# Patient Record
Sex: FEMALE | Race: WHITE | NOT HISPANIC OR LATINO | Employment: OTHER | URBAN - METROPOLITAN AREA
[De-identification: names, ages, dates, MRNs, and addresses within clinical notes are randomized per-mention and may not be internally consistent; named-entity substitution may affect disease eponyms.]

---

## 2018-09-26 ENCOUNTER — TRANSCRIBE ORDERS (OUTPATIENT)
Dept: PHYSICAL THERAPY | Facility: CLINIC | Age: 73
End: 2018-09-26

## 2018-09-26 ENCOUNTER — EVALUATION (OUTPATIENT)
Dept: PHYSICAL THERAPY | Facility: CLINIC | Age: 73
End: 2018-09-26
Payer: MEDICARE

## 2018-09-26 DIAGNOSIS — R26.9 GAIT DISTURBANCE: Primary | ICD-10-CM

## 2018-09-26 DIAGNOSIS — M47.812 SPONDYLOSIS OF CERVICAL REGION WITHOUT MYELOPATHY OR RADICULOPATHY: ICD-10-CM

## 2018-09-26 PROCEDURE — 97161 PT EVAL LOW COMPLEX 20 MIN: CPT

## 2018-09-26 PROCEDURE — G8985 CARRY GOAL STATUS: HCPCS

## 2018-09-26 PROCEDURE — 97110 THERAPEUTIC EXERCISES: CPT

## 2018-09-26 PROCEDURE — G8984 CARRY CURRENT STATUS: HCPCS

## 2018-09-26 NOTE — LETTER
2018    Angel Connolly PA-C  [facility] [address]    Patient: Nettie Conti   YOB: 1945   Date of Visit: 2018     Encounter Diagnosis     ICD-10-CM    1  Gait disturbance R26 9    2  Spondylosis of cervical region without myelopathy or radiculopathy M47 812        Dear Dr Jeramy Murillo:    Please review the attached Plan of Care from [facility]'s recent visit  Please verify that you agree therapy should continue by signing the attached document and sending it back to our office  If you have any questions or concerns, please don't hesitate to call  Sincerely,    Jose Alfredo Blount, PT      Referring Provider:      I certify that I have read the below Plan of Care and certify the need for these services furnished under this plan of treatment while under my care  Angel Connolly PA-C  [address] [place] [address]  VIA Facsimile: [phone]          PT Evaluation     Today's date: 2018  Patient name: Nettie Conti  : 1945  MRN: [id number]  Referring provider: Flor Joshi PA-C  Dx:   Encounter Diagnosis     ICD-10-CM    1  Gait disturbance R26 9    2  Spondylosis of cervical region without myelopathy or radiculopathy M47 812                   Assessment  Impairments: abnormal gait, abnormal or restricted ROM, impaired balance, impaired physical strength, lacks appropriate home exercise program, pain with function and poor posture     Assessment details: Nettie Conti is a 72 y o  female presents with cervical spondylosis, no myelopathy, and gait/balance dysfunction  Suspect cervicogenic HA as source of pain, gait issues  Responded well to tx today, feeling improved to end tx with cervical retraction, manual traction bias   Nettie Conti has the above listed impairments and will benefit from skilled PT to improve deficits to return to prior level of function  Shawn Prieto was educated on eval findings and plan for management, cervical anatomy, need for erect posture, self SOR in H/L with towel roll  HEP initiated  Estefania Letters would benefit from skilled services to improve ROM, strength, flexibility, and function, and to decrease pain  Understanding of Dx/Px/POC: good   Prognosis: good    Goals  2 wks  - No pain > 2/10  - Increase strength 1/3 grade  - Increase cervical ROM at least 10 deg where applicable  - 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%    4-6 wks  - Pain 0/10  - Strength 5/5  - Cervical ROM WFL and painfree  - Independent with HEP for self management  - Functional Status Measure at least 58  - 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, driving  - Normal gait for community distances  - Single leg balance at least 10 sec B  Plan  Patient would benefit from: skilled physical therapy  Planned modality interventions: traction and thermotherapy: hydrocollator packs  Planned therapy interventions: joint mobilization, manual therapy, neuromuscular re-education, patient education, postural training, strengthening, stretching, therapeutic exercise, home exercise program, gait training, flexibility and balance  Frequency: 2x week  Duration in visits: 8  Duration in weeks: 4  Treatment plan discussed with: patient        Subjective Evaluation    History of Present Illness  Mechanism of injury: Pt reporting 2 yr h/o "when I turn my head left and right, it sounds like bubble wrap, it goes up the right side and into my ear  It's all coming off my neck "   Notes "my legs are weak and my knees feel like rubber balls "  Denies UE of LE symptoms  Reports paresthesias B upper traps  Reports "every once in a while, I almost stagger, you don't know when it's coming on "  Reports tinnitus B ears x 2 years  Pt feels that she staggers with gait "when the neck is tight, you feel loopy "  Reports dizziness with prolonged standing  Occasional STALLWORTH R suboccipital to R ear  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, "it's very physical", cleaning stalls, throwing hay, "I feel that physically I can't do that anymore  I sit in a chair to be safe  Because of this situation "  Notes decreased R cervical rotation, affects driving  Last did horse chores 2 weeks ago  Pain  Current pain ratin  At best pain ratin  At worst pain rating: 3  Location: R sided HA, tightness cervical spine  Progression: no change    Social Support  Steps to enter house: yes  5  Stairs in house: yes   14  Lives in: multiple-level home  Lives with: alone    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)  Hand dominance: right      Diagnostic Tests  MRI studies: abnormal (Impression see chart  No evidence central spinal cord compression  Multi-level foraminal stenosis)  Treatments  Previous treatment: chiropractic (Chrio with manip 2 5 yrs ago, with cavitation, "that's exactly where it is, it went away but then it came back"  )  Patient Goals  Patient goals for therapy: decreased pain, improved balance, increased motion, increased strength, independence with ADLs/IADLs and return to sport/leisure activities  Patient goal: "Get the ringing out of my ears  Get back to normal "        Objective     Gait: no AD, decreased heel strike and toe off B, steady  TU 70 sec, no AD  OH squat: increased trunk flexion at hips, fair balance  Single leg balance: R unable, L 3 sec  Posture: decreased cervical lordosis  Cervicothoracic kyphosis  Increased thoracic kyphosis and lumbar lordosis  Forward head and shoulders  OH reach: R 146 deg, L 142 deg  Mild IAD scapula B  Asymptomatic prior to testing, Cervical ROM: flexion 60 deg, no effect (NE)  Ext 49 deg, pt reporting crepitus cervical spine, R cervical tightness to R ear  SBR 35 deg, L UT stretch  SBL 29 deg, R cervical "stretchy pain"    R rotation 45 deg, upper L cervical pain  L rotation 72 deg, NE  Hooklying and short sitting cervical retraction with upper cervical tightness  Repeated retracted relieved this tightness  Shoulder A/PROM:  R: flexion 171 deg,  deg, ER 79 deg, IR 31 deg  L: flexion 150 deg,  deg, ER 81 deg, IR 57 deg  MMT: mid trap: R 4/5, L 4-/5  Lower trap: R 4-/5, L 3+/5  Joint mobilizations: B UPA C1, C5 tender  Otherwise, B UPA, central PA C1-C7, T1-T3 with no effect  T1-T3 hypomobile to PA glide  Special tests: alar and transverse ligament testing, vertebral artery testing (-)  Manual traction, SOR pain free  Flowsheet Rows      Most Recent Value   PT/OT G-Codes   Current Score  46   Projected Score  58   FOTO information reviewed  Yes   Assessment Type  Evaluation   G code set  Carrying, Moving & Handling Objects   Carrying, Moving and Handling Objects Current Status ()  CK   Carrying, Moving and Handling Objects Goal Status ()  CK          Precautions: TIA, anxiety, PSH L ankle ORIF    Daily Treatment Diary       Manuals 9/26/ 18            Man txn 30/10" 3'            SOR  1'            C-retract mob             Man str R UT, LS             L U/L flexion mob C1-C7             Exercise Diary              Scap retract 10"x 10            Cervical retract short sit, H/L x10 ea              Core row              UBE             Doorway pec str low             Wall slide shldr flexion B             T ball squat             Single leg balance             High knees gait with CG-S             DNF in H/L             Self R UT str 20" x5                                                                                                                                                           Modalities             [facility] txn 30/10"             MH PRN

## 2018-09-26 NOTE — PROGRESS NOTES
PT Evaluation     Today's date: 2018  Patient name: Sandra Arroyo  : 1945  MRN: [id number]  Referring provider: Skye Luong PA-C  Dx:   Encounter Diagnosis     ICD-10-CM    1  Gait disturbance R26 9    2  Spondylosis of cervical region without myelopathy or radiculopathy M47 812                   Assessment  Impairments: abnormal gait, abnormal or restricted ROM, impaired balance, impaired physical strength, lacks appropriate home exercise program, pain with function and poor posture     Assessment details: Sandra Arroyo is a 72 y o  female presents with cervical spondylosis, no myelopathy, and gait/balance dysfunction  Suspect cervicogenic HA as source of pain, gait issues  Responded well to tx today, feeling improved to end tx with cervical retraction, manual traction bias  Sandra Arroyo has the above listed impairments and will benefit from skilled PT to improve deficits to return to prior level of function  Sandra Arroyo was educated on eval findings and plan for management, cervical anatomy, need for erect posture, self SOR in H/L with towel roll  HEP initiated   would benefit from skilled services to improve ROM, strength, flexibility, and function, and to decrease pain  Understanding of Dx/Px/POC: good   Prognosis: good    Goals  2 wks  - No pain > 2/10  - Increase strength 1/3 grade  - Increase cervical ROM at least 10 deg where applicable  - 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%    4-6 wks  - Pain 0/10  - Strength 5/5  - Cervical ROM WFL and painfree  - Independent with HEP for self management  - Functional Status Measure at least 58  - 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, driving  - Normal gait for community distances  - Single leg balance at least 10 sec B        Plan  Patient would benefit from: skilled physical therapy  Planned modality interventions: traction and thermotherapy: hydrocollator packs  Planned therapy interventions: joint mobilization, manual therapy, neuromuscular re-education, patient education, postural training, strengthening, stretching, therapeutic exercise, home exercise program, gait training, flexibility and balance  Frequency: 2x week  Duration in visits: 8  Duration in weeks: 4  Treatment plan discussed with: patient        Subjective Evaluation    History of Present Illness  Mechanism of injury: Pt reporting 2 yr h/o "when I turn my head left and right, it sounds like bubble wrap, it goes up the right side and into my ear  It's all coming off my neck "   Notes "my legs are weak and my knees feel like rubber balls "  Denies UE of LE symptoms  Reports paresthesias B upper traps  Reports "every once in a while, I almost stagger, you don't know when it's coming on "  Reports tinnitus B ears x 2 years  Pt feels that she staggers with gait "when the neck is tight, you feel loopy "  Reports dizziness with prolonged standing  Occasional STALLWORTH R suboccipital to R ear  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, "it's very physical", cleaning stalls, throwing hay, "I feel that physically I can't do that anymore  I sit in a chair to be safe  Because of this situation "  Notes decreased R cervical rotation, affects driving  Last did horse chores 2 weeks ago  Pain  Current pain ratin  At best pain ratin  At worst pain rating: 3  Location: R sided HA, tightness cervical spine  Progression: no change    Social Support  Steps to enter house: yes  5  Stairs in house: yes   14  Lives in: multiple-level home  Lives with: alone    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)  Hand dominance: right      Diagnostic Tests  MRI studies: abnormal (Impression see chart  No evidence central spinal cord compression    Multi-level foraminal stenosis)  Treatments  Previous treatment: chiropractic (Chrio with manip 2 5 yrs ago, with cavitation, "that's exactly where it is, it went away but then it came back"  )  Patient Goals  Patient goals for therapy: decreased pain, improved balance, increased motion, increased strength, independence with ADLs/IADLs and return to sport/leisure activities  Patient goal: "Get the ringing out of my ears  Get back to normal "        Objective     Gait: no AD, decreased heel strike and toe off B, steady  TU 70 sec, no AD  OH squat: increased trunk flexion at hips, fair balance  Single leg balance: R unable, L 3 sec  Posture: decreased cervical lordosis  Cervicothoracic kyphosis  Increased thoracic kyphosis and lumbar lordosis  Forward head and shoulders  OH reach: R 146 deg, L 142 deg  Mild IAD scapula B  Asymptomatic prior to testing, Cervical ROM: flexion 60 deg, no effect (NE)  Ext 49 deg, pt reporting crepitus cervical spine, R cervical tightness to R ear  SBR 35 deg, L UT stretch  SBL 29 deg, R cervical "stretchy pain"  R rotation 45 deg, upper L cervical pain  L rotation 72 deg, NE  Hooklying and short sitting cervical retraction with upper cervical tightness  Repeated retracted relieved this tightness  Shoulder A/PROM:  R: flexion 171 deg,  deg, ER 79 deg, IR 31 deg  L: flexion 150 deg,  deg, ER 81 deg, IR 57 deg  MMT: mid trap: R 4/5, L 4-/5  Lower trap: R 4-/5, L 3+/5  Joint mobilizations: B UPA C1, C5 tender  Otherwise, B UPA, central PA C1-C7, T1-T3 with no effect  T1-T3 hypomobile to PA glide  Special tests: alar and transverse ligament testing, vertebral artery testing (-)  Manual traction, SOR pain free      Flowsheet Rows      Most Recent Value   PT/OT G-Codes   Current Score  46   Projected Score  58   FOTO information reviewed  Yes   Assessment Type  Evaluation   G code set  Carrying, Moving & Handling Objects   Carrying, Moving and Handling Objects Current Status ()  CK   Carrying, Moving and Handling Objects Goal Status ()  CK          Precautions: TIA, anxiety, PSH L ankle ORIF    Daily Treatment Diary       Manuals 9/26/ 18            Man txn 30/10" 3'            SOR  1'            C-retract mob             Man str R UT, LS             L U/L flexion mob C1-C7             Exercise Diary              Scap retract 10"x 10            Cervical retract short sit, H/L x10 ea              Core row              UBE             Doorway pec str low             Wall slide shldr flexion B             T ball squat             Single leg balance             High knees gait with CG-S             DNF in H/L             Self R UT str 20" x5                                                                                                                                                           Modalities             [facility] txn 30/10"              PRN

## 2018-09-28 ENCOUNTER — OFFICE VISIT (OUTPATIENT)
Dept: PHYSICAL THERAPY | Facility: CLINIC | Age: 73
End: 2018-09-28
Payer: MEDICARE

## 2018-09-28 DIAGNOSIS — R26.9 GAIT DISTURBANCE: Primary | ICD-10-CM

## 2018-09-28 DIAGNOSIS — M47.812 SPONDYLOSIS OF CERVICAL REGION WITHOUT MYELOPATHY OR RADICULOPATHY: ICD-10-CM

## 2018-09-28 PROCEDURE — 97110 THERAPEUTIC EXERCISES: CPT

## 2018-09-28 PROCEDURE — 97140 MANUAL THERAPY 1/> REGIONS: CPT

## 2018-09-28 NOTE — PROGRESS NOTES
Daily Note     Today's date: 2018  Patient name: Ben Esposito  : 1945  MRN: [id number]  Referring provider: Jorgito Cardoso PA-C  Dx:   Encounter Diagnosis     ICD-10-CM    1  Gait disturbance R26 9    2  Spondylosis of cervical region without myelopathy or radiculopathy M47 812                   Subjective: notes soreness interscapular, muscular from scapular retractions, no neck pain or HA  "Better  The ringing in my left ear went away but then it came back "      Objective: See treatment diary below    Precautions: TIA, anxiety, PSH L ankle ORIF    Daily Treatment Diary       Manuals            Man txn 30/10" 3' 5'           SOR  1' 3'           C-retract mob  G3           Man str R UT, LS  R UT 20" x5           L U/L flexion mob C1-C7  G3           Exercise Diary              Scap retract 10"x 10            Cervical retract short sit, H/L x10 ea  x10 ea           Core row              UBE             Doorway pec str low  20" x5           Wall slide shldr flexion B  10" x15 B           T ball squat             Single leg balance             High knees gait with CG-S             DNF in H/L             Self R UT str 20" x5                                                                                                                                                           Modalities             [facility] txn 30/10"             MH PRN  10'                         Assessment: Tolerated treatment well  Patient would benefit from continued PT      Plan: Continue per plan of care

## 2018-10-01 ENCOUNTER — APPOINTMENT (OUTPATIENT)
Dept: PHYSICAL THERAPY | Facility: CLINIC | Age: 73
End: 2018-10-01
Payer: MEDICARE

## 2018-10-04 ENCOUNTER — OFFICE VISIT (OUTPATIENT)
Dept: PHYSICAL THERAPY | Facility: CLINIC | Age: 73
End: 2018-10-04
Payer: MEDICARE

## 2018-10-04 DIAGNOSIS — M47.812 SPONDYLOSIS OF CERVICAL REGION WITHOUT MYELOPATHY OR RADICULOPATHY: ICD-10-CM

## 2018-10-04 DIAGNOSIS — R26.9 GAIT DISTURBANCE: Primary | ICD-10-CM

## 2018-10-04 PROCEDURE — 97012 MECHANICAL TRACTION THERAPY: CPT

## 2018-10-04 PROCEDURE — 97140 MANUAL THERAPY 1/> REGIONS: CPT

## 2018-10-04 PROCEDURE — 97110 THERAPEUTIC EXERCISES: CPT

## 2018-10-04 NOTE — PROGRESS NOTES
Daily Note     Today's date: 10/4/2018  Patient name: Fercho Luo  : 1945  MRN: [id number]  Referring provider: Winnie Fofana PA-C  Dx:   Encounter Diagnosis     ICD-10-CM    1  Gait disturbance R26 9    2  Spondylosis of cervical region without myelopathy or radiculopathy M47 812                   Subjective: neck pain 1/10, "just annoying "  Cx 10/1 visit due to illness  Notes can have tinnitus R, L, or B ears at times, intermittent; none at present  Objective: See treatment diary below    Precautions: TIA, anxiety, PSH L ankle ORIF    Daily Treatment Diary       Manuals 9/26/ 18 9/28 10/4          Man txn 30/10" 3' 5'           SOR  1' 3' 3'          C-retract mob  G3 G3          Man str R UT, LS  R UT 20" x5 x5          L U/L flexion mob C1-C7  G3 x5          Exercise Diary              Scap retract 10"x 10            Cervical retract short sit, H/L x10 ea  x10 ea x10  sit          Core row    blk 2x10          UBE   5'          Doorway pec str low  20" x5 x5          Wall slide shldr flexion B  10" x15 B x20          T ball squat   2x10          Single leg balance   nv          High knees gait with CG-S             DNF in H/L   nv          Self R UT str 20" x5  x5                                                                                                                                                         Modalities             [facility] txn 30/10"   13# 10'          MH PRN  10'                     Assessment: Tolerated treatment well  Patient would benefit from continued PT  Therex, HEP progressed  Mech traction initiated and tolerated well  Plan: Continue per plan of care

## 2018-10-08 ENCOUNTER — APPOINTMENT (OUTPATIENT)
Dept: PHYSICAL THERAPY | Facility: CLINIC | Age: 73
End: 2018-10-08
Payer: MEDICARE

## 2018-10-11 ENCOUNTER — APPOINTMENT (OUTPATIENT)
Dept: PHYSICAL THERAPY | Facility: CLINIC | Age: 73
End: 2018-10-11
Payer: MEDICARE

## 2018-10-15 ENCOUNTER — OFFICE VISIT (OUTPATIENT)
Dept: PHYSICAL THERAPY | Facility: CLINIC | Age: 73
End: 2018-10-15
Payer: MEDICARE

## 2018-10-15 DIAGNOSIS — R26.9 GAIT DISTURBANCE: Primary | ICD-10-CM

## 2018-10-15 DIAGNOSIS — M47.812 SPONDYLOSIS OF CERVICAL REGION WITHOUT MYELOPATHY OR RADICULOPATHY: ICD-10-CM

## 2018-10-15 PROCEDURE — 97140 MANUAL THERAPY 1/> REGIONS: CPT

## 2018-10-15 PROCEDURE — 97110 THERAPEUTIC EXERCISES: CPT

## 2018-10-15 NOTE — PROGRESS NOTES
Daily Note     Today's date: 10/15/2018  Patient name: Amy Concepcion  : 1945  MRN: [id number]  Referring provider: Korina Bah PA-C  Dx:   Encounter Diagnosis     ICD-10-CM    1  Gait disturbance R26 9    2  Spondylosis of cervical region without myelopathy or radiculopathy M47 812                   Subjective: Patient reports still having dizziness and ringing in ears  Reports feels a nodule on her right neck behind the ear that she has told multiple doctors about  Reports she believes that is where her symptoms are coming from  Patient reports missed treatments last week because of illness  Reports pain upon arrival a 4/10 right side occiput constant  Post treatment 2/10  Objective: See treatment diary below    Precautions: TIA, anxiety, PSH L ankle ORIF    Daily Treatment Diary       Manuals 9/26/ 18 9/28 10/4 10/15         Man txn 30/10" 3' 5'  5'         SOR  1' 3' 3' 3'         C-retract mob  G3 G3          Man str R UT, LS  R UT 20" x5 x5 x5         L U/L flexion mob C1-C7  G3 x5          Exercise Diary              Scap retract 10"x 10            Cervical retract short sit, H/L x10 ea  x10 ea x10  sit x10         Core row    blk 2x10 blk  3x10           UBE   5' 5'         Doorway pec str low  20" x5 x5 x5         Wall slide shldr flexion B  10" x15 B x20 x20         T ball squat   2x10 2x10         Single leg balance   nv -         High knees gait with CG-S             DNF in H/L   nv -         Self R UT str 20" x5  x5 x5                                                                                                                                                        Modalities             [facility] txn 30/10"   13# 10' 17#  10'         MH PRN  10'             Assessment: Tolerated treatment well  Patient subjectively had good reduction in discomfort  Patient appears to be responding from treatment and consistant atttendance to PT should increase relief and improve function  Recommended patient to seek MD for subjective report and she appeared to understand  Plan: Continue per plan of care

## 2018-10-18 ENCOUNTER — OFFICE VISIT (OUTPATIENT)
Dept: PHYSICAL THERAPY | Facility: CLINIC | Age: 73
End: 2018-10-18
Payer: MEDICARE

## 2018-10-18 DIAGNOSIS — R26.9 GAIT DISTURBANCE: Primary | ICD-10-CM

## 2018-10-18 DIAGNOSIS — M47.812 SPONDYLOSIS OF CERVICAL REGION WITHOUT MYELOPATHY OR RADICULOPATHY: ICD-10-CM

## 2018-10-18 PROCEDURE — G8985 CARRY GOAL STATUS: HCPCS

## 2018-10-18 PROCEDURE — G8984 CARRY CURRENT STATUS: HCPCS

## 2018-10-18 PROCEDURE — 97012 MECHANICAL TRACTION THERAPY: CPT

## 2018-10-18 NOTE — PROGRESS NOTES
Daily Note     Today's date: 10/18/2018  Patient name: Nurys Macedo  : 1945  MRN: [id number]  Referring provider: Maren French PA-C  Dx:   Encounter Diagnosis     ICD-10-CM    1  Gait disturbance R26 9    2  Spondylosis of cervical region without myelopathy or radiculopathy M47 812                   Subjective: "Pretty good," neck pain/HA 0/10   "I think I'm getting a cold "      Objective: See treatment diary below    Precautions: TIA, anxiety, PSH L ankle ORIF    Daily Treatment Diary       Manuals 9/26/ 18 9/28 10/4 10/15 10/18        Man txn 30/10" 3' 5'  5'         SOR  1' 3' 3' 3' 3'        C-retract mob  G3 G3          Man str R UT, LS  R UT 20" x5 x5 x5 x5        L U/L flexion mob C1-C7  G3 G3  G3        Exercise Diary              Scap retract 10"x 10            Cervical retract short sit, H/L x10 ea  x10 ea x10  sit x10 x10        Core row    blk 2x10 blk  3x10   3x10        UBE   5' 5' 7'        Doorway pec str low  20" x5 x5 x5 x5        Wall slide shldr flexion B  10" x15 B x20 x20 x25        T ball squat   2x10 2x10 3x10        Single leg balance   nv -         High knees gait with CG-S             DNF in H/L   nv -         Self R UT str 20" x5  x5 x5 x5                                                                                                                                                       Modalities             [facility] txn 30/10"   13# 10' 17#  10' 10' 17#         PRN  10'                   Assessment: Tolerated treatment well  Patient would benefit from continued PT   Therex, neuro re-ed progressed  Plan: Continue per plan of care

## 2018-10-22 ENCOUNTER — APPOINTMENT (OUTPATIENT)
Dept: PHYSICAL THERAPY | Facility: CLINIC | Age: 73
End: 2018-10-22
Payer: MEDICARE

## 2018-10-25 ENCOUNTER — APPOINTMENT (OUTPATIENT)
Dept: PHYSICAL THERAPY | Facility: CLINIC | Age: 73
End: 2018-10-25
Payer: MEDICARE

## 2018-11-20 ENCOUNTER — OFFICE VISIT (OUTPATIENT)
Dept: FAMILY MEDICINE CLINIC | Facility: CLINIC | Age: 73
End: 2018-11-20
Payer: MEDICARE

## 2018-11-20 VITALS
WEIGHT: 146 LBS | RESPIRATION RATE: 16 BRPM | SYSTOLIC BLOOD PRESSURE: 120 MMHG | HEIGHT: 66 IN | HEART RATE: 80 BPM | BODY MASS INDEX: 23.46 KG/M2 | DIASTOLIC BLOOD PRESSURE: 80 MMHG | OXYGEN SATURATION: 97 %

## 2018-11-20 DIAGNOSIS — G89.29 CHRONIC ABDOMINAL PAIN: ICD-10-CM

## 2018-11-20 DIAGNOSIS — C53.9 SQUAMOUS CELL CARCINOMA OF CERVIX (HCC): ICD-10-CM

## 2018-11-20 DIAGNOSIS — E78.5 HYPERLIPIDEMIA, UNSPECIFIED HYPERLIPIDEMIA TYPE: ICD-10-CM

## 2018-11-20 DIAGNOSIS — R10.9 CHRONIC ABDOMINAL PAIN: ICD-10-CM

## 2018-11-20 DIAGNOSIS — Z72.0 TOBACCO ABUSE: ICD-10-CM

## 2018-11-20 DIAGNOSIS — Z76.89 ENCOUNTER TO ESTABLISH CARE WITH NEW DOCTOR: Primary | ICD-10-CM

## 2018-11-20 DIAGNOSIS — R03.0 WHITE COAT SYNDROME WITH HIGH BLOOD PRESSURE BUT WITHOUT HYPERTENSION: ICD-10-CM

## 2018-11-20 PROBLEM — N18.30 CKD (CHRONIC KIDNEY DISEASE) STAGE 3, GFR 30-59 ML/MIN (HCC): Status: RESOLVED | Noted: 2017-06-17 | Resolved: 2018-11-20

## 2018-11-20 PROBLEM — R87.611 PAPANICOLAOU SMEAR OF CERVIX WITH ATYPICAL SQUAMOUS CELLS CANNOT EXCLUDE HIGH GRADE SQUAMOUS INTRAEPITHELIAL LESION (ASC-H): Status: RESOLVED | Noted: 2018-11-20 | Resolved: 2018-11-20

## 2018-11-20 PROBLEM — N18.30 CKD (CHRONIC KIDNEY DISEASE) STAGE 3, GFR 30-59 ML/MIN (HCC): Status: ACTIVE | Noted: 2017-06-17

## 2018-11-20 PROBLEM — R87.611 PAPANICOLAOU SMEAR OF CERVIX WITH ATYPICAL SQUAMOUS CELLS CANNOT EXCLUDE HIGH GRADE SQUAMOUS INTRAEPITHELIAL LESION (ASC-H): Status: ACTIVE | Noted: 2018-11-20

## 2018-11-20 PROBLEM — IMO0001 AORTIC SCLEROSIS: Status: ACTIVE | Noted: 2017-06-17

## 2018-11-20 PROBLEM — M48.02 CERVICAL STENOSIS OF SPINE: Status: ACTIVE | Noted: 2018-09-12

## 2018-11-20 PROCEDURE — 99204 OFFICE O/P NEW MOD 45 MIN: CPT | Performed by: INTERNAL MEDICINE

## 2018-11-20 RX ORDER — DICYCLOMINE HYDROCHLORIDE 10 MG/1
10 CAPSULE ORAL 2 TIMES DAILY PRN
Qty: 30 CAPSULE | Refills: 2 | Status: SHIPPED | OUTPATIENT
Start: 2018-11-20 | End: 2019-03-17

## 2018-11-20 RX ORDER — BUPROPION HYDROCHLORIDE 150 MG/1
150 TABLET, EXTENDED RELEASE ORAL 2 TIMES DAILY
Qty: 60 TABLET | Refills: 2 | Status: SHIPPED | OUTPATIENT
Start: 2018-11-20 | End: 2019-03-17

## 2018-12-11 ENCOUNTER — OFFICE VISIT (OUTPATIENT)
Dept: GYNECOLOGIC ONCOLOGY | Facility: CLINIC | Age: 73
End: 2018-12-11
Payer: MEDICARE

## 2018-12-11 ENCOUNTER — APPOINTMENT (OUTPATIENT)
Dept: LAB | Facility: MEDICAL CENTER | Age: 73
End: 2018-12-11
Payer: MEDICARE

## 2018-12-11 VITALS
HEIGHT: 66 IN | DIASTOLIC BLOOD PRESSURE: 92 MMHG | RESPIRATION RATE: 16 BRPM | BODY MASS INDEX: 23.78 KG/M2 | WEIGHT: 148 LBS | SYSTOLIC BLOOD PRESSURE: 170 MMHG | HEART RATE: 96 BPM

## 2018-12-11 DIAGNOSIS — C53.9 SQUAMOUS CELL CARCINOMA OF CERVIX (HCC): ICD-10-CM

## 2018-12-11 DIAGNOSIS — R10.2 PELVIC PAIN: ICD-10-CM

## 2018-12-11 DIAGNOSIS — Z85.41 HISTORY OF MALIGNANT NEOPLASM OF CERVIX UTERI: ICD-10-CM

## 2018-12-11 DIAGNOSIS — C53.9 SQUAMOUS CELL CARCINOMA OF CERVIX (HCC): Primary | ICD-10-CM

## 2018-12-11 LAB
BUN SERPL-MCNC: 11 MG/DL (ref 5–25)
CHOLEST SERPL-MCNC: 255 MG/DL (ref 50–200)
CREAT SERPL-MCNC: 1 MG/DL (ref 0.6–1.3)
GFR SERPL CREATININE-BSD FRML MDRD: 57 ML/MIN/1.73SQ M
HDLC SERPL-MCNC: 58 MG/DL (ref 40–60)
LDLC SERPL CALC-MCNC: 175 MG/DL (ref 0–100)
NONHDLC SERPL-MCNC: 197 MG/DL
TRIGL SERPL-MCNC: 111 MG/DL

## 2018-12-11 PROCEDURE — 84520 ASSAY OF UREA NITROGEN: CPT

## 2018-12-11 PROCEDURE — 36415 COLL VENOUS BLD VENIPUNCTURE: CPT

## 2018-12-11 PROCEDURE — 82565 ASSAY OF CREATININE: CPT

## 2018-12-11 PROCEDURE — 80061 LIPID PANEL: CPT | Performed by: INTERNAL MEDICINE

## 2018-12-11 PROCEDURE — 99213 OFFICE O/P EST LOW 20 MIN: CPT | Performed by: OBSTETRICS & GYNECOLOGY

## 2018-12-11 NOTE — PROGRESS NOTES
Assessment/Plan:    Problem List Items Addressed This Visit        Other    History of malignant neoplasm of cervix uteri - Primary     Patient has been lost to follow-up for several years  She now reports history of intermittent suprapubic pain  Will obtain CT scan of the abdomen and pelvis to further evaluate  She will contact me if she has any new symptoms  Otherwise, I will review results of CT scan and contact her back if any abnormalities are identified  In the absence of such, I plan to see her back in 6 months for routine surveillance  Pelvic pain     Given history of cancer, we will obtain CT scan of the abdomen and pelvis to further evaluate  Relevant Orders    CT abdomen pelvis w contrast    BUN    Creatinine, serum            CHIEF COMPLAINT    Remote history of cervical cancer, has been lost to follow-up  Now with suprapubic pain  Patient ID: Jose J Torres is a 72 y o  female  HPI  Patient with history of stage I B2 cervical cancer treated with curative intent chemo radiotherapy and August 2014  She was last seen in July of 2016 and since then has been lost to follow-up  Recently consulted with primary gynecologist and reported suprapubic pressure and pain  She was referred for evaluation and treatment  Denies vaginal bleeding, drainage or discharge  She has a known history of a large rectal polyp and states her last colonoscopy was last year  She was counseled again by me about importance of follow-up with colorectal specialists  The following portions of the patient's history were reviewed and updated as appropriate: allergies, current medications, past family history, past medical history, past social history, past surgical history and problem list     Review of Systems  As above  Otherwise 12 point review of systems is unremarkable    Current Outpatient Prescriptions   Medication Sig Dispense Refill    ALPRAZolam (XANAX) 0 5 mg tablet Take 0 5 mg by mouth daily at bedtime as needed for anxiety   buPROPion (ZYBAN) 150 MG 12 hr tablet Take 1 tablet (150 mg total) by mouth 2 (two) times a day 60 tablet 2    dicyclomine (BENTYL) 10 mg capsule Take 1 capsule (10 mg total) by mouth 2 (two) times a day as needed (abdominal pain) 30 capsule 2     No current facility-administered medications for this visit  Objective:    Blood pressure 170/92, pulse 96, resp  rate 16, height 5' 6" (1 676 m), weight 67 1 kg (148 lb)  Body mass index is 23 89 kg/m²  Body surface area is 1 76 meters squared  Physical Exam   Constitutional: She is oriented to person, place, and time  She appears well-developed and well-nourished  HENT:   Head: Normocephalic and atraumatic  Neck: Normal range of motion  Neck supple  No thyromegaly present  Cardiovascular: Normal rate, regular rhythm and normal heart sounds  No murmur heard  Pulmonary/Chest: Effort normal and breath sounds normal  No respiratory distress  She has no rales  Abdominal: Soft  She exhibits no distension and no mass  There is no rebound  Genitourinary:   Genitourinary Comments: The external female genitalia is normal  The bartholin's, uretheral and skenes glands are normal  The urethral meatus is normal (midline with no lesions)  Anus without fissure or lesion  Vagina is markedly atrophic and agglutinated proximally consistent with history of prior curative intent radiotherapy  The cervix is status post radiotherapy not identifiable, agglutinated with vaginal fornices  No tumor  No blood, no induration  Bimanual exam rectovaginal exam demonstrate no evidence of parametrial induration or palpable tumor  Uterus appears small  Bladder is without fullness, mass or tenderness  Musculoskeletal: Normal range of motion  She exhibits no edema  Lymphadenopathy:     She has no cervical adenopathy  Neurological: She is alert and oriented to person, place, and time     Skin: Skin is warm and dry  No rash noted  Psychiatric: She has a normal mood and affect  Her behavior is normal    Vitals reviewed      Stanley Covington MD, [address]  12/11/2018  1:34 PM

## 2018-12-11 NOTE — ASSESSMENT & PLAN NOTE
Patient has been lost to follow-up for several years  She now reports history of intermittent suprapubic pain  Will obtain CT scan of the abdomen and pelvis to further evaluate  She will contact me if she has any new symptoms  Otherwise, I will review results of CT scan and contact her back if any abnormalities are identified  In the absence of such, I plan to see her back in 6 months for routine surveillance

## 2018-12-11 NOTE — LETTER
December 11, 2018     Izabelgenia Setting, DO  [address]    Patient: Jozef Pinedo   YOB: 1945   Date of Visit: 12/11/2018       Dear Dr Renee Lozano: Thank you for referring Jozef Pinedo to me for evaluation  Below are my notes for this consultation  If you have questions, please do not hesitate to call me  I look forward to following your patient along with you  Sincerely,        Snow Colvin MD        CC: DO Snow Nicholson MD  12/11/2018  1:34 PM  Sign at close encounter  Assessment/Plan:    Problem List Items Addressed This Visit        Other    History of malignant neoplasm of cervix uteri - Primary     Patient has been lost to follow-up for several years  She now reports history of intermittent suprapubic pain  Will obtain CT scan of the abdomen and pelvis to further evaluate  She will contact me if she has any new symptoms  Otherwise, I will review results of CT scan and contact her back if any abnormalities are identified  In the absence of such, I plan to see her back in 6 months for routine surveillance  Pelvic pain     Given history of cancer, we will obtain CT scan of the abdomen and pelvis to further evaluate  Relevant Orders    CT abdomen pelvis w contrast    BUN    Creatinine, serum            CHIEF COMPLAINT    Remote history of cervical cancer, has been lost to follow-up  Now with suprapubic pain  Patient ID: Jozef Pinedo is a 72 y o  female  HPI  Patient with history of stage I B2 cervical cancer treated with curative intent chemo radiotherapy and August 2014  She was last seen in July of 2016 and since then has been lost to follow-up  Recently consulted with primary gynecologist and reported suprapubic pressure and pain  She was referred for evaluation and treatment  Denies vaginal bleeding, drainage or discharge    She has a known history of a large rectal polyp and states her last colonoscopy was last year  She was counseled again by me about importance of follow-up with colorectal specialists  The following portions of the patient's history were reviewed and updated as appropriate: allergies, current medications, past family history, past medical history, past social history, past surgical history and problem list     Review of Systems  As above  Otherwise 12 point review of systems is unremarkable  Current Outpatient Prescriptions   Medication Sig Dispense Refill    ALPRAZolam (XANAX) 0 5 mg tablet Take 0 5 mg by mouth daily at bedtime as needed for anxiety   buPROPion (ZYBAN) 150 MG 12 hr tablet Take 1 tablet (150 mg total) by mouth 2 (two) times a day 60 tablet 2    dicyclomine (BENTYL) 10 mg capsule Take 1 capsule (10 mg total) by mouth 2 (two) times a day as needed (abdominal pain) 30 capsule 2     No current facility-administered medications for this visit  Objective:    Blood pressure 170/92, pulse 96, resp  rate 16, height 5' 6" (1 676 m), weight 67 1 kg (148 lb)  Body mass index is 23 89 kg/m²  Body surface area is 1 76 meters squared  Physical Exam   Constitutional: She is oriented to person, place, and time  She appears well-developed and well-nourished  HENT:   Head: Normocephalic and atraumatic  Neck: Normal range of motion  Neck supple  No thyromegaly present  Cardiovascular: Normal rate, regular rhythm and normal heart sounds  No murmur heard  Pulmonary/Chest: Effort normal and breath sounds normal  No respiratory distress  She has no rales  Abdominal: Soft  She exhibits no distension and no mass  There is no rebound  Genitourinary:   Genitourinary Comments: The external female genitalia is normal  The bartholin's, uretheral and skenes glands are normal  The urethral meatus is normal (midline with no lesions)  Anus without fissure or lesion    Vagina is markedly atrophic and agglutinated proximally consistent with history of prior curative intent radiotherapy  The cervix is status post radiotherapy not identifiable, agglutinated with vaginal fornices  No tumor  No blood, no induration  Bimanual exam rectovaginal exam demonstrate no evidence of parametrial induration or palpable tumor  Uterus appears small  Bladder is without fullness, mass or tenderness  Musculoskeletal: Normal range of motion  She exhibits no edema  Lymphadenopathy:     She has no cervical adenopathy  Neurological: She is alert and oriented to person, place, and time  Skin: Skin is warm and dry  No rash noted  Psychiatric: She has a normal mood and affect  Her behavior is normal    Vitals reviewed      Kayla Pena MD, [address] [facility]  12/11/2018  1:34 PM

## 2018-12-19 ENCOUNTER — HOSPITAL ENCOUNTER (OUTPATIENT)
Dept: CT IMAGING | Facility: HOSPITAL | Age: 73
Discharge: HOME | End: 2018-12-19
Attending: OBSTETRICS & GYNECOLOGY
Payer: MEDICARE

## 2018-12-19 DIAGNOSIS — C53.9 SQUAMOUS CELL CARCINOMA OF CERVIX (HCC): ICD-10-CM

## 2018-12-19 DIAGNOSIS — R10.2 PELVIC PAIN: ICD-10-CM

## 2018-12-19 PROCEDURE — 74177 CT ABD & PELVIS W/CONTRAST: CPT

## 2018-12-19 RX ADMIN — IOHEXOL 100 ML: 350 INJECTION, SOLUTION INTRAVENOUS at 08:24

## 2019-01-03 DIAGNOSIS — R10.84 GENERALIZED ABDOMINAL PAIN: Primary | ICD-10-CM

## 2019-01-28 ENCOUNTER — OFFICE VISIT (OUTPATIENT)
Dept: URGENT CARE | Facility: CLINIC | Age: 74
End: 2019-01-28
Payer: MEDICARE

## 2019-01-28 VITALS
WEIGHT: 143 LBS | SYSTOLIC BLOOD PRESSURE: 156 MMHG | OXYGEN SATURATION: 96 % | RESPIRATION RATE: 18 BRPM | HEIGHT: 67 IN | DIASTOLIC BLOOD PRESSURE: 96 MMHG | HEART RATE: 76 BPM | BODY MASS INDEX: 22.44 KG/M2 | TEMPERATURE: 97.4 F

## 2019-01-28 DIAGNOSIS — J01.90 ACUTE SINUSITIS, RECURRENCE NOT SPECIFIED, UNSPECIFIED LOCATION: Primary | ICD-10-CM

## 2019-01-28 PROCEDURE — 99213 OFFICE O/P EST LOW 20 MIN: CPT | Performed by: PHYSICIAN ASSISTANT

## 2019-01-28 PROCEDURE — G0463 HOSPITAL OUTPT CLINIC VISIT: HCPCS | Performed by: PHYSICIAN ASSISTANT

## 2019-01-28 RX ORDER — AMOXICILLIN 500 MG/1
500 CAPSULE ORAL EVERY 8 HOURS SCHEDULED
Qty: 21 CAPSULE | Refills: 0 | Status: SHIPPED | OUTPATIENT
Start: 2019-01-28 | End: 2019-02-04

## 2019-01-28 NOTE — PROGRESS NOTES
[facility]        NAME: Miguel Dutton is a 72 y o  female  : 1945    MRN: [id number]  DATE: 2019  TIME: 12:10 PM    Assessment and Plan   Acute sinusitis, recurrence not specified, unspecified location [J01 90]  1  Acute sinusitis, recurrence not specified, unspecified location  amoxicillin (AMOXIL) 500 mg capsule     Patient Instructions     Take antibiotic as prescribed  Follow up with PCP in 3-5 days  Proceed to  ER if symptoms worsen  Chief Complaint     Chief Complaint   Patient presents with    Cold Like Symptoms     started 3 weeks ago  believes she has a sinus infection         History of Present Illness       Sinusitis   This is a new problem  Episode onset: 3 weeks ago  The problem has been gradually worsening since onset  The pain is moderate  Associated symptoms include congestion, coughing, sinus pressure, a sore throat and swollen glands  Pertinent negatives include no chills, diaphoresis, ear pain, headaches, hoarse voice, neck pain, shortness of breath or sneezing  Past treatments include oral decongestants and acetaminophen  Review of Systems   Review of Systems   Constitutional: Negative for activity change, appetite change, chills, diaphoresis, fatigue, fever and unexpected weight change  HENT: Positive for congestion, sinus pressure and sore throat  Negative for ear pain, hoarse voice and sneezing  Respiratory: Positive for cough and chest tightness  Negative for apnea, choking, shortness of breath, wheezing and stridor  Cardiovascular: Negative for chest pain, palpitations and leg swelling  Musculoskeletal: Negative for neck pain  Neurological: Negative for headaches  Current Medications       Current Outpatient Prescriptions:     ALPRAZolam (XANAX) 0 5 mg tablet, Take 0 5 mg by mouth daily at bedtime as needed for anxiety  , Disp: , Rfl:     amoxicillin (AMOXIL) 500 mg capsule, Take 1 capsule (500 mg total) by mouth every 8 (eight) hours for 7 days, Disp: 21 capsule, Rfl: 0    buPROPion (ZYBAN) 150 MG 12 hr tablet, Take 1 tablet (150 mg total) by mouth 2 (two) times a day (Patient not taking: Reported on 1/28/2019 ), Disp: 60 tablet, Rfl: 2    dicyclomine (BENTYL) 10 mg capsule, Take 1 capsule (10 mg total) by mouth 2 (two) times a day as needed (abdominal pain) (Patient not taking: Reported on 1/28/2019 ), Disp: 30 capsule, Rfl: 2    Current Allergies     Allergies as of 01/28/2019 - Reviewed 01/28/2019   Allergen Reaction Noted    Lisinopril  01/30/2017            The following portions of the patient's history were reviewed and updated as appropriate: allergies, current medications, past family history, past medical history, past social history, past surgical history and problem list      Past Medical History:   Diagnosis Date    Atypical squamous cells cannot exclude high grade squamous intraepithelial lesion on cytologic smear of cervix (ASC-H)     Last Assessed 5/13/2014    Ovarian cyst        Past Surgical History:   Procedure Laterality Date    ANKLE FRACTURE SURGERY      Last Assessed 07/26/2016    ANKLE SURGERY      CERVICAL BIOPSY  W/ LOOP ELECTRODE EXCISION      DIAGNOSTIC LAPAROSCOPY      DILATION AND CURETTAGE OF UTERUS      ESOPHAGOGASTRODUODENOSCOPY N/A 9/12/2016    Procedure: ESOPHAGOGASTRODUODENOSCOPY (EGD); Surgeon: Christine Russell MD;  Location: [facility]; Service:     ME COLONOSCOPY FLX DX W/COLLJ SPEC WHEN PFRMD N/A 9/12/2016    Procedure: Iain Corey;  Surgeon: Christine Russell MD;  Location: [facility]; Service: Colorectal    SALPINGOOPHORECTOMY Left     TONSILLECTOMY         Family History   Problem Relation Age of Onset    No Known Problems Mother     No Known Problems Family          Medications have been verified          Objective   /96   Pulse 76   Temp (!) 97 4 °F (36 3 °C) (Tympanic)   Resp 18   Ht 5' 6 5" (1 689 m)   Wt 64 9 kg (143 lb)   SpO2 96%   BMI 22 74 kg/m²        Physical Exam     Physical Exam   Constitutional: She appears well-developed  HENT:   Head: Normocephalic  Right Ear: External ear normal    Left Ear: External ear normal    Nose: Mucosal edema and rhinorrhea present  Right sinus exhibits maxillary sinus tenderness  Left sinus exhibits maxillary sinus tenderness  Mouth/Throat: Mucous membranes are normal  Posterior oropharyngeal erythema present  No oropharyngeal exudate or posterior oropharyngeal edema  Cardiovascular: Normal rate, regular rhythm, normal heart sounds and intact distal pulses  Exam reveals no gallop and no friction rub  No murmur heard  Pulmonary/Chest: Effort normal and breath sounds normal  No respiratory distress  She has no wheezes  She has no rales  Abdominal: Soft  Bowel sounds are normal  She exhibits no distension  There is no tenderness  There is no rebound and no guarding  Lymphadenopathy:     She has cervical adenopathy  Right cervical: Superficial cervical adenopathy present  Left cervical: Superficial cervical adenopathy present

## 2019-02-22 ENCOUNTER — OFFICE VISIT (OUTPATIENT)
Dept: URGENT CARE | Facility: CLINIC | Age: 74
End: 2019-02-22
Payer: MEDICARE

## 2019-02-22 VITALS
DIASTOLIC BLOOD PRESSURE: 110 MMHG | HEART RATE: 72 BPM | HEIGHT: 67 IN | WEIGHT: 148 LBS | OXYGEN SATURATION: 99 % | SYSTOLIC BLOOD PRESSURE: 182 MMHG | BODY MASS INDEX: 23.23 KG/M2 | TEMPERATURE: 97.2 F | RESPIRATION RATE: 20 BRPM

## 2019-02-22 DIAGNOSIS — J06.9 VIRAL UPPER RESPIRATORY TRACT INFECTION: Primary | ICD-10-CM

## 2019-02-22 PROCEDURE — G0463 HOSPITAL OUTPT CLINIC VISIT: HCPCS | Performed by: EMERGENCY MEDICINE

## 2019-02-22 PROCEDURE — 99213 OFFICE O/P EST LOW 20 MIN: CPT | Performed by: EMERGENCY MEDICINE

## 2019-02-22 RX ORDER — PREDNISONE 10 MG/1
TABLET ORAL
Qty: 24 TABLET | Refills: 0 | Status: SHIPPED | OUTPATIENT
Start: 2019-02-22 | End: 2019-03-17

## 2019-02-22 RX ORDER — ALBUTEROL SULFATE 90 UG/1
2 AEROSOL, METERED RESPIRATORY (INHALATION) EVERY 6 HOURS PRN
Qty: 8.5 G | Refills: 0 | Status: SHIPPED | OUTPATIENT
Start: 2019-02-22 | End: 2019-03-17

## 2019-02-22 NOTE — PATIENT INSTRUCTIONS
You have been diagnosed with a Viral Upper Respiratory infection and your symptoms should resolve over the next 7 to 10 days with the treatments recommended today   If they do not, it is possible that you have developed a bacterial infection and you should return  If you were to take an antibiotic while you are still in the viral stage, you will not get better any faster, but could kill off the good germs in your body as well as make the germs in you resistant to the antibiotic  Take an expectorant - guaifenesin should be the only ingredient - during the day, and the cough suppressant (ex  Robitussin DM or Tessalon) if needed at night only  Take Zinc 12 5 to 15 mg every 2 - 3 hrs while awake for the next few days   You may take Cold Ciro (13 3 mg of Zinc) or split a 25 mg Zinc tablet or lozenge in two or a 50 mg into four to get the proper dose   The total daily dose of Zinc should exceed 75 mg per day   You may also take a decongestant like Sudafed, unless you have hypertension or cardiac disease  Hold any NSAID's like Ibuprofen (Advil), Naprosyn (Aleve), etc while on steroids like Medrol or Prednisone  If you are diabetic, you should also adhere strictly to your diet and monitor your blood sugar closely while on the steroids as discussed  Upper Respiratory Infection   AMBULATORY CARE:   An upper respiratory infection  is also called a common cold  It can affect your nose, throat, ears, and sinuses  Common signs and symptoms include the following:  Cold symptoms are usually worst for the first 3 to 5 days  You may have any of the following:  · Runny or stuffy nose    · Sneezing and coughing    · Sore throat or hoarseness    · Red, watery, and sore eyes    · Fatigue     · Chills and fever    · Headache, body aches, or sore muscles  Seek care immediately if:   · You have chest pain or trouble breathing  Contact your healthcare provider if:   · You have a fever over 102ºF (39°C)      · Your sore throat gets worse or you see white or yellow spots in your throat  · Your symptoms get worse after 3 to 5 days or your cold is not better in 14 days  · You have a rash anywhere on your skin  · You have large, tender lumps in your neck  · You have thick, green or yellow drainage from your nose  · You cough up thick yellow, green, or bloody mucus  · You have vomiting for more than 24 hours and cannot keep fluids down  · You have a bad earache  · You have questions or concerns about your condition or care  Treatment for a cold: There is no cure for the common cold  Colds are caused by viruses and do not get better with antibiotics  Most people get better in 7 to 14 days  You may continue to cough for 2 to 3 weeks  The following may help decrease your symptoms:  · Decongestants  help reduce nasal congestion and help you breathe more easily  If you take decongestant pills, they may make you feel restless or not able to sleep  Do not use decongestant sprays for more than a few days  · Cough suppressants  help reduce coughing  Ask your healthcare provider which type of cough medicine is best for you  · NSAIDs , such as ibuprofen, help decrease swelling, pain, and fever  NSAIDs can cause stomach bleeding or kidney problems in certain people  If you take blood thinner medicine, always ask your healthcare provider if NSAIDs are safe for you  Always read the medicine label and follow directions  · Acetaminophen  decreases pain and fever  It is available without a doctor's order  Ask how much to take and how often to take it  Follow directions  Read the labels of all other medicines you are using to see if they also contain acetaminophen, or ask your doctor or pharmacist  Acetaminophen can cause liver damage if not taken correctly  Do not use more than 4 grams (4,000 milligrams) total of acetaminophen in one day  Manage your cold:   · Rest as much as possible  Slowly start to do more each day  · Drink more liquids as directed  Liquids will help thin and loosen mucus so you can cough it up  Liquids will also help prevent dehydration  Liquids that help prevent dehydration include water, fruit juice, and broth  Do not drink liquids that contain caffeine  Caffeine can increase your risk for dehydration  Ask your healthcare provider how much liquid to drink each day  · Soothe a sore throat  Gargle with warm salt water  This helps your sore throat feel better  Make salt water by dissolving ¼ teaspoon salt in 1 cup warm water  You may also suck on hard candy or throat lozenges  You may use a sore throat spray  · Use a humidifier or vaporizer  Use a cool mist humidifier or a vaporizer to increase air moisture in your home  This may make it easier for you to breathe and help decrease your cough  · Use saline nasal drops as directed  These help relieve congestion  · Apply petroleum-based jelly around the outside of your nostrils  This can decrease irritation from blowing your nose  · Do not smoke  Nicotine and other chemicals in cigarettes and cigars can make your symptoms worse  They can also cause infections such as bronchitis or pneumonia  Ask your healthcare provider for information if you currently smoke and need help to quit  E-cigarettes or smokeless tobacco still contain nicotine  Talk to your healthcare provider before you use these products  Prevent spreading your cold to others:   · Try to stay away from other people during the first 2 to 3 days of your cold when it is more easily spread  · Do not share food or drinks  · Do not share hand towels with household members  · Wash your hands often, especially after you blow your nose  Turn away from other people and cover your mouth and nose with a tissue when you sneeze or cough  Follow up with your healthcare provider as directed:  Write down your questions so you remember to ask them during your visits     © 2017 [address] [facility] Information is for End User's use only and may not be sold, redistributed or otherwise used for commercial purposes  All illustrations and images included in CareNotes® are the copyrighted property of A D [employer] M [employer]  or [employer]  The above information is an  only  It is not intended as medical advice for individual conditions or treatments  Talk to your doctor, nurse or pharmacist before following any medical regimen to see if it is safe and effective for you

## 2019-02-22 NOTE — PROGRESS NOTES
[facility]        NAME: Rickie Ricketts is a 72 y o  female  : 1945    MRN: [id number]  DATE: 2019  TIME: 1:06 PM    Assessment and Plan   Viral upper respiratory tract infection [J06 9]  1  Viral upper respiratory tract infection  predniSONE 10 mg tablet    albuterol (PROAIR HFA) 90 mcg/act inhaler         Patient Instructions     Patient Instructions     You have been diagnosed with a Viral Upper Respiratory infection and your symptoms should resolve over the next 7 to 10 days with the treatments recommended today   If they do not, it is possible that you have developed a bacterial infection and you should return  If you were to take an antibiotic while you are still in the viral stage, you will not get better any faster, but could kill off the good germs in your body as well as make the germs in you resistant to the antibiotic  Take an expectorant - guaifenesin should be the only ingredient - during the day, and the cough suppressant (ex  Robitussin DM or Tessalon) if needed at night only  Take Zinc 12 5 to 15 mg every 2 - 3 hrs while awake for the next few days   You may take Cold Ciro (13 3 mg of Zinc) or split a 25 mg Zinc tablet or lozenge in two or a 50 mg into four to get the proper dose   The total daily dose of Zinc should exceed 75 mg per day   You may also take a decongestant like Sudafed, unless you have hypertension or cardiac disease  Hold any NSAID's like Ibuprofen (Advil), Naprosyn (Aleve), etc while on steroids like Medrol or Prednisone  If you are diabetic, you should also adhere strictly to your diet and monitor your blood sugar closely while on the steroids as discussed  Upper Respiratory Infection   AMBULATORY CARE:   An upper respiratory infection  is also called a common cold  It can affect your nose, throat, ears, and sinuses  Common signs and symptoms include the following:  Cold symptoms are usually worst for the first 3 to 5 days   You may have any of the following:  · Runny or stuffy nose    · Sneezing and coughing    · Sore throat or hoarseness    · Red, watery, and sore eyes    · Fatigue     · Chills and fever    · Headache, body aches, or sore muscles  Seek care immediately if:   · You have chest pain or trouble breathing  Contact your healthcare provider if:   · You have a fever over 102ºF (39°C)  · Your sore throat gets worse or you see white or yellow spots in your throat  · Your symptoms get worse after 3 to 5 days or your cold is not better in 14 days  · You have a rash anywhere on your skin  · You have large, tender lumps in your neck  · You have thick, green or yellow drainage from your nose  · You cough up thick yellow, green, or bloody mucus  · You have vomiting for more than 24 hours and cannot keep fluids down  · You have a bad earache  · You have questions or concerns about your condition or care  Treatment for a cold: There is no cure for the common cold  Colds are caused by viruses and do not get better with antibiotics  Most people get better in 7 to 14 days  You may continue to cough for 2 to 3 weeks  The following may help decrease your symptoms:  · Decongestants  help reduce nasal congestion and help you breathe more easily  If you take decongestant pills, they may make you feel restless or not able to sleep  Do not use decongestant sprays for more than a few days  · Cough suppressants  help reduce coughing  Ask your healthcare provider which type of cough medicine is best for you  · NSAIDs , such as ibuprofen, help decrease swelling, pain, and fever  NSAIDs can cause stomach bleeding or kidney problems in certain people  If you take blood thinner medicine, always ask your healthcare provider if NSAIDs are safe for you  Always read the medicine label and follow directions  · Acetaminophen  decreases pain and fever  It is available without a doctor's order   Ask how much to take and how often to take it  Follow directions  Read the labels of all other medicines you are using to see if they also contain acetaminophen, or ask your doctor or pharmacist  Acetaminophen can cause liver damage if not taken correctly  Do not use more than 4 grams (4,000 milligrams) total of acetaminophen in one day  Manage your cold:   · Rest as much as possible  Slowly start to do more each day  · Drink more liquids as directed  Liquids will help thin and loosen mucus so you can cough it up  Liquids will also help prevent dehydration  Liquids that help prevent dehydration include water, fruit juice, and broth  Do not drink liquids that contain caffeine  Caffeine can increase your risk for dehydration  Ask your healthcare provider how much liquid to drink each day  · Soothe a sore throat  Gargle with warm salt water  This helps your sore throat feel better  Make salt water by dissolving ¼ teaspoon salt in 1 cup warm water  You may also suck on hard candy or throat lozenges  You may use a sore throat spray  · Use a humidifier or vaporizer  Use a cool mist humidifier or a vaporizer to increase air moisture in your home  This may make it easier for you to breathe and help decrease your cough  · Use saline nasal drops as directed  These help relieve congestion  · Apply petroleum-based jelly around the outside of your nostrils  This can decrease irritation from blowing your nose  · Do not smoke  Nicotine and other chemicals in cigarettes and cigars can make your symptoms worse  They can also cause infections such as bronchitis or pneumonia  Ask your healthcare provider for information if you currently smoke and need help to quit  E-cigarettes or smokeless tobacco still contain nicotine  Talk to your healthcare provider before you use these products    Prevent spreading your cold to others:   · Try to stay away from other people during the first 2 to 3 days of your cold when it is more easily spread  · Do not share food or drinks  · Do not share hand towels with household members  · Wash your hands often, especially after you blow your nose  Turn away from other people and cover your mouth and nose with a tissue when you sneeze or cough  Follow up with your healthcare provider as directed:  Write down your questions so you remember to ask them during your visits  © 2017 [employer] Information is for End User's use only and may not be sold, redistributed or otherwise used for commercial purposes  All illustrations and images included in CareNotes® are the copyrighted property of [employer]  or [employer]  The above information is an  only  It is not intended as medical advice for individual conditions or treatments  Talk to your doctor, nurse or pharmacist before following any medical regimen to see if it is safe and effective for you  Follow up with PCP in 3-5 days  Proceed to  ER if symptoms worsen  Chief Complaint     Chief Complaint   Patient presents with    Cough     Cough and cold like symptoms for the past 4-5 weeks  Symptoms worsening         History of Present Illness       Patient complains of cough and congestion for the past 4-5 weeks  She recently completed a course of amoxicillin  She denies fever or chills  She claims similar symptoms in the past have responded to steroids and albuterol inhaler  Review of Systems   Review of Systems   Constitutional: Negative for chills and fever  HENT: Positive for congestion, rhinorrhea, sinus pressure and sore throat  Negative for trouble swallowing and voice change  Respiratory: Positive for cough  Negative for chest tightness, shortness of breath and wheezing  Cardiovascular: Negative for chest pain           Current Medications       Current Outpatient Medications:     ALPRAZolam (XANAX) 0 5 mg tablet, Take 0 5 mg by mouth daily at bedtime as needed for anxiety  , Disp: , Rfl:     albuterol (PROAIR HFA) 90 mcg/act inhaler, Inhale 2 puffs every 6 (six) hours as needed for shortness of breath, Disp: 8 5 g, Rfl: 0    buPROPion (ZYBAN) 150 MG 12 hr tablet, Take 1 tablet (150 mg total) by mouth 2 (two) times a day (Patient not taking: Reported on 1/28/2019 ), Disp: 60 tablet, Rfl: 2    dicyclomine (BENTYL) 10 mg capsule, Take 1 capsule (10 mg total) by mouth 2 (two) times a day as needed (abdominal pain) (Patient not taking: Reported on 1/28/2019 ), Disp: 30 capsule, Rfl: 2    predniSONE 10 mg tablet, Take once daily all days pills on this schedule 5- 5- 4- 4- 3- 2- 1, Disp: 24 tablet, Rfl: 0    Current Allergies     Allergies as of 02/22/2019 - Reviewed 02/22/2019   Allergen Reaction Noted    Lisinopril  01/30/2017            The following portions of the patient's history were reviewed and updated as appropriate: allergies, current medications, past family history, past medical history, past social history, past surgical history and problem list      Past Medical History:   Diagnosis Date    Atypical squamous cells cannot exclude high grade squamous intraepithelial lesion on cytologic smear of cervix (ASC-H)     Last Assessed 5/13/2014    Ovarian cyst        Past Surgical History:   Procedure Laterality Date    ANKLE FRACTURE SURGERY      Last Assessed 07/26/2016    ANKLE SURGERY      CERVICAL BIOPSY  W/ LOOP ELECTRODE EXCISION      DIAGNOSTIC LAPAROSCOPY      DILATION AND CURETTAGE OF UTERUS      ESOPHAGOGASTRODUODENOSCOPY N/A 9/12/2016    Procedure: ESOPHAGOGASTRODUODENOSCOPY (EGD); Surgeon: Mara Dixon MD;  [facility]; Service:     NE COLONOSCOPY FLX DX W/COLLJ SPEC WHEN PFRMD N/A 9/12/2016    Procedure: Reuben Cloud;  Surgeon: Mara Dixon MD;  [facility]: [facility];   Service: Colorectal    SALPINGOOPHORECTOMY Left     TONSILLECTOMY         Family History   Problem Relation Age of Onset    No Known Problems Mother    [facility] No Known Problems Family          Medications have been verified  Objective   BP (!) 182/110   Pulse 72   Temp (!) 97 2 °F (36 2 °C) (Tympanic)   Resp 20   Ht 5' 6 5" (1 689 m)   Wt 67 1 kg (148 lb)   SpO2 99%   BMI 23 53 kg/m²        Physical Exam     Physical Exam   Constitutional: She is oriented to person, place, and time  She appears well-developed and well-nourished  No distress  HENT:   Head: Normocephalic and atraumatic  Right Ear: Tympanic membrane and external ear normal    Left Ear: Tympanic membrane and external ear normal    Nose: Mucosal edema present  Mouth/Throat: Posterior oropharyngeal erythema present  No oropharyngeal exudate or tonsillar abscesses  Neck: Neck supple  Cardiovascular: Normal rate and regular rhythm  Pulmonary/Chest: Effort normal    Abdominal: Soft  Bowel sounds are normal    Neurological: She is alert and oriented to person, place, and time  Skin: Skin is warm and dry  Nursing note and vitals reviewed

## 2019-03-01 ENCOUNTER — OFFICE VISIT (OUTPATIENT)
Dept: URGENT CARE | Facility: CLINIC | Age: 74
End: 2019-03-01
Payer: MEDICARE

## 2019-03-01 VITALS
OXYGEN SATURATION: 97 % | HEIGHT: 66 IN | SYSTOLIC BLOOD PRESSURE: 200 MMHG | DIASTOLIC BLOOD PRESSURE: 116 MMHG | BODY MASS INDEX: 23.78 KG/M2 | HEART RATE: 97 BPM | TEMPERATURE: 97.4 F | WEIGHT: 148 LBS | RESPIRATION RATE: 18 BRPM

## 2019-03-01 DIAGNOSIS — I10 ELEVATED BLOOD PRESSURE READING WITH DIAGNOSIS OF HYPERTENSION: ICD-10-CM

## 2019-03-01 DIAGNOSIS — R51.9 ACUTE NONINTRACTABLE HEADACHE, UNSPECIFIED HEADACHE TYPE: ICD-10-CM

## 2019-03-01 DIAGNOSIS — R07.89 OTHER CHEST PAIN: Primary | ICD-10-CM

## 2019-03-01 PROCEDURE — 99213 OFFICE O/P EST LOW 20 MIN: CPT | Performed by: EMERGENCY MEDICINE

## 2019-03-01 PROCEDURE — G0463 HOSPITAL OUTPT CLINIC VISIT: HCPCS | Performed by: EMERGENCY MEDICINE

## 2019-03-01 PROCEDURE — 93005 ELECTROCARDIOGRAM TRACING: CPT | Performed by: EMERGENCY MEDICINE

## 2019-03-01 RX ORDER — ASPIRIN 81 MG/1
324 TABLET, CHEWABLE ORAL ONCE
Status: COMPLETED | OUTPATIENT
Start: 2019-03-01 | End: 2019-03-01

## 2019-03-01 RX ADMIN — ASPIRIN 324 MG: 81 TABLET, CHEWABLE ORAL at 13:21

## 2019-03-01 NOTE — PROGRESS NOTES
[address] [facility]        NAME: Ana M Villatoro is a 72 y o  female  : 1945    MRN: [id number]  DATE: 2019  TIME: 1:22 PM    Assessment and Plan   Other chest pain [R07 89]  1  Other chest pain  Ambulatory Referral to Emergency Medicine    Transfer to other facility    aspirin chewable tablet 324 mg   2  Acute nonintractable headache, unspecified headache type     3  Elevated blood pressure reading with diagnosis of hypertension       I offered to send patient to the ER by ambulance but she refuses, wants her daughter to drive her there  EKG showed normal sinus rhythm at 66/min, nonspecific ST and T-wave abnormalities, normal R-wave progression  Patient Instructions     Patient Instructions   Chest Pain   AMBULATORY CARE:   Chest pain  can be caused by a range of conditions, from not serious to life-threatening  It may be caused by a heart attack or a blood clot in your lungs  Sometimes chest pain or pressure is caused by poor blood flow to your heart (angina)  Infection, inflammation, or a fracture in the bones or cartilage in your chest can cause pain or discomfort  Chest pain can also be a symptom of a digestive problem, such as acid reflux or a stomach ulcer  An anxiety attack or a strong emotion such as anger can also cause chest pain  It is important to follow up with your healthcare provider to find the cause of your chest pain    Common symptoms you may have with chest pain:   · Fever or sweating     · Nausea or vomiting     · Shortness of breath     · Discomfort or pressure that spreads from your chest to your back, jaw, or arm     · A racing or slow heartbeat     · Feeling weak, tired, or faint  Call 911 if:   · You have any of the following signs of a heart attack:      ¨ Squeezing, pressure, or pain in your chest that lasts longer than 5 minutes or returns    ¨ Discomfort or pain in your back, neck, jaw, stomach, or arm     ¨ Trouble breathing    ¨ Nausea or vomiting    ¨ Lightheadedness or a sudden cold sweat, especially with chest pain or trouble breathing    Seek care immediately if:   · You have chest discomfort that gets worse, even with medicine  · You cough or vomit blood  · Your bowel movements are black or bloody  · You cannot stop vomiting, or it hurts to swallow  Contact your healthcare provider if:   · You have questions or concerns about your condition or care  Treatment for chest pain  may include medicine to treat your symptoms while your healthcare provider finds the cause of your chest pain  · Medicines  may be given to treat the cause of your chest pain  Examples include pain medicine, anxiety medicine, or medicines to increase blood flow to your heart  · Do not take certain medicines without asking your healthcare provider first   These include NSAIDs, herbal or vitamin supplements, or hormones (estrogen or progestin)  Follow up with your healthcare provider within 72 hours, or as directed: You may need to return for more tests to find the cause of your chest pain  You may be referred to a specialist, such as a cardiologist or gastroenterologist  Write down your questions so you remember to ask them during your visits  Healthy living tips: The following are general healthy guidelines  If your chest pain is caused by a heart problem, your healthcare provider will give you specific guidelines to follow  · Do not smoke  Nicotine and other chemicals in cigarettes and cigars can cause lung and heart damage  Ask your healthcare provider for information if you currently smoke and need help to quit  E-cigarettes or smokeless tobacco still contain nicotine  Talk to your healthcare provider before you use these products  · Eat a variety of healthy, low-fat foods  Healthy foods include fruits, vegetables, whole-grain breads, low-fat dairy products, beans, lean meats, and fish   Ask for more information about a heart healthy diet     · Ask about activity  Your healthcare provider will tell you which activities to limit or avoid  Ask when you can drive, return to work, and have sex  Ask about the best exercise plan for you  · Maintain a healthy weight  Ask your healthcare provider how much you should weigh  Ask him or her to help you create a weight loss plan if you are overweight  · Get the flu and pneumonia vaccines  All adults should get the influenza (flu) vaccine  Get it every year as soon as it becomes available  The pneumococcal vaccine is given to adults aged 72 years or older  The vaccine is given every 5 years to prevent pneumococcal disease, such as pneumonia  © 2017 [employer] Information is for End User's use only and may not be sold, redistributed or otherwise used for commercial purposes  All illustrations and images included in CareNotes® are the copyrighted property of [employer]  or [employer]  The above information is an  only  It is not intended as medical advice for individual conditions or treatments  Talk to your doctor, nurse or pharmacist before following any medical regimen to see if it is safe and effective for you  Follow up with PCP in 3-5 days  Proceed to  ER if symptoms worsen  Chief Complaint     Chief Complaint   Patient presents with    Headache     c/o ear pain and headache  pt states its the same thing that she has had since the last visit  History of Present Illness       Patient complains of recurrent dull substernal chest pain for the past several days  She denies palpitations or shortness of breath  She denies diaphoresis  She has history of hypertension but has not been taking any of her blood pressure medicines for about a year because of syncopal episodes while on each of 3 different blood pressure meds    She also complains of cough and congestion for the past week despite being on prednisone taper at present  Review of Systems   Review of Systems   Constitutional: Negative for activity change, chills and fever  HENT: Positive for congestion, rhinorrhea, sinus pressure and sore throat  Negative for trouble swallowing and voice change  Respiratory: Positive for cough  Negative for chest tightness, shortness of breath and wheezing  Cardiovascular: Negative for chest pain, palpitations and leg swelling  Gastrointestinal: Negative for abdominal pain  Musculoskeletal: Negative for arthralgias, back pain, myalgias, neck pain and neck stiffness  Skin: Negative for color change and wound  Neurological: Negative for dizziness, syncope, weakness, light-headedness and headaches  Psychiatric/Behavioral: Negative for confusion  Current Medications       Current Outpatient Medications:     ALPRAZolam (XANAX) 0 5 mg tablet, Take 0 5 mg by mouth daily at bedtime as needed for anxiety  , Disp: , Rfl:     albuterol (PROAIR HFA) 90 mcg/act inhaler, Inhale 2 puffs every 6 (six) hours as needed for shortness of breath (Patient not taking: Reported on 3/1/2019), Disp: 8 5 g, Rfl: 0    buPROPion (ZYBAN) 150 MG 12 hr tablet, Take 1 tablet (150 mg total) by mouth 2 (two) times a day (Patient not taking: Reported on 1/28/2019 ), Disp: 60 tablet, Rfl: 2    dicyclomine (BENTYL) 10 mg capsule, Take 1 capsule (10 mg total) by mouth 2 (two) times a day as needed (abdominal pain) (Patient not taking: Reported on 1/28/2019 ), Disp: 30 capsule, Rfl: 2    predniSONE 10 mg tablet, Take once daily all days pills on this schedule 5- 5- 4- 4- 3- 2- 1 (Patient not taking: Reported on 3/1/2019), Disp: 24 tablet, Rfl: 0  No current facility-administered medications for this visit       Current Allergies     Allergies as of 03/01/2019 - Reviewed 03/01/2019   Allergen Reaction Noted    Lisinopril  01/30/2017            The following portions of the patient's history were reviewed and updated as appropriate: allergies, current medications, past family history, past medical history, past social history, past surgical history and problem list      Past Medical History:   Diagnosis Date    Atypical squamous cells cannot exclude high grade squamous intraepithelial lesion on cytologic smear of cervix (ASC-H)     Last Assessed 5/13/2014    Ovarian cyst        Past Surgical History:   Procedure Laterality Date    ANKLE FRACTURE SURGERY      Last Assessed 07/26/2016    ANKLE SURGERY      CERVICAL BIOPSY  W/ LOOP ELECTRODE EXCISION      DIAGNOSTIC LAPAROSCOPY      DILATION AND CURETTAGE OF UTERUS      ESOPHAGOGASTRODUODENOSCOPY N/A 9/12/2016    Procedure: ESOPHAGOGASTRODUODENOSCOPY (EGD); Surgeon: Arnulfo Payton MD;  [facility]; Service:     MI COLONOSCOPY FLX DX W/COLLJ SPEC WHEN PFRMD N/A 9/12/2016    Procedure: Tahir Battiest;  Surgeon: Arnulfo Payton MD;  [facility]; Service: Colorectal    SALPINGOOPHORECTOMY Left     TONSILLECTOMY         Family History   Problem Relation Age of Onset    No Known Problems Mother     No Known Problems Family          Medications have been verified  Objective   BP (!) 198/121   Pulse 97   Temp (!) 97 4 °F (36 3 °C) (Tympanic)   Resp 18   Ht 5' 6" (1 676 m)   Wt 67 1 kg (148 lb)   SpO2 97%   BMI 23 89 kg/m²        Physical Exam     Physical Exam   Constitutional: She is oriented to person, place, and time  She appears well-developed and well-nourished  No distress  HENT:   Head: Normocephalic and atraumatic  Right Ear: Tympanic membrane and external ear normal    Left Ear: Tympanic membrane and external ear normal    Nose: Mucosal edema present  Mouth/Throat: Posterior oropharyngeal erythema present  No oropharyngeal exudate or tonsillar abscesses  Eyes: Pupils are equal, round, and reactive to light  Conjunctivae and EOM are normal    Neck: Normal range of motion  Neck supple     No carotid bruits   Cardiovascular: Normal rate, regular rhythm, normal heart sounds and intact distal pulses  Exam reveals no gallop and no friction rub  No murmur heard  Pulmonary/Chest: Effort normal and breath sounds normal    Abdominal: Soft  Bowel sounds are normal    Musculoskeletal: She exhibits no tenderness  Neurological: She is alert and oriented to person, place, and time  Skin: Skin is warm and dry  No rash noted  Nursing note and vitals reviewed

## 2019-03-01 NOTE — PATIENT INSTRUCTIONS
Chest Pain   AMBULATORY CARE:   Chest pain  can be caused by a range of conditions, from not serious to life-threatening  It may be caused by a heart attack or a blood clot in your lungs  Sometimes chest pain or pressure is caused by poor blood flow to your heart (angina)  Infection, inflammation, or a fracture in the bones or cartilage in your chest can cause pain or discomfort  Chest pain can also be a symptom of a digestive problem, such as acid reflux or a stomach ulcer  An anxiety attack or a strong emotion such as anger can also cause chest pain  It is important to follow up with your healthcare provider to find the cause of your chest pain  Common symptoms you may have with chest pain:   · Fever or sweating     · Nausea or vomiting     · Shortness of breath     · Discomfort or pressure that spreads from your chest to your back, jaw, or arm     · A racing or slow heartbeat     · Feeling weak, tired, or faint  Call 911 if:   · You have any of the following signs of a heart attack:      ¨ Squeezing, pressure, or pain in your chest that lasts longer than 5 minutes or returns    ¨ Discomfort or pain in your back, neck, jaw, stomach, or arm     ¨ Trouble breathing    ¨ Nausea or vomiting    ¨ Lightheadedness or a sudden cold sweat, especially with chest pain or trouble breathing    Seek care immediately if:   · You have chest discomfort that gets worse, even with medicine  · You cough or vomit blood  · Your bowel movements are black or bloody  · You cannot stop vomiting, or it hurts to swallow  Contact your healthcare provider if:   · You have questions or concerns about your condition or care  Treatment for chest pain  may include medicine to treat your symptoms while your healthcare provider finds the cause of your chest pain  · Medicines  may be given to treat the cause of your chest pain  Examples include pain medicine, anxiety medicine, or medicines to increase blood flow to your heart  · Do not take certain medicines without asking your healthcare provider first   These include NSAIDs, herbal or vitamin supplements, or hormones (estrogen or progestin)  Follow up with your healthcare provider within 72 hours, or as directed: You may need to return for more tests to find the cause of your chest pain  You may be referred to a specialist, such as a cardiologist or gastroenterologist  Write down your questions so you remember to ask them during your visits  Healthy living tips: The following are general healthy guidelines  If your chest pain is caused by a heart problem, your healthcare provider will give you specific guidelines to follow  · Do not smoke  Nicotine and other chemicals in cigarettes and cigars can cause lung and heart damage  Ask your healthcare provider for information if you currently smoke and need help to quit  E-cigarettes or smokeless tobacco still contain nicotine  Talk to your healthcare provider before you use these products  · Eat a variety of healthy, low-fat foods  Healthy foods include fruits, vegetables, whole-grain breads, low-fat dairy products, beans, lean meats, and fish  Ask for more information about a heart healthy diet  · Ask about activity  Your healthcare provider will tell you which activities to limit or avoid  Ask when you can drive, return to work, and have sex  Ask about the best exercise plan for you  · Maintain a healthy weight  Ask your healthcare provider how much you should weigh  Ask him or her to help you create a weight loss plan if you are overweight  · Get the flu and pneumonia vaccines  All adults should get the influenza (flu) vaccine  Get it every year as soon as it becomes available  The pneumococcal vaccine is given to adults aged 72 years or older  The vaccine is given every 5 years to prevent pneumococcal disease, such as pneumonia    © 2017 [employer] Information is for End User's use only and may not be sold, redistributed or otherwise used for commercial purposes  All illustrations and images included in CareNotes® are the copyrighted property of A D A M [employer] Inc  or [employer]  The above information is an  only  It is not intended as medical advice for individual conditions or treatments  Talk to your doctor, nurse or pharmacist before following any medical regimen to see if it is safe and effective for you

## 2019-03-04 LAB
ATRIAL RATE: 66 BPM
P AXIS: 73 DEGREES
PR INTERVAL: 150 MS
QRS AXIS: 2 DEGREES
QRSD INTERVAL: 92 MS
QT INTERVAL: 418 MS
QTC INTERVAL: 438 MS
T WAVE AXIS: 63 DEGREES
VENTRICULAR RATE: 66 BPM

## 2019-03-04 PROCEDURE — 93010 ELECTROCARDIOGRAM REPORT: CPT | Performed by: INTERNAL MEDICINE

## 2019-03-17 ENCOUNTER — APPOINTMENT (EMERGENCY)
Dept: CT IMAGING | Facility: HOSPITAL | Age: 74
End: 2019-03-17
Payer: MEDICARE

## 2019-03-17 ENCOUNTER — HOSPITAL ENCOUNTER (EMERGENCY)
Facility: HOSPITAL | Age: 74
Discharge: HOME | End: 2019-03-17
Attending: EMERGENCY MEDICINE | Admitting: EMERGENCY MEDICINE
Payer: MEDICARE

## 2019-03-17 VITALS
TEMPERATURE: 97.9 F | OXYGEN SATURATION: 96 % | WEIGHT: 152.12 LBS | RESPIRATION RATE: 18 BRPM | SYSTOLIC BLOOD PRESSURE: 148 MMHG | DIASTOLIC BLOOD PRESSURE: 92 MMHG | HEART RATE: 65 BPM | BODY MASS INDEX: 24.55 KG/M2

## 2019-03-17 DIAGNOSIS — G89.29 CHRONIC HEADACHE: ICD-10-CM

## 2019-03-17 DIAGNOSIS — I10 UNCONTROLLED HYPERTENSION: Primary | ICD-10-CM

## 2019-03-17 DIAGNOSIS — R51.9 CHRONIC HEADACHE: ICD-10-CM

## 2019-03-17 DIAGNOSIS — F41.9 ANXIETY: ICD-10-CM

## 2019-03-17 LAB
ANION GAP SERPL CALCULATED.3IONS-SCNC: 10 MMOL/L (ref 4–13)
BASOPHILS # BLD AUTO: 0.03 THOUSANDS/ΜL (ref 0–0.1)
BASOPHILS NFR BLD AUTO: 0 % (ref 0–1)
BILIRUB UR QL STRIP: NEGATIVE
BUN SERPL-MCNC: 17 MG/DL (ref 5–25)
CALCIUM SERPL-MCNC: 9.4 MG/DL (ref 8.3–10.1)
CHLORIDE SERPL-SCNC: 106 MMOL/L (ref 100–108)
CLARITY UR: CLEAR
CO2 SERPL-SCNC: 28 MMOL/L (ref 21–32)
COLOR UR: YELLOW
COLOR, POC: YELLOW
CREAT SERPL-MCNC: 1.02 MG/DL (ref 0.6–1.3)
EOSINOPHIL # BLD AUTO: 0.07 THOUSAND/ΜL (ref 0–0.61)
EOSINOPHIL NFR BLD AUTO: 1 % (ref 0–6)
ERYTHROCYTE [DISTWIDTH] IN BLOOD BY AUTOMATED COUNT: 12.7 % (ref 11.6–15.1)
GFR SERPL CREATININE-BSD FRML MDRD: 55 ML/MIN/1.73SQ M
GLUCOSE SERPL-MCNC: 103 MG/DL (ref 65–140)
GLUCOSE UR STRIP-MCNC: NEGATIVE MG/DL
HCT VFR BLD AUTO: 49.8 % (ref 34.8–46.1)
HGB BLD-MCNC: 16.3 G/DL (ref 11.5–15.4)
HGB UR QL STRIP.AUTO: NEGATIVE
IMM GRANULOCYTES # BLD AUTO: 0.03 THOUSAND/UL (ref 0–0.2)
IMM GRANULOCYTES NFR BLD AUTO: 0 % (ref 0–2)
KETONES UR STRIP-MCNC: NEGATIVE MG/DL
LEUKOCYTE ESTERASE UR QL STRIP: NEGATIVE
LYMPHOCYTES # BLD AUTO: 0.64 THOUSANDS/ΜL (ref 0.6–4.47)
LYMPHOCYTES NFR BLD AUTO: 10 % (ref 14–44)
MCH RBC QN AUTO: 30.8 PG (ref 26.8–34.3)
MCHC RBC AUTO-ENTMCNC: 32.7 G/DL (ref 31.4–37.4)
MCV RBC AUTO: 94 FL (ref 82–98)
MONOCYTES # BLD AUTO: 0.33 THOUSAND/ΜL (ref 0.17–1.22)
MONOCYTES NFR BLD AUTO: 5 % (ref 4–12)
NEUTROPHILS # BLD AUTO: 5.65 THOUSANDS/ΜL (ref 1.85–7.62)
NEUTS SEG NFR BLD AUTO: 84 % (ref 43–75)
NITRITE UR QL STRIP: NEGATIVE
NRBC BLD AUTO-RTO: 0 /100 WBCS
PH UR STRIP.AUTO: 5.5 [PH] (ref 4.5–8)
PLATELET # BLD AUTO: 293 THOUSANDS/UL (ref 149–390)
PMV BLD AUTO: 9.4 FL (ref 8.9–12.7)
POTASSIUM SERPL-SCNC: 3.8 MMOL/L (ref 3.5–5.3)
PROT UR STRIP-MCNC: NEGATIVE MG/DL
RBC # BLD AUTO: 5.3 MILLION/UL (ref 3.81–5.12)
SODIUM SERPL-SCNC: 144 MMOL/L (ref 136–145)
SP GR UR STRIP.AUTO: 1.01 (ref 1–1.03)
UROBILINOGEN UR QL STRIP.AUTO: 0.2 E.U./DL
WBC # BLD AUTO: 6.75 THOUSAND/UL (ref 4.31–10.16)

## 2019-03-17 PROCEDURE — 36415 COLL VENOUS BLD VENIPUNCTURE: CPT | Performed by: EMERGENCY MEDICINE

## 2019-03-17 PROCEDURE — 81003 URINALYSIS AUTO W/O SCOPE: CPT

## 2019-03-17 PROCEDURE — 99284 EMERGENCY DEPT VISIT MOD MDM: CPT

## 2019-03-17 PROCEDURE — 70498 CT ANGIOGRAPHY NECK: CPT

## 2019-03-17 PROCEDURE — 93005 ELECTROCARDIOGRAM TRACING: CPT

## 2019-03-17 PROCEDURE — 70496 CT ANGIOGRAPHY HEAD: CPT

## 2019-03-17 PROCEDURE — 85025 COMPLETE CBC W/AUTO DIFF WBC: CPT | Performed by: EMERGENCY MEDICINE

## 2019-03-17 PROCEDURE — 80048 BASIC METABOLIC PNL TOTAL CA: CPT | Performed by: EMERGENCY MEDICINE

## 2019-03-17 RX ADMIN — IODIXANOL 85 ML: 320 INJECTION, SOLUTION INTRAVASCULAR at 12:50

## 2019-03-17 NOTE — ED PROVIDER NOTES
History  Chief Complaint   Patient presents with    High Blood Pressure     pt c/o dizzeness anbd hypertension for the past x3 days; pt states she has had a headache and vision issues  pt denies any n/v/d  pt states she has had taken her BP medications this morning  This 82-year-old female presents for evaluation of 3 years of waxing waning symptoms of dizziness, intermittent high blood pressure, headaches, palpitations  The patient admits to history of high anxiety as well as hypertension  She states that she has been on antihypertensives the past however she has had syncopal episodes  She believes that her elevated blood pressures related to her anxiety which is worse when she has to go somewhere  She states that sometimes she has taken Xanax as needed with some improvement  None       Past Medical History:   Diagnosis Date    Atypical squamous cells cannot exclude high grade squamous intraepithelial lesion on cytologic smear of cervix (ASC-H)     Last Assessed 5/13/2014    Ovarian cyst        Past Surgical History:   Procedure Laterality Date    ANKLE FRACTURE SURGERY      Last Assessed 07/26/2016    ANKLE SURGERY      CERVICAL BIOPSY  W/ LOOP ELECTRODE EXCISION      DIAGNOSTIC LAPAROSCOPY      DILATION AND CURETTAGE OF UTERUS      ESOPHAGOGASTRODUODENOSCOPY N/A 9/12/2016    Procedure: ESOPHAGOGASTRODUODENOSCOPY (EGD); Surgeon: Wanda Woo MD;  Location: [facility]; Service:     OK COLONOSCOPY FLX DX W/COLLJ SPEC WHEN PFRMD N/A 9/12/2016    Procedure: Travis Velasco;  Surgeon: Wanda Woo MD;  Location: [facility]; Service: Colorectal    SALPINGOOPHORECTOMY Left     TONSILLECTOMY         Family History   Problem Relation Age of Onset    No Known Problems Mother     No Known Problems Family      I have reviewed and agree with the history as documented      Social History     Tobacco Use    Smoking status: Current Some Day Smoker     Packs/day: 0 50    Smokeless tobacco: Never Used    Tobacco comment: Tobacco use   Substance Use Topics    Alcohol use: No    Drug use: Never        Review of Systems   Constitutional: Negative for chills, fatigue and fever  HENT: Negative for sore throat  Eyes: Negative for visual disturbance  Respiratory: Negative for shortness of breath  Cardiovascular: Positive for palpitations  Negative for chest pain  Gastrointestinal: Negative for abdominal pain, diarrhea, nausea and vomiting  Genitourinary: Negative for difficulty urinating, dysuria and pelvic pain  Musculoskeletal: Negative for back pain  Skin: Negative for rash  Neurological: Positive for light-headedness and headaches  Negative for syncope and weakness  All other systems reviewed and are negative  Physical Exam  Physical Exam   Constitutional: She is oriented to person, place, and time  She appears well-developed and well-nourished  No distress  HENT:   Head: Normocephalic and atraumatic  Right Ear: External ear normal    Left Ear: External ear normal    Eyes: Conjunctivae and EOM are normal  No scleral icterus  Fundoscopic exam:       The right eye shows papilledema  The left eye shows papilledema  Neck: Normal range of motion  Cardiovascular: Normal rate, regular rhythm and normal heart sounds  Pulmonary/Chest: Effort normal and breath sounds normal  No respiratory distress  Abdominal: Soft  Bowel sounds are normal  There is no tenderness  There is no rebound and no guarding  Musculoskeletal: Normal range of motion  She exhibits no edema  Neurological: She is alert and oriented to person, place, and time  Skin: Skin is warm and dry  No rash noted  Psychiatric: She has a normal mood and affect  Nursing note and vitals reviewed        Vital Signs  ED Triage Vitals [03/17/19 1050]   Temperature Pulse Respirations Blood Pressure SpO2   97 9 °F (36 6 °C) 60 18 (!) 195/100 96 %      Temp Source Heart Rate Source Patient Position - Orthostatic VS BP Location FiO2 (%)   Oral Monitor Lying Left arm --      Pain Score       No Pain           Vitals:    03/17/19 1050 03/17/19 1300 03/17/19 1422   BP: (!) 195/100 160/100 148/92   Pulse: 60 66 65   Patient Position - Orthostatic VS: Lying Lying Lying       qSOFA     Row Name 03/17/19 1422 03/17/19 1300 03/17/19 1102 03/17/19 1050       Altered mental status GCS < 15      0       Respiratory Rate > / =22  0  0    0     Systolic BP < / =635  0  0    0     Q Sofa Score  0  0  0  0           Visual Acuity  Visual Acuity      Most Recent Value   L Pupil Size (mm)  3   R Pupil Size (mm)  3          ED Medications  Medications   iodixanol (VISIPAQUE) 320 MG/ML injection 85 mL (85 mL Intravenous Given 3/17/19 1250)       Diagnostic Studies  Results Reviewed     Procedure Component Value Units Date/Time    Basic metabolic panel [[id number]] Collected:  03/17/19 1151    Lab Status:  Final result Specimen:  Blood from Arm, Right Updated:  03/17/19 1207     Sodium 144 mmol/L      Potassium 3 8 mmol/L      Chloride 106 mmol/L      CO2 28 mmol/L      ANION GAP 10 mmol/L      BUN 17 mg/dL      Creatinine 1 02 mg/dL      Glucose 103 mg/dL      Calcium 9 4 mg/dL      eGFR 55 ml/min/1 73sq m     Narrative:       National Kidney Disease Education Program recommendations are as follows:  GFR calculation is accurate only with a steady state creatinine  Chronic Kidney disease less than 60 ml/min/1 73 sq  meters  Kidney failure less than 15 ml/min/1 73 sq  meters      POCT urinalysis dipstick [[id number]]  (Normal) Resulted:  03/17/19 1157    Lab Status:  Final result Specimen:  Urine Updated:  03/17/19 1157     Color, UA yellow    ED Urine Macroscopic [[id number]] Collected:  03/17/19 1158    Lab Status:  Final result Specimen:  Urine Updated:  03/17/19 1156     Color, UA Yellow     Clarity, UA Clear     pH, UA 5 5     Leukocytes, UA Negative     Nitrite, UA Negative     Protein, UA Negative mg/dl      Glucose, UA Negative mg/dl      Ketones, UA Negative mg/dl      Urobilinogen, UA 0 2 E U /dl      Bilirubin, UA Negative     Blood, UA Negative     Specific Gravity, UA 1 010    Narrative:       CLINITEK RESULT    CBC and differential [[id number]]  (Abnormal) Collected:  03/17/19 1151    Lab Status:  Final result Specimen:  Blood from Arm, Right Updated:  03/17/19 1156     WBC 6 75 Thousand/uL      RBC 5 30 Million/uL      Hemoglobin 16 3 g/dL      Hematocrit 49 8 %      MCV 94 fL      MCH 30 8 pg      MCHC 32 7 g/dL      RDW 12 7 %      MPV 9 4 fL      Platelets 883 Thousands/uL      nRBC 0 /100 WBCs      Neutrophils Relative 84 %      Immat GRANS % 0 %      Lymphocytes Relative 10 %      Monocytes Relative 5 %      Eosinophils Relative 1 %      Basophils Relative 0 %      Neutrophils Absolute 5 65 Thousands/µL      Immature Grans Absolute 0 03 Thousand/uL      Lymphocytes Absolute 0 64 Thousands/µL      Monocytes Absolute 0 33 Thousand/µL      Eosinophils Absolute 0 07 Thousand/µL      Basophils Absolute 0 03 Thousands/µL                  CTA head and neck with and without contrast   Final Result by Jaime Santiago MD (03/17 5465)      1  No acute intracranial abnormality   2  Mild right carotid artery disease without stenosis  Otherwise no significant CTA abnormality  Workstation performed: [id number]                    Procedures  ECG 12 Lead Documentation  Date/Time: 3/17/2019 11:48 AM  Performed by: Herve Moscoso DO  Authorized by:  Herve Moscoso DO     Indications / Diagnosis:  Hypertension  ECG reviewed by me, the ED Provider: yes    Patient location:  ED  Previous ECG:     Previous ECG:  Compared to current    Similarity:  No change  Interpretation:     Interpretation: normal    Rate:     ECG rate:  57    ECG rate assessment: bradycardic    Rhythm:     Rhythm: sinus bradycardia    Ectopy:     Ectopy: none    QRS:     QRS axis:  Normal    QRS intervals:  Normal  Conduction:     Conduction: normal ST segments:     ST segments:  Normal  T waves:     T waves: normal             Phone Contacts  ED Phone Contact    ED Course  ED Course as of Mar 17 1752   Sun Mar 17, 2019   7014 Patient improved upon re-eval  Discussed labs and CTA  Noted SBP now 160  Discussed plan for DC and need for outpatient follow up for BP and anxiety control  MDM  Number of Diagnoses or Management Options  Anxiety:   Chronic headache:   Uncontrolled hypertension:   Diagnosis management comments: Plan is to obtain laboratories and a CTA to rule out bleed, electrolyte disturbance, renal failure other etiologies for the patient's worsening headache  I suspect there is a strong anxiety component however secondary etiology is will be ruled out  The diagnostics reviewed and discussed with patient  I discussed the need for the patient to follow up with primary care provider for outpatient management of her hypertension as well as her anxiety as I believe both of these are contributing to her elevated blood pressure and chronic headaches  The patient (and any family present) verbalized understanding of the discharge instructions and warnings that would necessitate return to the Emergency Department  All questions were answered prior to discharge         Amount and/or Complexity of Data Reviewed  Clinical lab tests: ordered and reviewed  Tests in the radiology section of CPT®: ordered and reviewed  Tests in the medicine section of CPT®: ordered and reviewed        Disposition  Final diagnoses:   Uncontrolled hypertension   Anxiety   Chronic headache     Time reflects when diagnosis was documented in both MDM as applicable and the Disposition within this note     Time User Action Codes Description Comment    3/17/2019  1:55 PM Alice Roe Add [I10] Uncontrolled hypertension     3/17/2019  1:55 PM Alice Sow Add [F41 9] Anxiety     3/17/2019  1:55 PM Geovanny DOW Add [R51] Chronic headache ED Disposition     ED Disposition Condition Date/Time Comment    Discharge Stable Sun Mar 17, 2019  1:55 PM [address] discharge to home/self care  Follow-up Information     Follow up With Specialties Details Why Contact Info Additional [address], DO Internal Medicine Schedule an appointment as soon as possible for a visit  For further evaluation of high blood pressure and anxiety [address] [facility] Schedule an appointment as soon as possible for a visit  For further evaluation of anxiety [address] [facility] [id number]  [address] [place] [address], [phone]          There are no discharge medications for this patient  No discharge procedures on file      ED Provider  Electronically Signed by           Oneyda Bose DO  03/17/19 0904

## 2019-03-18 LAB
ATRIAL RATE: 57 BPM
P AXIS: 70 DEGREES
PR INTERVAL: 148 MS
QRS AXIS: 42 DEGREES
QRSD INTERVAL: 82 MS
QT INTERVAL: 420 MS
QTC INTERVAL: 408 MS
T WAVE AXIS: 67 DEGREES
VENTRICULAR RATE: 57 BPM

## 2019-03-18 PROCEDURE — 93010 ELECTROCARDIOGRAM REPORT: CPT | Performed by: INTERNAL MEDICINE

## 2019-04-02 ENCOUNTER — OFFICE VISIT (OUTPATIENT)
Dept: FAMILY MEDICINE CLINIC | Facility: CLINIC | Age: 74
End: 2019-04-02
Payer: MEDICARE

## 2019-04-02 VITALS
WEIGHT: 146.6 LBS | HEIGHT: 66 IN | OXYGEN SATURATION: 98 % | TEMPERATURE: 98.1 F | DIASTOLIC BLOOD PRESSURE: 92 MMHG | BODY MASS INDEX: 23.56 KG/M2 | SYSTOLIC BLOOD PRESSURE: 150 MMHG | HEART RATE: 88 BPM | RESPIRATION RATE: 20 BRPM

## 2019-04-02 DIAGNOSIS — Z85.41 HISTORY OF MALIGNANT NEOPLASM OF CERVIX UTERI: ICD-10-CM

## 2019-04-02 DIAGNOSIS — M77.9 BONE SPUR: ICD-10-CM

## 2019-04-02 DIAGNOSIS — R03.0 WHITE COAT SYNDROME WITH HIGH BLOOD PRESSURE BUT WITHOUT HYPERTENSION: Primary | ICD-10-CM

## 2019-04-02 DIAGNOSIS — Z72.0 TOBACCO ABUSE: ICD-10-CM

## 2019-04-02 DIAGNOSIS — Z00.00 MEDICARE ANNUAL WELLNESS VISIT, SUBSEQUENT: ICD-10-CM

## 2019-04-02 PROBLEM — R10.9 CHRONIC ABDOMINAL PAIN: Status: RESOLVED | Noted: 2018-11-20 | Resolved: 2019-04-02

## 2019-04-02 PROBLEM — G89.29 CHRONIC ABDOMINAL PAIN: Status: RESOLVED | Noted: 2018-11-20 | Resolved: 2019-04-02

## 2019-04-02 PROCEDURE — 99213 OFFICE O/P EST LOW 20 MIN: CPT | Performed by: INTERNAL MEDICINE

## 2019-04-02 PROCEDURE — G0439 PPPS, SUBSEQ VISIT: HCPCS | Performed by: INTERNAL MEDICINE

## 2019-04-02 RX ORDER — HYDROCHLOROTHIAZIDE 12.5 MG/1
12.5 TABLET ORAL DAILY
COMMUNITY
End: 2019-08-27 | Stop reason: ALTCHOICE

## 2019-05-16 ENCOUNTER — TELEPHONE (OUTPATIENT)
Dept: FAMILY MEDICINE CLINIC | Facility: CLINIC | Age: 74
End: 2019-05-16

## 2019-05-16 DIAGNOSIS — R53.83 FATIGUE, UNSPECIFIED TYPE: Primary | ICD-10-CM

## 2019-05-16 DIAGNOSIS — M25.50 ARTHRALGIA, UNSPECIFIED JOINT: ICD-10-CM

## 2019-05-18 LAB — HBA1C MFR BLD HPLC: 5.6 %

## 2019-05-21 DIAGNOSIS — D58.2 ELEVATED HEMOGLOBIN (HCC): Primary | ICD-10-CM

## 2019-05-21 DIAGNOSIS — D75.1 ERYTHROCYTOSIS: ICD-10-CM

## 2019-05-22 ENCOUNTER — TELEPHONE (OUTPATIENT)
Dept: FAMILY MEDICINE CLINIC | Facility: CLINIC | Age: 74
End: 2019-05-22

## 2019-05-28 ENCOUNTER — HOSPITAL ENCOUNTER (EMERGENCY)
Facility: HOSPITAL | Age: 74
Discharge: HOME | End: 2019-05-28
Attending: EMERGENCY MEDICINE | Admitting: EMERGENCY MEDICINE
Payer: MEDICARE

## 2019-05-28 ENCOUNTER — APPOINTMENT (EMERGENCY)
Dept: CT IMAGING | Facility: HOSPITAL | Age: 74
End: 2019-05-28
Payer: MEDICARE

## 2019-05-28 VITALS
WEIGHT: 148.37 LBS | OXYGEN SATURATION: 96 % | HEART RATE: 60 BPM | TEMPERATURE: 98.5 F | SYSTOLIC BLOOD PRESSURE: 157 MMHG | DIASTOLIC BLOOD PRESSURE: 90 MMHG | BODY MASS INDEX: 23.95 KG/M2 | RESPIRATION RATE: 18 BRPM

## 2019-05-28 DIAGNOSIS — R10.2 PELVIC PAIN: ICD-10-CM

## 2019-05-28 DIAGNOSIS — J44.9 COPD (CHRONIC OBSTRUCTIVE PULMONARY DISEASE) (HCC): ICD-10-CM

## 2019-05-28 DIAGNOSIS — M79.10 MYALGIA: Primary | ICD-10-CM

## 2019-05-28 LAB
ALBUMIN SERPL BCP-MCNC: 3.6 G/DL (ref 3.5–5)
ALP SERPL-CCNC: 90 U/L (ref 46–116)
ALT SERPL W P-5'-P-CCNC: 21 U/L (ref 12–78)
ANION GAP SERPL CALCULATED.3IONS-SCNC: 8 MMOL/L (ref 4–13)
AST SERPL W P-5'-P-CCNC: 16 U/L (ref 5–45)
BASOPHILS # BLD AUTO: 0.03 THOUSANDS/ΜL (ref 0–0.1)
BASOPHILS NFR BLD AUTO: 1 % (ref 0–1)
BILIRUB SERPL-MCNC: 0.46 MG/DL (ref 0.2–1)
BILIRUB UR QL STRIP: NEGATIVE
BUN SERPL-MCNC: 13 MG/DL (ref 5–25)
CALCIUM SERPL-MCNC: 9.6 MG/DL (ref 8.3–10.1)
CHLORIDE SERPL-SCNC: 105 MMOL/L (ref 100–108)
CK SERPL-CCNC: 49 U/L (ref 26–192)
CLARITY UR: CLEAR
CO2 SERPL-SCNC: 26 MMOL/L (ref 21–32)
COLOR UR: YELLOW
CREAT SERPL-MCNC: 1.01 MG/DL (ref 0.6–1.3)
EOSINOPHIL # BLD AUTO: 0.06 THOUSAND/ΜL (ref 0–0.61)
EOSINOPHIL NFR BLD AUTO: 1 % (ref 0–6)
ERYTHROCYTE [DISTWIDTH] IN BLOOD BY AUTOMATED COUNT: 13.1 % (ref 11.6–15.1)
ERYTHROCYTE [SEDIMENTATION RATE] IN BLOOD: 1 MM/HOUR (ref 0–20)
GFR SERPL CREATININE-BSD FRML MDRD: 56 ML/MIN/1.73SQ M
GLUCOSE SERPL-MCNC: 102 MG/DL (ref 65–140)
GLUCOSE UR STRIP-MCNC: NEGATIVE MG/DL
HCT VFR BLD AUTO: 49.4 % (ref 34.8–46.1)
HGB BLD-MCNC: 16.4 G/DL (ref 11.5–15.4)
HGB UR QL STRIP.AUTO: NEGATIVE
IMM GRANULOCYTES # BLD AUTO: 0.02 THOUSAND/UL (ref 0–0.2)
IMM GRANULOCYTES NFR BLD AUTO: 0 % (ref 0–2)
KETONES UR STRIP-MCNC: NEGATIVE MG/DL
LEUKOCYTE ESTERASE UR QL STRIP: NEGATIVE
LIPASE SERPL-CCNC: 87 U/L (ref 73–393)
LYMPHOCYTES # BLD AUTO: 0.82 THOUSANDS/ΜL (ref 0.6–4.47)
LYMPHOCYTES NFR BLD AUTO: 14 % (ref 14–44)
MAGNESIUM SERPL-MCNC: 1.7 MG/DL (ref 1.6–2.6)
MCH RBC QN AUTO: 31.3 PG (ref 26.8–34.3)
MCHC RBC AUTO-ENTMCNC: 33.2 G/DL (ref 31.4–37.4)
MCV RBC AUTO: 94 FL (ref 82–98)
MONOCYTES # BLD AUTO: 0.33 THOUSAND/ΜL (ref 0.17–1.22)
MONOCYTES NFR BLD AUTO: 6 % (ref 4–12)
NEUTROPHILS # BLD AUTO: 4.75 THOUSANDS/ΜL (ref 1.85–7.62)
NEUTS SEG NFR BLD AUTO: 78 % (ref 43–75)
NITRITE UR QL STRIP: NEGATIVE
NRBC BLD AUTO-RTO: 0 /100 WBCS
PH UR STRIP.AUTO: 6 [PH]
PHOSPHATE SERPL-MCNC: 3.6 MG/DL (ref 2.3–4.1)
PLATELET # BLD AUTO: 217 THOUSANDS/UL (ref 149–390)
PMV BLD AUTO: 9.7 FL (ref 8.9–12.7)
POTASSIUM SERPL-SCNC: 3.5 MMOL/L (ref 3.5–5.3)
PROT SERPL-MCNC: 7.2 G/DL (ref 6.4–8.2)
PROT UR STRIP-MCNC: NEGATIVE MG/DL
RBC # BLD AUTO: 5.24 MILLION/UL (ref 3.81–5.12)
SODIUM SERPL-SCNC: 139 MMOL/L (ref 136–145)
SP GR UR STRIP.AUTO: <=1.005 (ref 1–1.03)
TROPONIN I SERPL-MCNC: <0.02 NG/ML
TSH SERPL DL<=0.05 MIU/L-ACNC: 0.87 UIU/ML (ref 0.36–3.74)
UROBILINOGEN UR QL STRIP.AUTO: 0.2 E.U./DL
WBC # BLD AUTO: 6.01 THOUSAND/UL (ref 4.31–10.16)

## 2019-05-28 PROCEDURE — 83735 ASSAY OF MAGNESIUM: CPT | Performed by: EMERGENCY MEDICINE

## 2019-05-28 PROCEDURE — 81003 URINALYSIS AUTO W/O SCOPE: CPT | Performed by: EMERGENCY MEDICINE

## 2019-05-28 PROCEDURE — 85652 RBC SED RATE AUTOMATED: CPT | Performed by: EMERGENCY MEDICINE

## 2019-05-28 PROCEDURE — 85025 COMPLETE CBC W/AUTO DIFF WBC: CPT | Performed by: EMERGENCY MEDICINE

## 2019-05-28 PROCEDURE — 80053 COMPREHEN METABOLIC PANEL: CPT | Performed by: EMERGENCY MEDICINE

## 2019-05-28 PROCEDURE — 84443 ASSAY THYROID STIM HORMONE: CPT | Performed by: EMERGENCY MEDICINE

## 2019-05-28 PROCEDURE — 83690 ASSAY OF LIPASE: CPT | Performed by: EMERGENCY MEDICINE

## 2019-05-28 PROCEDURE — 71260 CT THORAX DX C+: CPT

## 2019-05-28 PROCEDURE — 99284 EMERGENCY DEPT VISIT MOD MDM: CPT | Performed by: EMERGENCY MEDICINE

## 2019-05-28 PROCEDURE — 99285 EMERGENCY DEPT VISIT HI MDM: CPT

## 2019-05-28 PROCEDURE — 93005 ELECTROCARDIOGRAM TRACING: CPT

## 2019-05-28 PROCEDURE — 36415 COLL VENOUS BLD VENIPUNCTURE: CPT | Performed by: EMERGENCY MEDICINE

## 2019-05-28 PROCEDURE — 82550 ASSAY OF CK (CPK): CPT | Performed by: EMERGENCY MEDICINE

## 2019-05-28 PROCEDURE — 84484 ASSAY OF TROPONIN QUANT: CPT | Performed by: EMERGENCY MEDICINE

## 2019-05-28 PROCEDURE — 74177 CT ABD & PELVIS W/CONTRAST: CPT

## 2019-05-28 PROCEDURE — 84100 ASSAY OF PHOSPHORUS: CPT | Performed by: EMERGENCY MEDICINE

## 2019-05-28 RX ADMIN — IOHEXOL 100 ML: 350 INJECTION, SOLUTION INTRAVENOUS at 12:12

## 2019-05-29 ENCOUNTER — TELEPHONE (OUTPATIENT)
Dept: FAMILY MEDICINE CLINIC | Facility: CLINIC | Age: 74
End: 2019-05-29

## 2019-05-29 DIAGNOSIS — R53.1 GENERALIZED WEAKNESS: Primary | ICD-10-CM

## 2019-06-03 LAB
ATRIAL RATE: 63 BPM
P AXIS: 90 DEGREES
PR INTERVAL: 164 MS
QRS AXIS: 60 DEGREES
QRSD INTERVAL: 92 MS
QT INTERVAL: 440 MS
QTC INTERVAL: 450 MS
T WAVE AXIS: 77 DEGREES
VENTRICULAR RATE: 63 BPM

## 2019-06-03 PROCEDURE — 93010 ELECTROCARDIOGRAM REPORT: CPT | Performed by: INTERNAL MEDICINE

## 2019-08-27 ENCOUNTER — OFFICE VISIT (OUTPATIENT)
Dept: GYNECOLOGIC ONCOLOGY | Facility: CLINIC | Age: 74
End: 2019-08-27
Payer: MEDICARE

## 2019-08-27 VITALS
WEIGHT: 145 LBS | SYSTOLIC BLOOD PRESSURE: 144 MMHG | DIASTOLIC BLOOD PRESSURE: 80 MMHG | BODY MASS INDEX: 23.3 KG/M2 | HEART RATE: 68 BPM | RESPIRATION RATE: 16 BRPM | HEIGHT: 66 IN

## 2019-08-27 DIAGNOSIS — Z85.41 ENCOUNTER FOR FOLLOW-UP SURVEILLANCE OF CERVICAL CANCER: Primary | ICD-10-CM

## 2019-08-27 DIAGNOSIS — Z08 ENCOUNTER FOR FOLLOW-UP SURVEILLANCE OF CERVICAL CANCER: Primary | ICD-10-CM

## 2019-08-27 DIAGNOSIS — R10.2 PELVIC PAIN: ICD-10-CM

## 2019-08-27 PROCEDURE — 99213 OFFICE O/P EST LOW 20 MIN: CPT | Performed by: OBSTETRICS & GYNECOLOGY

## 2019-08-27 NOTE — PROGRESS NOTES
Assessment/Plan:    Problem List Items Addressed This Visit        Other    Encounter for follow-up surveillance of cervical cancer - Primary     Patient completed curative intent chemo radiotherapy for stage I B2 cervical cancer in August of 2014  She has remained with no evidence of disease  She has chronic fatigue and recurrent episodes of abdominal pain  This has been extensively worked up by means of cross-sectional imaging  I have personally reviewed CT scans from December 2018 and May of 2019, there is no evidence of abnormalities to suggest cancer recurrence or any other acute findings to explain pelvic pain  Patient reassured  At this time, risk of cervical cancer recurrence is negligible  We plan to see her back on a yearly basis for routine surveillance  I encouraged her to contact me if she develops any new symptoms  Pelvic pain     Chronic  Not related to history of cervical cancer  No organic pathology identified via recent cross-sectional imaging  Patient reassured  I have encouraged her to discuss with primary care providers possible GI evaluation if/as indicated  CHIEF COMPLAINT:     Surveillance for history of cervical cancer, chronic pelvic pain  Patient ID: Dolores Vallejo is a 69 y o  female  HPI  Patient presents for follow-up for history of cervical cancer  She has stage I B2 cervical cancer treated with curative intent chemo radiotherapy in August of 2014  She has remained with no evidence of disease  Multiple instances of acute on chronic pelvic pain requiring assessment in the emergency department, etc   CT scans have been negative  Denies vaginal bleeding, drainage or discharge  Normal bowel bladder function  Denies nausea vomiting or other symptoms    The following portions of the patient's history were reviewed and updated as appropriate: allergies, current medications, past family history, past medical history, past social history, past surgical history and problem list     Review of Systems  As above  Anxious  Fatigue  Twelve point review of systems otherwise unremarkable  No current outpatient medications on file  No current facility-administered medications for this visit  Objective:    Blood pressure 144/80, pulse 68, resp  rate 16, height 5' 6" (1 676 m), weight 65 8 kg (145 lb), not currently breastfeeding  Body mass index is 23 4 kg/m²  Body surface area is 1 74 meters squared  Physical Exam   Constitutional: She is oriented to person, place, and time  She appears well-developed and well-nourished  HENT:   Head: Normocephalic and atraumatic  Neck: Normal range of motion  Neck supple  No thyromegaly present  Pulmonary/Chest: Effort normal    Abdominal: Soft  She exhibits no distension and no mass  There is no rebound  Genitourinary:   Genitourinary Comments: The external female genitalia is normal  The bartholin's, uretheral and skenes glands are normal  The urethral meatus is normal (midline with no lesions)  Anus without fissure or lesion  Speculum exam reveals agglutination of proximal vagina, atrophy but otherwise grossly normal vagina  No masses, lesions, discharge or bleeding  No significant cystocele or rectocele noted  Bimanual exam notes a surgical absent cervix, uterus and adnexal structures  No masses or fullness  Bladder is without fullness, mass or tenderness  Musculoskeletal: Normal range of motion  She exhibits no edema  Lymphadenopathy:     She has no cervical adenopathy  Neurological: She is alert and oriented to person, place, and time  Skin: Skin is warm and dry  No rash noted  Psychiatric: She has a normal mood and affect  Her behavior is normal    Vitals reviewed  Study Result     CT CHEST, ABDOMEN AND PELVIS WITH IV CONTRAST     INDICATION:   79-year-old woman presenting with abdominal pain and weakness    History of cervical cancer      COMPARISON:  Abdomen pelvis CT 12/19/2018  No prior chest CTs available      TECHNIQUE: CT examination of the chest, abdomen and pelvis was performed  Axial, sagittal, and coronal 2D reformatted images were created from the source data and submitted for interpretation      Radiation dose length product (DLP) for this visit:  535 mGy-cm   This examination, like all CT scans performed in the [facility], was performed utilizing techniques to minimize radiation dose exposure, including the use of iterative   reconstruction and automated exposure control      IV Contrast:  100 mL of iohexol (OMNIPAQUE)  Enteric Contrast: Enteric contrast was not administered      FINDINGS:     CHEST     LUNGS:  Moderate centrilobular emphysema  No focal consolidation or suspicious pulmonary nodules     There is no tracheal or endobronchial lesion      PLEURA:  Unremarkable      HEART/GREAT VESSELS:  Unremarkable for patient's age      MEDIASTINUM AND MURALI:  No adenopathy  Small hiatal hernia is unchanged      CHEST WALL AND LOWER NECK:   Unremarkable      ABDOMEN     LIVER/BILIARY TREE:  Unremarkable      GALLBLADDER:  No calcified gallstones  No pericholecystic inflammatory change      SPLEEN:  Unremarkable      PANCREAS:  Unremarkable      ADRENAL GLANDS:  Unremarkable      KIDNEYS/URETERS:  Right kidney upper pole exophytic cyst is unchanged  Additional subcentimeter renal hypodensities are too small to characterize, but statistically benign representing additional cysts  No hydronephrosis      STOMACH AND BOWEL:  There is colonic diverticulosis without evidence of acute diverticulitis      APPENDIX:  No findings to suggest appendicitis      ABDOMINOPELVIC CAVITY:  No ascites or free intraperitoneal air  No lymphadenopathy      VESSELS:  Atherosclerotic changes are present  No evidence of aneurysm  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      PELVIS     REPRODUCTIVE ORGANS:  Status post hysterectomy    Soft tissue tract extending from the vaginal cuff to the rectum is unchanged        URINARY BLADDER:  Unremarkable      ABDOMINAL WALL/INGUINAL REGIONS:  Unremarkable      OSSEOUS STRUCTURES:  No acute fracture or destructive osseous lesion      IMPRESSION:  1  No acute inflammatory process in abdomen or pelvis  2   No evidence of metastatic disease within chest, abdomen or pelvis  3   Moderate centrilobular emphysema  4   Unchanged soft tissue tract between the vaginal cuff and rectum, possibly related to prior surgery or radiation for cervical cancer      Workstation performed: [id number]        I have personally reviewed images from CT scan above from May 2019       MD Lefty, [address] [place] [address]  8/27/2019  11:37 AM

## 2019-08-27 NOTE — ASSESSMENT & PLAN NOTE
Chronic  Not related to history of cervical cancer  No organic pathology identified via recent cross-sectional imaging  Patient reassured  I have encouraged her to discuss with primary care providers possible GI evaluation if/as indicated

## 2019-08-27 NOTE — ASSESSMENT & PLAN NOTE
Patient completed curative intent chemo radiotherapy for stage I B2 cervical cancer in August of 2014  She has remained with no evidence of disease  She has chronic fatigue and recurrent episodes of abdominal pain  This has been extensively worked up by means of cross-sectional imaging  I have personally reviewed CT scans from December 2018 and May of 2019, there is no evidence of abnormalities to suggest cancer recurrence or any other acute findings to explain pelvic pain  Patient reassured  At this time, risk of cervical cancer recurrence is negligible  We plan to see her back on a yearly basis for routine surveillance  I encouraged her to contact me if she develops any new symptoms

## 2019-09-26 PROBLEM — R19.7 DIARRHEA: Status: ACTIVE | Noted: 2019-09-26

## 2019-10-07 PROCEDURE — 88305 TISSUE EXAM BY PATHOLOGIST: CPT | Performed by: PATHOLOGY

## 2019-10-08 ENCOUNTER — LAB REQUISITION (OUTPATIENT)
Dept: LAB | Facility: HOSPITAL | Age: 74
End: 2019-10-08
Payer: MEDICARE

## 2019-10-08 DIAGNOSIS — D12.3 BENIGN NEOPLASM OF TRANSVERSE COLON: ICD-10-CM

## 2019-10-08 DIAGNOSIS — R19.7 DIARRHEA, UNSPECIFIED: ICD-10-CM

## 2019-10-29 ENCOUNTER — OFFICE VISIT (OUTPATIENT)
Dept: FAMILY MEDICINE CLINIC | Facility: CLINIC | Age: 74
End: 2019-10-29
Payer: MEDICARE

## 2019-10-29 VITALS
DIASTOLIC BLOOD PRESSURE: 84 MMHG | OXYGEN SATURATION: 98 % | HEART RATE: 78 BPM | WEIGHT: 143.6 LBS | SYSTOLIC BLOOD PRESSURE: 142 MMHG | BODY MASS INDEX: 23.08 KG/M2 | TEMPERATURE: 98.1 F | HEIGHT: 66 IN | RESPIRATION RATE: 18 BRPM

## 2019-10-29 DIAGNOSIS — G89.29 CHRONIC ABDOMINAL PAIN: Primary | ICD-10-CM

## 2019-10-29 DIAGNOSIS — R03.0 WHITE COAT SYNDROME WITH HIGH BLOOD PRESSURE BUT WITHOUT HYPERTENSION: ICD-10-CM

## 2019-10-29 DIAGNOSIS — R10.9 CHRONIC ABDOMINAL PAIN: Primary | ICD-10-CM

## 2019-10-29 DIAGNOSIS — K58.0 IRRITABLE BOWEL SYNDROME WITH DIARRHEA: ICD-10-CM

## 2019-10-29 DIAGNOSIS — F41.1 GENERALIZED ANXIETY DISORDER: ICD-10-CM

## 2019-10-29 PROCEDURE — 99214 OFFICE O/P EST MOD 30 MIN: CPT | Performed by: INTERNAL MEDICINE

## 2019-10-29 RX ORDER — HYDROCHLOROTHIAZIDE 12.5 MG/1
12.5 CAPSULE, GELATIN COATED ORAL DAILY
COMMUNITY
End: 2019-10-29 | Stop reason: SDUPTHER

## 2019-10-29 RX ORDER — HYDROCHLOROTHIAZIDE 12.5 MG/1
12.5 CAPSULE, GELATIN COATED ORAL DAILY
Qty: 30 CAPSULE | Refills: 2 | Status: SHIPPED | OUTPATIENT
Start: 2019-10-29 | End: 2020-03-16 | Stop reason: SDUPTHER

## 2019-10-29 RX ORDER — DICYCLOMINE HYDROCHLORIDE 10 MG/1
10 CAPSULE ORAL DAILY
COMMUNITY
End: 2019-10-29 | Stop reason: SDUPTHER

## 2019-10-29 RX ORDER — ESCITALOPRAM OXALATE 5 MG/1
5 TABLET ORAL DAILY
Qty: 30 TABLET | Refills: 1 | Status: SHIPPED | OUTPATIENT
Start: 2019-10-29 | End: 2020-05-19 | Stop reason: ALTCHOICE

## 2019-10-29 RX ORDER — ATORVASTATIN CALCIUM 20 MG/1
20 TABLET, FILM COATED ORAL DAILY
COMMUNITY
Start: 2018-08-28 | End: 2019-10-29

## 2019-10-29 RX ORDER — DICYCLOMINE HYDROCHLORIDE 10 MG/1
10 CAPSULE ORAL DAILY
Qty: 30 CAPSULE | Refills: 2 | Status: SHIPPED | OUTPATIENT
Start: 2019-10-29 | End: 2020-05-19 | Stop reason: ALTCHOICE

## 2019-10-29 NOTE — PROGRESS NOTES
Assessment/Plan:   Diagnoses and all orders for this visit:    Chronic abdominal pain  -     dicyclomine (BENTYL) 10 mg capsule; Take 1 capsule (10 mg total) by mouth daily    Irritable bowel syndrome with diarrhea  -     dicyclomine (BENTYL) 10 mg capsule; Take 1 capsule (10 mg total) by mouth daily    We discussed her symptoms and I gave her a worksheet with the low FODMAP diet to follow  Generalized anxiety disorder  -     escitalopram (LEXAPRO) 5 mg tablet; Take 1 tablet (5 mg total) by mouth daily  Had been on Prozac in the past  We will start this and have her return with close follow up  White coat syndrome with high blood pressure but without hypertension  -     hydrochlorothiazide (MICROZIDE) 12 5 mg capsule; Take 1 capsule (12 5 mg total) by mouth daily  Home readings are well controlled  Other orders  -     Discontinue: atorvastatin (LIPITOR) 20 mg tablet; Take 20 mg by mouth daily  -     Discontinue: hydrochlorothiazide (MICROZIDE) 12 5 mg capsule; Take 12 5 mg by mouth daily  -     Discontinue: dicyclomine (BENTYL) 10 mg capsule; Take 10 mg by mouth daily        Subjective:      Patient ID: Doug Ramos is a 69 y o  female  Guerry Massing is here today with continuing complaints of not feeling well  Reports chronic abdominal pain, dizziness, diarrhea in the AM, and anxiety  She feels this all started after treatment of her cancer  She was seen by colorectal and Cscope was found to be normal  Reports symptoms are concentrated in the AM  Reports 4-5 bowel movements  She reports she will have sharp abdominal pain in the lower abdomen and will take Bentyl  This will help but does not take this again  The following portions of the patient's history were reviewed and updated as appropriate: allergies, current medications, past family history, past medical history, past social history, past surgical history and problem list     Review of Systems   Constitutional: Positive for fatigue  Negative for chills, fever and unexpected weight change  Respiratory: Negative for cough, chest tightness and shortness of breath  Cardiovascular: Negative for chest pain  Gastrointestinal: Positive for abdominal pain and diarrhea  Negative for nausea and vomiting  Genitourinary: Negative for difficulty urinating  Neurological: Positive for dizziness and light-headedness  Negative for numbness and headaches  Psychiatric/Behavioral: Negative for dysphoric mood and sleep disturbance  The patient is nervous/anxious  Objective:      /84   Pulse 78   Temp 98 1 °F (36 7 °C)   Resp 18   Ht 5' 6" (1 676 m)   Wt 65 1 kg (143 lb 9 6 oz)   SpO2 98%   BMI 23 18 kg/m²          Physical Exam   Constitutional: She is oriented to person, place, and time  She appears well-developed and well-nourished  No distress  HENT:   Head: Normocephalic and atraumatic  Eyes: Conjunctivae and EOM are normal  Right eye exhibits no discharge  Left eye exhibits no discharge  No scleral icterus  Neck: Normal range of motion  Cardiovascular: Normal rate, regular rhythm and normal heart sounds  No murmur heard  Pulmonary/Chest: Effort normal and breath sounds normal  No respiratory distress  She has no wheezes  Abdominal: Soft  Bowel sounds are normal  There is no tenderness  Lymphadenopathy:     She has no cervical adenopathy  Neurological: She is alert and oriented to person, place, and time  Skin: Skin is warm and dry  She is not diaphoretic  No erythema  Psychiatric: She has a normal mood and affect  Her speech is normal and behavior is normal  Judgment and thought content normal    Vitals reviewed

## 2019-12-10 ENCOUNTER — OFFICE VISIT (OUTPATIENT)
Dept: FAMILY MEDICINE CLINIC | Facility: CLINIC | Age: 74
End: 2019-12-10
Payer: MEDICARE

## 2019-12-10 VITALS
DIASTOLIC BLOOD PRESSURE: 86 MMHG | TEMPERATURE: 97.7 F | WEIGHT: 144.6 LBS | RESPIRATION RATE: 20 BRPM | OXYGEN SATURATION: 97 % | HEART RATE: 80 BPM | SYSTOLIC BLOOD PRESSURE: 140 MMHG | HEIGHT: 66 IN | BODY MASS INDEX: 23.24 KG/M2

## 2019-12-10 DIAGNOSIS — R10.9 CHRONIC ABDOMINAL PAIN: Primary | ICD-10-CM

## 2019-12-10 DIAGNOSIS — G89.29 CHRONIC ABDOMINAL PAIN: Primary | ICD-10-CM

## 2019-12-10 DIAGNOSIS — R03.0 WHITE COAT SYNDROME WITH HIGH BLOOD PRESSURE BUT WITHOUT HYPERTENSION: ICD-10-CM

## 2019-12-10 DIAGNOSIS — F41.1 GENERALIZED ANXIETY DISORDER: ICD-10-CM

## 2019-12-10 PROCEDURE — 99214 OFFICE O/P EST MOD 30 MIN: CPT | Performed by: INTERNAL MEDICINE

## 2019-12-10 RX ORDER — HYOSCYAMINE SULFATE 0.125 MG
0.12 TABLET ORAL EVERY 6 HOURS PRN
Qty: 30 TABLET | Refills: 0 | Status: SHIPPED | OUTPATIENT
Start: 2019-12-10 | End: 2020-05-19 | Stop reason: ALTCHOICE

## 2019-12-10 NOTE — PROGRESS NOTES
Assessment/Plan:     Diagnoses and all orders for this visit:    Chronic abdominal pain  -     hyoscyamine (ANASPAZ,LEVSIN) 0 125 MG tablet; Take 1 tablet (0 125 mg total) by mouth every 6 (six) hours as needed for cramping  We discussed other options  Advised to stick to a LOW FODMAP diet  I am going to have her first try taking the Bentyl at bedtime to see if it helps morning symptoms  She can do a trial of hyoscyamine as well  We also discussed this may be secondary to anxiety which is not controlled and we could also increase the Lexapro if none of the above helps  Generalized anxiety disorder  Will remain on Lexapro  White coat syndrome with high blood pressure but without hypertension    Stable  Home readings are controlled  Subjective:      Patient ID: Marlene Alcantara is a 69 y o  female  Tatyana Verma is here today for a follow up  Reports generally doing better in terms of her anxiety  She has been taking the Lexapro as prescribed without issues  She still reports chronic abdominal pain in the morning with diarrhea until about 11 am  She does take the Bentyl then but notes only minimal relief  She has had evaluation with GI including Cscope testing which was fine  The following portions of the patient's history were reviewed and updated as appropriate: allergies, current medications, past family history, past medical history, past social history, past surgical history and problem list     Review of Systems   Constitutional: Negative for chills, fever and unexpected weight change  Respiratory: Negative for cough, chest tightness and shortness of breath  Cardiovascular: Negative for chest pain  Gastrointestinal: Positive for abdominal pain and diarrhea  Negative for nausea and vomiting  Musculoskeletal: Negative for myalgias  Neurological: Negative for dizziness, numbness and headaches  Psychiatric/Behavioral: Negative for dysphoric mood and sleep disturbance   The patient is nervous/anxious  Objective:      /86   Pulse 80   Temp 97 7 °F (36 5 °C)   Resp 20   Ht 5' 6" (1 676 m)   Wt 65 6 kg (144 lb 9 6 oz)   SpO2 97%   BMI 23 34 kg/m²          Physical Exam   Constitutional: She is oriented to person, place, and time  She appears well-developed and well-nourished  No distress  HENT:   Head: Normocephalic and atraumatic  Eyes: Conjunctivae and EOM are normal  Right eye exhibits no discharge  Left eye exhibits no discharge  No scleral icterus  Neck: Normal range of motion  Cardiovascular: Normal rate, regular rhythm and normal heart sounds  No murmur heard  Pulmonary/Chest: Effort normal and breath sounds normal  No respiratory distress  She has no wheezes  Abdominal: Soft  Bowel sounds are normal  There is no tenderness  There is no guarding  Musculoskeletal: Normal range of motion  She exhibits no edema  Lymphadenopathy:     She has no cervical adenopathy  Neurological: She is alert and oriented to person, place, and time  Skin: Skin is warm and dry  She is not diaphoretic  Psychiatric: She has a normal mood and affect  Her speech is normal and behavior is normal  Judgment and thought content normal    Vitals reviewed

## 2020-01-09 ENCOUNTER — OFFICE VISIT (OUTPATIENT)
Dept: FAMILY MEDICINE CLINIC | Facility: CLINIC | Age: 75
End: 2020-01-09
Payer: MEDICARE

## 2020-01-09 VITALS
HEIGHT: 66 IN | TEMPERATURE: 97.7 F | DIASTOLIC BLOOD PRESSURE: 78 MMHG | OXYGEN SATURATION: 97 % | WEIGHT: 143.2 LBS | BODY MASS INDEX: 23.01 KG/M2 | HEART RATE: 82 BPM | SYSTOLIC BLOOD PRESSURE: 129 MMHG

## 2020-01-09 DIAGNOSIS — R10.9 CHRONIC ABDOMINAL PAIN: Primary | ICD-10-CM

## 2020-01-09 DIAGNOSIS — K59.1 FUNCTIONAL DIARRHEA: ICD-10-CM

## 2020-01-09 DIAGNOSIS — F41.1 GENERALIZED ANXIETY DISORDER: ICD-10-CM

## 2020-01-09 DIAGNOSIS — Z72.0 TOBACCO ABUSE: ICD-10-CM

## 2020-01-09 DIAGNOSIS — G89.29 CHRONIC ABDOMINAL PAIN: Primary | ICD-10-CM

## 2020-01-09 DIAGNOSIS — Z13.820 SCREENING FOR OSTEOPOROSIS: ICD-10-CM

## 2020-01-09 DIAGNOSIS — Z11.59 NEED FOR HEPATITIS C SCREENING TEST: ICD-10-CM

## 2020-01-09 PROCEDURE — 99204 OFFICE O/P NEW MOD 45 MIN: CPT | Performed by: FAMILY MEDICINE

## 2020-01-09 NOTE — PROGRESS NOTES
Subjective:    HPI  Kelli Calderon is a 69 y o  female here today for:  Chief Complaint   Patient presents with    Establish Care    GI Problem     ongoing issue for years    Loss of Consciousness     ---Above per clinical staff & reviewed  ---  HPI:    72-year-old female here to establish  Last Medicare physical was in April of 2019  Patient is here for ongoing GI problems  Patient has had multiple testing scans labs and office visits due to these issues  Patient states that she develops abdominal pain and that cut her in half, she then has diarrhea sweating cold sweats and at times passes out  Patient has been seen in the ER for this issue  Patient's previous PCP had discussed the FODMAP diet with her  Patient states that she has been following this and does state that symptoms seem to have decreased  States that she had an episode this past Saturday but prior to that the last episode was in May of 2019  Patient has had labs which has been negative  Patient has had CT scans of the abdomen which have been normal   Patient has had colonoscopy which has been unremarkable  Patient has an appointment with GI on the 30th of this month  It was recommended that she keep this appointment  Patient is here with her granddaughter  Granddaughter states that she does have these episodes she has witnessed these episodes she does get sweaty pass out and seems very sick at the time  The patient states that these episodes just stop  There is nothing that makes them, on or that improves them  Patient is a smoker smoking at least half a pack a day since age 17  Patient is not interested in medication at this time  Did discuss the importance of quitting smoking  Had long discussion with patient after normal exam regarding diet  Also recommended that patient cut down on diet green tea which she has been drinking a lot of  Recommended she drink water    Recommended she continue to follow the FODMAP diet as it seems to have improved her symptoms  Patient is active taking care of horses  Patient is to for hepatitis C screening and a DEXA scan  Orders were given for these tests  Patient is to follow up in April for her Medicare physical   All questions were answered  The following portions of the patient's history were reviewed and updated as appropriate: allergies, current medications, past family history, past medical history, past social history, past surgical history and problem list     Past Medical History:   Diagnosis Date    Anxiety     Atypical squamous cells cannot exclude high grade squamous intraepithelial lesion on cytologic smear of cervix (ASC-H)     Last Assessed 5/13/2014    Depression     Ovarian cyst        Past Surgical History:   Procedure Laterality Date    ANKLE FRACTURE SURGERY      Last Assessed 07/26/2016    ANKLE SURGERY      CERVICAL BIOPSY  W/ LOOP ELECTRODE EXCISION      DIAGNOSTIC LAPAROSCOPY      DILATION AND CURETTAGE OF UTERUS      ESOPHAGOGASTRODUODENOSCOPY N/A 9/12/2016    Procedure: ESOPHAGOGASTRODUODENOSCOPY (EGD); Surgeon: Blossom Colbert MD;  Location: [facility]; Service:     MA COLONOSCOPY FLX DX W/COLLJ SPEC WHEN PFRMD N/A 9/12/2016    Procedure: [place];  Surgeon: Blossom Colbert MD;  Location: [facility]; Service: Colorectal    SALPINGOOPHORECTOMY Left     TONSILLECTOMY         Social History     Socioeconomic History    Marital status:       Spouse name: None    Number of children: None    Years of education: 13    Highest education level: None   Occupational History    None   Social Needs    Financial resource strain: None    Food insecurity:     Worry: Never true     Inability: None    Transportation needs:     Medical: No     Non-medical: None   Tobacco Use    Smoking status: Current Some Day Smoker     Packs/day: 0 50     Years: 60 00     Pack years: 30 00     Types: Cigarettes    Smokeless tobacco: Never Used   Substance and Sexual Activity    Alcohol use: No    Drug use: Never    Sexual activity: Not Currently   Lifestyle    Physical activity:     Days per week: None     Minutes per session: None    Stress: None   Relationships    Social connections:     Talks on phone: None     Gets together: None     Attends [employer] witness service: None     Active member of club or organization: None     Attends meetings of clubs or organizations: None     Relationship status: None    Intimate partner violence:     Fear of current or ex partner: None     Emotionally abused: None     Physically abused: None     Forced sexual activity: None   Other Topics Concern    None   Social History Narrative    None       Current Outpatient Medications   Medication Sig Dispense Refill    dicyclomine (BENTYL) 10 mg capsule Take 1 capsule (10 mg total) by mouth daily 30 capsule 2    escitalopram (LEXAPRO) 5 mg tablet Take 1 tablet (5 mg total) by mouth daily 30 tablet 1    hydrochlorothiazide (MICROZIDE) 12 5 mg capsule Take 1 capsule (12 5 mg total) by mouth daily 30 capsule 2    hyoscyamine (ANASPAZ,LEVSIN) 0 125 MG tablet Take 1 tablet (0 125 mg total) by mouth every 6 (six) hours as needed for cramping (Patient not taking: Reported on 1/9/2020) 30 tablet 0     No current facility-administered medications for this visit  Review of Systems   Constitutional: Negative  HENT: Negative  Respiratory: Positive for cough  Cardiovascular: Negative  Gastrointestinal: Positive for abdominal pain, diarrhea and nausea  Negative for vomiting  Genitourinary: Negative  Musculoskeletal: Negative  Skin: Negative  Neurological: Positive for weakness and light-headedness  Psychiatric/Behavioral: The patient is nervous/anxious           Objective:    /78 (BP Location: Left arm, Patient Position: Sitting, Cuff Size: Standard)   Pulse 82   Temp 97 7 °F (36 5 °C) (Tympanic)   Ht 5' 6" (1 676 m)   Wt 65 kg (143 lb 3 2 oz)   SpO2 97% BMI 23 11 kg/m²   Wt Readings from Last 3 Encounters:   01/09/20 65 kg (143 lb 3 2 oz)   12/10/19 65 6 kg (144 lb 9 6 oz)   10/29/19 65 1 kg (143 lb 9 6 oz)     BP Readings from Last 3 Encounters:   01/09/20 129/78   12/10/19 140/86   10/29/19 142/84       Lab Results   Component Value Date    WBC 6 01 05/28/2019    HGB 16 4 (H) 05/28/2019    HCT 49 4 (H) 05/28/2019     05/28/2019    TRIG 111 12/11/2018    HDL 58 12/11/2018    ALT 21 05/28/2019    AST 16 05/28/2019     12/10/2015    K 3 5 05/28/2019     05/28/2019    CREATININE 1 01 05/28/2019    BUN 13 05/28/2019    CO2 26 05/28/2019    INR 0 99 04/25/2014    HGBA1C 5 6 05/18/2019       Physical Exam   Constitutional: She is oriented to person, place, and time  She appears well-developed and well-nourished  HENT:   Head: Normocephalic and atraumatic  Right Ear: External ear normal    Left Ear: External ear normal    Mouth/Throat: Oropharynx is clear and moist    Eyes: Pupils are equal, round, and reactive to light  Conjunctivae and EOM are normal    Neck: Normal range of motion  Neck supple  No thyromegaly present  No carotid bruits   Cardiovascular: Normal rate, regular rhythm and normal heart sounds  No murmur heard  Pulmonary/Chest: Effort normal and breath sounds normal  No respiratory distress  Abdominal: Soft  Bowel sounds are normal  She exhibits no distension  There is no tenderness  Musculoskeletal: Normal range of motion  Neurological: She is alert and oriented to person, place, and time  No cranial nerve deficit  Skin: Skin is warm  Capillary refill takes less than 2 seconds  Psychiatric: She has a normal mood and affect  Nursing note and vitals reviewed  Tobacco Cessation Counseling: Tobacco cessation counseling was provided  The patient is sincerely urged to quit consumption of tobacco  She is not ready to quit tobacco  Medication options and side effects of medication discussed   Patient agreed to medication  Tobacco use screening or tobacco cessation counseling was not performed due to other medical reasons  Pt refused medication at this time         Assessment/Plan:   Julián Turner was seen today for establish care, gi problem and loss of consciousness  Diagnoses and all orders for this visit:    Chronic abdominal pain    Generalized anxiety disorder    Functional diarrhea    Screening for osteoporosis  -     DXA bone density spine hip and pelvis; Future    Need for hepatitis C screening test  -     Hepatitis C antibody; Future    Tobacco abuse      Return in about 4 months (around 5/9/2020) for Medicare physical   Patient Instructions   Please keep food diary and symptoms diary to see if certain foods cause your symptoms  Please keep your appointment with GI  Please quit smoking  Low Carb Recommendations:  Please follow a low carbohydrate, high protein diet  [employer] is a good bhavna/computer program to keep food logs  Your meals should be less than 30 grams of carbohydrates  Your snacks should be less than 15 grams of carbohydrates  You should drink at least 64 ounces of water daily  You should walk at least 30 minutes daily  Medicare physical is due in April  I am giving you orders for Hepatitis C screening test and  DEXA scan that you can schedule  It was a pleasure meeting you today  Thank you for enrolling in [address] [place] [address]  Please follow the instructions below to securely access your online medical record  InMage Systemshart allows you to send messages to your doctor, view your test results, renew your prescriptions, schedule appointments, and more  [id number] uses Single Sign on (SSO) Technology for you to log in and access our [employer], including [employer]  No more remembering multiple user names and passwords!   We are going to guide you through, step by step, to help you set up your [employer] account which will provide access to your [employer] account  How Do I Sign Up? 1  In your Internet browser, go to [email or web address]      2  Click on the [facility] patient account and then click Dont have an                 Account? Create one now      3  Enter your demographic information and chose a user name (email address) and password  Think of one that is secure and easy to remember  Enter a Referral code if you have one (this is not your MyChart code ) Accept the Terms and Conditions and the Privacy Policy  4  Select your security questions that you will use to reset your password should you forget it  Click Submit  5  Enter your [employer] Activation Code exactly as it appears below  You will not need to use this code after you have completed the sign-up process  If you do not sign up before the expiration date, you must request a new code  [employer] Activation Code: [email or web address]  Expires: 1/23/2020 12:29 PM    6  Confirm your email address  An email confirmation was sent to you  Please open that email and click Confirm your Email   You should then be redirected to our [address] [place] [address] Single sign on page, where you will log on with the user name and password you created! Proceed to the Inside [employer] Icon to view your personal health information  Additional Information  If you have questions, you can e-mail patient  [email or web address] or call [phone] to talk to our customer support staff  Remember, [employer] is NOT to be used for urgent needs  For medical emergencies, dial 911

## 2020-01-09 NOTE — PATIENT INSTRUCTIONS
Please keep food diary and symptoms diary to see if certain foods cause your symptoms  Please keep your appointment with GI  Please quit smoking  Low Carb Recommendations:  Please follow a low carbohydrate, high protein diet  [employer] is a good bhavna/computer program to keep food logs  Your meals should be less than 30 grams of carbohydrates  Your snacks should be less than 15 grams of carbohydrates  You should drink at least 64 ounces of water daily  You should walk at least 30 minutes daily  Medicare physical is due in April  I am giving you orders for Hepatitis C screening test and  DEXA scan that you can schedule  It was a pleasure meeting you today  Thank you for enrolling in [address] [place], [place] [address]  Please follow the instructions below to securely access your online medical record  TimeCast allows you to send messages to your doctor, view your test results, renew your prescriptions, schedule appointments, and more  [id number] [facility] uses Single Sign on (SSO) Technology for you to log in and access our [employer]  [employer], including TimeCast  No more remembering multiple user names and passwords! We are going to guide you through, step by step, to help you set up your Janet Wong account which will provide access to your [employer] Solutionst account  How Do I Sign Up? 1  In your Internet browser, go to [email or web address]      2  Click on the [facility] patient account and then click Dont have an                 Account? Create one now      3  Enter your demographic information and chose a user name (email address) and password  Think of one that is secure and easy to remember  Enter a Referral code if you have one (this is not your InSync Softwarehart code ) Accept the Terms and Conditions and the Privacy Policy  4  Select your security questions that you will use to reset your password should you forget it  Click Submit     5  Enter your InSync Softwarehart Activation Code exactly as it appears below  You will not need to use this code after you have completed the sign-up process  If you do not sign up before the expiration date, you must request a new code  [employer] Activation Code: [email or web address]  Expires: 1/23/2020 12:29 PM    6  Confirm your email address  An email confirmation was sent to you  Please open that email and click Confirm your Email   You should then be redirected to our Daniella Edwards Single sign on page, where you will log on with the user name and password you created! Proceed to the [employer] Icon to view your personal health information  Additional Information  If you have questions, you can e-mail patient  [email or web address] or call [phone] to talk to our customer support staff  Remember, [employer] is NOT to be used for urgent needs  For medical emergencies, dial 911

## 2020-01-14 ENCOUNTER — OFFICE VISIT (OUTPATIENT)
Dept: GASTROENTEROLOGY | Facility: MEDICAL CENTER | Age: 75
End: 2020-01-14
Payer: MEDICARE

## 2020-01-14 VITALS
BODY MASS INDEX: 23.14 KG/M2 | DIASTOLIC BLOOD PRESSURE: 74 MMHG | HEART RATE: 84 BPM | SYSTOLIC BLOOD PRESSURE: 110 MMHG | HEIGHT: 66 IN | WEIGHT: 144 LBS | TEMPERATURE: 98 F

## 2020-01-14 DIAGNOSIS — K59.1 FUNCTIONAL DIARRHEA: ICD-10-CM

## 2020-01-14 DIAGNOSIS — F41.1 GENERALIZED ANXIETY DISORDER: ICD-10-CM

## 2020-01-14 DIAGNOSIS — K63.5 POLYP OF COLON, UNSPECIFIED PART OF COLON, UNSPECIFIED TYPE: Primary | ICD-10-CM

## 2020-01-14 DIAGNOSIS — G89.29 CHRONIC ABDOMINAL PAIN: ICD-10-CM

## 2020-01-14 DIAGNOSIS — R15.9 FULL INCONTINENCE OF FECES: ICD-10-CM

## 2020-01-14 DIAGNOSIS — R10.9 CHRONIC ABDOMINAL PAIN: ICD-10-CM

## 2020-01-14 DIAGNOSIS — K58.0 IRRITABLE BOWEL SYNDROME WITH DIARRHEA: ICD-10-CM

## 2020-01-14 PROCEDURE — 99204 OFFICE O/P NEW MOD 45 MIN: CPT | Performed by: INTERNAL MEDICINE

## 2020-01-14 RX ORDER — NORTRIPTYLINE HYDROCHLORIDE 25 MG/1
25 CAPSULE ORAL
Qty: 30 CAPSULE | Refills: 2 | Status: SHIPPED | OUTPATIENT
Start: 2020-01-14 | End: 2020-03-16 | Stop reason: SDUPTHER

## 2020-01-14 RX ORDER — CHOLESTYRAMINE 4 G/9G
1 POWDER, FOR SUSPENSION ORAL 2 TIMES DAILY WITH MEALS
Qty: 60 PACKET | Refills: 0 | Status: SHIPPED | OUTPATIENT
Start: 2020-01-14 | End: 2020-05-19 | Stop reason: ALTCHOICE

## 2020-01-14 NOTE — LETTER
January 17, 2020     Referral [address] [place] [id number]    Patient: Kali Colon   YOB: 1945   Date of Visit: 1/14/2020       Dear Dr Osborn Alpers:    Thank you for referring Kali Colon to me for evaluation  Below are my notes for this consultation  If you have questions, please do not hesitate to call me  I look forward to following your patient along with you  Sincerely,        Vlad Dotson MD        CC: MD Vlad Marquez MD  1/17/2020  6:05 PM  Sign at close encounter    Outpatient Consultation  Rachel LIND  Ph : [phone]  Fax : [phone]  Mobile : [phone]  Email : [email or web address]  Also available on [employer]      Philipana maria Kosandijoellentorsten 69 y o  female MRN: [id number]    PCP: Telma Jovel MD  Referring: Referral [address] [place], [address]    Kali Colon was seen in consultation  My recommendations are included  Please do not hesitate to contact me with any questions you may have  ASSESSMENT AND PLAN:      1  Abdominal pain which is intermittent and quite significant  It appears that she may have pain either due to adhesions or she could also have intermittent intussusception  I would recommend getting a CT enterography  2  Regarding her diarrhea I think she could have IBS with diarrhea  I would recommend using Xifaxan and probiotics  Also recommend using cholestyramine and have instructed her to space medications at least 2 hours before and after  Lactose-free diet  FODMAP diet  3  She also feels that her anxiety Dr  Her diarrhea  Recommend evaluation by psychiatry/psychology  Prescribed her amitriptyline 25 mg to see if that would help  Referred [facility] to [facility] for Psychiatry    4  She also has fecal incontinence for which I recommended for her to follow up with Dr Chastity Sutherland regarding possible InterStim placement  Diagnoses and all orders for this visit:    Polyp of colon, unspecified part of colon, unspecified type    Chronic abdominal pain  -     CT small bowel enterography; Future    Functional diarrhea  -     CT small bowel enterography; Future  -     cholestyramine (QUESTRAN) 4 g packet; Take 1 packet (4 g total) by mouth 2 (two) times a day with meals    Full incontinence of feces  -     cholestyramine (QUESTRAN) 4 g packet; Take 1 packet (4 g total) by mouth 2 (two) times a day with meals    Generalized anxiety disorder    Irritable bowel syndrome with diarrhea  -     rifaximin (XIFAXAN) 550 mg tablet; Take 1 tablet (550 mg total) by mouth every 8 (eight) hours for 14 days  -     nortriptyline (PAMELOR) 25 mg capsule; Take 1 capsule (25 mg total) by mouth daily at bedtime  -     ECG 12 lead; Future        ______________________________________________________________________    HPI:  Pain for 6 years in the periumbilical area  It started after she had her left ovary removed  The pain is severe and feels like she is being ripped in half and has cramping  She has sweating during the episode followed by a cold sensation  The pain occurs once or twice a month  She has diarrhea with that as well  She has tried a FODMAP diet and did not improve  She tried Bentyl and still had the episode  She has had diarrhea and has had accidents as well  She has seen Dr Sandra Mendez and Sujey Bill  EGD in 2016 : The esophagus appeared to be normal  There was no   evidence of Robertson's esophagus or esophagitis in the esophagus  Stomach: There was a 4 cm hiatus hernia visible in the stomach  Otherwise, the   stomach appeared to be normal  There was no evidence of ulcers or   erythematous mucosae in the stomach  Duodenum: The duodenum appeared to   be normal  There was no evidence of ulcers in the duodenum   Four random   cold forceps biopsies was taken from the 2nd portion of the duodenum   Colonoscopy 10/19 : polyps and radiation changes to the rectum  Random biopsies were negative  She has a h/o cervical cancer and had radiation for 12 weeks  PRIOR ENDOSCOPIC EVALUATION :     Endoscopy : yes    Colonoscopy : yes      REVIEW OF SYSTEMS:    CONSTITUTIONAL: Denies any fever, chills, rigors, and weight loss  HEENT: No earache or tinnitus  Denies hearing loss or visual disturbances  CARDIOVASCULAR: No chest pain or palpitations  RESPIRATORY: Denies any cough, hemoptysis, shortness of breath or dyspnea on exertion  GASTROINTESTINAL: As noted in the History of Present Illness  GENITOURINARY: No problems with urination  Denies any hematuria or dysuria  NEUROLOGIC: No dizziness or vertigo, denies headaches  MUSCULOSKELETAL: Denies any muscle or joint pain  SKIN: Denies skin rashes or itching  ENDOCRINE: Denies excessive thirst  Denies intolerance to heat or cold  PSYCHOSOCIAL: Denies depression or anxiety  Denies any recent memory loss  Historical Information   Past Medical History:   Diagnosis Date    Anxiety     Atypical squamous cells cannot exclude high grade squamous intraepithelial lesion on cytologic smear of cervix (ASC-H)     Last Assessed 5/13/2014    Depression     Ovarian cyst      Past Surgical History:   Procedure Laterality Date    ANKLE FRACTURE SURGERY      Last Assessed 07/26/2016    ANKLE SURGERY      CERVICAL BIOPSY  W/ LOOP ELECTRODE EXCISION      COLONOSCOPY  10/2019    DIAGNOSTIC LAPAROSCOPY      DILATION AND CURETTAGE OF UTERUS      ESOPHAGOGASTRODUODENOSCOPY N/A 9/12/2016    Procedure: ESOPHAGOGASTRODUODENOSCOPY (EGD); Surgeon: Zenon Romero MD;  Location: [facility]; Service:     WI COLONOSCOPY FLX DX W/COLLJ SPEC WHEN PFRMD N/A 9/12/2016    Procedure: Jarrett Scott;  Surgeon: Zenon Romero MD;  Location: [facility];   Service: Colorectal    SALPINGOOPHORECTOMY Left     TONSILLECTOMY      UPPER GASTROINTESTINAL ENDOSCOPY  2016     Social History   Social History     Substance and Sexual Activity   Alcohol Use No     Social History     Substance and Sexual Activity   Drug Use Never     Social History     Tobacco Use   Smoking Status Current Some Day Smoker    Packs/day: 0 50    Years: 60 00    Pack years: 30 00    Types: Cigarettes   Smokeless Tobacco Never Used     Family History   Problem Relation Age of Onset    Coronary artery disease Mother     No Known Problems Family        Meds/Allergies       Current Outpatient Medications:     dicyclomine (BENTYL) 10 mg capsule    escitalopram (LEXAPRO) 5 mg tablet    hydrochlorothiazide (MICROZIDE) 12 5 mg capsule    hyoscyamine (ANASPAZ,LEVSIN) 0 125 MG tablet    cholestyramine (QUESTRAN) 4 g packet    doxycycline (ADOXA) 100 MG tablet    nortriptyline (PAMELOR) 25 mg capsule    rifaximin (XIFAXAN) 550 mg tablet    Allergies   Allergen Reactions    Lisinopril      Other reaction(s): Other (See Comments)  Nose bleeds, passing out           Objective     Blood pressure 110/74, pulse 84, temperature 98 °F (36 7 °C), height 5' 6" (1 676 m), weight 65 3 kg (144 lb), not currently breastfeeding  Body mass index is 23 24 kg/m²  PHYSICAL EXAM:      Physical Exam   Constitutional: She is oriented to person, place, and time  Vital signs are normal  She appears well-developed and well-nourished  HENT:   Head: Normocephalic and atraumatic  Eyes: Pupils are equal, round, and reactive to light  Conjunctivae are normal  No scleral icterus  Neck: Normal range of motion  Cardiovascular: Normal rate, regular rhythm and normal heart sounds  Pulmonary/Chest: Effort normal and breath sounds normal  No respiratory distress  Abdominal: Soft  Normal appearance and bowel sounds are normal  She exhibits no distension, no ascites and no mass  There is no hepatosplenomegaly  There is tenderness in the periumbilical area  No hernia  Musculoskeletal: Normal range of motion  Lymphadenopathy:     She has no cervical adenopathy  Neurological: She is alert and oriented to person, place, and time  Skin: Skin is warm  Psychiatric: She has a normal mood and affect  Her behavior is normal  Thought content normal            Lab Results:     Lab Results   Component Value Date    WBC 6 01 05/28/2019    HGB 16 4 (H) 05/28/2019    HCT 49 4 (H) 05/28/2019    MCV 94 05/28/2019     05/28/2019       Lab Results   Component Value Date     12/10/2015    K 3 5 05/28/2019     05/28/2019    CO2 26 05/28/2019    ANIONGAP 18 12/10/2015    BUN 13 05/28/2019    CREATININE 1 01 05/28/2019    GLUCOSE 91 12/10/2015    CALCIUM 9 6 05/28/2019    AST 16 05/28/2019    ALT 21 05/28/2019    ALKPHOS 90 05/28/2019    PROT 8 0 12/10/2015    BILITOT 0 65 12/10/2015    EGFR 56 05/28/2019       Lab Results   Component Value Date    INR 0 99 04/25/2014    PROTIME 12 6 04/25/2014         Radiology Results:   No results found

## 2020-01-14 NOTE — LETTER
January 17, 2020     Referral [address] [place] [id number]    Patient: Melinda Araiza   YOB: 1945   Date of Visit: 1/14/2020       Dear Dr Gela Mcmahon:    Thank you for referring Melinda Araiza to me for evaluation  Below are my notes for this consultation  If you have questions, please do not hesitate to call me  I look forward to following your patient along with you  Sincerely,        Inga Briscoe MD        CC: MD Inga Gudino MD  1/17/2020  6:04 PM  Incomplete    Outpatient Consultation  [facility]ec[facility]  Ph : [phone]  Fax : [phone]  Mobile : [phone]  Email : [email or web address]  Also available on Tiger Text      Ana Camara 69 y o  female MRN: [id number]    PCP: Sesar Connolly MD  Referring: Referral [address] [place] [address], [address]    Melinda Araiza was seen in consultation  My recommendations are included  Please do not hesitate to contact me with any questions you may have  ASSESSMENT AND PLAN:      1  Abdominal pain which is intermittent and quite significant  It appears that she may have pain either due to adhesions or she could also have intermittent intussusception  I would recommend getting a CT enterography  2  Regarding her diarrhea I think she could have IBS with diarrhea  I would recommend using Xifaxan and probiotics  Also recommend using cholestyramine and have instructed her to space medications at least 2 hours before and after  Lactose-free diet  FODMAP diet  3  She also feels that her anxiety Dr  Her diarrhea  Recommend evaluation by psychiatry/psychology  Prescribed her amitriptyline 25 mg to see if that would help  Referred her to [facility] for Psychiatry    4  She also has fecal incontinence for which I recommended for her to follow up with Dr Alexandria Kelly regarding possible InterStim placement  Diagnoses and all orders for this visit:    Polyp of colon, unspecified part of colon, unspecified type    Chronic abdominal pain  -     CT small bowel enterography; Future    Functional diarrhea  -     CT small bowel enterography; Future  -     cholestyramine (QUESTRAN) 4 g packet; Take 1 packet (4 g total) by mouth 2 (two) times a day with meals    Full incontinence of feces  -     cholestyramine (QUESTRAN) 4 g packet; Take 1 packet (4 g total) by mouth 2 (two) times a day with meals    Generalized anxiety disorder    Irritable bowel syndrome with diarrhea  -     rifaximin (XIFAXAN) 550 mg tablet; Take 1 tablet (550 mg total) by mouth every 8 (eight) hours for 14 days  -     nortriptyline (PAMELOR) 25 mg capsule; Take 1 capsule (25 mg total) by mouth daily at bedtime  -     ECG 12 lead; Future        ______________________________________________________________________    HPI:  Pain for 6 years in the periumbilical area  It started after she had her left ovary removed  The pain is severe and feels like she is being ripped in half and has cramping  She has sweating during the episode followed by a cold sensation  The pain occurs once or twice a month  She has diarrhea with that as well  She has tried a FODMAP diet and did not improve  She tried Bentyl and still had the episode  She has had diarrhea and has had accidents as well  She has seen Dr Dennie Pang and Jihan Vásquez  EGD in 2016 : The esophagus appeared to be normal  There was no   evidence of Robertson's esophagus or esophagitis in the esophagus  Stomach: There was a 4 cm hiatus hernia visible in the stomach  Otherwise, the   stomach appeared to be normal  There was no evidence of ulcers or   erythematous mucosae in the stomach  Duodenum: The duodenum appeared to   be normal  There was no evidence of ulcers in the duodenum   Four random   cold forceps biopsies was taken from the 2nd portion of the duodenum   Colonoscopy 10/19 : polyps and radiation changes to the rectum  Random biopsies were negative  She has a h/o cervical cancer and had radiation for 12 weeks  PRIOR ENDOSCOPIC EVALUATION :     Endoscopy : yes    Colonoscopy : yes      REVIEW OF SYSTEMS:    CONSTITUTIONAL: Denies any fever, chills, rigors, and weight loss  HEENT: No earache or tinnitus  Denies hearing loss or visual disturbances  CARDIOVASCULAR: No chest pain or palpitations  RESPIRATORY: Denies any cough, hemoptysis, shortness of breath or dyspnea on exertion  GASTROINTESTINAL: As noted in the History of Present Illness  GENITOURINARY: No problems with urination  Denies any hematuria or dysuria  NEUROLOGIC: No dizziness or vertigo, denies headaches  MUSCULOSKELETAL: Denies any muscle or joint pain  SKIN: Denies skin rashes or itching  ENDOCRINE: Denies excessive thirst  Denies intolerance to heat or cold  PSYCHOSOCIAL: Denies depression or anxiety  Denies any recent memory loss  Historical Information   Past Medical History:   Diagnosis Date    Anxiety     Atypical squamous cells cannot exclude high grade squamous intraepithelial lesion on cytologic smear of cervix (ASC-H)     Last Assessed 5/13/2014    Depression     Ovarian cyst      Past Surgical History:   Procedure Laterality Date    ANKLE FRACTURE SURGERY      Last Assessed 07/26/2016    ANKLE SURGERY      CERVICAL BIOPSY  W/ LOOP ELECTRODE EXCISION      COLONOSCOPY  10/2019    DIAGNOSTIC LAPAROSCOPY      DILATION AND CURETTAGE OF UTERUS      ESOPHAGOGASTRODUODENOSCOPY N/A 9/12/2016    Procedure: ESOPHAGOGASTRODUODENOSCOPY (EGD); Surgeon: Kaleb Riley MD;  [facility]; Service:     OK COLONOSCOPY FLX DX W/COLLJ SPEC WHEN PFRMD N/A 9/12/2016    Procedure: Gabriela Guzmán;  Surgeon: Kaleb Riley MD;  [facility];   Service: Colorectal    SALPINGOOPHORECTOMY Left     TONSILLECTOMY  UPPER GASTROINTESTINAL ENDOSCOPY  2016     Social History   Social History     Substance and Sexual Activity   Alcohol Use No     Social History     Substance and Sexual Activity   Drug Use Never     Social History     Tobacco Use   Smoking Status Current Some Day Smoker    Packs/day: 0 50    Years: 60 00    Pack years: 30 00    Types: Cigarettes   Smokeless Tobacco Never Used     Family History   Problem Relation Age of Onset    Coronary artery disease Mother     No Known Problems Family        Meds/Allergies       Current Outpatient Medications:     dicyclomine (BENTYL) 10 mg capsule    escitalopram (LEXAPRO) 5 mg tablet    hydrochlorothiazide (MICROZIDE) 12 5 mg capsule    hyoscyamine (ANASPAZ,LEVSIN) 0 125 MG tablet    cholestyramine (QUESTRAN) 4 g packet    doxycycline (ADOXA) 100 MG tablet    nortriptyline (PAMELOR) 25 mg capsule    rifaximin (XIFAXAN) 550 mg tablet    Allergies   Allergen Reactions    Lisinopril      Other reaction(s): Other (See Comments)  Nose bleeds, passing out           Objective     Blood pressure 110/74, pulse 84, temperature 98 °F (36 7 °C), height 5' 6" (1 676 m), weight 65 3 kg (144 lb), not currently breastfeeding  Body mass index is 23 24 kg/m²  PHYSICAL EXAM:      Physical Exam   Constitutional: She is oriented to person, place, and time  Vital signs are normal  She appears well-developed and well-nourished  HENT:   Head: Normocephalic and atraumatic  Eyes: Pupils are equal, round, and reactive to light  Conjunctivae are normal  No scleral icterus  Neck: Normal range of motion  Cardiovascular: Normal rate, regular rhythm and normal heart sounds  Pulmonary/Chest: Effort normal and breath sounds normal  No respiratory distress  Abdominal: Soft  Normal appearance and bowel sounds are normal  She exhibits no distension, no ascites and no mass  There is no hepatosplenomegaly  There is tenderness in the periumbilical area  No hernia  Musculoskeletal: Normal range of motion  Lymphadenopathy:     She has no cervical adenopathy  Neurological: She is alert and oriented to person, place, and time  Skin: Skin is warm  Psychiatric: She has a normal mood and affect  Her behavior is normal  Thought content normal            Lab Results:     Lab Results   Component Value Date    WBC 6 01 05/28/2019    HGB 16 4 (H) 05/28/2019    HCT 49 4 (H) 05/28/2019    MCV 94 05/28/2019     05/28/2019       Lab Results   Component Value Date     12/10/2015    K 3 5 05/28/2019     05/28/2019    CO2 26 05/28/2019    ANIONGAP 18 12/10/2015    BUN 13 05/28/2019    CREATININE 1 01 05/28/2019    GLUCOSE 91 12/10/2015    CALCIUM 9 6 05/28/2019    AST 16 05/28/2019    ALT 21 05/28/2019    ALKPHOS 90 05/28/2019    PROT 8 0 12/10/2015    BILITOT 0 65 12/10/2015    EGFR 56 05/28/2019       Lab Results   Component Value Date    INR 0 99 04/25/2014    PROTIME 12 6 04/25/2014         Radiology Results:   No results found  Talita Kang MD  1/14/2020  1:39 PM  Incomplete    Outpatient Consultation  Tu L[facility]  Ph : [phone]  Fax : [phone]  Mobile : [phone]  Email : [email or web address]  Also available on Tiger Text      Telma Phlegm Pellingtonfrantz 69 y o  female MRN: [id number]    PCP: Ina Hameed MD  Referring: Referral [address] [place] [address] [address] was seen in consultation  My recommendations are included  Please do not hesitate to contact me with any questions you may have  ASSESSMENT AND PLAN:      No problem-specific Assessment & Plan notes found for this encounter        Diagnoses and all orders for this visit:    Polyp of colon, unspecified part of colon, unspecified type    Chronic abdominal pain        ______________________________________________________________________    HPI:  Pain for 6 years in the periumbilical area  It started after she had her left ovary removed  The pain is severe and feels like she is being ripped in half and has cramping  She has sweating during the episode followed by cold  The pain occurs once or twice a month  She has diarrhea with that as well  She has tried a FODMAP diet and did not improve  She tried Bentyl and still had the episode  She has had diarrhea and has had accidents as well  She has seen Dr Silva Erickson and Brianna Hurley  EGD in 2016 : The esophagus appeared to be normal  There was no   evidence of Robertson's esophagus or esophagitis in the esophagus  Stomach: There was a 4 cm hiatus hernia visible in the stomach  Otherwise, the   stomach appeared to be normal  There was no evidence of ulcers or   erythematous mucosae in the stomach  Duodenum: The duodenum appeared to   be normal  There was no evidence of ulcers in the duodenum  Four random   cold forceps biopsies was taken from the 2nd portion of the duodenum   Colonoscopy 10/19 : polyps and radiation changes to the rectum  She has a h/o cervical cancer in 2014 and had radiation for 12 weeks  PRIOR ENDOSCOPIC EVALUATION :     Endoscopy : {YES/NO:20200}    Colonoscopy : {YES/NO:20200}      REVIEW OF SYSTEMS:    CONSTITUTIONAL: Denies any fever, chills, rigors, and weight loss  HEENT: No earache or tinnitus  Denies hearing loss or visual disturbances  CARDIOVASCULAR: No chest pain or palpitations  RESPIRATORY: Denies any cough, hemoptysis, shortness of breath or dyspnea on exertion  GASTROINTESTINAL: As noted in the History of Present Illness  GENITOURINARY: No problems with urination  Denies any hematuria or dysuria  NEUROLOGIC: No dizziness or vertigo, denies headaches  MUSCULOSKELETAL: Denies any muscle or joint pain  SKIN: Denies skin rashes or itching     ENDOCRINE: Denies excessive thirst  Denies intolerance to heat or cold  PSYCHOSOCIAL: Denies depression or anxiety  Denies any recent memory loss  Historical Information   Past Medical History:   Diagnosis Date    Anxiety     Atypical squamous cells cannot exclude high grade squamous intraepithelial lesion on cytologic smear of cervix (ASC-H)     Last Assessed 5/13/2014    Depression     Ovarian cyst      Past Surgical History:   Procedure Laterality Date    ANKLE FRACTURE SURGERY      Last Assessed 07/26/2016    ANKLE SURGERY      CERVICAL BIOPSY  W/ LOOP ELECTRODE EXCISION      COLONOSCOPY  10/2019    DIAGNOSTIC LAPAROSCOPY      DILATION AND CURETTAGE OF UTERUS      ESOPHAGOGASTRODUODENOSCOPY N/A 9/12/2016    Procedure: ESOPHAGOGASTRODUODENOSCOPY (EGD); Surgeon: Maribel Connolly MD;  [facility]; Service:     [place] COLONOSCOPY FLX DX W/COLLJ SPEC WHEN PFRMD N/A 9/12/2016    Procedure: Davene Beams;  Surgeon: Maribel Connolly MD;  [facility]; Service: Colorectal    SALPINGOOPHORECTOMY Left     TONSILLECTOMY      UPPER GASTROINTESTINAL ENDOSCOPY  2016     Social History   Social History     Substance and Sexual Activity   Alcohol Use No     Social History     Substance and Sexual Activity   Drug Use Never     Social History     Tobacco Use   Smoking Status Current Some Day Smoker    Packs/day: 0 50    Years: 60 00    Pack years: 30 00    Types: Cigarettes   Smokeless Tobacco Never Used     Family History   Problem Relation Age of Onset    Coronary artery disease Mother     No Known Problems Family        Meds/Allergies       Current Outpatient Medications:     dicyclomine (BENTYL) 10 mg capsule    escitalopram (LEXAPRO) 5 mg tablet    hydrochlorothiazide (MICROZIDE) 12 5 mg capsule    hyoscyamine (ANASPAZ,LEVSIN) 0 125 MG tablet    Allergies   Allergen Reactions    Lisinopril      Other reaction(s):  Other (See Comments)  Nose bleeds, passing out           Objective Blood pressure 110/74, pulse 84, temperature 98 °F (36 7 °C), height 5' 6" (1 676 m), weight 65 3 kg (144 lb), not currently breastfeeding  Body mass index is 23 24 kg/m²  PHYSICAL EXAM:      Physical Exam        Lab Results:     Lab Results   Component Value Date    WBC 6 01 05/28/2019    HGB 16 4 (H) 05/28/2019    HCT 49 4 (H) 05/28/2019    MCV 94 05/28/2019     05/28/2019       Lab Results   Component Value Date     12/10/2015    K 3 5 05/28/2019     05/28/2019    CO2 26 05/28/2019    ANIONGAP 18 12/10/2015    BUN 13 05/28/2019    CREATININE 1 01 05/28/2019    GLUCOSE 91 12/10/2015    CALCIUM 9 6 05/28/2019    AST 16 05/28/2019    ALT 21 05/28/2019    ALKPHOS 90 05/28/2019    PROT 8 0 12/10/2015    BILITOT 0 65 12/10/2015    EGFR 56 05/28/2019       Lab Results   Component Value Date    INR 0 99 04/25/2014    PROTIME 12 6 04/25/2014         Radiology Results:   No results found  Remberto Samuels MD  1/14/2020 12:39 PM  Incomplete    Outpatient Consultation  Josh LIND  Ph : [phone]  Fax : [phone]  Mobile : [phone]  Email : [email or web address]  Also available on Tiger Text      Aubree Precise Pellingtonfrantz 69 y o  female MRN: [id number]    PCP: Luna Blel MD  Referring: Referral [address] [place], [address] was seen in consultation  My recommendations are included  Please do not hesitate to contact me with any questions you may have  ASSESSMENT AND PLAN:      No problem-specific Assessment & Plan notes found for this encounter  Diagnoses and all orders for this visit:    Polyp of colon, unspecified part of colon, unspecified type    Chronic abdominal pain        ______________________________________________________________________    HPI:  Pain for 6 years in the periumbilical area   It started after she had her left ovary removed  The pain is severe and feels like she is being ripped in half and has cramping  She has sweating during the episode followed by cold  The pain occurs once or twice a month  She has diarrhea with that as well  She has tried a FODMAP diet and did not improve  She tried Bentyl and still had the episode  She has had diarrhea and has had accidents as well  She has seen Dr Rach Oshea and Asif Leigh  EGD in 2016 : The esophagus appeared to be normal  There was no   evidence of Robertson's esophagus or esophagitis in the esophagus  Stomach: There was a 4 cm hiatus hernia visible in the stomach  Otherwise, the   stomach appeared to be normal  There was no evidence of ulcers or   erythematous mucosae in the stomach  Duodenum: The duodenum appeared to   be normal  There was no evidence of ulcers in the duodenum  Four random   cold forceps biopsies was taken from the 2nd portion of the duodenum   Colonoscopy 10/19 : polyps and radiation changes to the rectum  She has a h/o cervical cancer and had radiation for 12 weeks  PRIOR ENDOSCOPIC EVALUATION :     Endoscopy : {YES/NO:20200}    Colonoscopy : {YES/NO:20200}      REVIEW OF SYSTEMS:    CONSTITUTIONAL: Denies any fever, chills, rigors, and weight loss  HEENT: No earache or tinnitus  Denies hearing loss or visual disturbances  CARDIOVASCULAR: No chest pain or palpitations  RESPIRATORY: Denies any cough, hemoptysis, shortness of breath or dyspnea on exertion  GASTROINTESTINAL: As noted in the History of Present Illness  GENITOURINARY: No problems with urination  Denies any hematuria or dysuria  NEUROLOGIC: No dizziness or vertigo, denies headaches  MUSCULOSKELETAL: Denies any muscle or joint pain  SKIN: Denies skin rashes or itching  ENDOCRINE: Denies excessive thirst  Denies intolerance to heat or cold  PSYCHOSOCIAL: Denies depression or anxiety  Denies any recent memory loss         Historical Information   Past Medical History:   Diagnosis Date    Anxiety     Atypical squamous cells cannot exclude high grade squamous intraepithelial lesion on cytologic smear of cervix (ASC-H)     Last Assessed 5/13/2014    Depression     Ovarian cyst      Past Surgical History:   Procedure Laterality Date    ANKLE FRACTURE SURGERY      Last Assessed 07/26/2016    ANKLE SURGERY      CERVICAL BIOPSY  W/ LOOP ELECTRODE EXCISION      COLONOSCOPY  10/2019    DIAGNOSTIC LAPAROSCOPY      DILATION AND CURETTAGE OF UTERUS      ESOPHAGOGASTRODUODENOSCOPY N/A 9/12/2016    Procedure: ESOPHAGOGASTRODUODENOSCOPY (EGD); Surgeon: Miller Reyes MD;  [facility]; Service:     DE COLONOSCOPY FLX DX W/COLLJ SPEC WHEN PFRMD N/A 9/12/2016    Procedure: Tacy Patch;  Surgeon: Miller Reyes MD;  [facility]; Service: Colorectal    SALPINGOOPHORECTOMY Left     TONSILLECTOMY      UPPER GASTROINTESTINAL ENDOSCOPY  2016     Social History   Social History     Substance and Sexual Activity   Alcohol Use No     Social History     Substance and Sexual Activity   Drug Use Never     Social History     Tobacco Use   Smoking Status Current Some Day Smoker    Packs/day: 0 50    Years: 60 00    Pack years: 30 00    Types: Cigarettes   Smokeless Tobacco Never Used     Family History   Problem Relation Age of Onset    Coronary artery disease Mother     No Known Problems Family        Meds/Allergies       Current Outpatient Medications:     dicyclomine (BENTYL) 10 mg capsule    escitalopram (LEXAPRO) 5 mg tablet    hydrochlorothiazide (MICROZIDE) 12 5 mg capsule    hyoscyamine (ANASPAZ,LEVSIN) 0 125 MG tablet    Allergies   Allergen Reactions    Lisinopril      Other reaction(s): Other (See Comments)  Nose bleeds, passing out           Objective     Blood pressure 110/74, pulse 84, temperature 98 °F (36 7 °C), height 5' 6" (1 676 m), weight 65 3 kg (144 lb), not currently breastfeeding   Body mass index is 23 24 kg/m²  PHYSICAL EXAM:      Physical Exam        Lab Results:     Lab Results   Component Value Date    WBC 6 01 05/28/2019    HGB 16 4 (H) 05/28/2019    HCT 49 4 (H) 05/28/2019    MCV 94 05/28/2019     05/28/2019       Lab Results   Component Value Date     12/10/2015    K 3 5 05/28/2019     05/28/2019    CO2 26 05/28/2019    ANIONGAP 18 12/10/2015    BUN 13 05/28/2019    CREATININE 1 01 05/28/2019    GLUCOSE 91 12/10/2015    CALCIUM 9 6 05/28/2019    AST 16 05/28/2019    ALT 21 05/28/2019    ALKPHOS 90 05/28/2019    PROT 8 0 12/10/2015    BILITOT 0 65 12/10/2015    EGFR 56 05/28/2019       Lab Results   Component Value Date    INR 0 99 04/25/2014    PROTIME 12 6 04/25/2014         Radiology Results:   No results found

## 2020-01-14 NOTE — PATIENT INSTRUCTIONS
1  Rifaximin - please make sure it is approved and the copy is low  2  Lactose free diet  3  FODMAP diet  4  Cholestyramine (space it out between medications by 2 hours)  5  CT enterography  6  Psychiatry - [facility]  7  Amitriptyline 25 mg daily  8  Follow up with Dr Laura Krause regarding InterStim

## 2020-01-15 ENCOUNTER — TELEPHONE (OUTPATIENT)
Dept: GASTROENTEROLOGY | Facility: CLINIC | Age: 75
End: 2020-01-15

## 2020-01-15 NOTE — TELEPHONE ENCOUNTER
Patients GI provider:  Dr Jeff August    Number to return call: [phone]    Reason for call: Pt calling stated pharmacy is not filling xifaxan due to cost  Patient would like altenative sent to  [facility]    Scheduled procedure/appointment date if applicable: Apt/procedure 3/16/20

## 2020-01-16 DIAGNOSIS — K58.0 IRRITABLE BOWEL SYNDROME WITH DIARRHEA: Primary | ICD-10-CM

## 2020-01-16 RX ORDER — DOXYCYCLINE 100 MG/1
100 TABLET ORAL 2 TIMES DAILY
Qty: 20 TABLET | Refills: 0 | Status: SHIPPED | OUTPATIENT
Start: 2020-01-16 | End: 2020-01-26

## 2020-01-16 NOTE — TELEPHONE ENCOUNTER
Please let her know I can send doxycycline instead, which is an antibiotic Dr Felicia Monroy uses as an alternative   Thank you

## 2020-01-24 ENCOUNTER — HOSPITAL ENCOUNTER (OUTPATIENT)
Dept: CT IMAGING | Facility: HOSPITAL | Age: 75
Discharge: HOME | End: 2020-01-24
Attending: INTERNAL MEDICINE
Payer: MEDICARE

## 2020-01-24 DIAGNOSIS — R10.9 CHRONIC ABDOMINAL PAIN: ICD-10-CM

## 2020-01-24 DIAGNOSIS — K59.1 FUNCTIONAL DIARRHEA: ICD-10-CM

## 2020-01-24 DIAGNOSIS — G89.29 CHRONIC ABDOMINAL PAIN: ICD-10-CM

## 2020-01-24 PROCEDURE — 74177 CT ABD & PELVIS W/CONTRAST: CPT

## 2020-01-24 RX ADMIN — IOHEXOL 100 ML: 350 INJECTION, SOLUTION INTRAVENOUS at 19:58

## 2020-01-28 ENCOUNTER — HOSPITAL ENCOUNTER (OUTPATIENT)
Dept: NON INVASIVE DIAGNOSTICS | Facility: HOSPITAL | Age: 75
Discharge: HOME | End: 2020-01-28
Attending: INTERNAL MEDICINE
Payer: MEDICARE

## 2020-01-28 DIAGNOSIS — K58.0 IRRITABLE BOWEL SYNDROME WITH DIARRHEA: ICD-10-CM

## 2020-01-28 LAB
ATRIAL RATE: 65 BPM
P AXIS: 66 DEGREES
PR INTERVAL: 160 MS
QRS AXIS: 11 DEGREES
QRSD INTERVAL: 84 MS
QT INTERVAL: 414 MS
QTC INTERVAL: 430 MS
T WAVE AXIS: 53 DEGREES
VENTRICULAR RATE: 65 BPM

## 2020-01-28 PROCEDURE — 93005 ELECTROCARDIOGRAM TRACING: CPT

## 2020-01-28 PROCEDURE — 93010 ELECTROCARDIOGRAM REPORT: CPT | Performed by: INTERNAL MEDICINE

## 2020-03-16 ENCOUNTER — OFFICE VISIT (OUTPATIENT)
Dept: GASTROENTEROLOGY | Facility: MEDICAL CENTER | Age: 75
End: 2020-03-16

## 2020-03-16 VITALS
HEIGHT: 66 IN | SYSTOLIC BLOOD PRESSURE: 116 MMHG | DIASTOLIC BLOOD PRESSURE: 72 MMHG | WEIGHT: 158 LBS | HEART RATE: 71 BPM | BODY MASS INDEX: 25.39 KG/M2 | TEMPERATURE: 97.2 F

## 2020-03-16 DIAGNOSIS — R03.0 WHITE COAT SYNDROME WITH HIGH BLOOD PRESSURE BUT WITHOUT HYPERTENSION: ICD-10-CM

## 2020-03-16 DIAGNOSIS — R10.9 ABDOMINAL PAIN, UNSPECIFIED ABDOMINAL LOCATION: Primary | ICD-10-CM

## 2020-03-16 DIAGNOSIS — K58.0 IRRITABLE BOWEL SYNDROME WITH DIARRHEA: ICD-10-CM

## 2020-03-16 PROCEDURE — 1160F RVW MEDS BY RX/DR IN RCRD: CPT | Performed by: PHYSICIAN ASSISTANT

## 2020-03-16 PROCEDURE — 4004F PT TOBACCO SCREEN RCVD TLK: CPT | Performed by: PHYSICIAN ASSISTANT

## 2020-03-16 PROCEDURE — 3074F SYST BP LT 130 MM HG: CPT | Performed by: PHYSICIAN ASSISTANT

## 2020-03-16 PROCEDURE — 99214 OFFICE O/P EST MOD 30 MIN: CPT | Performed by: PHYSICIAN ASSISTANT

## 2020-03-16 PROCEDURE — 3008F BODY MASS INDEX DOCD: CPT | Performed by: PHYSICIAN ASSISTANT

## 2020-03-16 PROCEDURE — 3078F DIAST BP <80 MM HG: CPT | Performed by: PHYSICIAN ASSISTANT

## 2020-03-16 RX ORDER — NORTRIPTYLINE HYDROCHLORIDE 25 MG/1
25 CAPSULE ORAL
Qty: 30 CAPSULE | Refills: 2 | Status: SHIPPED | OUTPATIENT
Start: 2020-03-16 | End: 2020-05-29

## 2020-03-16 NOTE — PROGRESS NOTES
[facility] - Outpatient Follow-up Note  Radha Camara 69 y o  female MRN: [id number]  Encounter: [id number]          ASSESSMENT AND PLAN:      1  Irritable bowel syndrome with diarrhea:  2  Abdominal pain:   She was experiencing quite significant periumbilical abdominal pain as well as multiple episodes of diarrhea on a daily basis  She underwent a CT enterography of the small bowel, which was negative  She tried medications including Bentyl which did not work, [place] which caused worsening diarrhea  She also took Xifaxan as started nortriptyline, these 2 medications seem to have worked well for her  She takes 25 mg of nortriptyline and a daily basis and states that her abdominal pain has resolved  Her stools may be loose or more frequent depending on what she eats that day  Her incontinence has resolved and she does not think that she needs to see colorectal surgery further for InterStim placement  - nortriptyline (PAMELOR) 25 mg capsule; Take 1 capsule (25 mg total) by mouth daily at bedtime  Dispense: 30 capsule; Refill: 2  -continue low FODMAP diet  -started daily probiotic  -follow-up in 3 months    ______________________________________________________________________    SUBJECTIVE:  Forrest Rodriguez is a 45-year-old female with a past medical history of anxiety, history of cervical cancer status post radiation he was here for follow-up of abdominal pain and diarrhea  She was experiencing intermittent but significant periumbilical abdominal pain associated with multiple episodes of diarrhea on a daily basis  She had previously tried Bentyl but her symptoms persisted  She has seen Dr Carmina Rosado and Dr Damien Dent in the past    Was recently given Xifaxan, nortriptyline 25 mg daily as well as Questran and bentyl  She states that the Bentyl did not help her symptoms, the question made her diarrhea worse    She did have significant improvement with Xifaxan and nortriptyline 25 mg daily  She states that she is now having 1-2 formed bowel movements on a daily basis and her abdominal pain has resolved  She had an EGD in 2016 that revealed 4 cm hiatal hernia  Colonoscopy in October of 2019 with polyps and evidence of radiation changes to the rectum  Biopsies were negative  REVIEW OF SYSTEMS IS OTHERWISE NEGATIVE  Historical Information   Past Medical History:   Diagnosis Date    Anxiety     Atypical squamous cells cannot exclude high grade squamous intraepithelial lesion on cytologic smear of cervix (ASC-H)     Last Assessed 5/13/2014    Depression     Ovarian cyst      Past Surgical History:   Procedure Laterality Date    ANKLE FRACTURE SURGERY      Last Assessed 07/26/2016    ANKLE SURGERY      CERVICAL BIOPSY  W/ LOOP ELECTRODE EXCISION      COLONOSCOPY  10/2019    DIAGNOSTIC LAPAROSCOPY      DILATION AND CURETTAGE OF UTERUS      ESOPHAGOGASTRODUODENOSCOPY N/A 9/12/2016    Procedure: ESOPHAGOGASTRODUODENOSCOPY (EGD); Surgeon: Arnoldo Fuentes MD;  [facility]: [facility]; Service:     SC COLONOSCOPY FLX DX W/COLLJ SPEC WHEN PFRMD N/A 9/12/2016    Procedure: Anahi Martínez;  Surgeon: Arnoldo Fuentes MD;  Location: [facility];   Service: Colorectal    SALPINGOOPHORECTOMY Left     TONSILLECTOMY      UPPER GASTROINTESTINAL ENDOSCOPY  2016     Social History   Social History     Substance and Sexual Activity   Alcohol Use No     Social History     Substance and Sexual Activity   Drug Use Never     Social History     Tobacco Use   Smoking Status Current Some Day Smoker    Packs/day: 0 50    Years: 60 00    Pack years: 30 00    Types: Cigarettes   Smokeless Tobacco Never Used     Family History   Problem Relation Age of Onset    Coronary artery disease Mother     No Known Problems Family        Meds/Allergies       Current Outpatient Medications:     cholestyramine (QUESTRAN) 4 g packet    dicyclomine (BENTYL) 10 mg capsule   escitalopram (LEXAPRO) 5 mg tablet    hydrochlorothiazide (MICROZIDE) 12 5 mg capsule    hyoscyamine (ANASPAZ,LEVSIN) 0 125 MG tablet    nortriptyline (PAMELOR) 25 mg capsule    Allergies   Allergen Reactions    Lisinopril      Other reaction(s): Other (See Comments)  Nose bleeds, passing out           Objective     Blood pressure 116/72, pulse 71, temperature (!) 97 2 °F (36 2 °C), temperature source Tympanic, height 5' 6" (1 676 m), weight 71 7 kg (158 lb), not currently breastfeeding  Body mass index is 25 5 kg/m²  PHYSICAL EXAM:      General Appearance:   Alert, cooperative, no distress   HEENT:   Normocephalic, atraumatic, anicteric  Right eye exhibits no discharge  Left eye exhibits no discharge  No scleral icterus    Neck:  Supple, symmetrical, trachea midline, no stridor    Lungs:   Clear to auscultation bilaterally; no rales, rhonchi or wheezing; respirations unlabored    Heart[de-identified]   Regular rate and rhythm; no murmur, rub, or gallop  Abdomen:   Soft, non-tender, non-distended; normal bowel sounds; no masses, no organomegaly    Genitalia:   Deferred    Rectal:   Deferred    Extremities:  No cyanosis, clubbing or edema        Skin:  No jaundice, rashes, or lesions          Lab Results:   No visits with results within 1 Day(s) from this visit  Latest known visit with results is:   Hospital Outpatient Visit on 01/28/2020   Component Date Value    Ventricular Rate 01/28/2020 65     Atrial Rate 01/28/2020 65     NH Interval 01/28/2020 160     QRSD Interval 01/28/2020 84     QT Interval 01/28/2020 414     QTC Interval 01/28/2020 430     P Axis 01/28/2020 66     QRS Axis 01/28/2020 11     T Wave [place] 01/28/2020 53          Radiology Results:   No results found

## 2020-03-17 RX ORDER — HYDROCHLOROTHIAZIDE 12.5 MG/1
12.5 CAPSULE, GELATIN COATED ORAL DAILY
Qty: 90 CAPSULE | Refills: 1 | Status: SHIPPED | OUTPATIENT
Start: 2020-03-17 | End: 2020-11-02 | Stop reason: SDUPTHER

## 2020-04-27 ENCOUNTER — TELEPHONE (OUTPATIENT)
Dept: GASTROENTEROLOGY | Facility: AMBULARY SURGERY CENTER | Age: 75
End: 2020-04-27

## 2020-04-27 DIAGNOSIS — K58.0 IRRITABLE BOWEL SYNDROME WITH DIARRHEA: ICD-10-CM

## 2020-04-27 RX ORDER — NORTRIPTYLINE HYDROCHLORIDE 25 MG/1
25 CAPSULE ORAL
Qty: 30 CAPSULE | Refills: 2 | Status: CANCELLED | OUTPATIENT
Start: 2020-04-27 | End: 2020-05-27

## 2020-05-19 ENCOUNTER — OFFICE VISIT (OUTPATIENT)
Dept: FAMILY MEDICINE CLINIC | Facility: CLINIC | Age: 75
End: 2020-05-19
Payer: MEDICARE

## 2020-05-19 VITALS
SYSTOLIC BLOOD PRESSURE: 131 MMHG | HEART RATE: 90 BPM | BODY MASS INDEX: 24.28 KG/M2 | OXYGEN SATURATION: 97 % | DIASTOLIC BLOOD PRESSURE: 73 MMHG | HEIGHT: 66 IN | WEIGHT: 151.1 LBS | TEMPERATURE: 98 F

## 2020-05-19 DIAGNOSIS — J44.9 CHRONIC OBSTRUCTIVE PULMONARY DISEASE, UNSPECIFIED COPD TYPE (HCC): ICD-10-CM

## 2020-05-19 DIAGNOSIS — F32.A DEPRESSION, UNSPECIFIED DEPRESSION TYPE: ICD-10-CM

## 2020-05-19 DIAGNOSIS — Z72.0 TOBACCO ABUSE: ICD-10-CM

## 2020-05-19 DIAGNOSIS — E78.5 HYPERLIPIDEMIA, UNSPECIFIED HYPERLIPIDEMIA TYPE: ICD-10-CM

## 2020-05-19 DIAGNOSIS — Z00.00 MEDICARE ANNUAL WELLNESS VISIT, SUBSEQUENT: Primary | ICD-10-CM

## 2020-05-19 DIAGNOSIS — K58.0 IRRITABLE BOWEL SYNDROME WITH DIARRHEA: ICD-10-CM

## 2020-05-19 DIAGNOSIS — F41.1 GENERALIZED ANXIETY DISORDER: ICD-10-CM

## 2020-05-19 DIAGNOSIS — Z13.6 SCREENING FOR CARDIOVASCULAR CONDITION: ICD-10-CM

## 2020-05-19 DIAGNOSIS — D58.2 ELEVATED HEMOGLOBIN (HCC): ICD-10-CM

## 2020-05-19 PROCEDURE — 3075F SYST BP GE 130 - 139MM HG: CPT | Performed by: FAMILY MEDICINE

## 2020-05-19 PROCEDURE — 3008F BODY MASS INDEX DOCD: CPT | Performed by: FAMILY MEDICINE

## 2020-05-19 PROCEDURE — 4004F PT TOBACCO SCREEN RCVD TLK: CPT | Performed by: FAMILY MEDICINE

## 2020-05-19 PROCEDURE — 1160F RVW MEDS BY RX/DR IN RCRD: CPT | Performed by: FAMILY MEDICINE

## 2020-05-19 PROCEDURE — G0439 PPPS, SUBSEQ VISIT: HCPCS | Performed by: FAMILY MEDICINE

## 2020-05-19 PROCEDURE — 3078F DIAST BP <80 MM HG: CPT | Performed by: FAMILY MEDICINE

## 2020-05-19 PROCEDURE — 99213 OFFICE O/P EST LOW 20 MIN: CPT | Performed by: FAMILY MEDICINE

## 2020-05-19 PROCEDURE — 1170F FXNL STATUS ASSESSED: CPT | Performed by: FAMILY MEDICINE

## 2020-05-19 PROCEDURE — 1125F AMNT PAIN NOTED PAIN PRSNT: CPT | Performed by: FAMILY MEDICINE

## 2020-05-19 RX ORDER — ALPRAZOLAM 0.5 MG/1
TABLET ORAL
Qty: 30 TABLET | Refills: 0 | Status: SHIPPED | OUTPATIENT
Start: 2020-05-19 | End: 2021-01-19 | Stop reason: SDUPTHER

## 2020-05-19 RX ORDER — BUPROPION HYDROCHLORIDE 150 MG/1
150 TABLET ORAL EVERY MORNING
Qty: 30 TABLET | Refills: 1 | Status: SHIPPED | OUTPATIENT
Start: 2020-05-19 | End: 2021-10-21 | Stop reason: ALTCHOICE

## 2020-05-19 RX ORDER — ESCITALOPRAM OXALATE 5 MG/1
5 TABLET ORAL DAILY
Qty: 30 TABLET | Refills: 1 | Status: CANCELLED | OUTPATIENT
Start: 2020-05-19

## 2020-05-29 ENCOUNTER — TELEMEDICINE (OUTPATIENT)
Dept: GASTROENTEROLOGY | Facility: MEDICAL CENTER | Age: 75
End: 2020-05-29
Payer: MEDICARE

## 2020-05-29 ENCOUNTER — TELEPHONE (OUTPATIENT)
Dept: GASTROENTEROLOGY | Facility: MEDICAL CENTER | Age: 75
End: 2020-05-29

## 2020-05-29 DIAGNOSIS — K63.5 POLYP OF COLON, UNSPECIFIED PART OF COLON, UNSPECIFIED TYPE: ICD-10-CM

## 2020-05-29 DIAGNOSIS — G89.29 CHRONIC ABDOMINAL PAIN: ICD-10-CM

## 2020-05-29 DIAGNOSIS — K58.0 IRRITABLE BOWEL SYNDROME WITH DIARRHEA: Primary | ICD-10-CM

## 2020-05-29 DIAGNOSIS — R10.9 CHRONIC ABDOMINAL PAIN: ICD-10-CM

## 2020-05-29 PROBLEM — R15.9 FULL INCONTINENCE OF FECES: Status: RESOLVED | Noted: 2020-01-14 | Resolved: 2020-05-29

## 2020-05-29 PROCEDURE — 99441 PR PHYS/QHP TELEPHONE EVALUATION 5-10 MIN: CPT | Performed by: INTERNAL MEDICINE

## 2020-05-29 RX ORDER — NORTRIPTYLINE HYDROCHLORIDE 25 MG/1
50 CAPSULE ORAL
Qty: 30 CAPSULE | Refills: 2 | Status: SHIPPED | OUTPATIENT
Start: 2020-05-29 | End: 2020-09-17 | Stop reason: SDUPTHER

## 2020-07-14 ENCOUNTER — OFFICE VISIT (OUTPATIENT)
Dept: FAMILY MEDICINE CLINIC | Facility: CLINIC | Age: 75
End: 2020-07-14
Payer: MEDICARE

## 2020-07-14 VITALS
DIASTOLIC BLOOD PRESSURE: 90 MMHG | HEART RATE: 78 BPM | TEMPERATURE: 96 F | BODY MASS INDEX: 24.53 KG/M2 | OXYGEN SATURATION: 94 % | SYSTOLIC BLOOD PRESSURE: 142 MMHG | WEIGHT: 152.6 LBS | HEIGHT: 66 IN

## 2020-07-14 DIAGNOSIS — E78.5 HYPERLIPIDEMIA, UNSPECIFIED HYPERLIPIDEMIA TYPE: ICD-10-CM

## 2020-07-14 DIAGNOSIS — F41.1 GENERALIZED ANXIETY DISORDER: Primary | ICD-10-CM

## 2020-07-14 DIAGNOSIS — Z72.0 TOBACCO ABUSE: ICD-10-CM

## 2020-07-14 PROCEDURE — 1160F RVW MEDS BY RX/DR IN RCRD: CPT | Performed by: FAMILY MEDICINE

## 2020-07-14 PROCEDURE — 3080F DIAST BP >= 90 MM HG: CPT | Performed by: FAMILY MEDICINE

## 2020-07-14 PROCEDURE — 99214 OFFICE O/P EST MOD 30 MIN: CPT | Performed by: FAMILY MEDICINE

## 2020-07-14 PROCEDURE — 3077F SYST BP >= 140 MM HG: CPT | Performed by: FAMILY MEDICINE

## 2020-07-14 PROCEDURE — 4004F PT TOBACCO SCREEN RCVD TLK: CPT | Performed by: FAMILY MEDICINE

## 2020-07-14 PROCEDURE — 3008F BODY MASS INDEX DOCD: CPT | Performed by: FAMILY MEDICINE

## 2020-07-14 NOTE — PATIENT INSTRUCTIONS
Your labs look good- continue to watch greasy, fatty, high cholesterol foods  Make sure to be drinking plenty of water and walk! Start Wellbutrin/Buproprion as directed  Quit smoking after starting this med, it could help with cravings  Any questions or concerns please call office  Follow up in 3 months

## 2020-07-14 NOTE — PROGRESS NOTES
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  Patient has also been in a lot of pain due to back issues  Patient did have an injection yesterday and states that the pain is improved  She is hoping to become more active as this pain resolves  Patient is smoking approximately 8 cigarettes a day  I did discuss with patient regarding the Wellbutrin decreasing cravings of smoking  Recent labs reviewed with patient cholesterol levels have slightly improved  Kidney function is stable or improved over the past 2-3 years  GFR has been around the range of 55  Discussed making sure to drink drink plenty of water with patient and also avoiding NSAIDs      The following portions of the patient's history were reviewed and updated as appropriate: allergies, current medications, past family history, past medical history, past social history, past surgical history and problem list     Past Medical History:   Diagnosis Date    Anxiety     Atypical squamous cells cannot exclude high grade squamous intraepithelial lesion on cytologic smear of cervix (ASC-H)     Last Assessed 5/13/2014    Depression     Ovarian cyst        Past Surgical History:   Procedure Laterality Date    ANKLE FRACTURE SURGERY      Last Assessed 07/26/2016    ANKLE SURGERY      CERVICAL BIOPSY  W/ LOOP ELECTRODE EXCISION      COLONOSCOPY  10/2019    DIAGNOSTIC LAPAROSCOPY      DILATION AND CURETTAGE OF UTERUS      ESOPHAGOGASTRODUODENOSCOPY N/A 9/12/2016    Procedure: ESOPHAGOGASTRODUODENOSCOPY (EGD); Surgeon: Felipe Hurtado MD;  [facility]; Service:     UT COLONOSCOPY FLX DX W/COLLJ SPEC WHEN PFRMD N/A 9/12/2016    Procedure: Amara Pap;  Surgeon: Felipe Hurtado MD;  [facility]; Service: Colorectal    SALPINGOOPHORECTOMY Left     TONSILLECTOMY      UPPER GASTROINTESTINAL ENDOSCOPY  2016       Social History     Socioeconomic History    Marital status:       Spouse name: None    Number of children: None    Years of education: 13    Highest education level: None   Occupational History    None   Social Needs    Financial resource strain: None    Food insecurity:     Worry: Never true     Inability: None    Transportation needs:     Medical: No     Non-medical: None   Tobacco Use    Smoking status: Current Some Day Smoker     Packs/day: 0 50     Years: 60 00     Pack years: 30 00     Types: Cigarettes    Smokeless tobacco: Never Used   Substance and Sexual Activity    Alcohol use: No    Drug use: Never    Sexual activity: Not Currently   Lifestyle    Physical activity:     Days per week: None     Minutes per session: None    Stress: None   Relationships    Social connections:     Talks on phone: None     Gets together: None     Attends Shinto service: None     Active member of club or organization: None     Attends meetings of clubs or organizations: None     Relationship status: None    Intimate partner violence:     Fear of current or ex partner: None     Emotionally abused: None     Physically abused: None     Forced sexual activity: None   Other Topics Concern    None   Social History Narrative    None       Current Outpatient Medications   Medication Sig Dispense Refill    ALPRAZolam (XANAX) 0 5 mg tablet Take 1/2-1 tablet daily as needed for anxiety 30 tablet 0    ASPIRIN 81 PO Take 81 mg by mouth daily      buPROPion (WELLBUTRIN XL) 150 mg 24 hr tablet Take 1 tablet (150 mg total) by mouth every morning 30 tablet 1    hydrochlorothiazide (MICROZIDE) 12 5 mg capsule Take 1 capsule (12 5 mg total) by mouth daily 90 capsule 1    nortriptyline (PAMELOR) 25 mg capsule Take 2 capsules (50 mg total) by mouth daily at bedtime 30 capsule 2     No current facility-administered medications for this visit  Review of Systems   Constitutional: Negative  HENT: Negative  Eyes: Negative  Respiratory: Negative  Cardiovascular: Negative  Gastrointestinal: Negative  Genitourinary: Negative  Musculoskeletal: Positive for back pain  Skin: Negative  Neurological: Negative  Psychiatric/Behavioral: The patient is nervous/anxious  Objective:    /90 (BP Location: Left arm, Patient Position: Sitting, Cuff Size: Adult)   Pulse 78   Temp (!) 96 °F (35 6 °C) (Tympanic)   Ht 5' 6" (1 676 m)   Wt 69 2 kg (152 lb 9 6 oz)   SpO2 94%   BMI 24 63 kg/m²   Wt Readings from Last 3 Encounters:   07/14/20 69 2 kg (152 lb 9 6 oz)   05/19/20 68 5 kg (151 lb 1 6 oz)   03/16/20 71 7 kg (158 lb)     BP Readings from Last 3 Encounters:   07/14/20 142/90   05/19/20 131/73   03/16/20 116/72       Lab Results   Component Value Date    WBC 6 01 05/28/2019    HGB 16 4 (H) 05/28/2019    HCT 49 4 (H) 05/28/2019     05/28/2019    TRIG 111 12/11/2018    HDL 58 12/11/2018    ALT 21 05/28/2019    AST 16 05/28/2019     12/10/2015    K 3 5 05/28/2019     05/28/2019    CREATININE 1 01 05/28/2019    BUN 13 05/28/2019    CO2 26 05/28/2019    INR 0 99 04/25/2014    HGBA1C 5 6 05/18/2019       Physical Exam   Constitutional: She is oriented to person, place, and time  She appears well-developed and well-nourished  HENT:   Head: Normocephalic and atraumatic     Eyes: Pupils are equal, round, and reactive to light  EOM are normal    Neck: Normal range of motion  Neck supple  Cardiovascular: Normal rate and regular rhythm  No murmur heard  Pulmonary/Chest: Effort normal and breath sounds normal    Neurological: She is alert and oriented to person, place, and time  No cranial nerve deficit  Skin: Skin is warm  Capillary refill takes less than 2 seconds  Psychiatric: She has a normal mood and affect  Nursing note and vitals reviewed  Assessment/Plan:   Daniel Pelayo was seen today for follow-up, depression and anxiety  Diagnoses and all orders for this visit:    Generalized anxiety disorder    Tobacco abuse    Hyperlipidemia, unspecified hyperlipidemia type      Return in about 3 months (around 10/14/2020) for Recheck  Patient Instructions   Your labs look good- continue to watch greasy, fatty, high cholesterol foods  Make sure to be drinking plenty of water and walk! Start Wellbutrin/Buproprion as directed  Quit smoking after starting this med, it could help with cravings  Any questions or concerns please call office  Follow up in 3 months

## 2020-07-31 ENCOUNTER — TELEPHONE (OUTPATIENT)
Dept: FAMILY MEDICINE CLINIC | Facility: CLINIC | Age: 75
End: 2020-07-31

## 2020-07-31 NOTE — TELEPHONE ENCOUNTER
wendy called requesting a referral for orthopedics for her Left leg, she states that 3-4 years ago broke her ankle and she is having pain and she thinks its related to this injury please advise  I asked the patient if she was seen for this with you and she said she has not seen you for it at all   I did advise the patient that you would not be back until tuesday

## 2020-08-06 ENCOUNTER — OFFICE VISIT (OUTPATIENT)
Dept: FAMILY MEDICINE CLINIC | Facility: CLINIC | Age: 75
End: 2020-08-06
Payer: MEDICARE

## 2020-08-06 VITALS
TEMPERATURE: 97.6 F | HEART RATE: 95 BPM | RESPIRATION RATE: 17 BRPM | HEIGHT: 66 IN | SYSTOLIC BLOOD PRESSURE: 144 MMHG | WEIGHT: 150.3 LBS | OXYGEN SATURATION: 98 % | DIASTOLIC BLOOD PRESSURE: 90 MMHG | BODY MASS INDEX: 24.15 KG/M2

## 2020-08-06 DIAGNOSIS — Z98.890 HISTORY OF OPEN REDUCTION AND INTERNAL FIXATION (ORIF) PROCEDURE: ICD-10-CM

## 2020-08-06 DIAGNOSIS — G89.29 CHRONIC PAIN OF LEFT ANKLE: Primary | ICD-10-CM

## 2020-08-06 DIAGNOSIS — M54.16 LUMBAR RADICULOPATHY: ICD-10-CM

## 2020-08-06 DIAGNOSIS — Z72.0 TOBACCO ABUSE: ICD-10-CM

## 2020-08-06 DIAGNOSIS — M25.572 CHRONIC PAIN OF LEFT ANKLE: Primary | ICD-10-CM

## 2020-08-06 PROCEDURE — 3008F BODY MASS INDEX DOCD: CPT | Performed by: FAMILY MEDICINE

## 2020-08-06 PROCEDURE — 1160F RVW MEDS BY RX/DR IN RCRD: CPT | Performed by: FAMILY MEDICINE

## 2020-08-06 PROCEDURE — 3077F SYST BP >= 140 MM HG: CPT | Performed by: FAMILY MEDICINE

## 2020-08-06 PROCEDURE — 3080F DIAST BP >= 90 MM HG: CPT | Performed by: FAMILY MEDICINE

## 2020-08-06 PROCEDURE — 99213 OFFICE O/P EST LOW 20 MIN: CPT | Performed by: FAMILY MEDICINE

## 2020-08-06 PROCEDURE — 4004F PT TOBACCO SCREEN RCVD TLK: CPT | Performed by: FAMILY MEDICINE

## 2020-08-06 NOTE — PATIENT INSTRUCTIONS
I am giving you a referral for orthopedic surgery  Use ice and over the counter meds for pain control  Please do stretching exercises of your calf muscle and thigh muscles- try and do them twice daily  Follow up as needed after your ortho visit

## 2020-08-06 NOTE — PROGRESS NOTES
Subjective:    HPI  Loretta Ventura is a 70 y o  female here today for:  Chief Complaint   Patient presents with    Left leg     needs referral          ---Above per clinical staff & reviewed  ---  HPI:  77-year-old female here for left ankle and lower leg pain  Approximately 4 years ago, patient had a fall and had significantly displaced comminuted fractures of her left tibia and fibula  Patient had open reduction internal fixation with plate and screws  Patient states that approximately 2 and half years ago she started having ankle pain  She has been going to the chiropractor for this pain  Patient does a lot of work outside with walking  More recently she has been having pain that seems to originate in her ankle and radiate upward  She states she feels something is lose  Patient has gone back to orthopedics for this in June of this year  An x-ray was done which showed that plates and screws were in place  The orthopedist felt that her pain was possibly due to a lumbar radiculopathy  Patient proceeded to go see pain management and did have a L4-L5 injection  She states the pain that she is having in her ankle and lower leg has not improved at all  She states that the pain has been coming with less than less walking  Patient states the ankle was not swell  She only uses over-the-counter medication when she is having pain  She states that the pain comes on when she walks approximately 30-50 feet  She states that she does not have pain when she is not putting pressure or weight on it  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 because she has trouble getting on the horse due to her left leg pain      The following portions of the patient's history were reviewed and updated as appropriate: allergies, current medications, past family history, past medical history, past social history, past surgical history and problem list     Past Medical History:   Diagnosis Date    Anxiety     Atypical squamous cells cannot exclude high grade squamous intraepithelial lesion on cytologic smear of cervix (ASC-H)     Last Assessed 5/13/2014    Depression     Ovarian cyst        Past Surgical History:   Procedure Laterality Date    ANKLE FRACTURE SURGERY      Last Assessed 07/26/2016    ANKLE SURGERY      CERVICAL BIOPSY  W/ LOOP ELECTRODE EXCISION      COLONOSCOPY  10/2019    DIAGNOSTIC LAPAROSCOPY      DILATION AND CURETTAGE OF UTERUS      ESOPHAGOGASTRODUODENOSCOPY N/A 9/12/2016    Procedure: ESOPHAGOGASTRODUODENOSCOPY (EGD); Surgeon: Scott Gardner MD;  [facility]; Service:     DC COLONOSCOPY FLX DX W/COLLJ SPEC WHEN PFRMD N/A 9/12/2016    Procedure: Iain Glaze;  Surgeon: Scott Gardner MD;  [facility]; Service: Colorectal    SALPINGOOPHORECTOMY Left     TONSILLECTOMY      UPPER GASTROINTESTINAL ENDOSCOPY  2016       Social History     Socioeconomic History    Marital status:       Spouse name: None    Number of children: None    Years of education: 13    Highest education level: None   Occupational History    None   Social Needs    Financial resource strain: None    Food insecurity     Worry: Never true     Inability: None    Transportation needs     Medical: No     Non-medical: None   Tobacco Use    Smoking status: Current Some Day Smoker     Packs/day: 0 50     Years: 60 00     Pack years: 30 00     Types: Cigarettes    Smokeless tobacco: Never Used   Substance and Sexual Activity    Alcohol use: No    Drug use: Never    Sexual activity: Not Currently   Lifestyle    Physical activity     Days per week: None     Minutes per session: None    Stress: None   Relationships    Social connections     Talks on phone: None     Gets together: None     Attends Roman Catholic service: None     Active member of club or organization: None     Attends meetings of clubs or organizations: None     Relationship status: None    Intimate partner violence     Fear of current or ex partner: None     Emotionally abused: None     Physically abused: None     Forced sexual activity: None   Other Topics Concern    None   Social History Narrative    None       Current Outpatient Medications   Medication Sig Dispense Refill    ALPRAZolam (XANAX) 0 5 mg tablet Take 1/2-1 tablet daily as needed for anxiety 30 tablet 0    ASPIRIN 81 PO Take 81 mg by mouth daily      buPROPion (WELLBUTRIN XL) 150 mg 24 hr tablet Take 1 tablet (150 mg total) by mouth every morning 30 tablet 1    hydrochlorothiazide (MICROZIDE) 12 5 mg capsule Take 1 capsule (12 5 mg total) by mouth daily 90 capsule 1    nortriptyline (PAMELOR) 25 mg capsule Take 2 capsules (50 mg total) by mouth daily at bedtime 30 capsule 2     No current facility-administered medications for this visit  Review of Systems   Constitutional: Negative  HENT: Negative  Eyes: Negative  Respiratory: Negative  Cardiovascular: Negative  Gastrointestinal: Negative  Genitourinary: Negative  Musculoskeletal: Positive for myalgias  Left ankle and calf pain   Skin: Negative  Neurological: Negative  Psychiatric/Behavioral: Negative           Objective:    /90 (BP Location: Left arm, Patient Position: Sitting, Cuff Size: Adult)   Pulse 95   Temp 97 6 °F (36 4 °C) (Tympanic)   Resp 17   Ht 5' 6" (1 676 m)   Wt 68 2 kg (150 lb 4 8 oz)   SpO2 98%   BMI 24 26 kg/m²   Wt Readings from Last 3 Encounters:   08/06/20 68 2 kg (150 lb 4 8 oz)   07/14/20 69 2 kg (152 lb 9 6 oz)   05/19/20 68 5 kg (151 lb 1 6 oz)     BP Readings from Last 3 Encounters:   08/06/20 144/90   07/14/20 142/90   05/19/20 131/73       Lab Results   Component Value Date    WBC 6 01 05/28/2019    HGB 16 4 (H) 05/28/2019    HCT 49 4 (H) 05/28/2019     05/28/2019    TRIG 111 12/11/2018    HDL 58 12/11/2018    ALT 21 05/28/2019    AST 16 05/28/2019     12/10/2015    K 3 5 05/28/2019     05/28/2019    CREATININE 1 01 05/28/2019    BUN 13 05/28/2019    CO2 26 05/28/2019    INR 0 99 04/25/2014    HGBA1C 5 6 05/18/2019       Physical Exam   Constitutional: She is oriented to person, place, and time  She appears well-developed  HENT:   Head: Normocephalic and atraumatic  Eyes: Pupils are equal, round, and reactive to light  Neck: Normal range of motion  Neck supple  Cardiovascular: Normal rate and regular rhythm  No murmur heard  Pulmonary/Chest: Effort normal and breath sounds normal    Musculoskeletal:      Comments: Left ankle without erythema or edema  Scars noted from her ORIF  Mobility is slightly decreased  She does have good distal pulses bilaterally  She does have tenderness when palpating around her medial malleolus and the incision in that area  No deformity noted of her ankle  Neurological: She is alert and oriented to person, place, and time  No cranial nerve deficit  Skin: Skin is warm  Capillary refill takes less than 2 seconds  Nursing note and vitals reviewed  Assessment/Plan:   Tony Proctor was seen today for left leg  Diagnoses and all orders for this visit:    Chronic pain of left ankle  -     Ambulatory referral to Orthopedic Surgery; Future    History of open reduction and internal fixation (ORIF) procedure  -     Ambulatory referral to Orthopedic Surgery; Future    Tobacco abuse    Lumbar radiculopathy      Return if symptoms worsen or fail to improve  Patient Instructions   I am giving you a referral for orthopedic surgery  Use ice and over the counter meds for pain control  Please do stretching exercises of your calf muscle and thigh muscles- try and do them twice daily  Follow up as needed after your ortho visit

## 2020-08-17 ENCOUNTER — TRANSCRIBE ORDERS (OUTPATIENT)
Dept: PHYSICAL THERAPY | Facility: CLINIC | Age: 75
End: 2020-08-17

## 2020-08-17 ENCOUNTER — EVALUATION (OUTPATIENT)
Dept: PHYSICAL THERAPY | Facility: CLINIC | Age: 75
End: 2020-08-17
Payer: MEDICARE

## 2020-08-17 DIAGNOSIS — M12.572 TRAUMATIC ARTHROPATHY OF ANKLE, LEFT: Primary | ICD-10-CM

## 2020-08-17 PROCEDURE — 97140 MANUAL THERAPY 1/> REGIONS: CPT

## 2020-08-17 PROCEDURE — 97161 PT EVAL LOW COMPLEX 20 MIN: CPT

## 2020-08-17 NOTE — LETTER
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        Dear Dr Seema Crockett: Thank you for your recent referral of Jozef Pinedo  Please review the attached evaluation summary from Ángela's recent visit  Please verify that you agree with the plan of care by signing the attached order  If you have any questions or concerns, please do not hesitate to call  I sincerely appreciate the opportunity to share in the care of one of your patients and hope to have another opportunity to work with you in the near future  Sincerely,    Hanna Givens, PT      Referring Provider:      I certify that I have read the below Plan of Care and certify the need for these services furnished under this plan of treatment while under my care  Jinny Osler, [address]  [facility]: [phone]          PT Evaluation     Today's date: 2020  Patient name: Jozef Pinedo  : 1945  MRN: [id number]  Referring provider: Jenifer Zhou DPM  Dx:   Encounter Diagnosis     ICD-10-CM    1  Traumatic arthropathy of ankle, 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        Start Time: 1617          Assessment  Assessment details: Jozef Pinedo is a 70 y o  female presents with PSH L ankle ORIF  Presents with functional leg length discrepancy, postural asymmetry  LLD correctable with self MET today  Pt pain free to end tx, including ambulation, "I feel good "  Jozef Pinedo has the above listed impairments and will benefit from skilled PT to improve deficits to return to prior level of function  Jozef Pinedo was educated on eval findings and plan for management  [facility] would benefit from skilled services to improve ROM, strength, flexibility, and function, and to decrease pain  Impairments: abnormal or restricted ROM, activity intolerance, impaired balance, impaired physical strength, lacks appropriate home exercise program, pain with function and poor posture   Understanding of Dx/Px/POC: good   Prognosis: good    Goals  Impairment Goals  - Pt I with initial HEP in 1-2 visits  - No functional LLD in 1-2 visits   - Increase strength to 5/5 in all affected areas in 4-6 weeks    Functional Goals  - Patient will be independent with comprehensive HEP in 4-6 weeks  - Ambulation is improved to prior level of function in 4-6 weeks, pain free community ambulation  - Stair climbing is improved to prior level of function in 4-6 weeks, reciprocal pattern with rail   - LLE dynamic strength and balance at least 90% of RLE via single leg step test, in 4-6 wks  - Pt able to mount horse leading with LLE in 4-6 wks  Plan  Patient would benefit from: skilled physical therapy  Planned modality interventions: cryotherapy and thermotherapy: hydrocollator packs  Planned therapy interventions: abdominal trunk stabilization, joint mobilization, manual therapy, patient education, postural training, home exercise program, flexibility, stretching, strengthening, therapeutic activities, therapeutic exercise and neuromuscular re-education  Frequency: 2x week  Duration in visits: 8  Duration in weeks: 4  Treatment plan discussed with: patient        Subjective Evaluation    History of Present Illness  Mechanism of injury: Pt noting 1 year ago pt began to develop "all kinds of reactions, falling apart" with L calf cramping with walking 140 ft, which would spread to L anterior thigh  "The leg is weak, I don't understand why  I don't know if it's a pinched nerve or what    Sometimes the bottom of my (left) foot feels like it's sleeping "  [facility] L ankle ORIF 2016   "I think being in that cast threw off my walking "  Notes "if I continue walking when it's like that I'll feel it into my left hip and my low back "  Recent x-rays taken of L ankle ORIF hardware, "Dr Jair Elliott said everything looks good " Pt received 2 injections to lumbar spine 6 wks ago, "my back felt better but it didn't do anything for my leg "  Also received injection to L ankle a few days ago, "that helped a little bit "  Chief complaint L calf, thigh cramping, L lateral hip and central lumbosacral pain beginning after walking 140 ft  No PSH lumbar spine  Functional limitations include pain with ambulation, unable to mount horse with LLE due to weakness  Also pain with steps  Pain  Current pain ratin  At best pain ratin  At worst pain rating: 3  Location: L calf, anterior thigh, lateral hip, central LBP  Quality: cramping, burning, sharp and radiating  Relieving factors: rest  Aggravating factors: stair climbing and walking    Social Support  Steps to enter house: yes  2  Stairs in house: yes   14  Lives in: multiple-level home  Lives with: alone    Employment status: not working (Retired)    Diagnostic Tests  X-ray: abnormal (ORIF L ankle with hardware intact)  MRI studies: abnormal (Lumbar MRI)  Treatments  Previous treatment: injection treatment  Current treatment: chiropractic and injection treatment  Current treatment comments: Chiro most recent visit last week, no improvement  Patient Goals  Patient goals for therapy: decreased pain, independence with ADLs/IADLs, increased motion, increased strength and return to sport/leisure activities  Patient goal: Horse back riding, horse training        Objective    Gait: no AD, non-antalgic steady pattern  Posture/observation: decreased lumbar lordosis and thoracic kyphosis  L iliac crest inferior vs R   L ankle non-edematous, non-erythmatous  Foot/ankle alignment unremarkable  OH squat: increased L genu valgum, depth 50% of normal, fair balance  Lumbar ROM: flexion 80 deg, no effect (NE)    RFIS NE   Ext 15 deg, central LBP reproduced  MARK with decreased LBP, better  SBR 15 deg, NE   SBL 20 deg, LBP  R rotation 73 deg, NE   L rotation 69 deg, NE  Ankle A/PROM: B WFL grossly assessed, no effect on LLE pain  MMT: B anterior tibialis, peroneals, posterior tibialis 5/5  B glute medius 5/5  Glute max: R 4-/5, L 3/5  Transverse abdominis cx Fair via palpation  Special tests: L stand march test (+), R (-)  LLE short in supine, long in long sit  B SLR, Gaenslen's, Thigh thrust, ISAURA, FADIR (-)  Flexibility: B gastroc, hamstrings, hip flexors moderately tight  L SI joint area pain with L hip flexor testing  Palpation: B calves non-TTP deep midline  B SI joint non-TTP  Neuro: DTR with B L3, S1 2/4  Lumbosacral myotomes 5/5 B to include iliospoas, quadriceps, anterior tibialis, extensor hallucis, peroneals, and hamstrings  Sensation with B lower legs/feet intact to light touch             Precautions: COPD, PSH L ankle ORIF 2016      Manuals 8/17 /20            R lumbar rotation mob G3            R inom ant rot mob G3            R inom ant rot SCS 90"                         Neuro Re-Ed             Self MET for L inom post rot 5"x3            TA cx 10"x10                                                                             Ther Ex             Bridge 10"x10            Wall gastroc str             H/L HS str             Kneel HF str             T ball squat             LSU L              Nu Step                                                                                                        Modalities

## 2020-08-17 NOTE — PROGRESS NOTES
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        Start Time: 1617          Assessment  Assessment details: Sebas Alfonso is a 70 y o  female presents with PSH L ankle ORIF  Presents with functional leg length discrepancy, postural asymmetry  LLD correctable with self MET today  Pt pain free to end tx, including ambulation, "I feel good "  Sebas Alfonso has the above listed impairments and will benefit from skilled PT to improve deficits to return to prior level of function  Sebas Alfonso was educated on eval findings and plan for management  HEP initiated  Dane Johnson would benefit from skilled services to improve ROM, strength, flexibility, and function, and to decrease pain  Impairments: abnormal or restricted ROM, activity intolerance, impaired balance, impaired physical strength, lacks appropriate home exercise program, pain with function and poor posture   Understanding of Dx/Px/POC: good   Prognosis: good    Goals  Impairment Goals  - Pt I with initial HEP in 1-2 visits  - No functional LLD in 1-2 visits   - Increase strength to 5/5 in all affected areas in 4-6 weeks    Functional Goals  - Patient will be independent with comprehensive HEP in 4-6 weeks  - Ambulation is improved to prior level of function in 4-6 weeks, pain free community ambulation  - Stair climbing is improved to prior level of function in 4-6 weeks, reciprocal pattern with rail   - LLE dynamic strength and balance at least 90% of RLE via single leg step test, in 4-6 wks  - Pt able to mount horse leading with LLE in 4-6 wks        Plan  Patient would benefit from: skilled physical therapy  Planned modality interventions: cryotherapy and thermotherapy: hydrocollator packs  Planned therapy interventions: abdominal trunk stabilization, joint mobilization, manual therapy, patient education, postural training, home exercise program, flexibility, stretching, strengthening, therapeutic activities, therapeutic exercise and neuromuscular re-education  Frequency: 2x week  Duration in visits: 8  Duration in weeks: 4  Treatment plan discussed with: patient        Subjective Evaluation    History of Present Illness  Mechanism of injury: Pt noting 1 year ago pt began to develop "all kinds of reactions, falling apart" with L calf cramping with walking 140 ft, which would spread to L anterior thigh  "The leg is weak, I don't understand why  I don't know if it's a pinched nerve or what  Sometimes the bottom of my (left) foot feels like it's sleeping "  PSH L ankle ORIF 2016   "I think being in that cast threw off my walking "  Notes "if I continue walking when it's like that I'll feel it into my left hip and my low back "  Recent x-rays taken of L ankle ORIF hardware, "Dr Torres Distance said everything looks good " Pt received 2 injections to lumbar spine 6 wks ago, "my back felt better but it didn't do anything for my leg "  Also received injection to L ankle a few days ago, "that helped a little bit "  Chief complaint L calf, thigh cramping, L lateral hip and central lumbosacral pain beginning after walking 140 ft  No PSH lumbar spine  Functional limitations include pain with ambulation, unable to mount horse with LLE due to weakness  Also pain with steps    Pain  Current pain ratin  At best pain ratin  At worst pain rating: 3  Location: L calf, anterior thigh, lateral hip, central LBP  Quality: cramping, burning, sharp and radiating  Relieving factors: rest  Aggravating factors: stair climbing and walking    Social Support  Steps to enter house: yes  2  Stairs in house: yes   14  Lives in: multiple-level home  Lives with: alone    Employment status: not working (Retired)    Diagnostic Tests  X-ray: abnormal (ORIF L ankle with hardware intact)  MRI studies: abnormal (Lumbar MRI)  Treatments  Previous treatment: injection treatment  Current treatment: chiropractic and injection treatment  Current treatment comments: Chiro most recent visit last week, no improvement  Patient Goals  Patient goals for therapy: decreased pain, independence with ADLs/IADLs, increased motion, increased strength and return to sport/leisure activities  Patient goal: Horse back riding, horse training        Objective    Gait: no AD, non-antalgic steady pattern  Posture/observation: decreased lumbar lordosis and thoracic kyphosis  L iliac crest inferior vs R   L ankle non-edematous, non-erythmatous  Foot/ankle alignment unremarkable  OH squat: increased L genu valgum, depth 50% of normal, fair balance  Lumbar ROM: flexion 80 deg, no effect (NE)  RFIS NE  Ext 15 deg, central LBP reproduced  MARK with decreased LBP, better  SBR 15 deg, NE   SBL 20 deg, LBP  R rotation 73 deg, NE   L rotation 69 deg, NE  Ankle A/PROM: B WFL grossly assessed, no effect on LLE pain  MMT: B anterior tibialis, peroneals, posterior tibialis 5/5  B glute medius 5/5  Glute max: R 4-/5, L 3/5  Transverse abdominis cx Fair via palpation  Special tests: L stand march test (+), R (-)  LLE short in supine, long in long sit  B SLR, Gaenslen's, Thigh thrust, ISAURA, FADIR (-)  Flexibility: B gastroc, hamstrings, hip flexors moderately tight  L SI joint area pain with L hip flexor testing  Palpation: B calves non-TTP deep midline  B SI joint non-TTP  Neuro: DTR with B L3, S1 2/4  Lumbosacral myotomes 5/5 B to include iliospoas, quadriceps, anterior tibialis, extensor hallucis, peroneals, and hamstrings  Sensation with B lower legs/feet intact to light touch             Precautions: COPD, PSH L ankle ORIF 2016      Manuals 8/17 /20            R lumbar rotation mob G3            R inom ant rot mob G3            R inom ant rot SCS 90" Neuro Re-Ed             Self MET for L inom post rot 5"x3            TA cx 10"x10                                                                             Ther Ex             Bridge 10"x10            Wall gastroc str             H/L HS str             Kneel HF str             T ball squat             LSU L              Nu Step                                                                                                        Modalities

## 2020-08-19 ENCOUNTER — OFFICE VISIT (OUTPATIENT)
Dept: PHYSICAL THERAPY | Facility: CLINIC | Age: 75
End: 2020-08-19
Payer: MEDICARE

## 2020-08-19 DIAGNOSIS — M12.572 TRAUMATIC ARTHROPATHY OF ANKLE, LEFT: Primary | ICD-10-CM

## 2020-08-19 PROCEDURE — 97110 THERAPEUTIC EXERCISES: CPT

## 2020-08-19 PROCEDURE — 97140 MANUAL THERAPY 1/> REGIONS: CPT

## 2020-08-19 PROCEDURE — 97112 NEUROMUSCULAR REEDUCATION: CPT

## 2020-08-19 NOTE — PROGRESS NOTES
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                   Subjective: "I hurt, I feel muscles I haven't felt in a while " Pt c/o's of LB and L LE pain that spans down to her posterior calf, "it burns and feels like a knot  It only hurts when I go to use it " Pt unable to specify number on verbal pain scale  Objective: See treatment diary below  B ASIS level, no functional LLD  Assessment: Tolerated treatment well  Patient would benefit from continued PT For improved strength, flexibility and overall function  Pt most likely experiencing DOMS from initial eval  L LE fatigue noted throughout session  Issued updated HEP  Plan: Continue per plan of care        Precautions: COPD, PSH L ankle ORIF 2016      Manuals            R lumbar rotation mob G3 G3 HJ           L inom ant rot mob G3 G3 HJ           L inom ant rot SCS 90" 80'' HJ                        Neuro Re-Ed             Self MET for L inom post rot 5"x3 -           TA cx 10"x10 x15                                                                             Ther Ex             Bridge 10"x10 x15            Wall gastroc str  20''x5           H/L HS str  20''x5           Kneel HF str  20''x5           T ball squat  nv           LSU L              Nu Step  L1 5'                                                                                                      Modalities

## 2020-08-24 ENCOUNTER — OFFICE VISIT (OUTPATIENT)
Dept: PHYSICAL THERAPY | Facility: CLINIC | Age: 75
End: 2020-08-24
Payer: MEDICARE

## 2020-08-24 DIAGNOSIS — M12.572 TRAUMATIC ARTHROPATHY OF ANKLE, LEFT: Primary | ICD-10-CM

## 2020-08-24 NOTE — PROGRESS NOTES
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                   Subjective: "Not so good  I'm stressed  I've been on the phone fighting with [place] this morning over this horse I'm buying  I have anxiety and that sets it off "  Pt reports feeling dizzy, overheated  "My leg is feeling better (LLE), I just have to be careful how I'm walking, that my leg isn't rolled out "      Objective: See treatment diary below  No treatment rendered today, /90  Urged pt to f/u with PCP  Assessment: no treatment rendered today, pt to f/u with PCP due to elevated BP  Treatment cancelled today  Plan: Continue per plan of care  This pending f/u with PCP       Precautions: COPD, PSH L ankle ORIF 2016      Manuals           R lumbar rotation mob G3 G3 HJ / 90          L inom ant rot mob G3 G3 HJ           L inom ant rot SCS 90" 90'' HJ                        Neuro Re-Ed             Self MET for L inom post rot 5"x3 -           TA cx 10"x10 x15                                                                             Ther Ex             Bridge 10"x10 x15            Wall gastroc str  20''x5           H/L HS str  20''x5           Kneel HF str  20''x5           T ball squat  nv           LSU L              Nu Step  L1 5'                                                                                                      Modalities

## 2020-08-26 ENCOUNTER — OFFICE VISIT (OUTPATIENT)
Dept: PHYSICAL THERAPY | Facility: CLINIC | Age: 75
End: 2020-08-26
Payer: MEDICARE

## 2020-08-26 DIAGNOSIS — M12.572 TRAUMATIC ARTHROPATHY OF ANKLE, LEFT: Primary | ICD-10-CM

## 2020-08-26 PROCEDURE — 97110 THERAPEUTIC EXERCISES: CPT

## 2020-08-26 PROCEDURE — 97140 MANUAL THERAPY 1/> REGIONS: CPT

## 2020-08-26 NOTE — PROGRESS NOTES
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                   Subjective: pt did not f/u with PCP after last visit with high BP, "she isn't in until Thursday  I feel fine today "  Pt denies any dizziness prior to tx  Pt with no LLE pain today, "my leg is exhausted  I ran across the field after two horses that got loose "  "I took my blood pressure this morning and it was 115/70  I don't take anything for blood pressure  I had two cardiologists tell me don't take anything for blood pressure "      Objective: See treatment diary below  /90  Urged pt again to f/u with PCP re: BP   Prior to treatment, no LLD and level ASIS  Prior to treatment, no LLD and level ASIS  Assessment: Tolerated treatment well  Patient would benefit from continued PT      Plan: Continue per plan of care        Precautions: COPD, PSH L ankle ORIF 2016      Manuals          R lumbar rotation mob G3 G3 HJ / 90 G3         L inom ant rot mob G3 G3 HJ  G3         L inom ant rot SCS 90" 80'' HJ  90"                      Neuro Re-Ed             Self MET for L inom post rot 5"x3 -  -         TA cx 10"x10 x15                                                                             Ther Ex             Bridge 10"x10 x15   x30         Wall gastroc str  20''x5  x5         H/L HS str  20''x5  x5         Kneel HF str  20''x5  x5         T ball squat  2x10  3x10         LSU L              Nu Step  L1 5'  nv                                                                                                    Modalities

## 2020-08-31 ENCOUNTER — APPOINTMENT (OUTPATIENT)
Dept: PHYSICAL THERAPY | Facility: CLINIC | Age: 75
End: 2020-08-31
Payer: MEDICARE

## 2020-09-17 ENCOUNTER — TELEMEDICINE (OUTPATIENT)
Dept: FAMILY MEDICINE CLINIC | Facility: CLINIC | Age: 75
End: 2020-09-17
Payer: MEDICARE

## 2020-09-17 DIAGNOSIS — G89.29 CHRONIC NECK AND BACK PAIN: Primary | ICD-10-CM

## 2020-09-17 DIAGNOSIS — K58.0 IRRITABLE BOWEL SYNDROME WITH DIARRHEA: ICD-10-CM

## 2020-09-17 DIAGNOSIS — M54.2 CHRONIC NECK AND BACK PAIN: Primary | ICD-10-CM

## 2020-09-17 DIAGNOSIS — M54.9 CHRONIC NECK AND BACK PAIN: Primary | ICD-10-CM

## 2020-09-17 PROCEDURE — 99441 PR PHYS/QHP TELEPHONE EVALUATION 5-10 MIN: CPT | Performed by: FAMILY MEDICINE

## 2020-09-17 RX ORDER — NORTRIPTYLINE HYDROCHLORIDE 25 MG/1
50 CAPSULE ORAL
Qty: 30 CAPSULE | Refills: 5 | Status: SHIPPED | OUTPATIENT
Start: 2020-09-17 | End: 2021-04-06 | Stop reason: SINTOL

## 2020-09-17 NOTE — TELEPHONE ENCOUNTER
GI Physician: Dr Alyssa Burkett for Medication: nortripline    Dose: 50 mg    Quantity: 30 capsule    Pharmacy and Location: [facility]

## 2020-09-17 NOTE — PROGRESS NOTES
Virtual Brief Visit    Assessment/Plan:    Problem List Items Addressed This Visit     None                Reason for visit is   Chief Complaint   Patient presents with    Virtual Brief Visit    Neck Pain    Back Pain        Encounter provider Ramiro Morales MD    Provider located at [address] [place] [address] [address]  [address] [address]    Recent Visits  No visits were found meeting these conditions  Showing recent visits within past 7 days and meeting all other requirements     Today's Visits  Date Type Provider Dept   09/17/20 MD [facility]   Showing today's visits and meeting all other requirements     Future Appointments  No visits were found meeting these conditions  Showing future appointments within next 150 days and meeting all other requirements        After connecting through telephone, the patient was identified by name and date of birth  Kyara Truong was informed that this is a telemedicine visit and that the visit is being conducted through telephone  My office door was closed  No one else was in the room  She acknowledged consent and understanding of privacy and security of the platform  The patient has agreed to participate and understands she can discontinue the visit at any time  Patient is aware this is a billable service  Jasbir Montalvo is a 70 y o  female  HPI   79-year-old female calling in by a telephone visit to request x-rays of cervical, thoracic, and lumbar spine  Patient has been having chronic pain in those areas  Patient has already been through physical therapy  She is currently seeing a chiropractor  Her chiropractor is requesting knees but she states she gets them done through him they will be extremely expensive    I did discuss with patient getting the x-rays through [address] [place] [address] so we have a radiologist reading them   Patient is agreeable and will go to the [facility] and sent her to get these x-rays done  Patient denies any shooting pains  Patient denies any numbness and tingling of her arms or legs  Patient denies any bowel or bladder incontinence  I explained to patient that if she does experience any of the symptoms that she should be seen in the ER as these are signs of worsening issues  Patient voiced understanding and all questions were answered  Past Medical History:   Diagnosis Date    Anxiety     Atypical squamous cells cannot exclude high grade squamous intraepithelial lesion on cytologic smear of cervix (ASC-H)     Last Assessed 5/13/2014    Depression     Ovarian cyst        Past Surgical History:   Procedure Laterality Date    ANKLE FRACTURE SURGERY      Last Assessed 07/26/2016    ANKLE SURGERY      CERVICAL BIOPSY  W/ LOOP ELECTRODE EXCISION      COLONOSCOPY  10/2019    DIAGNOSTIC LAPAROSCOPY      DILATION AND CURETTAGE OF UTERUS      ESOPHAGOGASTRODUODENOSCOPY N/A 9/12/2016    Procedure: ESOPHAGOGASTRODUODENOSCOPY (EGD); Surgeon: Jaun Summers MD;  Location: [facility]; Service:     IL COLONOSCOPY FLX DX W/COLLJ SPEC WHEN PFRMD N/A 9/12/2016    Procedure: Lashanda Walker;  Surgeon: Jaun Summers MD;  Location: [facility];   Service: Colorectal    SALPINGOOPHORECTOMY Left     TONSILLECTOMY      UPPER GASTROINTESTINAL ENDOSCOPY  2016       Current Outpatient Medications   Medication Sig Dispense Refill    ALPRAZolam (XANAX) 0 5 mg tablet Take 1/2-1 tablet daily as needed for anxiety 30 tablet 0    ASPIRIN 81 PO Take 81 mg by mouth daily      buPROPion (WELLBUTRIN XL) 150 mg 24 hr tablet Take 1 tablet (150 mg total) by mouth every morning 30 tablet 1    hydrochlorothiazide (MICROZIDE) 12 5 mg capsule Take 1 capsule (12 5 mg total) by mouth daily 90 capsule 1    nortriptyline (PAMELOR) 25 mg capsule Take 2 capsules (50 mg total) by mouth daily at bedtime 30 capsule 2     No current facility-administered medications for this visit  Allergies   Allergen Reactions    Lisinopril      Other reaction(s): Other (See Comments)  Nose bleeds, passing out       Review of Systems   Constitutional: Negative  HENT: Negative  Eyes: Negative  Respiratory: Negative  Cardiovascular: Negative  Gastrointestinal: Negative  Genitourinary: Negative  Musculoskeletal: Positive for back pain and neck pain  Neurological: Negative  Psychiatric/Behavioral: The patient is nervous/anxious  There were no vitals filed for this visit  I spent 5 minutes directly with the patient during this visit    VIRTUAL VISIT [address] acknowledges that she has consented to an online visit or consultation  She understands that the online visit is based solely on information provided by her, and that, in the absence of a face-to-face physical evaluation by the physician, the diagnosis she receives is both limited and provisional in terms of accuracy and completeness  This is not intended to replace a full medical face-to-face evaluation by the physician  Rogelio Ferrell understands and accepts these terms

## 2020-09-18 ENCOUNTER — APPOINTMENT (OUTPATIENT)
Dept: RADIOLOGY | Facility: MEDICAL CENTER | Age: 75
End: 2020-09-18
Payer: MEDICARE

## 2020-09-18 DIAGNOSIS — M54.9 CHRONIC NECK AND BACK PAIN: ICD-10-CM

## 2020-09-18 DIAGNOSIS — G89.29 CHRONIC NECK AND BACK PAIN: ICD-10-CM

## 2020-09-18 DIAGNOSIS — M54.2 CHRONIC NECK AND BACK PAIN: ICD-10-CM

## 2020-09-18 PROCEDURE — 72072 X-RAY EXAM THORAC SPINE 3VWS: CPT

## 2020-09-18 PROCEDURE — 72050 X-RAY EXAM NECK SPINE 4/5VWS: CPT

## 2020-09-18 PROCEDURE — 72110 X-RAY EXAM L-2 SPINE 4/>VWS: CPT

## 2020-09-23 ENCOUNTER — TELEPHONE (OUTPATIENT)
Dept: GYNECOLOGIC ONCOLOGY | Facility: CLINIC | Age: 75
End: 2020-09-23

## 2020-09-23 NOTE — TELEPHONE ENCOUNTER
Phone call to patient to reschedule her appointment on 10/13 with Dr Mattie Solis to either earlier or later in the day due to him being unavailable at her current time  Left message to return my call

## 2020-10-02 ENCOUNTER — OFFICE VISIT (OUTPATIENT)
Dept: FAMILY MEDICINE CLINIC | Facility: CLINIC | Age: 75
End: 2020-10-02
Payer: MEDICARE

## 2020-10-02 VITALS
RESPIRATION RATE: 18 BRPM | WEIGHT: 150.2 LBS | TEMPERATURE: 98.1 F | SYSTOLIC BLOOD PRESSURE: 115 MMHG | HEART RATE: 89 BPM | HEIGHT: 66 IN | DIASTOLIC BLOOD PRESSURE: 80 MMHG | OXYGEN SATURATION: 99 % | BODY MASS INDEX: 24.14 KG/M2

## 2020-10-02 DIAGNOSIS — N30.00 ACUTE CYSTITIS WITHOUT HEMATURIA: ICD-10-CM

## 2020-10-02 DIAGNOSIS — R30.0 DYSURIA: Primary | ICD-10-CM

## 2020-10-02 PROBLEM — H93.13 TINNITUS OF BOTH EARS: Status: ACTIVE | Noted: 2020-10-02

## 2020-10-02 PROBLEM — Z00.00 MEDICARE ANNUAL WELLNESS VISIT, SUBSEQUENT: Status: RESOLVED | Noted: 2019-04-02 | Resolved: 2020-10-02

## 2020-10-02 PROBLEM — M47.816 LUMBAR SPONDYLOSIS: Status: ACTIVE | Noted: 2020-10-02

## 2020-10-02 LAB
SL AMB  POCT GLUCOSE, UA: NEGATIVE
SL AMB LEUKOCYTE ESTERASE,UA: ABNORMAL
SL AMB POCT BILIRUBIN,UA: NEGATIVE
SL AMB POCT BLOOD,UA: ABNORMAL
SL AMB POCT CLARITY,UA: CLEAR
SL AMB POCT COLOR,UA: YELLOW
SL AMB POCT KETONES,UA: ABNORMAL
SL AMB POCT NITRITE,UA: NEGATIVE
SL AMB POCT PH,UA: 5
SL AMB POCT SPECIFIC GRAVITY,UA: 1.03
SL AMB POCT URINE PROTEIN: ABNORMAL
SL AMB POCT UROBILINOGEN: 0.2

## 2020-10-02 PROCEDURE — 81002 URINALYSIS NONAUTO W/O SCOPE: CPT | Performed by: FAMILY MEDICINE

## 2020-10-02 PROCEDURE — 99213 OFFICE O/P EST LOW 20 MIN: CPT | Performed by: FAMILY MEDICINE

## 2020-10-02 RX ORDER — DICYCLOMINE HYDROCHLORIDE 10 MG/1
10 CAPSULE ORAL
COMMUNITY
End: 2022-08-04 | Stop reason: ALTCHOICE

## 2020-10-02 RX ORDER — SULFAMETHOXAZOLE AND TRIMETHOPRIM 800; 160 MG/1; MG/1
1 TABLET ORAL EVERY 12 HOURS SCHEDULED
Qty: 10 TABLET | Refills: 0 | Status: SHIPPED | OUTPATIENT
Start: 2020-10-02 | End: 2020-10-07

## 2020-10-15 ENCOUNTER — OFFICE VISIT (OUTPATIENT)
Dept: FAMILY MEDICINE CLINIC | Facility: CLINIC | Age: 75
End: 2020-10-15
Payer: MEDICARE

## 2020-10-15 VITALS
DIASTOLIC BLOOD PRESSURE: 96 MMHG | HEIGHT: 66 IN | TEMPERATURE: 96.9 F | OXYGEN SATURATION: 99 % | BODY MASS INDEX: 24.56 KG/M2 | HEART RATE: 74 BPM | SYSTOLIC BLOOD PRESSURE: 144 MMHG | WEIGHT: 152.8 LBS

## 2020-10-15 DIAGNOSIS — R39.15 URGENCY OF MICTURITION: ICD-10-CM

## 2020-10-15 DIAGNOSIS — M79.605 BILATERAL LEG PAIN: ICD-10-CM

## 2020-10-15 DIAGNOSIS — R03.0 WHITE COAT SYNDROME WITH HIGH BLOOD PRESSURE BUT WITHOUT HYPERTENSION: Primary | ICD-10-CM

## 2020-10-15 DIAGNOSIS — M79.604 BILATERAL LEG PAIN: ICD-10-CM

## 2020-10-15 LAB
SL AMB  POCT GLUCOSE, UA: NORMAL
SL AMB LEUKOCYTE ESTERASE,UA: NORMAL
SL AMB POCT BILIRUBIN,UA: NORMAL
SL AMB POCT BLOOD,UA: NORMAL
SL AMB POCT CLARITY,UA: CLEAR
SL AMB POCT COLOR,UA: YELLOW
SL AMB POCT KETONES,UA: NORMAL
SL AMB POCT NITRITE,UA: NORMAL
SL AMB POCT PH,UA: NORMAL
SL AMB POCT SPECIFIC GRAVITY,UA: 1010
SL AMB POCT URINE PROTEIN: NORMAL
SL AMB POCT UROBILINOGEN: NORMAL

## 2020-10-15 PROCEDURE — 81002 URINALYSIS NONAUTO W/O SCOPE: CPT | Performed by: FAMILY MEDICINE

## 2020-10-15 PROCEDURE — 99214 OFFICE O/P EST MOD 30 MIN: CPT | Performed by: FAMILY MEDICINE

## 2020-10-27 ENCOUNTER — TELEPHONE (OUTPATIENT)
Dept: FAMILY MEDICINE CLINIC | Facility: CLINIC | Age: 75
End: 2020-10-27

## 2020-11-02 DIAGNOSIS — R03.0 WHITE COAT SYNDROME WITH HIGH BLOOD PRESSURE BUT WITHOUT HYPERTENSION: ICD-10-CM

## 2020-11-02 RX ORDER — HYDROCHLOROTHIAZIDE 12.5 MG/1
CAPSULE, GELATIN COATED ORAL
Qty: 90 CAPSULE | Refills: 0 | Status: SHIPPED | OUTPATIENT
Start: 2020-11-02 | End: 2021-03-19 | Stop reason: SDUPTHER

## 2020-12-11 ENCOUNTER — TELEPHONE (OUTPATIENT)
Dept: FAMILY MEDICINE CLINIC | Facility: CLINIC | Age: 75
End: 2020-12-11

## 2021-01-19 ENCOUNTER — OFFICE VISIT (OUTPATIENT)
Dept: FAMILY MEDICINE CLINIC | Facility: CLINIC | Age: 76
End: 2021-01-19
Payer: MEDICARE

## 2021-01-19 VITALS
SYSTOLIC BLOOD PRESSURE: 152 MMHG | WEIGHT: 151.9 LBS | DIASTOLIC BLOOD PRESSURE: 98 MMHG | TEMPERATURE: 97.6 F | HEART RATE: 95 BPM | BODY MASS INDEX: 24.41 KG/M2 | HEIGHT: 66 IN | OXYGEN SATURATION: 97 %

## 2021-01-19 DIAGNOSIS — Z72.0 TOBACCO ABUSE: ICD-10-CM

## 2021-01-19 DIAGNOSIS — J44.9 CHRONIC OBSTRUCTIVE PULMONARY DISEASE, UNSPECIFIED COPD TYPE (HCC): ICD-10-CM

## 2021-01-19 DIAGNOSIS — F33.9 DEPRESSION, RECURRENT (HCC): ICD-10-CM

## 2021-01-19 DIAGNOSIS — F41.1 GENERALIZED ANXIETY DISORDER: ICD-10-CM

## 2021-01-19 DIAGNOSIS — Z12.31 SCREENING MAMMOGRAM, ENCOUNTER FOR: Primary | ICD-10-CM

## 2021-01-19 DIAGNOSIS — M54.16 LUMBAR RADICULAR PAIN: ICD-10-CM

## 2021-01-19 DIAGNOSIS — G89.29 CHRONIC BILATERAL LOW BACK PAIN WITH BILATERAL SCIATICA: ICD-10-CM

## 2021-01-19 DIAGNOSIS — D58.2 ELEVATED HEMOGLOBIN (HCC): ICD-10-CM

## 2021-01-19 DIAGNOSIS — M54.41 CHRONIC BILATERAL LOW BACK PAIN WITH BILATERAL SCIATICA: ICD-10-CM

## 2021-01-19 DIAGNOSIS — M54.42 CHRONIC BILATERAL LOW BACK PAIN WITH BILATERAL SCIATICA: ICD-10-CM

## 2021-01-19 DIAGNOSIS — M47.816 LUMBAR SPONDYLOSIS: ICD-10-CM

## 2021-01-19 DIAGNOSIS — I73.9 CLAUDICATION OF BOTH LOWER EXTREMITIES (HCC): ICD-10-CM

## 2021-01-19 PROCEDURE — 99214 OFFICE O/P EST MOD 30 MIN: CPT | Performed by: FAMILY MEDICINE

## 2021-01-19 RX ORDER — GABAPENTIN 300 MG/1
300 CAPSULE ORAL 3 TIMES DAILY
Qty: 90 CAPSULE | Refills: 0 | Status: SHIPPED | OUTPATIENT
Start: 2021-01-19 | End: 2021-06-10 | Stop reason: SDUPTHER

## 2021-01-19 RX ORDER — MULTIVIT-MIN/IRON/FOLIC ACID/K 18-600-40
CAPSULE ORAL
COMMUNITY
End: 2022-08-04 | Stop reason: ALTCHOICE

## 2021-01-19 RX ORDER — ALPRAZOLAM 0.5 MG/1
TABLET ORAL
Qty: 30 TABLET | Refills: 0 | Status: SHIPPED | OUTPATIENT
Start: 2021-01-19 | End: 2021-07-22

## 2021-01-19 NOTE — PATIENT INSTRUCTIONS
I am giving you Gabapentin to take for your pain  I am giving you a referral for neurosurgery  If symptoms worsen and you have numbness and tingling, incontinence, or pain that is too severe- please go to ER  Follow up in 3-4 weeks with myself

## 2021-01-19 NOTE — PROGRESS NOTES
Subjective:    HPI  Pollo Flannery is a 70 y o  female here today for:  Chief Complaint   Patient presents with    Follow-up     bp f/u    Back Pain     ---Above per clinical staff & reviewed  ---  HPI:  70-year-old female here for follow-up  Blood pressure still slightly elevated   Patient is complaining of significantly worsening back pain which radiates around to the front of her legs  She states that she has tingling  She also states that her urinary urge incontinence has worsened  Patient last saw neuro surgery, Dr Marylin Jara, 10/26/2020- he reviewed the most recent MRI films which were done in October 2020 and notes multilevel degenerative changes without significant foraminal stenosis or canal stenosis  He stated that he would not recommend surgical intervention at this time but if she was to consider surgical intervention would recommend a lumbar diskogram in preparation for a lumbar fusion  Recommended that she continue conservative measures and follow-up as needed  Patient is interested in 2nd opinion from a different neurosurgeon  It does seem like her pain and her symptoms have worsened significantly since this visit  Patient has had 2 injections in the past with pain management  She has had Percocet in the past which made her break out into a cold sweat and is not interested in this at this time  Otherwise she has only used over-the-counter pain medications for her discomfort  Had a discussion with patient regarding starting something like gabapentin for the nerve pain and she is willing to try this out  Blood pressure elevated in the office again but outside of the office blood pressures are good  I discussed with patient titrated in the gabapentin that she may feel a little tired on this  We will start her at a low dose but started at 3 times a day  I will follow up with patient in 3 to 4 weeks    Told her to go to the ER if significant worsening of her pain that she is unable to handle or she develops incomplete incontinence or worsening numbness and tingling  The following portions of the patient's history were reviewed and updated as appropriate: allergies, current medications, past family history, past medical history, past social history, past surgical history and problem list     Past Medical History:   Diagnosis Date    Anxiety     Atypical squamous cells cannot exclude high grade squamous intraepithelial lesion on cytologic smear of cervix (ASC-H)     Last Assessed 5/13/2014    Depression     Ovarian cyst     Tinnitus of both ears 10/2/2020    Tobacco abuse 11/20/2018       Past Surgical History:   Procedure Laterality Date    ANKLE FRACTURE SURGERY      Last Assessed 07/26/2016    ANKLE SURGERY      CERVICAL BIOPSY  W/ LOOP ELECTRODE EXCISION      COLONOSCOPY  10/2019    DIAGNOSTIC LAPAROSCOPY      DILATION AND CURETTAGE OF UTERUS      ESOPHAGOGASTRODUODENOSCOPY N/A 9/12/2016    Procedure: ESOPHAGOGASTRODUODENOSCOPY (EGD); Surgeon: Michael Batista MD;  Location: [facility]; Service:     CA COLONOSCOPY FLX DX W/COLLJ SPEC WHEN PFRMD N/A 9/12/2016    Procedure: Lawyer Jensen;  Surgeon: Michael Batista MD;  Location: [facility]; Service: Colorectal    SALPINGOOPHORECTOMY Left     TONSILLECTOMY      UPPER GASTROINTESTINAL ENDOSCOPY  2016       Social History     Socioeconomic History    Marital status:       Spouse name: None    Number of children: None    Years of education: 13    Highest education level: None   Occupational History    None   Social Needs    Financial resource strain: None    Food insecurity     Worry: Never true     Inability: None    Transportation needs     Medical: No     Non-medical: None   Tobacco Use    Smoking status: Current Some Day Smoker     Packs/day: 0 50     Years: 60 00     Pack years: 30 00     Types: Cigarettes    Smokeless tobacco: Never Used   Substance and Sexual Activity    Alcohol use: No    Drug use: Never    Sexual activity: Not Currently   Lifestyle    Physical activity     Days per week: None     Minutes per session: None    Stress: None   Relationships    Social connections     Talks on phone: None     Gets together: None     Attends Muslim service: None     Active member of club or organization: None     Attends meetings of clubs or organizations: None     Relationship status: None    Intimate partner violence     Fear of current or ex partner: None     Emotionally abused: None     Physically abused: None     Forced sexual activity: None   Other Topics Concern    None   Social History Narrative      since 2015 after 13 year marriage  1 daughter from 1st marriage  1 granddaughter, Josee Lagos, whom she raised  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  Current Outpatient Medications   Medication Sig Dispense Refill    ALPRAZolam (XANAX) 0 5 mg tablet Take 1/2-1 tablet daily as needed for anxiety 30 tablet 0    Ascorbic Acid (Vitamin C) 500 MG CAPS Take by mouth      ASPIRIN 81 PO Take 81 mg by mouth daily      hydrochlorothiazide (MICROZIDE) 12 5 mg capsule Take 1 capsule by mouth once daily 90 capsule 0    Zinc 15 MG CAPS Take by mouth daily      buPROPion (WELLBUTRIN XL) 150 mg 24 hr tablet Take 1 tablet (150 mg total) by mouth every morning (Patient not taking: Reported on 1/19/2021) 30 tablet 1    dicyclomine (BENTYL) 10 mg capsule Take 10 mg by mouth      gabapentin (NEURONTIN) 300 mg capsule Take 1 capsule (300 mg total) by mouth 3 (three) times a day 90 capsule 0    nortriptyline (PAMELOR) 25 mg capsule Take 2 capsules (50 mg total) by mouth daily at bedtime 30 capsule 5     No current facility-administered medications for this visit  Review of Systems   Constitutional: Negative  Negative for chills and fever  HENT: Negative  Negative for ear pain and sore throat  Eyes: Negative for pain and visual disturbance     Respiratory: Negative  Negative for cough and shortness of breath  Cardiovascular: Negative  Negative for chest pain and palpitations  Gastrointestinal: Negative  Negative for abdominal pain and vomiting  Genitourinary: Positive for urgency  Negative for dysuria and hematuria  Musculoskeletal: Positive for back pain  Negative for arthralgias  Skin: Negative for color change and rash  Neurological: Positive for numbness  Negative for seizures and syncope  Psychiatric/Behavioral: Positive for sleep disturbance  The patient is nervous/anxious  All other systems reviewed and are negative  Objective:    /98 (BP Location: Left arm, Patient Position: Sitting, Cuff Size: Standard)   Pulse 95   Temp 97 6 °F (36 4 °C) (Temporal)   Ht 5' 6" (1 676 m)   Wt 68 9 kg (151 lb 14 4 oz)   SpO2 97%   BMI 24 52 kg/m²   Wt Readings from Last 3 Encounters:   01/19/21 68 9 kg (151 lb 14 4 oz)   12/04/20 69 4 kg (153 lb)   10/15/20 69 3 kg (152 lb 12 8 oz)     BP Readings from Last 3 Encounters:   01/19/21 152/98   10/15/20 144/96   10/02/20 115/80       Lab Results   Component Value Date    WBC 6 01 05/28/2019    HGB 16 4 (H) 05/28/2019    HCT 49 4 (H) 05/28/2019     05/28/2019    TRIG 111 12/11/2018    HDL 58 12/11/2018    ALT 21 05/28/2019    AST 16 05/28/2019     12/10/2015    K 3 5 05/28/2019     05/28/2019    CREATININE 1 01 05/28/2019    BUN 13 05/28/2019    CO2 26 05/28/2019    INR 0 99 04/25/2014    HGBA1C 5 6 05/18/2019       Physical Exam  Vitals signs and nursing note reviewed  Constitutional:       Appearance: Normal appearance  She is well-developed  HENT:      Head: Normocephalic and atraumatic  Eyes:      Conjunctiva/sclera: Conjunctivae normal       Pupils: Pupils are equal, round, and reactive to light  Neck:      Musculoskeletal: Normal range of motion and neck supple  Cardiovascular:      Rate and Rhythm: Normal rate and regular rhythm  Heart sounds: No murmur  Pulmonary:      Effort: Pulmonary effort is normal       Breath sounds: Normal breath sounds  Abdominal:      General: There is no distension  Palpations: Abdomen is soft  Musculoskeletal:      Comments: Normal straight leg test, normal reflexes bilateral, normal strength bilaterally  Normal heel and toe walk without problems  Normal range of motion although patient does have discomfort with bending forward  No spinal or paraspinal tenderness on exam today  Skin:     General: Skin is warm  Capillary Refill: Capillary refill takes less than 2 seconds  Neurological:      Mental Status: She is alert and oriented to person, place, and time  Cranial Nerves: No cranial nerve deficit  Assessment/Plan:   Kelli Calderon was seen today for follow-up and back pain  Diagnoses and all orders for this visit:    Screening mammogram, encounter for  -     Mammo screening bilateral w 3d & cad; Future    Generalized anxiety disorder  -     ALPRAZolam (XANAX) 0 5 mg tablet; Take 1/2-1 tablet daily as needed for anxiety    Lumbar radicular pain  -     Ambulatory referral to Neurosurgery; Future  -     gabapentin (NEURONTIN) 300 mg capsule; Take 1 capsule (300 mg total) by mouth 3 (three) times a day    Chronic bilateral low back pain with bilateral sciatica  -     Ambulatory referral to Neurosurgery; Future  -     gabapentin (NEURONTIN) 300 mg capsule; Take 1 capsule (300 mg total) by mouth 3 (three) times a day    Tobacco abuse    Lumbar spondylosis  -     Ambulatory referral to Neurosurgery; Future  -     gabapentin (NEURONTIN) 300 mg capsule; Take 1 capsule (300 mg total) by mouth 3 (three) times a day    Chronic obstructive pulmonary disease, unspecified COPD type (HCC)    Depression, recurrent ([address] )    Claudication of both lower extremities ([address] )    Elevated hemoglobin ([address] )      Return in about 3 weeks (around 2/9/2021) for Recheck- back pain    Patient Instructions   I am giving you Gabapentin to take for your pain  I am giving you a referral for neurosurgery  If symptoms worsen and you have numbness and tingling, incontinence, or pain that is too severe- please go to ER  Follow up in 3-4 weeks with myself

## 2021-01-20 ENCOUNTER — TELEPHONE (OUTPATIENT)
Dept: FAMILY MEDICINE CLINIC | Facility: CLINIC | Age: 76
End: 2021-01-20

## 2021-01-20 DIAGNOSIS — M47.816 LUMBAR SPONDYLOSIS: Primary | ICD-10-CM

## 2021-01-20 NOTE — TELEPHONE ENCOUNTER
Anel Cruz said that she can not take the 300mg of Gabapentin  She said she almost fell going up the steps last night and this morning woke feeling drunk  She said she is not opposed to trying a lower strength

## 2021-01-21 ENCOUNTER — TELEPHONE (OUTPATIENT)
Dept: FAMILY MEDICINE CLINIC | Facility: CLINIC | Age: 76
End: 2021-01-21

## 2021-01-21 DIAGNOSIS — M47.816 LUMBAR SPONDYLOSIS: Primary | ICD-10-CM

## 2021-01-21 RX ORDER — GABAPENTIN 100 MG/1
100 CAPSULE ORAL 3 TIMES DAILY
Qty: 90 CAPSULE | Refills: 0 | Status: SHIPPED | OUTPATIENT
Start: 2021-01-21 | End: 2021-06-10 | Stop reason: SDUPTHER

## 2021-01-21 NOTE — TELEPHONE ENCOUNTER
100mg tablets sent to pharmacy- have her message me or call me to let me know if she is ok taking these after a few days- thanks

## 2021-01-21 NOTE — TELEPHONE ENCOUNTER
Patient is aware to call [facility] option 6 to talk to a comprehensive spine nurse for intake

## 2021-01-26 NOTE — PROGRESS NOTES
PT Evaluation     Today's date: 2021  Patient name: Augustina Melendez  : 1945  MRN: [id number]  Referring provider: Tony Rees PT  Dx:   Encounter Diagnosis     ICD-10-CM    1  Lumbar spondylosis  M47 [id number]                   Assessment  Assessment details: PT notes the patient with decrease ROM and strength t/o the lumbar spine with trunk/core weakness leading to increase pain levels, LE radicular symptoms, and functional limitations with need for course of skilled therapy for 6 weeks with focus on lumbar stabilization, manual therapy, posture, analgesic modalities, and HEP  Patient was found to be   Risk level with START Back Tool  Goals  ST  Initiate HEP   2  Decrease pain levels by 25-50%   3  Increase strength by 1-2 mm grades  4  Increase ROM by 25-50%   LT  Improve spinal stability with increase trunk/core strength   2  Decrease limitations with standing, walking and stairs  3  Decrease limitations with trunk movement and lifting/carrying  4  DC with HEP    Plan  Plan details: PT notes review of POC and finding with patient who is in agreement with PT recommendations of course of skilled therapy      Patient would benefit from: PT eval and skilled physical therapy  Duration in weeks: 6  Treatment plan discussed with: patient        Subjective    Objective           Precautions: ***      Manuals                                             Neuro Re-Ed                                                                        Ther Ex                                                                        HEP update/review          Ther Activity                           Gait Training                           Modalities

## 2021-01-27 ENCOUNTER — EVALUATION (OUTPATIENT)
Dept: PHYSICAL THERAPY | Facility: CLINIC | Age: 76
End: 2021-01-27

## 2021-01-27 ENCOUNTER — TELEPHONE (OUTPATIENT)
Dept: FAMILY MEDICINE CLINIC | Facility: CLINIC | Age: 76
End: 2021-01-27

## 2021-01-27 DIAGNOSIS — M47.816 LUMBAR SPONDYLOSIS: Primary | ICD-10-CM

## 2021-01-27 NOTE — TELEPHONE ENCOUNTER
Patient called stating that she went to her appointment, it was for physical therapy  Asked patient if she called the phone number that I gave her for the comphrenesive intake, patient states that she does not know if she called the number or what she did   Please advise

## 2021-01-27 NOTE — PROGRESS NOTES
PT notes patient came into clinic and stated that she thought she was going to have evaluation by orthopedic MD for the LBP  Patient reports she was not aware of the PT orders and states she can not perform PT secondary to increase pain levels in the LB with any activity  PT is not sure of the confusion because PT was identified while scheduling appointment  PT did state to patient to contact PCP about issue

## 2021-01-28 NOTE — TELEPHONE ENCOUNTER
Please call patient regarding specialist  I informed patient regarding the instructions you gave me, patient states that she was being scheduled for physical therapy and she can not do therapy   Please advise

## 2021-01-29 DIAGNOSIS — M47.816 LUMBAR SPONDYLOSIS: Primary | ICD-10-CM

## 2021-01-29 NOTE — PROGRESS NOTES
Patient called requesting an order for neurosurgery, I spoke to Dr Sam Pablo and she authorize for the order to be in the system for the patient  I then spoke to the patient and gave her the phone number to [facility]

## 2021-02-15 ENCOUNTER — OFFICE VISIT (OUTPATIENT)
Dept: NEUROSURGERY | Facility: CLINIC | Age: 76
End: 2021-02-15
Payer: MEDICARE

## 2021-02-15 VITALS
BODY MASS INDEX: 24.27 KG/M2 | SYSTOLIC BLOOD PRESSURE: 148 MMHG | TEMPERATURE: 97.4 F | WEIGHT: 151 LBS | RESPIRATION RATE: 16 BRPM | HEART RATE: 75 BPM | DIASTOLIC BLOOD PRESSURE: 94 MMHG | HEIGHT: 66 IN

## 2021-02-15 DIAGNOSIS — M47.816 LUMBAR SPONDYLOSIS: ICD-10-CM

## 2021-02-15 DIAGNOSIS — M47.818 SI JOINT ARTHRITIS: Primary | ICD-10-CM

## 2021-02-15 PROCEDURE — 99203 OFFICE O/P NEW LOW 30 MIN: CPT | Performed by: NEUROLOGICAL SURGERY

## 2021-02-15 NOTE — PROGRESS NOTES
Assessment/Plan:    No problem-specific Assessment & Plan notes found for this encounter  Problem List Items Addressed This Visit        Musculoskeletal and Integument    Lumbar spondylosis    Relevant Orders    Ambulatory referral to Pain Management      Other Visit Diagnoses     SI joint arthritis    -  Primary    Relevant Orders    Ambulatory referral to Pain Management            Subjective:      Patient ID: Dhruv Matias is a 70 y o  female  HPI    The following portions of the patient's history were reviewed and updated as appropriate:   She  has a past medical history of Anxiety, Atypical squamous cells cannot exclude high grade squamous intraepithelial lesion on cytologic smear of cervix (ASC-H), Depression, Ovarian cyst, Tinnitus of both ears (10/2/2020), and Tobacco abuse (11/20/2018)  She   Patient Active Problem List    Diagnosis Date Noted    Claudication of both lower extremities ([facility] [address] ) 01/19/2021    Lumbar spondylosis 10/02/2020    Tinnitus of both ears 10/02/2020    Depression     Chronic obstructive pulmonary disease (HCC) 05/19/2020    Elevated hemoglobin (HCC) 05/19/2020    Irritable bowel syndrome with diarrhea 01/14/2020    Chronic abdominal pain 10/29/2019    Generalized anxiety disorder 10/29/2019    Diarrhea 09/26/2019    Pelvic pain 12/11/2018    Hyperlipidemia 11/20/2018    Tobacco abuse 11/20/2018    White coat syndrome with high blood pressure but without hypertension 11/20/2018    Cervical stenosis of spine 09/12/2018    Aortic sclerosis 06/17/2017    Colon polyps 07/26/2016    Encounter for follow-up surveillance of cervical cancer 06/16/2014     She  has a past surgical history that includes Ankle surgery; Tonsillectomy; pr colonoscopy flx dx w/collj spec when pfrmd (N/A, 9/12/2016); Esophagogastroduodenoscopy (N/A, 9/12/2016);  Cervical biopsy w/ loop electrode excision; Dilation and curettage of uterus; Diagnostic laparoscopy; Salpingoophorectomy (Left); Ankle fracture surgery; Colonoscopy (10/2019); and Upper gastrointestinal endoscopy (2016)  Her family history includes Coronary artery disease in her mother; No Known Problems in her family  She  reports that she has been smoking cigarettes  She has a 30 00 pack-year smoking history  She has never used smokeless tobacco  She reports that she does not drink alcohol or use drugs  Current Outpatient Medications   Medication Sig Dispense Refill    Ascorbic Acid (Vitamin C) 500 MG CAPS Take by mouth      hydrochlorothiazide (MICROZIDE) 12 5 mg capsule Take 1 capsule by mouth once daily 90 capsule 0    ALPRAZolam (XANAX) 0 5 mg tablet Take 1/2-1 tablet daily as needed for anxiety (Patient not taking: Reported on 2/15/2021) 30 tablet 0    ASPIRIN 81 PO Take 81 mg by mouth daily      buPROPion (WELLBUTRIN XL) 150 mg 24 hr tablet Take 1 tablet (150 mg total) by mouth every morning (Patient not taking: Reported on 1/19/2021) 30 tablet 1    dicyclomine (BENTYL) 10 mg capsule Take 10 mg by mouth      gabapentin (NEURONTIN) 100 mg capsule Take 1 capsule (100 mg total) by mouth 3 (three) times a day (Patient not taking: Reported on 2/15/2021) 90 capsule 0    gabapentin (NEURONTIN) 300 mg capsule Take 1 capsule (300 mg total) by mouth 3 (three) times a day (Patient not taking: Reported on 2/15/2021) 90 capsule 0    nortriptyline (PAMELOR) 25 mg capsule Take 2 capsules (50 mg total) by mouth daily at bedtime 30 capsule 5    Zinc 15 MG CAPS Take by mouth daily       No current facility-administered medications for this visit        Current Outpatient Medications on File Prior to Visit   Medication Sig    Ascorbic Acid (Vitamin C) 500 MG CAPS Take by mouth    hydrochlorothiazide (MICROZIDE) 12 5 mg capsule Take 1 capsule by mouth once daily    ALPRAZolam (XANAX) 0 5 mg tablet Take 1/2-1 tablet daily as needed for anxiety (Patient not taking: Reported on 2/15/2021)    ASPIRIN 81 PO Take 81 mg by mouth daily    buPROPion (WELLBUTRIN XL) 150 mg 24 hr tablet Take 1 tablet (150 mg total) by mouth every morning (Patient not taking: Reported on 1/19/2021)    dicyclomine (BENTYL) 10 mg capsule Take 10 mg by mouth    gabapentin (NEURONTIN) 100 mg capsule Take 1 capsule (100 mg total) by mouth 3 (three) times a day (Patient not taking: Reported on 2/15/2021)    gabapentin (NEURONTIN) 300 mg capsule Take 1 capsule (300 mg total) by mouth 3 (three) times a day (Patient not taking: Reported on 2/15/2021)    nortriptyline (PAMELOR) 25 mg capsule Take 2 capsules (50 mg total) by mouth daily at bedtime    Zinc 15 MG CAPS Take by mouth daily     No current facility-administered medications on file prior to visit  She is allergic to lisinopril       Review of Systems   Constitutional: Negative  HENT: Positive for hearing loss  Eyes: Positive for visual disturbance  Gastrointestinal:        Incontinence    Endocrine: Negative  Genitourinary: Positive for frequency and urgency  Incontinence    Musculoskeletal: Positive for back pain (Center low back pain radiates to b/l legs   Pain wraps around abdomen)  4 year back pain     PT: tried x2 last year which pt felt made pain worse  Chiropractor  CRISTIAN: Last year       Skin: Negative  Allergic/Immunologic: Negative  Neurological: Positive for dizziness, weakness (b/l legs) and numbness (b/l thighs)  Negative for tremors  Hematological: Bruises/bleeds easily (asa DOES NOT TAKE REGULARLY)  Psychiatric/Behavioral: Negative for sleep disturbance  Objective: There were no vitals taken for this visit  Physical Exam      I have personally obtain history and examined patient  I have personally reviewed case including all pertinent investigations/studies  Time spent 45 minutes   More than 50% of total time spent on counseling and coordination of care as described above including patient education, discussion of risks and rationale of conservative vs surgical treatment options  HPI     Chronic back pain worse x 1 yr with reproducible paraspinal pain radiating down right buttock since falling off horse  nsaids of limited benefit  Difficulty with completing ADL  No relief from recent PT or CRISTIAN  Denies bowel and bladder deficit  Exam     Full strength bilateral LE   Negative SLR  Normal sensation  Symmetrically depressed DTR  Antalgic stooped gait  Limited lumbar ROM  Paravertebral spasm  Able to toe and heel walk  Reproducible tender to palpation right SI region                                   Back:     Symmetric, no curvature, ROM abnormal, no CVA tenderness         Chest wall:    No tenderness or deformity                           Extremities:   Extremities normal, atraumatic, no cyanosis or edema         Skin:   Skin color, texture, turgor normal, no rashes or lesions         Radiology     xray     Minimal L4/5 spondylolisthesis in the setting of moderate multilevel lumbar spondylosis     Summary and Plan     Mr Ramon Cao has back pain that my be SI in origin  Today we reviewed the conservative options including PT, CRISTIAN and medications  I have referred him to Dr Jillian Dowell for further appraisal  I will see him in fu on a PRN basis

## 2021-03-01 ENCOUNTER — CONSULT (OUTPATIENT)
Dept: PAIN MEDICINE | Facility: MEDICAL CENTER | Age: 76
End: 2021-03-01
Payer: MEDICARE

## 2021-03-01 VITALS
TEMPERATURE: 98.4 F | HEART RATE: 73 BPM | DIASTOLIC BLOOD PRESSURE: 96 MMHG | BODY MASS INDEX: 24.43 KG/M2 | WEIGHT: 152 LBS | HEIGHT: 66 IN | SYSTOLIC BLOOD PRESSURE: 170 MMHG

## 2021-03-01 DIAGNOSIS — M47.816 LUMBAR SPONDYLOSIS: ICD-10-CM

## 2021-03-01 DIAGNOSIS — M47.818 SI JOINT ARTHRITIS: ICD-10-CM

## 2021-03-01 PROCEDURE — 99204 OFFICE O/P NEW MOD 45 MIN: CPT | Performed by: PHYSICAL MEDICINE & REHABILITATION

## 2021-03-01 NOTE — PROGRESS NOTES
Assessment  1  Lumbar spondylosis    2  SI joint arthritis        Plan  1  I did have an in depth conversation with the patient today  Clearly, the patient's pain has persisted despite time, relative rest, activity modification as well as therapy  The patient's examination and symptoms would suggest an underlying sacroiliac joint etiology  I would recommend proceeding with bilateral intra-articular sacroiliac joint injection under fluoroscopic guidance  The injection will serve both diagnostic and hopefully therapeutic roles for the patient  In the office today, we reviewed the nature of sacroiliac joint pathology in depth using a spine model  We discussed the approach we would use for the sacroiliac joint injection and provided literature for home review  Complete risks and benefits including bleeding, infection, tissue reaction, allergic reaction were reviewed and verbal and written consent was obtained  My impressions and treatment recommendations were discussed in detail with the patient who verbalized understanding and had no further questions  Discharge instructions were provided  I personally saw and examined the patient and I agree with the above discussed plan of care  Orders Placed This Encounter   Procedures    FL spine and pain procedure     Standing Status:   Future     Standing Expiration Date:   3/1/2022     Order Specific Question:   Reason for Exam:     Answer:   BILATERAL SIJ injection     Order Specific Question:   Anticoagulant hold needed? Answer:   no     No orders of the defined types were placed in this encounter  History of Present Illness    Jay Knowles is a 70 y o  female seen in consultation at the request of Dr Sharon Martinez regarding chronic lower back and radiating posterior thigh pain    Patient has been experiencing symptoms for about 4 years and is describing moderate to severe intensity pain currently rated as an 8/10 which is nearly constant without any typical pattern  This is characterized as burning, shooting, sharp, pins and needles  She does describe subjective lower extremity weakness especially with walking long distances  She does not require an assistive device at this time however  Aggravating factors include bending, sitting, walking, exercise, relaxation, and bowel movements  Alleviating factors can also include exercise, relaxation, and bowel movements  Diagnostic testing includes x-rays of the lumbar spine  She has had epidural injections in the past as well as physical therapy, acupuncture, and chiropractic manipulation without relief  She does continue to smoke a half pack per day but denies marijuana use or alcohol use  Currently using ibuprofen on and gabapentin for pain relief which provide mild improvement  She is hopeful for further relief with other treatment options  I have personally reviewed and/or updated the patient's past medical history, past surgical history, family history, social history, current medications, allergies, and vital signs today  Review of Systems   Constitutional: Positive for unexpected weight change  Negative for fever  HENT: Positive for hearing loss  Negative for trouble swallowing  Eyes: Positive for visual disturbance  Respiratory: Positive for wheezing  Negative for shortness of breath  Cardiovascular: Negative for chest pain and palpitations  Gastrointestinal: Positive for abdominal pain  Negative for constipation, diarrhea, nausea and vomiting  Endocrine: Positive for polyuria  Negative for cold intolerance, heat intolerance and polydipsia  Genitourinary: Negative for difficulty urinating and frequency  Musculoskeletal: Positive for back pain (radiates down the posterior of both legs)  Negative for arthralgias, gait problem, joint swelling and myalgias  Skin: Negative for rash  Neurological: Positive for dizziness   Negative for seizures, syncope, weakness and headaches  Hematological: Does not bruise/bleed easily  Psychiatric/Behavioral: Negative for dysphoric mood  The patient is nervous/anxious  All other systems reviewed and are negative  Patient Active Problem List   Diagnosis    Aortic sclerosis    Encounter for follow-up surveillance of cervical cancer    Hyperlipidemia    Colon polyps    Cervical stenosis of spine    Tobacco abuse    White coat syndrome with high blood pressure but without hypertension    Pelvic pain    Diarrhea    Chronic abdominal pain    Generalized anxiety disorder    Irritable bowel syndrome with diarrhea    Chronic obstructive pulmonary disease (HCC)    Elevated hemoglobin (HCC)    Lumbar spondylosis    Tinnitus of both ears    Depression    Claudication of both lower extremities ([address] )       Past Medical History:   Diagnosis Date    Anxiety     Atypical squamous cells cannot exclude high grade squamous intraepithelial lesion on cytologic smear of cervix (ASC-H)     Last Assessed 5/13/2014    Back pain     Cancer ([address] )     Depression     Ovarian cyst     Tinnitus of both ears 10/2/2020    Tobacco abuse 11/20/2018       Past Surgical History:   Procedure Laterality Date    ANKLE FRACTURE SURGERY      Last Assessed 07/26/2016    ANKLE SURGERY      CERVICAL BIOPSY  W/ LOOP ELECTRODE EXCISION      COLONOSCOPY  10/2019    DIAGNOSTIC LAPAROSCOPY      DILATION AND CURETTAGE OF UTERUS      ESOPHAGOGASTRODUODENOSCOPY N/A 9/12/2016    Procedure: ESOPHAGOGASTRODUODENOSCOPY (EGD); Surgeon: Juvencio Chaves MD;  [facility]; Service:     FRACTURE SURGERY      ORTHOPEDIC SURGERY      [place] COLONOSCOPY FLX DX W/[facility] WHEN PFRMD N/A 9/12/2016    Procedure: Angel Valles;  Surgeon: Juvencio Chaves MD;  [facility];   Service: Colorectal    SALPINGOOPHORECTOMY Left     TONSILLECTOMY      UPPER GASTROINTESTINAL ENDOSCOPY  2016       Family History   Problem Relation Age of Onset    Coronary artery disease Mother     No Known Problems Family     No Known Problems Father     Colon cancer Neg Hx        Social History     Occupational History    Not on file   Tobacco Use    Smoking status: Current Some Day Smoker     Packs/day: 0 50     Years: 60 00     Pack years: 30 00     Types: Cigarettes    Smokeless tobacco: Never Used   Substance and Sexual Activity    Alcohol use: No    Drug use: Never    Sexual activity: Not Currently       Current Outpatient Medications on File Prior to Visit   Medication Sig    Ascorbic Acid (Vitamin C) 500 MG CAPS Take by mouth    hydrochlorothiazide (MICROZIDE) 12 5 mg capsule Take 1 capsule by mouth once daily    Zinc 15 MG CAPS Take by mouth daily    ALPRAZolam (XANAX) 0 5 mg tablet Take 1/2-1 tablet daily as needed for anxiety (Patient not taking: Reported on 2/15/2021)    ASPIRIN 81 PO Take 81 mg by mouth daily    buPROPion (WELLBUTRIN XL) 150 mg 24 hr tablet Take 1 tablet (150 mg total) by mouth every morning (Patient not taking: Reported on 1/19/2021)    dicyclomine (BENTYL) 10 mg capsule Take 10 mg by mouth    gabapentin (NEURONTIN) 100 mg capsule Take 1 capsule (100 mg total) by mouth 3 (three) times a day (Patient not taking: Reported on 2/15/2021)    gabapentin (NEURONTIN) 300 mg capsule Take 1 capsule (300 mg total) by mouth 3 (three) times a day (Patient not taking: Reported on 2/15/2021)    nortriptyline (PAMELOR) 25 mg capsule Take 2 capsules (50 mg total) by mouth daily at bedtime     No current facility-administered medications on file prior to visit  Allergies   Allergen Reactions    Lisinopril Syncope     Other reaction(s): Other (See Comments)  Nose bleeds, passing out       Physical Exam    /96   Pulse 73   Temp 98 4 °F (36 9 °C)   Ht 5' 6" (1 676 m)   Wt 68 9 kg (152 lb)   BMI 24 53 kg/m²     LUMBAR  General: Well-developed, well-nourished individual in no acute distress    Mental: Appropriate mood and affect  Grossly oriented with coherent speech and thought processing   Neuro:  Cranial nerves: Cranial nerve function is grossly intact bilaterally   Strength: Bilateral lower extremity strength is normal and symmetric   No atrophy or tone abnormalities noted   Reflexes: Bilateral lower extremity muscle stretch reflexes are physiologic and symmetric   No ankle clonus is noted   Sensation: No loss of sensation is noted   SLR/Foraminal Compression Maneuvers: Straight leg raising in the sitting position is negative for radicular pain   Gait:  Gait/gross motor: Gait is antalgic  Station is forward flexed  Toe walking, heel walking  are normal     Musculoskeletal:  Palpation: Inspection and palpation of the spine and extremities are unremarkable except for significant tenderness to palpation along the sacroiliac joints bilaterally reproducing her pain complaint  Spine: Normal pain-free range of motion   No gross axial skeletal deformities   Skin: Skin inspection grossly negative for erythema, breakdown, or concerning lesions in affected area   Lymph: No lymphadenopathy is appreciated in the involved extremity   Vessels: No lower extremity edema   Lungs: Breathing is comfortable and regular  No dyspnea noted during examination   Eyes: Visual field grossly intact to confrontation  No redness appreciated  ENT: No craniofacial deformities or asymmetry  No neck masses appreciated           Imaging

## 2021-03-01 NOTE — PATIENT INSTRUCTIONS
Sacroiliitis   WHAT YOU NEED TO KNOW:   Sacroiliitis is a painful swelling of one or both of your sacroiliac joints that lasts at least 3 months  The sacroiliac joint connects your pelvis to the base of your spine  DISCHARGE INSTRUCTIONS:   Medicines:  Ask for more information about these and other medicines you may need to treat sacroiliitis:  · Pain medicine: You may be given medicine to take away or decrease pain  Do not wait until the pain is severe before you take your medicine  You may be given the medicine as a pill to swallow or as a lotion that you put on the painful area  · NSAIDs  help decrease swelling and pain or fever  This medicine is available with or without a doctor's order  NSAIDs can cause stomach bleeding or kidney problems in certain people  If you take blood thinner medicine, always ask your healthcare provider if NSAIDs are safe for you  Always read the medicine label and follow directions  · Muscle relaxers  help decrease pain and muscle spasms  · Take your medicine as directed  Contact your healthcare provider if you think your medicine is not helping or if you have side effects  Tell him of her if you are allergic to any medicine  Keep a list of the medicines, vitamins, and herbs you take  Include the amounts, and when and why you take them  Bring the list or the pill bottles to follow-up visits  Carry your medicine list with you in case of an emergency  Physical therapy:  Your healthcare provider may suggest physical therapy  Your physical therapist may teach you exercises to improve posture (the way you stand and sit), flexibility, and strength in your lower back  He may also teach you how to remain safely active and avoid further injury  Follow the exercise plan given to you by your physical therapist   Rest:  Follow your healthcare provider's instructions about how much rest you should get  Avoid activity that worsens your pain    Ice or heat packs:  Use ice or heat packs on the sore area of your body to decrease the pain and swelling  Put ice in a plastic bag covered with a towel on your low back  Cover heated items with a towel to avoid burns  Use ice and heat as directed  Follow up with your healthcare provider or spine specialist within 1 to 2 weeks:  Write down your questions so you remember to ask them during your visits  Contact your healthcare provider or spine specialist if:   · Your pain makes it hard for you to do your daily activities, such as work or school  · Your pain does not go away after treatment  · You feel depressed or anxious  · You have questions about your condition or care  Return to the emergency department if:   · You have a fever  · Your pain is worse than before  · Your pain prevents you from sleeping  © Copyright [address] Information is for End User's use only and may not be sold, redistributed or otherwise used for commercial purposes  All illustrations and images included in CareNotes® are the copyrighted property of [employer]  or [address] [place], [place] [address]   The above information is an  only  It is not intended as medical advice for individual conditions or treatments  Talk to your doctor, nurse or pharmacist before following any medical regimen to see if it is safe and effective for you

## 2021-03-09 DIAGNOSIS — Z23 ENCOUNTER FOR IMMUNIZATION: ICD-10-CM

## 2021-03-10 ENCOUNTER — HOSPITAL ENCOUNTER (OUTPATIENT)
Dept: RADIOLOGY | Facility: MEDICAL CENTER | Age: 76
Discharge: HOME | End: 2021-03-10
Attending: PHYSICAL MEDICINE & REHABILITATION
Payer: MEDICARE

## 2021-03-10 VITALS
SYSTOLIC BLOOD PRESSURE: 162 MMHG | DIASTOLIC BLOOD PRESSURE: 90 MMHG | RESPIRATION RATE: 20 BRPM | OXYGEN SATURATION: 97 % | TEMPERATURE: 97.9 F | HEART RATE: 67 BPM

## 2021-03-10 DIAGNOSIS — M47.818 SI JOINT ARTHRITIS: ICD-10-CM

## 2021-03-10 PROCEDURE — 27096 INJECT SACROILIAC JOINT: CPT | Performed by: PHYSICAL MEDICINE & REHABILITATION

## 2021-03-10 RX ORDER — METHYLPREDNISOLONE ACETATE 40 MG/ML
80 INJECTION, SUSPENSION INTRA-ARTICULAR; INTRALESIONAL; INTRAMUSCULAR; PARENTERAL; SOFT TISSUE ONCE
Status: COMPLETED | OUTPATIENT
Start: 2021-03-10 | End: 2021-03-10

## 2021-03-10 RX ORDER — BUPIVACAINE HCL/PF 2.5 MG/ML
10 VIAL (ML) INJECTION ONCE
Status: COMPLETED | OUTPATIENT
Start: 2021-03-10 | End: 2021-03-10

## 2021-03-10 RX ADMIN — METHYLPREDNISOLONE ACETATE 80 MG: 40 INJECTION, SUSPENSION INTRA-ARTICULAR; INTRALESIONAL; INTRAMUSCULAR; SOFT TISSUE at 13:37

## 2021-03-10 RX ADMIN — Medication 3 ML: at 13:37

## 2021-03-10 RX ADMIN — IOHEXOL 1 ML: 300 INJECTION, SOLUTION INTRAVENOUS at 13:36

## 2021-03-10 NOTE — DISCHARGE INSTRUCTIONS
Steroid Joint Injection   WHAT YOU NEED TO KNOW:   A steroid joint injection is a procedure to inject steroid medicine into a joint  Steroid medicine decreases pain and inflammation  The injection may also contain an anesthetic (numbing medicine) to decrease pain  It may be done to treat conditions such as arthritis, gout, or carpal tunnel syndrome  The injections may be given in your knee, ankle, shoulder, elbow, wrist, ankle or sacroiliac joint  1  Do not apply heat to any area that is numb  If you have discomfort or soreness at the injection site, you may apply ice today, 20 minutes on and 20 minutes off  Tomorrow you may use ice or warm, moist heat  Do not apply ice or heat directly to the skin  2  You may have an increase or change in the discomfort for 36-48 hours after your treatment  Apply ice and continue with any pain medicine you have been prescribed  3  Do not do anything strenuous today  You may shower, but no tub baths or hot tubs today  You may resume your normal activities tomorrow, but do not overdo it  Resume normal activities slowly when you are feeling better  4  If you experience redness, drainage or swelling at the injection site, or if you develop a fever above 100 degrees, please call [facility] at [phone] or go to the Emergency Room  5  Continue to take all routine medicines prescribed by your primary care physician unless otherwise instructed by our staff  Most blood thinners should be started again according to your regularly scheduled dosing  If you have any questions, please give our office a call  If you have a problem specifically related to your procedure, please call our office at [phone]  Problems not related to your procedure should be directed to your primary care physician

## 2021-03-10 NOTE — H&P
History of Present Illness: The patient is a 70 y o  female who presents with complaints of bilateral lower back pain    Patient Active Problem List   Diagnosis    Aortic sclerosis    Encounter for follow-up surveillance of cervical cancer    Hyperlipidemia    Colon polyps    Cervical stenosis of spine    Tobacco abuse    White coat syndrome with high blood pressure but without hypertension    Pelvic pain    Diarrhea    Chronic abdominal pain    Generalized anxiety disorder    Irritable bowel syndrome with diarrhea    Chronic obstructive pulmonary disease (HCC)    Elevated hemoglobin (HCC)    Lumbar spondylosis    Tinnitus of both ears    Depression    Claudication of both lower extremities ([address] )       Past Medical History:   Diagnosis Date    Anxiety     Atypical squamous cells cannot exclude high grade squamous intraepithelial lesion on cytologic smear of cervix (ASC-H)     Last Assessed 5/13/2014    Back pain     Cancer ([address] )     Depression     Ovarian cyst     Tinnitus of both ears 10/2/2020    Tobacco abuse 11/20/2018       Past Surgical History:   Procedure Laterality Date    ANKLE FRACTURE SURGERY      Last Assessed 07/26/2016    ANKLE SURGERY      CERVICAL BIOPSY  W/ LOOP ELECTRODE EXCISION      COLONOSCOPY  10/2019    DIAGNOSTIC LAPAROSCOPY      DILATION AND CURETTAGE OF UTERUS      ESOPHAGOGASTRODUODENOSCOPY N/A 9/12/2016    Procedure: ESOPHAGOGASTRODUODENOSCOPY (EGD); Surgeon: David Pennington MD;  [facility]; Service:     FRACTURE SURGERY      ORTHOPEDIC SURGERY      [facility] N/A [facility]/12/2016    Procedure: Pablo Beltran;  Surgeon: David Pennington MD;  [facility];   Service: Colorectal    SALPINGOOPHORECTOMY Left     TONSILLECTOMY      UPPER GASTROINTESTINAL ENDOSCOPY  2016         Current Outpatient Medications:     ALPRAZolam (XANAX) 0 5 mg tablet, Take 1/2-1 tablet daily as needed for anxiety (Patient not taking: Reported on 2/15/2021), Disp: 30 tablet, Rfl: 0    Ascorbic Acid (Vitamin C) 500 MG CAPS, Take by mouth, Disp: , Rfl:     ASPIRIN 81 PO, Take 81 mg by mouth daily, Disp: , Rfl:     buPROPion (WELLBUTRIN XL) 150 mg 24 hr tablet, Take 1 tablet (150 mg total) by mouth every morning (Patient not taking: Reported on 1/19/2021), Disp: 30 tablet, Rfl: 1    dicyclomine (BENTYL) 10 mg capsule, Take 10 mg by mouth, Disp: , Rfl:     gabapentin (NEURONTIN) 100 mg capsule, Take 1 capsule (100 mg total) by mouth 3 (three) times a day (Patient not taking: Reported on 2/15/2021), Disp: 90 capsule, Rfl: 0    gabapentin (NEURONTIN) 300 mg capsule, Take 1 capsule (300 mg total) by mouth 3 (three) times a day (Patient not taking: Reported on 2/15/2021), Disp: 90 capsule, Rfl: 0    hydrochlorothiazide (MICROZIDE) 12 5 mg capsule, Take 1 capsule by mouth once daily, Disp: 90 capsule, Rfl: 0    nortriptyline (PAMELOR) 25 mg capsule, Take 2 capsules (50 mg total) by mouth daily at bedtime, Disp: 30 capsule, Rfl: 5    Zinc 15 MG CAPS, Take by mouth daily, Disp: , Rfl:     Current Facility-Administered Medications:     bupivacaine (PF) (MARCAINE) 0 25 % injection 10 mL, 10 mL, Intra-articular, Once, Lanza Deshaun, DO    iohexol (OMNIPAQUE) 300 mg/mL injection 50 mL, 50 mL, Intra-articular, Once, Lanza Deshaun, DO    methylPREDNISolone acetate (DEPO-MEDROL) injection 80 mg, 80 mg, Intra-articular, Once, Lanza Deshaun, DO    Allergies   Allergen Reactions    Lisinopril Syncope     Other reaction(s):  Other (See Comments)  Nose bleeds, passing out       Physical Exam:   Vitals:    03/10/21 1315   BP: (!) 170/104   Pulse: 77   Resp: 20   Temp: 97 9 °F (36 6 °C)   SpO2: 98%     General: Awake, Alert, Oriented x 3, Mood and affect appropriate  Respiratory: Respirations even and unlabored  Cardiovascular: Peripheral pulses intact; no edema  Musculoskeletal Exam: bilateral lower back pain    ASA Score: 2    Patient/Chart Verification  Patient ID Verified: Verbal  ID Band Applied: No  Consents Confirmed: Procedural  H&P( within 30 days) Verified: To be obtained in the Pre-Procedure area  Interval H&P(within 24 hr) Complete (required for Outpatients and Surgery Admit only): To be obtained in the Pre-Procedure area  Allergies Reviewed: Yes  Anticoag/NSAID held?: No(Pt takes 81 mg of aspirin   Hold not required for this procedure )  Currently on antibiotics?: No  Pregnancy denied?: NA    Assessment:   1  SI joint arthritis        Plan: BILATERAL SIJ injection

## 2021-03-17 ENCOUNTER — TELEPHONE (OUTPATIENT)
Dept: PAIN MEDICINE | Facility: CLINIC | Age: 76
End: 2021-03-17

## 2021-03-19 DIAGNOSIS — R03.0 WHITE COAT SYNDROME WITH HIGH BLOOD PRESSURE BUT WITHOUT HYPERTENSION: ICD-10-CM

## 2021-03-19 NOTE — TELEPHONE ENCOUNTER
Medication: Hydrochlorothiazide  [facility]  Last refill: 11/2/20  Last office visit: 1/19/21  Upcoming office visit: n/a

## 2021-03-22 RX ORDER — HYDROCHLOROTHIAZIDE 12.5 MG/1
12.5 CAPSULE, GELATIN COATED ORAL DAILY
Qty: 90 CAPSULE | Refills: 0 | Status: SHIPPED | OUTPATIENT
Start: 2021-03-22 | End: 2021-07-22 | Stop reason: SDUPTHER

## 2021-04-05 PROBLEM — M46.1 SACROILIITIS (HCC): Status: ACTIVE | Noted: 2021-04-05

## 2021-04-05 PROBLEM — G89.4 CHRONIC PAIN SYNDROME: Status: ACTIVE | Noted: 2021-04-05

## 2021-04-06 ENCOUNTER — OFFICE VISIT (OUTPATIENT)
Dept: PAIN MEDICINE | Facility: MEDICAL CENTER | Age: 76
End: 2021-04-06
Payer: MEDICARE

## 2021-04-06 VITALS
DIASTOLIC BLOOD PRESSURE: 93 MMHG | HEART RATE: 70 BPM | HEIGHT: 66 IN | BODY MASS INDEX: 24.3 KG/M2 | SYSTOLIC BLOOD PRESSURE: 143 MMHG | WEIGHT: 151.2 LBS | TEMPERATURE: 99.2 F

## 2021-04-06 DIAGNOSIS — M47.816 LUMBAR SPONDYLOSIS: ICD-10-CM

## 2021-04-06 DIAGNOSIS — M46.1 SACROILIITIS (HCC): ICD-10-CM

## 2021-04-06 DIAGNOSIS — R29.898 LEFT LEG WEAKNESS: ICD-10-CM

## 2021-04-06 DIAGNOSIS — M47.818 SI JOINT ARTHRITIS: ICD-10-CM

## 2021-04-06 DIAGNOSIS — G57.12 MERALGIA PARAESTHETICA, LEFT: ICD-10-CM

## 2021-04-06 DIAGNOSIS — G89.4 CHRONIC PAIN SYNDROME: Primary | ICD-10-CM

## 2021-04-06 PROCEDURE — 99214 OFFICE O/P EST MOD 30 MIN: CPT | Performed by: NURSE PRACTITIONER

## 2021-04-06 RX ORDER — DULOXETIN HYDROCHLORIDE 20 MG/1
20 CAPSULE, DELAYED RELEASE ORAL DAILY
Qty: 30 CAPSULE | Refills: 1 | Status: SHIPPED | OUTPATIENT
Start: 2021-04-06 | End: 2021-10-21 | Stop reason: ALTCHOICE

## 2021-04-06 NOTE — PROGRESS NOTES
Assessment:  1  Chronic pain syndrome    2  Sacroiliitis ([address] ) - Bilateral    3  SI joint arthritis - Bilateral    4  Lumbar spondylosis    5  Left leg weakness        Plan:  1  Start duloxetine 20 mg daily to address neuropathic pain in left thigh  I feel that the neuropathic component to the patient's pain makes them an excellent candidate to start Cymbalta  I discussed the benefits of the medication with the patient and the patient denied being prescribed any antidepressants or other psychiatric medications  I explained to the patient that it may take 3-4 weeks for the medication effects to be noticed and that the medication should never be abruptly stopped  Possible side effects include, but are not limited to: vertigo, lethargy, nausea, and edema of the extremities  I advised the patient to call our office if they experience any side effects  The patient verbalized understanding  2  Start physical therapy to help strengthen left leg and low back  Patient states that pain increases in her left leg when she walks  She also feels that her left leg is much weaker her than her right leg  She had an injury to her lower left leg where she states she has pins and rods in it and ever since she has had issues with her left leg stability  3  Possible future left-sided ultrasound-guided lateral femoral cutaneous nerve injection for meralgia paresthetica  4  Follow-up in 4 weeks to evaluate affects of medication and physical therapy  History of Present Illness: The patient is a 70 y o  female who presents for a follow up office visit in regards to Leg Pain  The patients current symptoms include   Intermittent burning, dull aching, cramping and numbness in her left thigh, lateral and anterior she states that sometimes it wraps around posteriorly as well  Right now her pain is a 0/10    She states that the burning thigh pain happens shortly after she walks and then it goes away when she stops walking  She states that it is getting worse in that it used to happen only every once in a while and now it happens every time she walks  When it happens, patient states the burning pain is excruciating  She states she has not done physical therapy to try to strengthen her left leg or her lower back and she does not take any medication for the pain either  She did have a bilateral sacroiliac joint injections on 03/10/2021 and she states that took away all of her pain in her sacroiliac joints  I have personally reviewed and/or updated the patient's past medical history, past surgical history, family history, social history, current medications, allergies, and vital signs today  Review of Systems  Review of Systems   Respiratory: Negative for shortness of breath  Cardiovascular: Negative for chest pain  Gastrointestinal: Negative for constipation, diarrhea, nausea and vomiting  Musculoskeletal: Positive for arthralgias, gait problem and myalgias  Negative for joint swelling  Neurological: Negative for dizziness, seizures and weakness  All other systems reviewed and are negative  Past Medical History:   Diagnosis Date    Anxiety     Atypical squamous cells cannot exclude high grade squamous intraepithelial lesion on cytologic smear of cervix (ASC-H)     Last Assessed 5/13/2014    Back pain     Cancer ([facility] [address] )     Depression     Ovarian cyst     Tinnitus of both ears 10/2/2020    Tobacco abuse 11/20/2018       Past Surgical History:   Procedure Laterality Date    ANKLE FRACTURE SURGERY      Last Assessed 07/26/2016    ANKLE SURGERY      CERVICAL BIOPSY  W/ LOOP ELECTRODE EXCISION      COLONOSCOPY  10/2019    DIAGNOSTIC LAPAROSCOPY      DILATION AND CURETTAGE OF UTERUS      ESOPHAGOGASTRODUODENOSCOPY N/A 9/12/2016    Procedure: ESOPHAGOGASTRODUODENOSCOPY (EGD); Surgeon: Digna Aldridge MD;  Location: [facility];   Service:     FRACTURE SURGERY      ORTHOPEDIC SURGERY      [place] COLONOSCOPY FLX DX W/COLLJ SPEC WHEN PFRMD N/A 9/12/2016    Procedure: Ramesh Lozada;  Surgeon: Evelio Deleon MD;  Location: [facility];   Service: Colorectal    SALPINGOOPHORECTOMY Left     TONSILLECTOMY      UPPER GASTROINTESTINAL ENDOSCOPY  2016       Family History   Problem Relation Age of Onset    Coronary artery disease Mother     No Known Problems Family     No Known Problems Father     Colon cancer Neg Hx        Social History     Occupational History    Not on file   Tobacco Use    Smoking status: Current Some Day Smoker     Packs/day: 0 50     Years: 60 00     Pack years: 30 00     Types: Cigarettes    Smokeless tobacco: Never Used   Substance and Sexual Activity    Alcohol use: No    Drug use: Never    Sexual activity: Not Currently         Current Outpatient Medications:     Ascorbic Acid (Vitamin C) 500 MG CAPS, Take by mouth, Disp: , Rfl:     hydrochlorothiazide (MICROZIDE) 12 5 mg capsule, Take 1 capsule (12 5 mg total) by mouth daily, Disp: 90 capsule, Rfl: 0    ALPRAZolam (XANAX) 0 5 mg tablet, Take 1/2-1 tablet daily as needed for anxiety (Patient not taking: Reported on 2/15/2021), Disp: 30 tablet, Rfl: 0    ASPIRIN 81 PO, Take 81 mg by mouth daily, Disp: , Rfl:     buPROPion (WELLBUTRIN XL) 150 mg 24 hr tablet, Take 1 tablet (150 mg total) by mouth every morning (Patient not taking: Reported on 1/19/2021), Disp: 30 tablet, Rfl: 1    dicyclomine (BENTYL) 10 mg capsule, Take 10 mg by mouth, Disp: , Rfl:     DULoxetine (CYMBALTA) 20 mg capsule, Take 1 capsule (20 mg total) by mouth daily, Disp: 30 capsule, Rfl: 1    gabapentin (NEURONTIN) 100 mg capsule, Take 1 capsule (100 mg total) by mouth 3 (three) times a day (Patient not taking: Reported on 2/15/2021), Disp: 90 capsule, Rfl: 0    gabapentin (NEURONTIN) 300 mg capsule, Take 1 capsule (300 mg total) by mouth 3 (three) times a day (Patient not taking: Reported on 2/15/2021), Disp: 90 capsule, Rfl: 0    Zinc 15 MG CAPS, Take by mouth daily, Disp: , Rfl:     Allergies   Allergen Reactions    Lisinopril Syncope     Other reaction(s): Other (See Comments)  Nose bleeds, passing out       Physical Exam:    /93 (BP Location: Left arm, Patient Position: Sitting, Cuff Size: Standard)   Pulse 70   Temp 99 2 °F (37 3 °C)   Ht 5' 6" (1 676 m)   Wt 68 6 kg (151 lb 3 2 oz)   BMI 24 40 kg/m²     Constitutional:normal, well developed, well nourished, alert, in no distress and non-toxic and no overt pain behavior  Eyes:anicteric  HEENT:grossly intact  Neck: symmetric, no visible masses  Pulmonary:even and unlabored  Cardiovascular: no visible edema  Skin:Normal without rashes or lesions and well hydrated  Psychiatric:Mood and affect appropriate  Neurologic:Cranial Nerves II-XII grossly intact  Musculoskeletal:normal   No low back tenderness, buttock tenderness, hip tenderness or thigh tenderness at this time      Imaging  No orders to display       Orders Placed This Encounter   Procedures    Ambulatory referral to Physical Therapy

## 2021-04-06 NOTE — PATIENT INSTRUCTIONS
Duloxetine (By mouth)   Duloxetine (doo-LOX-[id number]-teen)  Treats depression, anxiety, diabetic peripheral neuropathy, fibromyalgia, and chronic muscle or bone pain  This medicine is an SSNRI  Brand Name(s): Cymbalta, izalma Sprinkle, Josieka   There may be other brand names for this medicine  When This Medicine Should Not Be Used: This medicine is not right for everyone  Do not use it if you had an allergic reaction to duloxetine  How to Use This Medicine:   Capsule, Delayed Release Capsule  · Take your medicine as directed  Your dose may need to be changed several times to find what works best for you  · Delayed-release capsule: Swallow the capsule whole  Do not crush, chew, break, or open it  Do not open the Cymbalta® delayed-release capsule and sprinkle the contents on food or in liquids  · If you have trouble swallowing the Elex Lass delayed release capsule:   ? You may open the capsule and sprinkle the contents over one tablespoon (15 mL) of applesauce  Swallow the mixture right away and do not save any of the mixture to use later  ? You may open the capsule and pour the contents to an all plastic catheter tip syringe and add 50 mL of water  Do not use other liquids  Gently shake it for 10 seconds, and then use it through a nasogastric tube  Rinse with additional water (about 15 mL) if needed  · This medicine should come with a Medication Guide  Ask your pharmacist for a copy if you do not have one  · Missed dose: Take a dose as soon as you remember  If it is almost time for your next dose, wait until then and take a regular dose  Do not take extra medicine to make up for a missed dose  · Store the medicine in a closed container at room temperature, away from heat, moisture, and direct light  Drugs and Foods to Avoid:   Ask your doctor or pharmacist before using any other medicine, including over-the-counter medicines, vitamins, and herbal products    · Do not take duloxetine if you have used an MAO inhibitor (MAOI) within the past 14 days  Do not start taking an MAO inhibitor within 5 days of stopping duloxetine  · Some medicines can affect how duloxetine works  Tell your doctor if you are using any of the following:  ? Buspirone, cimetidine, ciprofloxacin, enoxacin, fentanyl, lithium, Michaela's wort, theophylline, tramadol, tryptophan, or warfarin  ? Amphetamines  ? Blood pressure medicine  ? Diuretic (water pill)  ? Medicine for heart rhythm problems (including flecainide, propafenone, quinidine)  ? Medicine to treat migraine headaches (including triptans)  ? NSAID pain or arthritis medicine (including aspirin, celecoxib, diclofenac, ibuprofen, naproxen)  ? Other medicine to treat depression or mood disorders (including amitriptyline, desipramine, fluoxetine, imipramine, nortriptyline, paroxetine)  ? Phenothiazine medicine (including thioridazine)  · Tell your doctor if you use anything else that makes you sleepy  Some examples are allergy medicine, narcotic pain medicine, and alcohol  · Do not drink alcohol while you are using this medicine  Warnings While Using This Medicine:   · Tell your doctor if you are pregnant or breastfeeding, or if you have kidney disease, liver disease, diabetes, digestion problems, glaucoma, heart disease, high or low blood pressure, or problems with urination  Tell your doctor if you smoke or you have a history of seizures, or drug or alcohol addiction  · This medicine may cause the following problems:   ? Serious liver problems  ? Serotonin syndrome (more likely when used with certain other medicines)  ? Increased risk of bleeding problems  ? Serious skin reactions  ? Low sodium levels in the blood  · This medicine can increase thoughts of suicide  Tell your doctor right away if you start to feel depressed and have thoughts about hurting yourself  · This medicine can cause changes in your blood pressure  This may make you dizzy or drowsy   Do not drive or do anything that could be dangerous until you know how this medicine affects you  Stand up slowly to avoid falls  · Do not stop using this medicine suddenly  Your doctor will need to slowly decrease your dose before you stop it completely  · Your doctor will check your progress and the effects of this medicine at regular visits  Keep all appointments  · Keep all medicine out of the reach of children  Never share your medicine with anyone  Possible Side Effects While Using This Medicine:   Call your doctor right away if you notice any of these side effects:  · Allergic reaction: Itching or hives, swelling in your face or hands, swelling or tingling in your mouth or throat, chest tightness, trouble breathing  · Anxiety, restlessness, fever, fast heartbeat, sweating, muscle spasms, diarrhea, seeing or hearing things that are not there  · Blistering, peeling, red skin rash  · Confusion, weakness, muscle twitching  · Dark urine or pale stools, nausea, vomiting, loss of appetite, stomach pain, yellow skin or eyes  · Decrease in how much or how often you urinate  · Eye pain, vision changes, seeing halos around lights  · Feeling more energetic than usual  · Lightheadedness, dizziness, fainting  · Unusual moods or behaviors, worsening depression, thoughts about hurting yourself, trouble sleeping  · Unusual bleeding or bruising  If you notice these less serious side effects, talk with your doctor:   · Decrease in appetite or weight  · Dry mouth, constipation, mild nausea  · Headache  · Unusual drowsiness, sleepiness, or tiredness  If you notice other side effects that you think are caused by this medicine, tell your doctor  Call your doctor for medical advice about side effects  You may report side effects to FDA at 0-465-FDA-1674  © Copyright [address] [place] [address] Drive Information is for End User's use only and may not be sold, redistributed or otherwise used for commercial purposes  The above information is an  only   It is not intended as medical advice for individual conditions or treatments  Talk to your doctor, nurse or pharmacist before following any medical regimen to see if it is safe and effective for you

## 2021-04-16 ENCOUNTER — APPOINTMENT (OUTPATIENT)
Dept: PHYSICAL THERAPY | Facility: CLINIC | Age: 76
End: 2021-04-16
Payer: MEDICARE

## 2021-04-19 ENCOUNTER — EVALUATION (OUTPATIENT)
Dept: PHYSICAL THERAPY | Facility: CLINIC | Age: 76
End: 2021-04-19
Payer: MEDICARE

## 2021-04-19 DIAGNOSIS — M47.816 LUMBAR SPONDYLOSIS: ICD-10-CM

## 2021-04-19 DIAGNOSIS — G89.4 CHRONIC PAIN SYNDROME: ICD-10-CM

## 2021-04-19 DIAGNOSIS — M47.818 SI JOINT ARTHRITIS: ICD-10-CM

## 2021-04-19 DIAGNOSIS — R29.898 LEFT LEG WEAKNESS: Primary | ICD-10-CM

## 2021-04-19 DIAGNOSIS — M46.1 SACROILIITIS (HCC): ICD-10-CM

## 2021-04-19 PROCEDURE — 97161 PT EVAL LOW COMPLEX 20 MIN: CPT

## 2021-04-19 PROCEDURE — 97110 THERAPEUTIC EXERCISES: CPT

## 2021-04-19 NOTE — PROGRESS NOTES
PT Evaluation     Today's date: 2021  Patient name: Fercho Luo  : 1945  MRN: [id number]  Referring provider: SHEA Cote  Dx:   Encounter Diagnosis     ICD-10-CM    1  Chronic pain syndrome  G89 4 Ambulatory referral to Physical Therapy   2  Sacroiliitis ([facility] [address] ) - Bilateral  M46 1 Ambulatory referral to Physical Therapy   3  SI joint arthritis - Bilateral  M47 818 Ambulatory referral to Physical Therapy   4  Lumbar spondylosis  M47 816 Ambulatory referral to Physical Therapy   5  Left leg weakness  R29 898 Ambulatory referral to Physical Therapy                  Assessment  Assessment details: Fercho Luo is a 70 y o  female presents with functional LLD correctable with self MET today  Presents with generalized LLE weakness, (-) adverse dural tension tests, (-) lumbar mechanical exam  Fercho Luo has the above listed impairments and will benefit from skilled PT to improve deficits to return to prior level of function  Fercho Luo was educated on eval findings and plan for management  HEP initiated  Ivy Maryana would benefit from skilled services to improve ROM, strength, flexibility, and function, and to decrease pain  Impairments: abnormal or restricted ROM, activity intolerance, impaired balance, impaired physical strength, lacks appropriate home exercise program, pain with function and poor posture   Understanding of Dx/Px/POC: good   Prognosis: good    Goals  2 wks  - No functional LLD  - No pain > 3/10  - Increase strength 1/3 grade  - Increase lumbar ROM at least 10 deg where applicable  - Increase ambulation tolerance at least 50% to 30 min    4-6 wks  - Pain 0/10  - Strength 5/5  - Lumbar ROM WFL and painfree  - Independent with HEP for self management  - Functional Status Measure at least 59 (score 52 at eval)  - Return to baseline tolerance for ambulation, tolerating community ambulation pain free        Plan  Patient would benefit from: skilled physical therapy  Planned modality interventions: thermotherapy: hydrocollator packs  Planned therapy interventions: abdominal trunk stabilization, manual therapy, joint mobilization, neuromuscular re-education, postural training, patient education, strengthening, stretching, therapeutic activities, therapeutic exercise, flexibility, home exercise program and balance  Frequency: 2x week  Duration in visits: 8  Duration in weeks: 4  Treatment plan discussed with: patient        Subjective Evaluation    History of Present Illness  Mechanism of injury: Pt reporting "a few years now, I broke my left ankle and I have all these plates and screws  I think subconsciously I favor my left leg  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  After Dr Nirmal Joaquin gave me my shots in my back my back doesn't hurt me "  Chief complaint LLE weakness, pain anterior aspect L thigh "like a burning cramp when I walk  It's just a weak leg "  Notes cramping to L calf on occasion otherwise no sx distal to L knee  Functional limitations: limited community ambulation due to pain and weakness LLE  No PSH lumbar spine  Pt denies having saddle paresthesias  Notes having urinary incontinence  Pt educated on cauda equina sx a medical emergency    Pain  Current pain ratin  At best pain ratin  At worst pain rating: 10  Location: L anterior thigh, occasionally to L calf  Quality: cramping and burning  Aggravating factors: walking    Social Support  Steps to enter house: yes  2  Stairs in house: yes   14  Lives in: multiple-level home  Lives with: alone    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, boarding horses)    Diagnostic Tests  X-ray: abnormal  MRI studies: abnormal  Treatments  Previous treatment: physical therapy and injection treatment  Current treatment: injection treatment  Patient Goals  Patient goals for therapy: decreased pain, increased strength, independence with ADLs/IADLs, return to sport/leisure activities and improved balance  Patient goal: "My ROM is good "        Objective    Gait: no AD, non-antalgic, slightly decreased tenzin  Posture: forward head and shoulders, increased thoracic kyphosis, increased lumbar lordosis    OH squat: increased trunk flexion at hips, increased genu valgum B, Fair balance    Lumbar ROM (pt pain free prior to testing): flexion 80 deg, no effect (NE)  RFIS NE  Ext 5 deg, NE   MARK NE  SBR 10 deg, R lumbosacral mild discomfort  SBL 20 deg, NE  R rotation 60 deg, NE   L rotation 62 deg, NE       MMT: glute medius 4/5 B  Glute max: R 3+/5, L 3-/5  Lumbosacral myotomes 5/5 R, 4/5 L to include iliospoas, quadriceps, anterior tibialis, peroneals, and hamstrings  B extensor hallucis 5/5  Joint mobilizations: B UPA, PA L1-L5 with no effect  Special tests: B lumbar quadrant, femoral nn tension tests, SLR (-)  B stand march tests (+)  LLE short in supine, long in long sit  L ASIS superior vs R  Palpation: B SI joints non-TTP  Flexibility: B Shelly's (-)  B hamstrings, gastroc mildly tight  Hip flexors: R moderately tight, L mildly tight           Precautions: none      Manuals 4/19 /21            R lumbar rotation mob G3 w cavit            L inom ant rot mob G3            L inom ant rot SCS 90"                         Neuro Re-Ed             Self MET for L inom post rot/R inom ant rot 5"x3            TA cx 10"x15                                                                             Ther Ex             Bridge 10"x15            Clamshell B 10"x15            H/L HS str nv            Wall gastroc str nv            Nu Step nv            TKE w t band L nv                                      Ther Activity             T ball squat nv            FSU             LSU                                                    Modalities

## 2021-04-26 ENCOUNTER — OFFICE VISIT (OUTPATIENT)
Dept: PHYSICAL THERAPY | Facility: CLINIC | Age: 76
End: 2021-04-26
Payer: MEDICARE

## 2021-04-26 DIAGNOSIS — M47.818 SI JOINT ARTHRITIS: ICD-10-CM

## 2021-04-26 DIAGNOSIS — G89.4 CHRONIC PAIN SYNDROME: Primary | ICD-10-CM

## 2021-04-26 DIAGNOSIS — R29.898 LEFT LEG WEAKNESS: ICD-10-CM

## 2021-04-26 DIAGNOSIS — M47.816 LUMBAR SPONDYLOSIS: ICD-10-CM

## 2021-04-26 DIAGNOSIS — M46.1 SACROILIITIS (HCC): ICD-10-CM

## 2021-04-26 PROCEDURE — 97140 MANUAL THERAPY 1/> REGIONS: CPT

## 2021-04-26 PROCEDURE — 97110 THERAPEUTIC EXERCISES: CPT

## 2021-04-26 NOTE — PROGRESS NOTES
Daily Note     Today's date: 2021  Patient name: Jose J Torres  : 1945  MRN: [id number]  Referring provider: SHEA Culp  Dx:   Encounter Diagnosis     ICD-10-CM    1  Chronic pain syndrome  G89 4    2  Sacroiliitis ([address] ) - Bilateral  M46 1    3  SI joint arthritis - Bilateral  M47 818    4  Lumbar spondylosis  M47 816    5  Left leg weakness  R29 898                   Subjective: "It feels a lot better "  LLE pain 0/10  Objective: See treatment diary below  Prior to treatment, no LLD and level ASIS  Assessment: Tolerated treatment well  Patient would benefit from continued PT      Plan: Continue per plan of care        Precautions: none      Manuals            R lumbar rotation mob G3 w cavit G3           L inom ant rot mob G3 G3           L inom ant rot SCS 90" 90"                        Neuro Re-Ed             Self MET for L inom post rot/R inom ant rot 5"x3            TA cx 10"x15 x30                                                                            Ther Ex             Bridge 10"x15 x20           Clamshell B 10"x15 x20           H/L HS str nv 20"x5           Wall gastroc str nv 20"x5           Nu Step nv 5'           TKE w t band L nv blk 2x10                                     Ther Activity             T ball squat nv 2x10           FSU             LSU                                                    Modalities

## 2021-04-28 ENCOUNTER — APPOINTMENT (OUTPATIENT)
Dept: PHYSICAL THERAPY | Facility: CLINIC | Age: 76
End: 2021-04-28
Payer: MEDICARE

## 2021-04-29 ENCOUNTER — OFFICE VISIT (OUTPATIENT)
Dept: PHYSICAL THERAPY | Facility: CLINIC | Age: 76
End: 2021-04-29
Payer: MEDICARE

## 2021-04-29 DIAGNOSIS — M47.818 SI JOINT ARTHRITIS: ICD-10-CM

## 2021-04-29 DIAGNOSIS — G89.4 CHRONIC PAIN SYNDROME: Primary | ICD-10-CM

## 2021-04-29 DIAGNOSIS — R29.898 LEFT LEG WEAKNESS: ICD-10-CM

## 2021-04-29 DIAGNOSIS — M47.816 LUMBAR SPONDYLOSIS: ICD-10-CM

## 2021-04-29 DIAGNOSIS — M46.1 SACROILIITIS (HCC): ICD-10-CM

## 2021-04-29 PROCEDURE — 97110 THERAPEUTIC EXERCISES: CPT

## 2021-05-03 ENCOUNTER — OFFICE VISIT (OUTPATIENT)
Dept: PHYSICAL THERAPY | Facility: CLINIC | Age: 76
End: 2021-05-03
Payer: MEDICARE

## 2021-05-03 DIAGNOSIS — M47.816 LUMBAR SPONDYLOSIS: ICD-10-CM

## 2021-05-03 DIAGNOSIS — M47.818 SI JOINT ARTHRITIS: ICD-10-CM

## 2021-05-03 DIAGNOSIS — M46.1 SACROILIITIS (HCC): ICD-10-CM

## 2021-05-03 DIAGNOSIS — G89.4 CHRONIC PAIN SYNDROME: Primary | ICD-10-CM

## 2021-05-03 DIAGNOSIS — R29.898 LEFT LEG WEAKNESS: ICD-10-CM

## 2021-05-03 PROCEDURE — 97110 THERAPEUTIC EXERCISES: CPT

## 2021-05-03 PROCEDURE — 97140 MANUAL THERAPY 1/> REGIONS: CPT

## 2021-05-03 PROCEDURE — 97530 THERAPEUTIC ACTIVITIES: CPT

## 2021-05-06 ENCOUNTER — APPOINTMENT (OUTPATIENT)
Dept: PHYSICAL THERAPY | Facility: CLINIC | Age: 76
End: 2021-05-06
Payer: MEDICARE

## 2021-05-07 ENCOUNTER — OFFICE VISIT (OUTPATIENT)
Dept: PAIN MEDICINE | Facility: MEDICAL CENTER | Age: 76
End: 2021-05-07
Payer: MEDICARE

## 2021-05-07 VITALS
HEIGHT: 66 IN | WEIGHT: 150 LBS | SYSTOLIC BLOOD PRESSURE: 172 MMHG | DIASTOLIC BLOOD PRESSURE: 98 MMHG | HEART RATE: 61 BPM | TEMPERATURE: 98 F | BODY MASS INDEX: 24.11 KG/M2

## 2021-05-07 DIAGNOSIS — G57.12 MERALGIA PARAESTHETICA, LEFT: ICD-10-CM

## 2021-05-07 DIAGNOSIS — M47.816 LUMBAR SPONDYLOSIS: ICD-10-CM

## 2021-05-07 DIAGNOSIS — G89.4 CHRONIC PAIN SYNDROME: Primary | ICD-10-CM

## 2021-05-07 PROCEDURE — 99213 OFFICE O/P EST LOW 20 MIN: CPT | Performed by: NURSE PRACTITIONER

## 2021-05-07 NOTE — PROGRESS NOTES
Assessment  1  Chronic pain syndrome    2  Lumbar spondylosis    3  Meralgia paraesthetica, left        Plan  Patient is here for follow-up of her chronic pain syndrome and specifically at our last visit on 04/06/2021 she was experiencing left leg weakness and left thigh burning and numbness  At that time I started her on duloxetine 20 mg daily and referred her for physical therapy  We also discussed if those things do not work possibly scheduling her for a left ultrasound-guided lateral femoral cutaneous nerve injection  Today patient reports 0/10 pain and states that she feels physical therapy is really helping her  She no longer takes the duloxetine and can do her daily chores without pain  At this time she will follow-up as needed for future injections  My impressions and treatment recommendations were discussed in detail with the patient who verbalized understanding and had no further questions  Discharge instructions were provided  I personally saw and examined the patient and I agree with the above discussed plan of care  No orders of the defined types were placed in this encounter  No orders of the defined types were placed in this encounter  History of Present Illness    Eder Snyder is a 78 y o  female   Who reports a pain score of 0/10  She states that she still occasionally gets pain however it is nothing like it was prior to initiating physical therapy  She states she only took the duloxetine for a few days and stop taking it because she felt like it did not work  She is currently doing physical therapy which she states has really helped to improve her level of pain and functioning  I have personally reviewed and/or updated the patient's past medical history, past surgical history, family history, social history, current medications, allergies, and vital signs today  Review of Systems   Respiratory: Negative for shortness of breath      Cardiovascular: Negative for chest pain  Gastrointestinal: Negative for constipation, diarrhea, nausea and vomiting  Musculoskeletal: Positive for back pain  Negative for arthralgias, gait problem, joint swelling and myalgias  Skin: Negative for rash  Neurological: Negative for dizziness, seizures and weakness  All other systems reviewed and are negative  Patient Active Problem List   Diagnosis    Aortic sclerosis    Encounter for follow-up surveillance of cervical cancer    Hyperlipidemia    Colon polyps    Cervical stenosis of spine    Tobacco abuse    White coat syndrome with high blood pressure but without hypertension    Pelvic pain    Diarrhea    Chronic abdominal pain    Generalized anxiety disorder    Irritable bowel syndrome with diarrhea    Chronic obstructive pulmonary disease (HCC)    Elevated hemoglobin (HCC)    Lumbar spondylosis    Tinnitus of both ears    Depression    Claudication of both lower extremities ([address]    SI joint arthritis    Chronic pain syndrome    Sacroiliitis ([address] )    Meralgia paraesthetica, left       Past Medical History:   Diagnosis Date    Anxiety     Atypical squamous cells cannot exclude high grade squamous intraepithelial lesion on cytologic smear of cervix (ASC-H)     Last Assessed 5/13/2014    Back pain     Cancer ([address] )     Depression     Ovarian cyst     Tinnitus of both ears 10/2/2020    Tobacco abuse 11/20/2018       Past Surgical History:   Procedure Laterality Date    ANKLE FRACTURE SURGERY      Last Assessed 07/26/2016    ANKLE SURGERY      CERVICAL BIOPSY  W/ LOOP ELECTRODE EXCISION      COLONOSCOPY  10/2019    DIAGNOSTIC LAPAROSCOPY      DILATION AND CURETTAGE OF UTERUS      ESOPHAGOGASTRODUODENOSCOPY N/A 9/12/2016    Procedure: ESOPHAGOGASTRODUODENOSCOPY (EGD); Surgeon: Quin Kamara MD;  [facility];   Service:     FRACTURE SURGERY      ORTHOPEDIC SURGERY      IN COLONOSCOPY FLX DX W/COLLJ SPEC WHEN PFRMD N/A 9/12/2016 Procedure: Chana Shafer;  Surgeon: Elaine Kaba MD;  Location:  GI LAB; Service: Colorectal    SALPINGOOPHORECTOMY Left     TONSILLECTOMY      UPPER GASTROINTESTINAL ENDOSCOPY  2016       Family History   Problem Relation Age of Onset    Coronary artery disease Mother     No Known Problems Family     No Known Problems Father     Colon cancer Neg Hx        Social History     Occupational History    Not on file   Tobacco Use    Smoking status: Current Some Day Smoker     Packs/day: 0 50     Years: 60 00     Pack years: 30 00     Types: Cigarettes    Smokeless tobacco: Never Used   Substance and Sexual Activity    Alcohol use: No    Drug use: Never    Sexual activity: Not Currently       Current Outpatient Medications on File Prior to Visit   Medication Sig    ALPRAZolam (XANAX) 0 5 mg tablet Take 1/2-1 tablet daily as needed for anxiety    Ascorbic Acid (Vitamin C) 500 MG CAPS Take by mouth    DULoxetine (CYMBALTA) 20 mg capsule Take 1 capsule (20 mg total) by mouth daily    hydrochlorothiazide (MICROZIDE) 12 5 mg capsule Take 1 capsule (12 5 mg total) by mouth daily    Zinc 15 MG CAPS Take by mouth daily    ASPIRIN 81 PO Take 81 mg by mouth daily    buPROPion (WELLBUTRIN XL) 150 mg 24 hr tablet Take 1 tablet (150 mg total) by mouth every morning (Patient not taking: Reported on 1/19/2021)    dicyclomine (BENTYL) 10 mg capsule Take 10 mg by mouth    gabapentin (NEURONTIN) 100 mg capsule Take 1 capsule (100 mg total) by mouth 3 (three) times a day (Patient not taking: Reported on 2/15/2021)    gabapentin (NEURONTIN) 300 mg capsule Take 1 capsule (300 mg total) by mouth 3 (three) times a day (Patient not taking: Reported on 2/15/2021)     No current facility-administered medications on file prior to visit  Allergies   Allergen Reactions    Lisinopril Syncope     Other reaction(s):  Other (See Comments)  Nose bleeds, passing out       Physical Exam    BP (!) 172/98   Pulse 61   Temp 98 °F (36 7 °C)   Ht 5' 6" (1 676 m)   Wt 68 kg (150 lb)   BMI 24 21 kg/m²     Constitutional: normal, well developed, well nourished, alert, in no distress and non-toxic and no overt pain behavior    Eyes: anicteric  HEENT: grossly intact  Neck: symmetric, no visible masses  Pulmonary:even and unlabored  Cardiovascular: no visible edema  Skin:Normal without rashes or lesions and well hydrated  Psychiatric:Mood and affect appropriate  Neurologic:Cranial Nerves II-XII grossly intact  Musculoskeletal:normal    Imaging

## 2021-05-10 ENCOUNTER — APPOINTMENT (OUTPATIENT)
Dept: PHYSICAL THERAPY | Facility: CLINIC | Age: 76
End: 2021-05-10
Payer: MEDICARE

## 2021-05-18 ENCOUNTER — TELEPHONE (OUTPATIENT)
Dept: PAIN MEDICINE | Facility: MEDICAL CENTER | Age: 76
End: 2021-05-18

## 2021-05-18 NOTE — TELEPHONE ENCOUNTER
Patient called in stating that she would like to move forward and schedule injections   Thank you       [phone]

## 2021-05-19 NOTE — TELEPHONE ENCOUNTER
S/w pt who states that her back feels "wonderful" since her injections w/ JE and her PT, but she states that she cannot seem to get rid of the L thigh burning and pain  Pt wondering if she can proceed w/ the L USGI lateral femoral cutaneous nerve injections that LH recommended at 5/7 OV  Pt rates her L leg pain as an 8/10  Pt was advised that RN will send this information to [facility] and then our scheduling team will reach out to set up her appt  Pt appreciative  Okay to proceed w/ L USGI lateral femoral cutaneous nerve injections? Also forwarding to [facility] surgery coordinator to reach out to pt after [facility] says okay to proceed

## 2021-05-20 ENCOUNTER — TELEPHONE (OUTPATIENT)
Dept: FAMILY MEDICINE CLINIC | Facility: CLINIC | Age: 76
End: 2021-05-20

## 2021-05-20 NOTE — TELEPHONE ENCOUNTER
Called and scheduled patient for 6/24/2021 for Left USGI lateral femoral cutaneous nerve block  Reviewed instructions:  loose-fitting/comfortable pants, if ill/fever/infx/abx to call and reschedule  No immunizations or vaccinations 2w prior/after steroid injections  Patient stated verbal understanding

## 2021-05-20 NOTE — TELEPHONE ENCOUNTER
Glenroy Mai called in to let you know that she has been having issues with urinary tract infections and went to see her gyn who recommended she follow up with Urology  She did make an appt with them but wanted to make you aware

## 2021-05-21 NOTE — TELEPHONE ENCOUNTER
Patient called this morning and stated that she has no UTI and her abdominal pain feels like it's getting worse  She was asking if Dr Teddy Mascorro can order a CT of abdomen  I did let her know that Dr Teddy Mascorro will not be in until Tuesday and she should go to the ED if her pain is getting worse  She said that she will probably do that

## 2021-05-25 NOTE — TELEPHONE ENCOUNTER
Spoke with pt- doing better, taking ibuprofen at time- has follow up with GYN 6/1- recommended calling if any worsening

## 2021-06-10 ENCOUNTER — TELEMEDICINE (OUTPATIENT)
Dept: FAMILY MEDICINE CLINIC | Facility: CLINIC | Age: 76
End: 2021-06-10
Payer: MEDICARE

## 2021-06-10 DIAGNOSIS — M47.816 LUMBAR SPONDYLOSIS: ICD-10-CM

## 2021-06-10 PROBLEM — R35.0 INCREASED FREQUENCY OF URINATION: Status: ACTIVE | Noted: 2021-05-23

## 2021-06-10 PROBLEM — R03.0 ELEVATED BLOOD-PRESSURE READING WITHOUT DIAGNOSIS OF HYPERTENSION: Status: ACTIVE | Noted: 2021-05-23

## 2021-06-10 PROBLEM — E78.2 MIXED HYPERLIPIDEMIA: Status: ACTIVE | Noted: 2017-04-24

## 2021-06-10 PROBLEM — F41.9 ANXIETY: Status: ACTIVE | Noted: 2017-05-10

## 2021-06-10 PROBLEM — Z78.0 POSTMENOPAUSAL STATE: Status: ACTIVE | Noted: 2021-06-01

## 2021-06-10 PROBLEM — G45.9 TIA (TRANSIENT ISCHEMIC ATTACK): Status: ACTIVE | Noted: 2018-08-25

## 2021-06-10 PROCEDURE — 99213 OFFICE O/P EST LOW 20 MIN: CPT | Performed by: FAMILY MEDICINE

## 2021-06-10 RX ORDER — GABAPENTIN 100 MG/1
100 CAPSULE ORAL 3 TIMES DAILY
Qty: 90 CAPSULE | Refills: 0 | Status: SHIPPED | OUTPATIENT
Start: 2021-06-10 | End: 2022-08-04 | Stop reason: ALTCHOICE

## 2021-06-10 NOTE — PROGRESS NOTES
Virtual Regular Visit      Assessment/Plan:    Problem List Items Addressed This Visit        Musculoskeletal and Integument    Lumbar spondylosis    Relevant Medications    gabapentin (NEURONTIN) 100 mg capsule               Reason for visit is   Chief Complaint   Patient presents with    Follow-up    Virtual Regular Visit        Encounter provider Lázaro Flanagan MD    Provider located at [address] [place] [address] [address]  [facility] [address] [phone]      Recent Visits  No visits were found meeting these conditions  Showing recent visits within past 7 days and meeting all other requirements     Today's Visits  Date Type Provider Dept   06/10/21 Cheyenne Lomax MD [facility]   Showing today's visits and meeting all other requirements     Future Appointments  No visits were found meeting these conditions  Showing future appointments within next 150 days and meeting all other requirements        The patient was identified by name and date of birth  Sae Christiansen was informed that this is a telemedicine visit and that the visit is being conducted through [address] [place] [address] [facility] [employer] and patient was informed that this is a secure, HIPAA-compliant platform  She agrees to proceed     Other methods to assure confidentiality were taken noone was around  No one else was in the room  She acknowledged consent and understanding of privacy and security of the video platform  The patient has agreed to participate and understands they can discontinue the visit at any time  Attempted to do video visit but did not work and did telephone visit    Patient is aware this is a billable service  Fabi Sauceda is a 78 y o  female for follow up  Pt was seen by gyn who did PAP and referred her to urology for her pelvic/abdominal pain   Pt states she has trouble holding her urine to get to the bathroom and has cramping pain  Pt questioned why she didn't get tested for an infection  Explained that we look at many things and they may have dipped her urine in the office  Pt is due for labs and will schedule well visit in near future  Pt has appointment with urology scheduled  Pt was also given order for mammo and DEXA  Pt states BP has been 110s/80s, feels it is usually fine outside of the office  Taking HCTZ  Pt very pleased with the injections, has another coming up  Still has leg pain when walking  Asked about the Gabapentin that I prescribed in January and pt states she never got the call for it  I did resend it today an explained that she can increase by 1 pill three times a day after about 5 days if she is tolerating it  Pt otherwise doing well and will schedule well exam   Pt has not gotten COVID vaccine and states she is not going to get the vaccine  HPI     Past Medical History:   Diagnosis Date    Anxiety     Atypical squamous cells cannot exclude high grade squamous intraepithelial lesion on cytologic smear of cervix (ASC-H)     Last Assessed 5/13/2014    Back pain     Cancer ([address]     Depression     Ovarian cyst     Tinnitus of both ears 10/2/2020    Tobacco abuse 11/20/2018       Past Surgical History:   Procedure Laterality Date    ANKLE FRACTURE SURGERY      Last Assessed 07/26/2016    ANKLE SURGERY      CERVICAL BIOPSY  W/ LOOP ELECTRODE EXCISION      COLONOSCOPY  10/2019    DIAGNOSTIC LAPAROSCOPY      DILATION AND CURETTAGE OF UTERUS      ESOPHAGOGASTRODUODENOSCOPY N/A 9/12/2016    Procedure: ESOPHAGOGASTRODUODENOSCOPY (EGD); Surgeon: Shaw Pantoja MD;  [facility]; Service:     FRACTURE SURGERY      ORTHOPEDIC SURGERY      [facility] WHEN PFRMD N/A 9/12/2016    Procedure: Amalia Sutherland;  Surgeon: Shaw Pantoja MD;  [facility];   Service: Colorectal    SALPINGOOPHORECTOMY Left     TONSILLECTOMY      UPPER GASTROINTESTINAL ENDOSCOPY  2016       Current Outpatient Medications   Medication Sig Dispense Refill    ALPRAZolam (XANAX) 0 5 mg tablet Take 1/2-1 tablet daily as needed for anxiety 30 tablet 0    Ascorbic Acid (Vitamin C) 500 MG CAPS Take by mouth      ASPIRIN 81 PO Take 81 mg by mouth daily      buPROPion (WELLBUTRIN XL) 150 mg 24 hr tablet Take 1 tablet (150 mg total) by mouth every morning (Patient not taking: Reported on 1/19/2021) 30 tablet 1    dicyclomine (BENTYL) 10 mg capsule Take 10 mg by mouth      DULoxetine (CYMBALTA) 20 mg capsule Take 1 capsule (20 mg total) by mouth daily 30 capsule 1    gabapentin (NEURONTIN) 100 mg capsule Take 1 capsule (100 mg total) by mouth 3 (three) times a day 90 capsule 0    hydrochlorothiazide (MICROZIDE) 12 5 mg capsule Take 1 capsule (12 5 mg total) by mouth daily 90 capsule 0    Zinc 15 MG CAPS Take by mouth daily       No current facility-administered medications for this visit  Allergies   Allergen Reactions    Lisinopril Syncope     Other reaction(s): Other (See Comments)  Nose bleeds, passing out       Review of Systems   Constitutional: Negative for chills and fever  HENT: Negative for ear pain and sore throat  Eyes: Negative for pain and visual disturbance  Respiratory: Negative for cough and shortness of breath  Cardiovascular: Negative for chest pain and palpitations  Gastrointestinal: Negative for abdominal pain and vomiting  Genitourinary: Positive for pelvic pain and urgency  Negative for dysuria and hematuria  Musculoskeletal: Positive for back pain and gait problem  Negative for arthralgias  Skin: Negative for color change and rash  Neurological: Positive for weakness  Negative for seizures and syncope  Psychiatric/Behavioral: Positive for sleep disturbance  The patient is nervous/anxious  All other systems reviewed and are negative  Video Exam    There were no vitals filed for this visit      Physical Exam     I spent 20 minutes directly with the patient during this visit      VIRTUAL VISIT 1013 Tolu Mishra acknowledges that she has consented to an online visit or consultation  She understands that the online visit is based solely on information provided by her, and that, in the absence of a face-to-face physical evaluation by the physician, the diagnosis she receives is both limited and provisional in terms of accuracy and completeness  This is not intended to replace a full medical face-to-face evaluation by the physician  Sae Christiansen understands and accepts these terms

## 2021-06-10 NOTE — PATIENT INSTRUCTIONS
We will schedule a well exam in near future  I resent the Gabapentin  You can each dose by 1 tablet every 5 days or so if tolerating it well  It can make you sleepy

## 2021-06-14 ENCOUNTER — APPOINTMENT (EMERGENCY)
Dept: CT IMAGING | Facility: HOSPITAL | Age: 76
End: 2021-06-14
Payer: MEDICARE

## 2021-06-14 ENCOUNTER — HOSPITAL ENCOUNTER (EMERGENCY)
Facility: HOSPITAL | Age: 76
Discharge: HOME | End: 2021-06-14
Attending: EMERGENCY MEDICINE | Admitting: EMERGENCY MEDICINE
Payer: MEDICARE

## 2021-06-14 VITALS
TEMPERATURE: 97.6 F | HEART RATE: 55 BPM | SYSTOLIC BLOOD PRESSURE: 161 MMHG | WEIGHT: 147.71 LBS | DIASTOLIC BLOOD PRESSURE: 91 MMHG | OXYGEN SATURATION: 96 % | RESPIRATION RATE: 16 BRPM

## 2021-06-14 DIAGNOSIS — N26.1 RENAL ATROPHY: ICD-10-CM

## 2021-06-14 DIAGNOSIS — R10.9 CHRONIC ABDOMINAL PAIN: Primary | ICD-10-CM

## 2021-06-14 DIAGNOSIS — G89.29 CHRONIC ABDOMINAL PAIN: Primary | ICD-10-CM

## 2021-06-14 DIAGNOSIS — N28.1 RENAL CYST: ICD-10-CM

## 2021-06-14 DIAGNOSIS — R32 URINARY INCONTINENCE: ICD-10-CM

## 2021-06-14 LAB
ALBUMIN SERPL BCP-MCNC: 3.5 G/DL (ref 3.5–5)
ALP SERPL-CCNC: 86 U/L (ref 46–116)
ALT SERPL W P-5'-P-CCNC: 19 U/L (ref 12–78)
ANION GAP SERPL CALCULATED.3IONS-SCNC: 10 MMOL/L (ref 4–13)
AST SERPL W P-5'-P-CCNC: 21 U/L (ref 5–45)
BASOPHILS # BLD AUTO: 0.04 THOUSANDS/ΜL (ref 0–0.1)
BASOPHILS NFR BLD AUTO: 1 % (ref 0–1)
BILIRUB SERPL-MCNC: 0.46 MG/DL (ref 0.2–1)
BILIRUB UR QL STRIP: NEGATIVE
BUN SERPL-MCNC: 11 MG/DL (ref 5–25)
CALCIUM SERPL-MCNC: 9.6 MG/DL (ref 8.3–10.1)
CHLORIDE SERPL-SCNC: 103 MMOL/L (ref 100–108)
CLARITY UR: CLEAR
CO2 SERPL-SCNC: 29 MMOL/L (ref 21–32)
COLOR UR: YELLOW
CREAT SERPL-MCNC: 1.02 MG/DL (ref 0.6–1.3)
EOSINOPHIL # BLD AUTO: 0.26 THOUSAND/ΜL (ref 0–0.61)
EOSINOPHIL NFR BLD AUTO: 5 % (ref 0–6)
ERYTHROCYTE [DISTWIDTH] IN BLOOD BY AUTOMATED COUNT: 13.3 % (ref 11.6–15.1)
GFR SERPL CREATININE-BSD FRML MDRD: 54 ML/MIN/1.73SQ M
GLUCOSE SERPL-MCNC: 88 MG/DL (ref 65–140)
GLUCOSE UR STRIP-MCNC: NEGATIVE MG/DL
HCT VFR BLD AUTO: 50 % (ref 34.8–46.1)
HGB BLD-MCNC: 16.6 G/DL (ref 11.5–15.4)
HGB UR QL STRIP.AUTO: NEGATIVE
IMM GRANULOCYTES # BLD AUTO: 0.01 THOUSAND/UL (ref 0–0.2)
IMM GRANULOCYTES NFR BLD AUTO: 0 % (ref 0–2)
KETONES UR STRIP-MCNC: NEGATIVE MG/DL
LEUKOCYTE ESTERASE UR QL STRIP: NEGATIVE
LIPASE SERPL-CCNC: 42 U/L (ref 73–393)
LYMPHOCYTES # BLD AUTO: 0.92 THOUSANDS/ΜL (ref 0.6–4.47)
LYMPHOCYTES NFR BLD AUTO: 17 % (ref 14–44)
MCH RBC QN AUTO: 31.2 PG (ref 26.8–34.3)
MCHC RBC AUTO-ENTMCNC: 33.2 G/DL (ref 31.4–37.4)
MCV RBC AUTO: 94 FL (ref 82–98)
MONOCYTES # BLD AUTO: 0.38 THOUSAND/ΜL (ref 0.17–1.22)
MONOCYTES NFR BLD AUTO: 7 % (ref 4–12)
NEUTROPHILS # BLD AUTO: 3.75 THOUSANDS/ΜL (ref 1.85–7.62)
NEUTS SEG NFR BLD AUTO: 70 % (ref 43–75)
NITRITE UR QL STRIP: NEGATIVE
NRBC BLD AUTO-RTO: 0 /100 WBCS
PH UR STRIP.AUTO: 5 [PH] (ref 4.5–8)
PLATELET # BLD AUTO: 215 THOUSANDS/UL (ref 149–390)
PMV BLD AUTO: 10.5 FL (ref 8.9–12.7)
POTASSIUM SERPL-SCNC: 3.9 MMOL/L (ref 3.5–5.3)
PROT SERPL-MCNC: 7 G/DL (ref 6.4–8.2)
PROT UR STRIP-MCNC: NEGATIVE MG/DL
RBC # BLD AUTO: 5.32 MILLION/UL (ref 3.81–5.12)
SODIUM SERPL-SCNC: 142 MMOL/L (ref 136–145)
SP GR UR STRIP.AUTO: <=1.005 (ref 1–1.03)
UROBILINOGEN UR QL STRIP.AUTO: 0.2 E.U./DL
WBC # BLD AUTO: 5.36 THOUSAND/UL (ref 4.31–10.16)

## 2021-06-14 PROCEDURE — 85025 COMPLETE CBC W/AUTO DIFF WBC: CPT | Performed by: EMERGENCY MEDICINE

## 2021-06-14 PROCEDURE — 83690 ASSAY OF LIPASE: CPT | Performed by: EMERGENCY MEDICINE

## 2021-06-14 PROCEDURE — 99284 EMERGENCY DEPT VISIT MOD MDM: CPT

## 2021-06-14 PROCEDURE — 80053 COMPREHEN METABOLIC PANEL: CPT | Performed by: EMERGENCY MEDICINE

## 2021-06-14 PROCEDURE — 99284 EMERGENCY DEPT VISIT MOD MDM: CPT | Performed by: EMERGENCY MEDICINE

## 2021-06-14 PROCEDURE — 81003 URINALYSIS AUTO W/O SCOPE: CPT

## 2021-06-14 PROCEDURE — 36415 COLL VENOUS BLD VENIPUNCTURE: CPT | Performed by: EMERGENCY MEDICINE

## 2021-06-14 PROCEDURE — 96374 THER/PROPH/DIAG INJ IV PUSH: CPT

## 2021-06-14 PROCEDURE — 74177 CT ABD & PELVIS W/CONTRAST: CPT

## 2021-06-14 RX ORDER — KETOROLAC TROMETHAMINE 30 MG/ML
15 INJECTION, SOLUTION INTRAMUSCULAR; INTRAVENOUS ONCE
Status: COMPLETED | OUTPATIENT
Start: 2021-06-14 | End: 2021-06-14

## 2021-06-14 RX ORDER — DICYCLOMINE HCL 20 MG
20 TABLET ORAL 2 TIMES DAILY
Qty: 20 TABLET | Refills: 0 | Status: SHIPPED | OUTPATIENT
Start: 2021-06-14 | End: 2021-06-21

## 2021-06-14 RX ORDER — ALPRAZOLAM 0.5 MG/1
0.5 TABLET ORAL
COMMUNITY
End: 2021-07-22

## 2021-06-14 RX ADMIN — IOHEXOL 100 ML: 350 INJECTION, SOLUTION INTRAVENOUS at 13:24

## 2021-06-14 RX ADMIN — KETOROLAC TROMETHAMINE 15 MG: 30 INJECTION, SOLUTION INTRAMUSCULAR; INTRAVENOUS at 11:52

## 2021-06-14 NOTE — ED PROVIDER NOTES
History  Chief Complaint   Patient presents with    Abdominal Pain     Patient reports pain in lower abdomen that radiates from her back  Has been incontinent of urine  Pain has been off and on for past month but getting worse this week  History provided by:  Patient  Abdominal Pain  Pain location:  LLQ, RLQ and suprapubic  Pain quality: cramping    Pain radiates to:  RLQ and L flank  Pain severity:  Moderate  Onset quality:  Gradual  Duration: several months  Timing:  Constant  Progression:  Worsening  Chronicity:  Chronic  Context comment:  Has seen her pcp and ob/gyn  no imaging or labs to date scheduled to follow up with urolgy  Relieved by:  Nothing  Worsened by:  Nothing  Associated symptoms: no anorexia, no chest pain, no chills, no constipation, no diarrhea, no dysuria, no fatigue, no fever, no flatus, no hematuria, no nausea, no shortness of breath, no sore throat and no vomiting    Associated symptoms comment:  Urinary incontinence        Prior to Admission Medications   Prescriptions Last Dose Informant Patient Reported? Taking? ALPRAZolam (XANAX) 0 5 mg tablet   Yes Yes   Sig: Take 0 5 mg by mouth daily at bedtime as needed for anxiety      Facility-Administered Medications: None       Past Medical History:   Diagnosis Date    Cervical cancer ([facility] [address] )        History reviewed  No pertinent surgical history  History reviewed  No pertinent family history  I have reviewed and agree with the history as documented  E-Cigarette/Vaping     E-Cigarette/Vaping Substances     Social History     Tobacco Use    Smoking status: Never Smoker    Smokeless tobacco: Never Used   Substance Use Topics    Alcohol use: Not on file    Drug use: Not on file       Review of Systems   Constitutional: Negative for activity change, appetite change, chills, fatigue and fever  HENT: Negative for congestion, dental problem, ear pain, rhinorrhea and sore throat  Eyes: Negative for pain and redness  Respiratory: Negative for chest tightness, shortness of breath and wheezing  Cardiovascular: Negative for chest pain and palpitations  Gastrointestinal: Positive for abdominal pain  Negative for anorexia, blood in stool, constipation, diarrhea, flatus, nausea and vomiting  Endocrine: Negative for cold intolerance and heat intolerance  Genitourinary: Negative for dysuria, frequency and hematuria  Musculoskeletal: Negative for arthralgias and myalgias  Skin: Negative for color change, pallor and rash  Neurological: Negative for weakness and numbness  Hematological: Does not bruise/bleed easily  Psychiatric/Behavioral: Negative for agitation, hallucinations and suicidal ideas  Physical Exam  Physical Exam  Constitutional:       Appearance: She is well-developed  HENT:      Head: Normocephalic and atraumatic  Eyes:      Pupils: Pupils are equal, round, and reactive to light  Neck:      Vascular: No JVD  Trachea: No tracheal deviation  Cardiovascular:      Rate and Rhythm: Normal rate and regular rhythm  Pulmonary:      Effort: No tachypnea, accessory muscle usage or respiratory distress  Abdominal:      General: There is no distension  Palpations: Abdomen is soft  Tenderness: There is abdominal tenderness in the right lower quadrant, suprapubic area and left lower quadrant  There is no right CVA tenderness, left CVA tenderness, guarding or rebound  Musculoskeletal:      Right lower leg: Normal       Left lower leg: Normal    Skin:     General: Skin is warm  Capillary Refill: Capillary refill takes less than 2 seconds  Neurological:      Mental Status: She is alert and oriented to person, place, and time     Psychiatric:         Behavior: Behavior normal          Vital Signs  ED Triage Vitals   Temperature Pulse Respirations Blood Pressure SpO2   06/14/21 1118 06/14/21 1118 06/14/21 1118 06/14/21 1118 06/14/21 1118   97 6 °F (36 4 °C) 76 16 170/100 96 % Temp Source Heart Rate Source Patient Position - Orthostatic VS BP Location FiO2 (%)   06/14/21 1118 06/14/21 1118 06/14/21 1258 06/14/21 1258 --   Oral Monitor Lying Right arm       Pain Score       06/14/21 1118       6           Vitals:    06/14/21 1118 06/14/21 1258   BP: 170/100 161/91   Pulse: 76 55   Patient Position - Orthostatic VS:  Lying         Visual Acuity      ED Medications  Medications   ketorolac (TORADOL) injection 15 mg (15 mg Intravenous Given 6/14/21 1152)   iohexol (OMNIPAQUE) 350 MG/ML injection (SINGLE-DOSE) 100 mL (100 mL Intravenous Given 6/14/21 1324)       Diagnostic Studies  Results Reviewed     Procedure Component Value Units Date/Time    Comprehensive metabolic panel [[id number]] Collected: 06/14/21 1136    Lab Status: Final result Specimen: Blood from Arm, Right Updated: 06/14/21 1205     Sodium 142 mmol/L      Potassium 3 9 mmol/L      Chloride 103 mmol/L      CO2 29 mmol/L      ANION GAP 10 mmol/L      BUN 11 mg/dL      Creatinine 1 02 mg/dL      Glucose 88 mg/dL      Calcium 9 6 mg/dL      AST 21 U/L      ALT 19 U/L      Alkaline Phosphatase 86 U/L      Total Protein 7 0 g/dL      Albumin 3 5 g/dL      Total Bilirubin 0 46 mg/dL      eGFR 54 ml/min/1 73sq m     Narrative:      [place] guidelines for Chronic Kidney Disease (CKD):     Stage 1 with normal or high GFR (GFR > 90 mL/min/1 73 square meters)    Stage 2 Mild CKD (GFR = 60-89 mL/min/1 73 square meters)    Stage 3A Moderate CKD (GFR = 45-59 mL/min/1 73 square meters)    Stage 3B Moderate CKD (GFR = 30-44 mL/min/1 73 square meters)    Stage 4 Severe CKD (GFR = 15-29 mL/min/1 73 square meters)    Stage 5 End Stage CKD (GFR <15 mL/min/1 73 square meters)  Note: GFR calculation is accurate only with a steady state creatinine    Lipase [[id number]]  (Abnormal) Collected: 06/14/21 1136    Lab Status: Final result Specimen: Blood from Arm, Right Updated: 06/14/21 1205     Lipase 42 u/L     Urine Macroscopic, POC [[id number]] Collected: 06/14/21 1153    Lab Status: Final result Specimen: Urine Updated: 06/14/21 1154     Color, UA Yellow     Clarity, UA Clear     pH, UA 5 0     Leukocytes, UA Negative     Nitrite, UA Negative     Protein, UA Negative mg/dl      Glucose, UA Negative mg/dl      Ketones, UA Negative mg/dl      Urobilinogen, UA 0 2 E U /dl      Bilirubin, UA Negative     Blood, UA Negative     Specific Gravity, UA <=1 005    Narrative:      CLINITEK RESULT    CBC and differential [[id number]]  (Abnormal) Collected: 06/14/21 1136    Lab Status: Final result Specimen: Blood from Arm, Right Updated: 06/14/21 1145     WBC 5 36 Thousand/uL      RBC 5 32 Million/uL      Hemoglobin 16 6 g/dL      Hematocrit 50 0 %      MCV 94 fL      MCH 31 2 pg      MCHC 33 2 g/dL      RDW 13 3 %      MPV 10 5 fL      Platelets 422 Thousands/uL      nRBC 0 /100 WBCs      Neutrophils Relative 70 %      Immat GRANS % 0 %      Lymphocytes Relative 17 %      Monocytes Relative 7 %      Eosinophils Relative 5 %      Basophils Relative 1 %      Neutrophils Absolute 3 75 Thousands/µL      Immature Grans Absolute 0 01 Thousand/uL      Lymphocytes Absolute 0 92 Thousands/µL      Monocytes Absolute 0 38 Thousand/µL      Eosinophils Absolute 0 26 Thousand/µL      Basophils Absolute 0 04 Thousands/µL                  CT abdomen pelvis with contrast   Final Result by Angelic Restrepo DO (06/14 1411)      No acute intra-abdominal abnormality  Mild left renal atrophy  Small right renal cysts  Workstation performed: [id number]                    Procedures  Procedures         ED Course  ED Course as of Jun 14 1416   Mon Jun 14, 2021   1413 Pt updated on labs, ct results, tx plan, need for follow up                                SBIRT 20yo+      Most Recent Value   SBIRT (22 yo +)   In order to provide better care to our patients, we are screening all of our patients for alcohol and drug use   Would it be okay to ask you these screening questions? Unable to answer at this time Filed at: 06/14/2021 1121                    [facility]  Number of Diagnoses or Management Options  Diagnosis management comments: Acute on chronic lower abd pain radiating into back w/o recent imaging and hx of cervical ca-will do abd labs, urine dip, ct a/p to r/o acute pathology, prn pain meds, reassess      Disposition  Final diagnoses:   Chronic abdominal pain   Urinary incontinence   Renal cyst   Renal atrophy     Time reflects when diagnosis was documented in both MDM as applicable and the Disposition within this note     Time User Action Codes Description Comment    6/14/2021  2:14 PM Jodeane Sees Add [R10 9,  G89 29] Chronic abdominal pain     6/14/2021  2:14 PM Hosak Cyn Majestic Add [R32] Urinary incontinence     6/14/2021  2:14 PM Jodeane Sees Add [N28 1] Renal cyst     6/14/2021  2:14 PM Jodeane Sees Add [N26 1] Renal atrophy       ED Disposition     ED Disposition Condition Date/Time Comment    Discharge Stable Mon Jun 14, 2021  2:14 PM SAW Toro discharge to home/self care              Follow-up Information     Follow up With Specialties Details Why Contact Info Additional [address] [facility] Schedule an appointment as soon as possible for a visit in 2 days  [place] [address]  7  [facility], [address] [place] [address], [id number] [phone]    Catrachita Ramirez MD Family Medicine Schedule an appointment as soon as possible for a visit in 2 days  [facility]  [phone]             Patient's Medications   Discharge Prescriptions    DICYCLOMINE (BENTYL) 20 MG TABLET    Take 1 tablet (20 mg total) by mouth 2 (two) times a day for 7 days       Start Date: 6/14/2021 End Date: 6/21/2021       Order Dose: 20 mg       Quantity: 20 tablet    Refills: 0     No discharge procedures on file      PDMP Review     None          ED Provider  Electronically Signed by           Piter West MD  06/14/21 3586

## 2021-06-14 NOTE — Clinical Note
Jona Nathanari was seen and treated in our emergency department on 6/14/2021  Diagnosis:     C  may return to work on return date  She may return on this date: 06/16/2021         If you have any questions or concerns, please don't hesitate to call        Lauri Srinivasan MD    ______________________________           _______________          _______________  Hospital Representative                              Date                                Time

## 2021-06-14 NOTE — PROGRESS NOTES
Assessment and plan:       1  Urinary frequency with mixed incontinence  · Urine dip unremarkable  · PVR 28 ml  · avoid bladder irritants: drinks a lot of green tea ([place]-a gallon a day), 1 cup of coffee and water (2-3 bottles of water)  · Recommend adequate hydration with water   · daily bowel regimen to prevent constipation   · Referral to pelvic floor physical therapy made  · Instructed on how to perform Kegel exercises  · Timed voiding every 2 hours  · Voiding diary, will bring to next visit  · mixed incontinence x 5 months  · Nocturia x 1  · Follow up in 2 months after behavioral changes      2  Gross hematuria 2018   urine dip unremarkable   Denies ever having gross hematuria   Followed up with [facility] urology for the same and was recommended to have cystoscopy, did not have this   Smoking history: 30 plus pack year history   History of radiation for cervical cancer   Urine unremarkable in ED yesterday   Ct scan 6/14/2021 with renal cyst and mild resultant circumferential wall thickening   Microscopic urine unremarkable in 2019 x2 and 2016      SHEA Wright    History of Present Illness     Ana M Villatoro is a 78 y o  New patient who presents for urinary incontinence and frequency  She does have a history of gross hematuria for which she followed with an outside urology group in 2018  she has a history of colon polyp, irritable bowel syndrome with diarrhea, chronic obstructive pulmonary disease, aortic sclerosis, hypertension, TIA, spondylosis and arthritis, CKD stage 3, cervical cancer with radiation treatment  Left ovary removed due to cyst    Reviewed first-line therapy to include pelvic floor physical therapy and or home Kegel exercises  Can consider addition anticholinergic or beta adrenergic agonist medication especially for concurrent overactivity and urge component   First we discussed the pathophysiology of overactive bladder including storage and voiding symptoms, and the potential for interplay between anatomic and neurologic factors as well as effect of medications, diet, and stress  The initial treatment option discussed is behavior modifications including avoidance of bladder irritants such as caffeine/spicy/acidic foods/alcohol/tobacco; Increasing water intake; Avoidance of constipation and appropriate bowel regimens; Healthy weight loss; Elevating the lower extremities or wearing stockings throughout the day and >1 hr prior to bedtime in the presence of edema; Taking any diuretics in AM if feasible; Managing chronic pain or other mobility issues that make it difficult to reach the toilet; Timed voiding and/or double voiding  I have also encouraged the patient to track a voiding diary at least for the next few weeks if not already doing so  Next we discussed Kegel exercises as well as formal pelvic floor physical therapy which is a great low-risk high-benefit option for voiding dysfunction including chronic pelvic pain and incontinence  We discussed medication options for treatment of overactive bladder including the dosing and side effect potentials for each (anticholinergics and beta adrenergic agonist)  We also discussed the utility of alpha blockers having some efficacy in symptom improvement in females with incomplete emptying in particular  For postmenopausal females without known estrogen dependent malignancy or significant cardiac/vascular risk factors topical estrogen has shown to be efficacious in treating OAB and incontinence as well as UTI prevention  Lastly, we discussed an office based procedure- cystoscopy to visually evaluate the bladder which is particularly useful in the setting of concurrent hematuria or symptoms refractory to above treatment options  We discussed the utility of urodynamic testing that may or may not be recommended at some point in the patient's care timeline       We did not discuss more invasive/tertiary treatment options including PTNS, Botox, and Interstim and the necessary steps to evaluate candidacy for these as well as referral to urogynecology for surgical options  Laboratory     Lab Results   Component Value Date    BUN 13 05/28/2019    CREATININE 1 01 05/28/2019       No components found for: GFR    Lab Results   Component Value Date    GLUCOSE 91 12/10/2015    CALCIUM 9 6 05/28/2019     12/10/2015    K 3 5 05/28/2019    CO2 26 05/28/2019     05/28/2019       Lab Results   Component Value Date    WBC 6 01 05/28/2019    HGB 16 4 (H) 05/28/2019    HCT 49 4 (H) 05/28/2019    MCV 94 05/28/2019     05/28/2019       No results found for: PSA    Recent Results (from the past 1 hour(s))   POCT urine dip    Collection Time: 06/15/21 10:22 AM   Result Value Ref Range    LEUKOCYTE ESTERASE,UA -     NITRITE,UA -     SL AMB POCT UROBILINOGEN 0 2     POCT URINE PROTEIN -      PH,UA 5 0     BLOOD,UA -     SPECIFIC GRAVITY,UA 1 005     KETONES,UA -     BILIRUBIN,UA -     GLUCOSE, UA -      COLOR,UA yellow     CLARITY,UA clear    POCT Measure PVR    Collection Time: 06/15/21 10:23 AM   Result Value Ref Range    POST-VOID RESIDUAL VOLUME, ML POC 23 mL       @RESULT(URINEMICROSCOPIC)@    @RESULT(URINECULTURE)@    Radiology     CT ABDOMEN AND PELVIS WITH IV CONTRAST     INDICATION:  Abdominal pain radiating into flank      COMPARISON:  None     TECHNIQUE:  CT examination of the abdomen and pelvis was performed  Axial, sagittal, and coronal 2D reformatted images were created from the source data and submitted for interpretation      Radiation dose length product (DLP) for this visit:  393 mGy-cm    This examination, like all CT scans performed in the [facility], was performed utilizing techniques to minimize radiation dose exposure, including the use of iterative   reconstruction and automated exposure control      IV Contrast:  100 mL of iohexol (OMNIPAQUE)  Enteric Contrast:  Enteric contrast was not administered      FINDINGS:     ABDOMEN     LOWER CHEST:  No clinically significant abnormality identified in the visualized lower chest      LIVER/BILIARY TREE:  Unremarkable      GALLBLADDER:  No calcified gallstones  No pericholecystic inflammatory change      SPLEEN:  Unremarkable      PANCREAS:  Mild fatty replacement of the pancreas without acute findings      ADRENAL GLANDS:  Unremarkable      KIDNEYS/URETERS:  Multiple small peripheral right renal cysts  No hydronephrosis  Mildly atrophic left kidney      STOMACH AND BOWEL:    The stomach is poorly distended, evaluation limited  Small bowel is normal caliber  No focal inflammatory changes or fluid collections are identified      APPENDIX:  No findings to suggest appendicitis      ABDOMINOPELVIC CAVITY:  No ascites  No pneumoperitoneum  No lymphadenopathy      VESSELS:  Atherosclerotic changes are present  No evidence of aneurysm      PELVIS     REPRODUCTIVE ORGANS:  Status post hysterectomy  No adnexal masses      URINARY BLADDER:  Suboptimally distended with mild resultant circumferential wall thickening  No perivesical inflammatory changes      ABDOMINAL WALL/INGUINAL REGIONS:  Unremarkable      OSSEOUS STRUCTURES:  No acute fracture or destructive osseous lesion  Degenerative changes of the spine  Minimal grade 1 spondylolisthesis L4 on L5      IMPRESSION:     No acute intra-abdominal abnormality      Mild left renal atrophy  Small right renal cysts  Review of Systems     Review of Systems   Constitutional: Negative for activity change, appetite change, chills, fatigue, fever and unexpected weight change  HENT: Negative for facial swelling  Eyes: Negative for discharge  Respiratory: Negative  Negative for cough and shortness of breath  Cardiovascular: Negative for chest pain and leg swelling  Gastrointestinal: Positive for abdominal pain and diarrhea  Negative for abdominal distention, constipation, nausea and vomiting  Diarrhea sometimes, denies constipation   Endocrine: Negative  Genitourinary: Positive for frequency and urgency  Negative for decreased urine volume, difficulty urinating, dysuria, enuresis, flank pain, genital sores and hematuria  Stress incontinence   Musculoskeletal: Negative for back pain and myalgias  Skin: Negative for pallor and rash  Allergic/Immunologic: Negative  Negative for immunocompromised state  Neurological: Negative for facial asymmetry and speech difficulty  Psychiatric/Behavioral: Negative for agitation and confusion  Allergies     Allergies   Allergen Reactions    Lisinopril Syncope     Other reaction(s): Other (See Comments)  Nose bleeds, passing out       Physical Exam     Physical Exam  Vitals reviewed  Constitutional:       General: She is not in acute distress  Appearance: Normal appearance  She is normal weight  She is not ill-appearing, toxic-appearing or diaphoretic  HENT:      Head: Normocephalic and atraumatic  Eyes:      General: No scleral icterus  Cardiovascular:      Rate and Rhythm: Normal rate  Pulmonary:      Effort: Pulmonary effort is normal  No respiratory distress  Abdominal:      General: Abdomen is flat  There is no distension  Palpations: Abdomen is soft  Tenderness: There is no abdominal tenderness  There is no right CVA tenderness, left CVA tenderness, guarding or rebound  Musculoskeletal:         General: No swelling  Cervical back: Normal range of motion  Skin:     General: Skin is warm and dry  Coloration: Skin is not jaundiced or pale  Findings: No rash  Neurological:      General: No focal deficit present  Mental Status: She is alert and oriented to person, place, and time  Gait: Gait normal    Psychiatric:         Mood and Affect: Mood normal          Behavior: Behavior normal          Thought Content:  Thought content normal          Judgment: Judgment normal          Vital Signs Vitals:    06/15/21 1013   BP: 140/80   BP Location: Left arm   Patient Position: Standing   Cuff Size: Adult   Pulse: 64   Weight: 67 6 kg (149 lb)   Height: 5' 6" (1 676 m)       Current Medications       Current Outpatient Medications:     ALPRAZolam (XANAX) 0 5 mg tablet, Take 1/2-1 tablet daily as needed for anxiety, Disp: 30 tablet, Rfl: 0    dicyclomine (BENTYL) 10 mg capsule, Take 10 mg by mouth, Disp: , Rfl:     hydrochlorothiazide (MICROZIDE) 12 5 mg capsule, Take 1 capsule (12 5 mg total) by mouth daily, Disp: 90 capsule, Rfl: 0    Ascorbic Acid (Vitamin C) 500 MG CAPS, Take by mouth, Disp: , Rfl:     ASPIRIN 81 PO, Take 81 mg by mouth daily, Disp: , Rfl:     buPROPion (WELLBUTRIN XL) 150 mg 24 hr tablet, Take 1 tablet (150 mg total) by mouth every morning (Patient not taking: Reported on 1/19/2021), Disp: 30 tablet, Rfl: 1    DULoxetine (CYMBALTA) 20 mg capsule, Take 1 capsule (20 mg total) by mouth daily (Patient not taking: Reported on 6/15/2021), Disp: 30 capsule, Rfl: 1    gabapentin (NEURONTIN) 100 mg capsule, Take 1 capsule (100 mg total) by mouth 3 (three) times a day (Patient not taking: Reported on 6/15/2021), Disp: 90 capsule, Rfl: 0    Zinc 15 MG CAPS, Take by mouth daily, Disp: , Rfl:     Active Problems     Patient Active Problem List   Diagnosis    Aortic sclerosis    History of malignant neoplasm of cervix    Essential hypertension    Mixed hyperlipidemia    Colon polyps    CKD (chronic kidney disease) stage 3, GFR 30-59 ml/min ([facility])    Cervical stenosis of spine    Tobacco abuse    White coat syndrome with high blood pressure but without hypertension    Pain in pelvis    Diarrhea    Chronic abdominal pain    Generalized anxiety disorder    Irritable bowel syndrome with diarrhea    Chronic obstructive pulmonary disease ([facility])    Elevated hemoglobin ([facility])    Lumbar spondylosis    Tinnitus of both ears    Depression    Claudication of both lower extremities ([facility]    SI joint arthritis    Chronic pain syndrome    Sacroiliitis (HCC)    Meralgia paraesthetica, left    Anxiety    Elevated blood-pressure reading without diagnosis of hypertension    Increased frequency of urination    Postmenopausal state    TIA (transient ischemic attack)       Past Medical History     Past Medical History:   Diagnosis Date    Anxiety     Atypical squamous cells cannot exclude high grade squamous intraepithelial lesion on cytologic smear of cervix ([facility])     Last Assessed 5/13/2014    Back pain     Cancer ([facility] [address] [facility]     Depression     Ovarian cyst     Tinnitus of both ears 10/2/2020    Tobacco abuse 11/20/2018       Surgical History     Past Surgical History:   Procedure Laterality Date    ANKLE FRACTURE SURGERY      Last Assessed 07/26/2016    ANKLE SURGERY      CERVICAL BIOPSY  W/ LOOP ELECTRODE EXCISION      COLONOSCOPY  10/2019    DIAGNOSTIC LAPAROSCOPY      DILATION AND CURETTAGE OF UTERUS      ESOPHAGOGASTRODUODENOSCOPY N/A 9/12/2016    Procedure: ESOPHAGOGASTRODUODENOSCOPY (EGD); Surgeon: Patty Donahue MD;  [facility]: [facility]; Service:     FRACTURE SURGERY      ORTHOPEDIC SURGERY      [facility] WHEN PFRMD N/A 9/12/2016    Procedure: Chana Shafer;  Surgeon: Patty Donahue MD;  [facility];   Service: Colorectal    SALPINGOOPHORECTOMY Left     TONSILLECTOMY      UPPER GASTROINTESTINAL ENDOSCOPY  2016       Family History     Family History   Problem Relation Age of Onset    Coronary artery disease Mother     No Known Problems Family     No Known Problems Father     Colon cancer Neg Hx        Social History     Social History     Social History     Tobacco Use   Smoking Status Current Some Day Smoker    Packs/day: 0 50    Years: 60 00    Pack years: 30 00    Types: Cigarettes   Smokeless Tobacco Never Used       Past Surgical History:   Procedure Laterality Date    ANKLE FRACTURE SURGERY      Last Assessed 07/26/2016    ANKLE SURGERY      CERVICAL BIOPSY  W/ LOOP ELECTRODE EXCISION      COLONOSCOPY  10/2019    DIAGNOSTIC LAPAROSCOPY      DILATION AND CURETTAGE OF UTERUS      ESOPHAGOGASTRODUODENOSCOPY N/A 9/12/2016    Procedure: ESOPHAGOGASTRODUODENOSCOPY (EGD); Surgeon: Damian Billings MD;  [facility]; Service:     FRACTURE SURGERY      ORTHOPEDIC SURGERY      ME COLONOSCOPY FLX DX W/[facility] WHEN PFRMD N/A 9/12/2016    Procedure: Adryan Mosher;  Surgeon: Damian Billings MD;  [facility]; Service: Colorectal    SALPINGOOPHORECTOMY Left     TONSILLECTOMY      UPPER GASTROINTESTINAL ENDOSCOPY  2016         The following portions of the patient's history were reviewed and updated as appropriate: allergies, current medications, past family history, past medical history, past social history, past surgical history and problem list    Please note :  Voice dictation software has been used to create this document  There may be inadvertent transcription errors      [id number] [id number] Ashok No

## 2021-06-15 ENCOUNTER — OFFICE VISIT (OUTPATIENT)
Dept: UROLOGY | Facility: CLINIC | Age: 76
End: 2021-06-15
Payer: MEDICARE

## 2021-06-15 VITALS
WEIGHT: 149 LBS | BODY MASS INDEX: 23.95 KG/M2 | SYSTOLIC BLOOD PRESSURE: 140 MMHG | HEIGHT: 66 IN | HEART RATE: 64 BPM | DIASTOLIC BLOOD PRESSURE: 80 MMHG

## 2021-06-15 DIAGNOSIS — R35.0 URINE FREQUENCY: Primary | ICD-10-CM

## 2021-06-15 DIAGNOSIS — N39.498 OTHER URINARY INCONTINENCE: ICD-10-CM

## 2021-06-15 LAB
POST-VOID RESIDUAL VOLUME, ML POC: 23 ML
SL AMB  POCT GLUCOSE, UA: NORMAL
SL AMB LEUKOCYTE ESTERASE,UA: NORMAL
SL AMB POCT BILIRUBIN,UA: NORMAL
SL AMB POCT BLOOD,UA: NORMAL
SL AMB POCT CLARITY,UA: CLEAR
SL AMB POCT COLOR,UA: YELLOW
SL AMB POCT KETONES,UA: NORMAL
SL AMB POCT NITRITE,UA: NORMAL
SL AMB POCT PH,UA: 5
SL AMB POCT SPECIFIC GRAVITY,UA: 1
SL AMB POCT URINE PROTEIN: NORMAL
SL AMB POCT UROBILINOGEN: 0.2

## 2021-06-15 PROCEDURE — 81002 URINALYSIS NONAUTO W/O SCOPE: CPT | Performed by: NURSE PRACTITIONER

## 2021-06-15 PROCEDURE — 99204 OFFICE O/P NEW MOD 45 MIN: CPT | Performed by: NURSE PRACTITIONER

## 2021-06-15 PROCEDURE — 51798 US URINE CAPACITY MEASURE: CPT | Performed by: NURSE PRACTITIONER

## 2021-06-15 NOTE — PATIENT INSTRUCTIONS
BLADDER HEALTH    WHAT IS CONSIDERED NORMAL?  The average bladder can hold about 2 cups of urine before it needs to be emptied   The normal range of voiding urine is 6 to 8 times during a 24 hour period  As we get older, our bladder capacity can get smaller and we may need to pass urine more frequently but usually not more than every 2 hours   Urine should flow easily without discomfort in a good, steady stream until the bladder is empty  No pushing or straining is necessary to empty the bladder   An urge is a signal that you feel as the bladder stretches to fill with urine  Urges can be felt even if the bladder is not full  Urges are not commands to go to the toilet, merely a signal and can be controlled  WHAT ARE GOOD BLADDER HABITS?  Take your time when emptying your bladder  Dont strain or push to empty your bladder  Make sure you empty your bladder completely each time you pass urine  Do not rush the process   Consistently ignoring the urge to go (waiting more than 4 hours between toileting) or urinating too infrequently may be convenient but not healthy for your bladder   Avoid going to the toilet just in case or more often than every 2 hours  It is usually not necessary to go when you feel the first urge  Try to go only when your bladder is full  Urgency and frequency of urination can be improved by retraining the bladder and spacing your fluid intake throughout the day  Practice good toilet habits  Dont let your bladder control your life  TIPS TO MAINTAIN GOOD BLADDER HABITS   Maintain a good fluid intake  Depending on your body size and environment, drink 6 -8 cups (8 oz each) of fluid per day unless otherwise advised by your doctor  Not enough fluid creates a foul odor and dark color of the urine     Limit the amount of caffeine (coffee, cola, chocolate or tea) and citrus foods that you consume as these foods can be associated with increased sensation of urinary urgency and frequency   Limit the amount of alcohol you drink  Alcohol increases urine production and makes it difficult for the brain to coordinate bladder control   Avoid constipation by maintaining a balanced diet of dietary fiber   Cigarette smoking is also irritating to the bladder surface and is associated with bladder cancer  In addition, the coughing associated with smoking may lead to increased incontinent episodes because of the increased pressure  HOW DIET CAN AFFECT YOUR BLADDER  Although there is no particular "diet" that can cure bladder control, there are certain dietary suggestions you can use to help control the problem  There are 2 points to consider when evaluating how your habits and diet may affect your bladder:    1  Foods and fluids can irritate the bladder  Some foods and beverages are thought to contribute to bladder leakage and irritability  However their effect on the bladder is not completely understood and you may want to see if eliminating one or all of these items improves your bladder control  If you are unable to give them up completely, it is recommended that you use the following items in moderation:   Acidic beverages and foods (orange juice, grapefruit juice, lemonade etc)   Alcoholic beverages   Vinegar   Coffee (regular and decaf)   Tea (regular and decaf)   Caffeinated beverages   Carbonated beverages          2  Drinking enough and the right kinds of fluids  Many people with bladder control issues decrease their intake of liquids in hope that they will need to urinate less frequently or have less urinary leakage   You should not restrict fluids to control your bladder  While a decrease in liquid intake does result in a decrease in the volume of urine, the smaller amount of urine may be more highly concentrated         Highly concentrated, dark yellow urine is irritating to the bladder surface and may actually cause you to go to the bathroom more frequently   It also encourages the growth of bacteria, which may lead to infections resulting in incontinence   Substitutions for Bladder Irritants: water is always the best beverage choice  Grape and apple juice are thirst quenchers are good selections and are not as irritating to the bladder   o Low acid fruits:  Pears, apricots, papaya, watermelon  o For coffee drinkers: KAVA®, Postum®, Adam®, Kaffree Amna®  o For tea drinkers:  non-citrus or herbal and sun brewed tea  Kegel Exercises for Women   AMBULATORY CARE:   Kegel exercises  help strengthen your pelvic muscles  Pelvic muscles hold your pelvic organs, such as your bladder and uterus, in place  Kegel exercises help prevent or control problems with urine incontinence (leakage)  Incontinence may be caused by pregnancy, childbirth, or menopause  Contact your healthcare provider if:   · You cannot feel your muscles tighten or relax  · You continue to leak urine  · You have questions or concerns about your condition or care  Use the correct muscles:  Pelvic muscles are the muscles you use to control urine flow  To target these muscles, stop and start the flow of urine several times  This will help you become familiar with how it feels to tighten and relax these muscles  How to do Kegel exercises:   · Empty your bladder  You may lie down, stand up, or sit down to do these exercises  When you first try to do these exercises, it may be easier if you lie down  Tighten or squeeze your pelvic muscles slowly  It may feel like you are trying to hold back urine or gas  Hold this position for 3 seconds  Relax for 3 seconds  Repeat this cycle 10 times  · Do 10 sets of Kegel exercises, at least 3 times a day  Do not hold your breath when you do Kegel exercises  Keep your stomach, back, and leg muscles relaxed  · As your muscles get stronger, you will be able to hold the squeeze longer   Your healthcare provider may ask that you increase your pelvic muscle squeeze to 10 seconds  After you squeeze for 10 seconds, relax for 10 seconds  What else you should know:   · Once you know how to do Kegel exercises, use different positions  You can do these exercises while you lie on the floor, sit at your desk or watch TV, and while you stand  · You may notice improved bladder control within about 6 weeks  · Tighten your pelvic muscles before you sneeze, cough, or lift to prevent urine leakage  Follow up with your healthcare provider as directed:  Write down your questions so you remember to ask them during your visits  © Copyright [address] Information is for End User's use only and may not be sold, redistributed or otherwise used for commercial purposes  All illustrations and images included in CareNotes® are the copyrighted property of [employer]  or [facility] Paige Townsend   The above information is an  only  It is not intended as medical advice for individual conditions or treatments  Talk to your doctor, nurse or pharmacist before following any medical regimen to see if it is safe and effective for you

## 2021-06-16 ENCOUNTER — TELEPHONE (OUTPATIENT)
Dept: FAMILY MEDICINE CLINIC | Facility: CLINIC | Age: 76
End: 2021-06-16

## 2021-06-16 NOTE — TELEPHONE ENCOUNTER
Patient called in stating that she is still having abdominal pain  She does not know where to turn  She has gone to the hospital and been to a couple specialists  She doesn't know what else to do? She knows that you are not in the office today and can wait to hear back from you tomorrow

## 2021-06-17 ENCOUNTER — TELEPHONE (OUTPATIENT)
Dept: UROLOGY | Facility: MEDICAL CENTER | Age: 76
End: 2021-06-17

## 2021-06-17 NOTE — TELEPHONE ENCOUNTER
Spoke with patient and informed patient she is to follow up in august as scheduled with Francisco Benítez  Patient was agreeable

## 2021-06-17 NOTE — TELEPHONE ENCOUNTER
Patient seen by Efren Baker at [facility]    Patient called stating she is not sure if she is to schedule a cysto  She stated Efren Baker asked her about it but she does not remember if it needed to be scheduled  Please review and call patient with appointment

## 2021-06-24 ENCOUNTER — PROCEDURE VISIT (OUTPATIENT)
Dept: PAIN MEDICINE | Facility: MEDICAL CENTER | Age: 76
End: 2021-06-24
Payer: MEDICARE

## 2021-06-24 DIAGNOSIS — R10.2 PAIN IN PELVIS: Primary | ICD-10-CM

## 2021-06-24 PROCEDURE — 64450 NJX AA&/STRD OTHER PN/BRANCH: CPT | Performed by: PHYSICAL MEDICINE & REHABILITATION

## 2021-06-24 PROCEDURE — 76942 ECHO GUIDE FOR BIOPSY: CPT | Performed by: PHYSICAL MEDICINE & REHABILITATION

## 2021-06-24 RX ORDER — METHYLPREDNISOLONE ACETATE 40 MG/ML
40 INJECTION, SUSPENSION INTRA-ARTICULAR; INTRALESIONAL; INTRAMUSCULAR; SOFT TISSUE ONCE
Status: COMPLETED | OUTPATIENT
Start: 2021-06-24 | End: 2021-06-24

## 2021-06-24 RX ORDER — BUPIVACAINE HYDROCHLORIDE 2.5 MG/ML
10 INJECTION, SOLUTION EPIDURAL; INFILTRATION; INTRACAUDAL ONCE
Status: COMPLETED | OUTPATIENT
Start: 2021-06-24 | End: 2021-06-24

## 2021-06-24 RX ADMIN — BUPIVACAINE HYDROCHLORIDE 10 ML: 2.5 INJECTION, SOLUTION EPIDURAL; INFILTRATION; INTRACAUDAL at 15:29

## 2021-06-24 RX ADMIN — METHYLPREDNISOLONE ACETATE 40 MG: 40 INJECTION, SUSPENSION INTRA-ARTICULAR; INTRALESIONAL; INTRAMUSCULAR; SOFT TISSUE at 15:29

## 2021-06-24 NOTE — PROGRESS NOTES
Indication: Anterior lateral thigh pain  Preprocedure diagnosis:  Meralgia paresthetica  Postprocedure diagnosis:  Meralgia paresthetica    Procedure: Ultrasound-guided left- Lateral Femoral Cutaneous Nerve block    After discussing the risks, benefits, and alternatives to the procedure, the patient expressed understanding and wished to proceed  The patient was brought to the procedure suite and placed in the supine position  A procedural pause was conducted to verify:  correct patient identity, procedure to be performed and as applicable, correct side and site, correct patient position, and availability of implants, special equipment or special requirements  A simple surgical tray was used  A simple surgical tray was used  Prior to the procedure, the thigh was examined with a 12 MHz linear transducer to visualize the lateral femoral cutaneous nerve and determine the optimal needle path  Following this, the area was prepared with a ChloraPrep scrub, then re-examined using the same transducer, a sterile ultrasound transducer cover, and sterile ultrasound transducer gel  Thereafter, using ultrasound guidance, a 2 5 inch 25-gauge needle was advanced into the thigh towards the lateral femoral cutaneous nerve  Lfter visualization of the tip near the nerve and negative aspiration for blood, a mixture of 40 mg of Depo-Medrol in 3 mL of 0 25% bupivacaine was injected into the thigh- around the LFCN- good kaiden-neural spread of injectate was noted  Following the injection, the needle was withdrawn  The patient tolerated the procedure well and there were no apparent complications  After an appropriate amount of observation, the patient was dismissed from the clinic in good condition under their own power

## 2021-06-24 NOTE — TELEPHONE ENCOUNTER
Spoke with pt regarding her complaints  Discussed recommendations form urology  Pt states she is not going to do therapy because of the pain she is having  She has tried to cut down on irritants  I did discuss that if she is not wanting to do therapy then I would recommend contacting urology for further recommendations as her follow up is not until August, possibly be seen sooner since she declines pelvic floor therapy  Pt also has not seen Dr Robyne Sicard from gyn/onc for awhile and may possibly benefit from his input regarding her pain and her previous radiation therapy and surgeries  Pt states Xanax does take the edge off  I asked if she tried the Gabapentin and she has not  I did recommend she try this at low dose as this may also help  Pt is going to call urology and Dr Robyne Sicard; We will await their recommendations/input; pt will try the gabapentin

## 2021-06-25 ENCOUNTER — TELEPHONE (OUTPATIENT)
Dept: FAMILY MEDICINE CLINIC | Facility: CLINIC | Age: 76
End: 2021-06-25

## 2021-06-25 NOTE — TELEPHONE ENCOUNTER
Natalie Campbell is calling to extend her gratitude to you for figuring out what was causing her pain  She said she is 100% pain free and could not be happier  She wished she knew that just one phone call to her pcp was all she needed versus all the time she spent with other specialist   Many thanks to you

## 2021-07-01 ENCOUNTER — TELEPHONE (OUTPATIENT)
Dept: PAIN MEDICINE | Facility: CLINIC | Age: 76
End: 2021-07-01

## 2021-07-20 ENCOUNTER — OFFICE VISIT (OUTPATIENT)
Dept: FAMILY MEDICINE CLINIC | Facility: CLINIC | Age: 76
End: 2021-07-20
Payer: MEDICARE

## 2021-07-20 ENCOUNTER — APPOINTMENT (OUTPATIENT)
Dept: LAB | Facility: CLINIC | Age: 76
End: 2021-07-20
Payer: MEDICARE

## 2021-07-20 VITALS
TEMPERATURE: 98.1 F | OXYGEN SATURATION: 94 % | HEART RATE: 66 BPM | WEIGHT: 148.1 LBS | HEIGHT: 66 IN | SYSTOLIC BLOOD PRESSURE: 140 MMHG | BODY MASS INDEX: 23.8 KG/M2 | DIASTOLIC BLOOD PRESSURE: 88 MMHG

## 2021-07-20 DIAGNOSIS — L03.114 CELLULITIS OF LEFT UPPER EXTREMITY: Primary | ICD-10-CM

## 2021-07-20 DIAGNOSIS — S60.562A INSECT BITE OF LEFT HAND, INITIAL ENCOUNTER: ICD-10-CM
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
## 2021-07-20 DIAGNOSIS — L03.114 CELLULITIS OF LEFT UPPER EXTREMITY: ICD-10-CM

## 2021-07-20 LAB
ALBUMIN SERPL BCP-MCNC: 3.3 G/DL (ref 3.5–5)
ALP SERPL-CCNC: 84 U/L (ref 46–116)
ALT SERPL W P-5'-P-CCNC: 18 U/L (ref 12–78)
ANION GAP SERPL CALCULATED.3IONS-SCNC: 5 MMOL/L (ref 4–13)
AST SERPL W P-5'-P-CCNC: 12 U/L (ref 5–45)
BASOPHILS # BLD AUTO: 0.04 THOUSANDS/ΜL (ref 0–0.1)
BASOPHILS NFR BLD AUTO: 1 % (ref 0–1)
BILIRUB SERPL-MCNC: 0.63 MG/DL (ref 0.2–1)
BUN SERPL-MCNC: 15 MG/DL (ref 5–25)
CALCIUM ALBUM COR SERPL-MCNC: 10 MG/DL (ref 8.3–10.1)
CALCIUM SERPL-MCNC: 9.4 MG/DL (ref 8.3–10.1)
CHLORIDE SERPL-SCNC: 107 MMOL/L (ref 100–108)
CO2 SERPL-SCNC: 26 MMOL/L (ref 21–32)
CREAT SERPL-MCNC: 1 MG/DL (ref 0.6–1.3)
EOSINOPHIL # BLD AUTO: 0.5 THOUSAND/ΜL (ref 0–0.61)
EOSINOPHIL NFR BLD AUTO: 8 % (ref 0–6)
ERYTHROCYTE [DISTWIDTH] IN BLOOD BY AUTOMATED COUNT: 13.5 % (ref 11.6–15.1)
GFR SERPL CREATININE-BSD FRML MDRD: 56 ML/MIN/1.73SQ M
GLUCOSE P FAST SERPL-MCNC: 87 MG/DL (ref 65–99)
HCT VFR BLD AUTO: 49.9 % (ref 34.8–46.1)
HGB BLD-MCNC: 16 G/DL (ref 11.5–15.4)
IMM GRANULOCYTES # BLD AUTO: 0.02 THOUSAND/UL (ref 0–0.2)
IMM GRANULOCYTES NFR BLD AUTO: 0 % (ref 0–2)
LYMPHOCYTES # BLD AUTO: 0.92 THOUSANDS/ΜL (ref 0.6–4.47)
LYMPHOCYTES NFR BLD AUTO: 14 % (ref 14–44)
MCH RBC QN AUTO: 30.9 PG (ref 26.8–34.3)
MCHC RBC AUTO-ENTMCNC: 32.1 G/DL (ref 31.4–37.4)
MCV RBC AUTO: 96 FL (ref 82–98)
MONOCYTES # BLD AUTO: 0.48 THOUSAND/ΜL (ref 0.17–1.22)
MONOCYTES NFR BLD AUTO: 7 % (ref 4–12)
NEUTROPHILS # BLD AUTO: 4.63 THOUSANDS/ΜL (ref 1.85–7.62)
NEUTS SEG NFR BLD AUTO: 70 % (ref 43–75)
NRBC BLD AUTO-RTO: 0 /100 WBCS
PLATELET # BLD AUTO: 216 THOUSANDS/UL (ref 149–390)
PMV BLD AUTO: 11.6 FL (ref 8.9–12.7)
POTASSIUM SERPL-SCNC: 4.3 MMOL/L (ref 3.5–5.3)
PROT SERPL-MCNC: 6.8 G/DL (ref 6.4–8.2)
RBC # BLD AUTO: 5.18 MILLION/UL (ref 3.81–5.12)
SODIUM SERPL-SCNC: 138 MMOL/L (ref 136–145)
WBC # BLD AUTO: 6.59 THOUSAND/UL (ref 4.31–10.16)

## 2021-07-20 PROCEDURE — 36415 COLL VENOUS BLD VENIPUNCTURE: CPT

## 2021-07-20 PROCEDURE — 85025 COMPLETE CBC W/AUTO DIFF WBC: CPT

## 2021-07-20 PROCEDURE — 80053 COMPREHEN METABOLIC PANEL: CPT

## 2021-07-20 PROCEDURE — 99213 OFFICE O/P EST LOW 20 MIN: CPT | Performed by: FAMILY MEDICINE

## 2021-07-20 RX ORDER — CEPHALEXIN 500 MG/1
500 CAPSULE ORAL EVERY 8 HOURS SCHEDULED
Qty: 30 CAPSULE | Refills: 0 | Status: SHIPPED | OUTPATIENT
Start: 2021-07-20 | End: 2021-07-30

## 2021-07-20 NOTE — PATIENT INSTRUCTIONS
Please go to lab after visit to get your lab work done  I am giving you antibiotics to take 3 times daily as directed  If any redness going up forearm or any worsening in the next 24 hours please go to the ER  I will follow-up with you on Thursday

## 2021-07-20 NOTE — PROGRESS NOTES
Subjective:    HPI  Elier Vega is a 78 y o  female here today for:  Chief Complaint   Patient presents with    Insect Bite     spider bite on hand     ---Above per clinical staff & reviewed  ---  HPI:  70-year-old female here with her daughter with complaints of a spider bite on her left ankle and a wasp bite on her left hand  She states spider bite was about 2 weeks ago and the wasp bite was 2 days ago  The spider bite area seems to be improved  There is a small spot at the site of the bite with some peeling skin but there is no surrounding erythema or sign of infection  The wasp bite was on her left 5th finger on her distal phalanx  She now has significant swelling in her left dorsal hand and her distal wrist area  The area is erythematous  Patient states it is tight to move and slightly tender to touch  She is able to grasp but not as tightly as her other hand  There is no streaking up her arm but the erythema is part way proximal to her wrist   I am sending patient to the lab to get a CBC  I am going to start her on Keflex 3 times a day for the cellulitis  I did discuss with her in daughter if any streaking or worsening in the next 12:48 p m  To go directly to the ER  I will follow-up with her on Thursday to make sure that she is improving         The following portions of the patient's history were reviewed and updated as appropriate: allergies, current medications, past family history, past medical history, past social history, past surgical history and problem list     Past Medical History:   Diagnosis Date    Anxiety     Atypical squamous cells cannot exclude high grade squamous intraepithelial lesion on cytologic smear of cervix (ASC-H)     Last Assessed 5/13/2014    Back pain     [facility] [address]     Cervical cancer [facility] [address] [facility]     Depression     Ovarian cyst     Tinnitus of both ears 10/2/2020    Tobacco abuse 11/20/2018       Past Surgical History:   Procedure Laterality Date    ANKLE FRACTURE SURGERY      Last Assessed 07/26/2016    ANKLE SURGERY      CERVICAL BIOPSY  W/ LOOP ELECTRODE EXCISION      COLONOSCOPY  10/2019    DIAGNOSTIC LAPAROSCOPY      DILATION AND CURETTAGE OF UTERUS      ESOPHAGOGASTRODUODENOSCOPY N/A 9/12/2016    Procedure: ESOPHAGOGASTRODUODENOSCOPY (EGD); Surgeon: Jimbo Hiens MD;  Location: [facility]; Service:     FRACTURE SURGERY      ORTHOPEDIC SURGERY      VA COLONOSCOPY FLX DX W/COLLJ [facility] WHEN [facility] N/A 9/12/2016    Procedure: Judith Weller;  Surgeon: Jimbo Hines MD;  Location: [facility]; Service: Colorectal    SALPINGOOPHORECTOMY Left     TONSILLECTOMY      UPPER GASTROINTESTINAL ENDOSCOPY  2016       Social History     Socioeconomic History    Marital status:      Spouse name: None    Number of children: None    Years of education: 13    Highest education level: None   Occupational History    None   Tobacco Use    Smoking status: Never Smoker    Smokeless tobacco: Never Used   Vaping Use    Vaping Use: Never used   Substance and Sexual Activity    Alcohol use: No    Drug use: Never    Sexual activity: Not Currently   Other Topics Concern    None   Social History Narrative    ** Merged History Encounter **           since 2015 after 13 year marriage  1 daughter from 1st marriage  1 granddaughter, Otilia Rausch, whom she raised  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  Social Determinants of Health     Financial Resource Strain:     Difficulty of Paying Living Expenses:    Food Insecurity: Unknown    Worried About Running Out of Food in the Last Year: Never true    [place] of Food in the Last Year: Not on file   Transportation Needs: Unknown    Lack of Transportation (Medical): No    Lack of Transportation (Non-Medical):  Not on file   Physical Activity:     Days of Exercise per Week:     Minutes of Exercise per Session:    Stress:     Feeling of Stress :    Social Connections:  Frequency of Communication with Friends and Family:     Frequency of Social Gatherings with Friends and Family:     Attends Caodaism Services:     Active Member of Clubs or Organizations:     Attends Club or Organization Meetings:     Marital Status:    Intimate Partner Violence:     Fear of Current or Ex-Partner:     Emotionally Abused:     Physically Abused:     Sexually Abused:        Current Outpatient Medications   Medication Sig Dispense Refill    ALPRAZolam (XANAX) 0 5 mg tablet Take 1/2-1 tablet daily as needed for anxiety 30 tablet 0    Ascorbic Acid (Vitamin C) 500 MG CAPS Take by mouth      gabapentin (NEURONTIN) 100 mg capsule Take 1 capsule (100 mg total) by mouth 3 (three) times a day 90 capsule 0    Zinc 15 MG CAPS Take by mouth daily      ALPRAZolam (XANAX) 0 5 mg tablet Take 0 5 mg by mouth daily at bedtime as needed for anxiety      ASPIRIN 81 PO Take 81 mg by mouth daily      buPROPion (WELLBUTRIN XL) 150 mg 24 hr tablet Take 1 tablet (150 mg total) by mouth every morning (Patient not taking: Reported on 1/19/2021) 30 tablet 1    cephalexin (KEFLEX) 500 mg capsule Take 1 capsule (500 mg total) by mouth every 8 (eight) hours for 10 days 30 capsule 0    dicyclomine (BENTYL) 10 mg capsule Take 10 mg by mouth      dicyclomine (BENTYL) 20 mg tablet Take 1 tablet (20 mg total) by mouth 2 (two) times a day for 7 days 20 tablet 0    DULoxetine (CYMBALTA) 20 mg capsule Take 1 capsule (20 mg total) by mouth daily (Patient not taking: Reported on 6/15/2021) 30 capsule 1    hydrochlorothiazide (MICROZIDE) 12 5 mg capsule Take 1 capsule (12 5 mg total) by mouth daily (Patient not taking: Reported on 7/20/2021) 90 capsule 0     No current facility-administered medications for this visit  Review of Systems   Constitutional: Negative  Negative for chills and fever  HENT: Negative  Negative for ear pain and sore throat  Eyes: Negative for pain and visual disturbance  Respiratory: Negative  Negative for cough and shortness of breath  Cardiovascular: Negative  Negative for chest pain and palpitations  Gastrointestinal: Negative  Negative for abdominal pain and vomiting  Genitourinary: Negative  Negative for dysuria and hematuria  Musculoskeletal: Negative for arthralgias and back pain  Skin: Positive for color change  Negative for rash  Left hand erythematous   Neurological: Negative  Negative for seizures and syncope  Psychiatric/Behavioral: Negative  All other systems reviewed and are negative  Objective:    /88 (BP Location: Left arm, Patient Position: Sitting, Cuff Size: Adult)   Pulse 66   Temp 98 1 °F (36 7 °C) (Temporal)   Ht 5' 6" (1 676 m)   Wt 67 2 kg (148 lb 1 6 oz)   SpO2 94%   BMI 23 90 kg/m²   Wt Readings from Last 3 Encounters:   07/20/21 67 2 kg (148 lb 1 6 oz)   06/15/21 67 6 kg (149 lb)   06/14/21 67 kg (147 lb 11 3 oz)     BP Readings from Last 3 Encounters:   07/20/21 140/88   06/15/21 140/80   06/14/21 161/91       Lab Results   Component Value Date    WBC 5 36 06/14/2021    HGB 16 6 (H) 06/14/2021    HCT 50 0 (H) 06/14/2021     06/14/2021    TRIG 111 12/11/2018    HDL 58 12/11/2018    ALT 19 06/14/2021    AST 21 06/14/2021     12/10/2015    K 3 9 06/14/2021     06/14/2021    CREATININE 1 02 06/14/2021    BUN 11 06/14/2021    CO2 29 06/14/2021    INR 0 99 04/25/2014    HGBA1C 5 6 05/18/2019       Physical Exam  Vitals and nursing note reviewed  Constitutional:       Appearance: Normal appearance  She is well-developed  HENT:      Head: Normocephalic and atraumatic  Eyes:      Pupils: Pupils are equal, round, and reactive to light  Cardiovascular:      Rate and Rhythm: Normal rate and regular rhythm  Heart sounds: No murmur heard  Pulmonary:      Effort: Pulmonary effort is normal       Breath sounds: Normal breath sounds     Musculoskeletal:      Cervical back: Normal range of motion and neck supple  Skin:     General: Skin is warm  Capillary Refill: Capillary refill takes less than 2 seconds  Findings: Erythema present  Comments: Left hand with erythema and swelling from dorsum of hand to proximal to the wrist   Neurological:      Mental Status: She is alert and oriented to person, place, and time  Cranial Nerves: No cranial nerve deficit  Assessment/Plan:   Faustino Moyer was seen today for insect bite  Diagnoses and all orders for this visit:    Cellulitis of left upper extremity  -     CBC and differential; Future  -     Comprehensive metabolic panel; Future  -     cephalexin (KEFLEX) 500 mg capsule; Take 1 capsule (500 mg total) by mouth every 8 (eight) hours for 10 days    Insect bite of left hand, initial encounter  -     CBC and differential; Future  -     Comprehensive metabolic panel; Future  -     cephalexin (KEFLEX) 500 mg capsule; Take 1 capsule (500 mg total) by mouth every 8 (eight) hours for 10 days      Return in about 2 days (around 7/22/2021) for Recheck cellulitis, wasp bite  Patient Instructions   Please go to lab after visit to get your lab work done  I am giving you antibiotics to take 3 times daily as directed  If any redness going up forearm or any worsening in the next 24 hours please go to the ER  I will follow-up with you on Thursday

## 2021-07-22 ENCOUNTER — OFFICE VISIT (OUTPATIENT)
Dept: FAMILY MEDICINE CLINIC | Facility: CLINIC | Age: 76
End: 2021-07-22
Payer: MEDICARE

## 2021-07-22 VITALS
OXYGEN SATURATION: 96 % | TEMPERATURE: 97.4 F | HEART RATE: 72 BPM | SYSTOLIC BLOOD PRESSURE: 140 MMHG | BODY MASS INDEX: 23.85 KG/M2 | DIASTOLIC BLOOD PRESSURE: 88 MMHG | HEIGHT: 66 IN | WEIGHT: 148.4 LBS

## 2021-07-22 DIAGNOSIS — Z00.00 MEDICARE ANNUAL WELLNESS VISIT, SUBSEQUENT: Primary | ICD-10-CM

## 2021-07-22 DIAGNOSIS — E78.2 MIXED HYPERLIPIDEMIA: ICD-10-CM

## 2021-07-22 DIAGNOSIS — L03.114 CELLULITIS OF LEFT UPPER EXTREMITY: ICD-10-CM

## 2021-07-22 DIAGNOSIS — K58.0 IRRITABLE BOWEL SYNDROME WITH DIARRHEA: ICD-10-CM

## 2021-07-22 DIAGNOSIS — R03.0 WHITE COAT SYNDROME WITH HIGH BLOOD PRESSURE BUT WITHOUT HYPERTENSION: ICD-10-CM

## 2021-07-22 DIAGNOSIS — F41.1 GENERALIZED ANXIETY DISORDER: ICD-10-CM

## 2021-07-22 DIAGNOSIS — N18.31 STAGE 3A CHRONIC KIDNEY DISEASE (HCC): ICD-10-CM

## 2021-07-22 DIAGNOSIS — I10 ESSENTIAL HYPERTENSION: ICD-10-CM

## 2021-07-22 DIAGNOSIS — Z13.6 SCREENING FOR CARDIOVASCULAR CONDITION: ICD-10-CM

## 2021-07-22 PROCEDURE — 1123F ACP DISCUSS/DSCN MKR DOCD: CPT | Performed by: FAMILY MEDICINE

## 2021-07-22 PROCEDURE — G0439 PPPS, SUBSEQ VISIT: HCPCS | Performed by: FAMILY MEDICINE

## 2021-07-22 RX ORDER — ALPRAZOLAM 0.5 MG/1
0.5 TABLET ORAL
Qty: 30 TABLET | Refills: 0 | Status: SHIPPED | OUTPATIENT
Start: 2021-07-22 | End: 2022-02-03 | Stop reason: SDUPTHER

## 2021-07-22 RX ORDER — HYDROCHLOROTHIAZIDE 12.5 MG/1
12.5 CAPSULE, GELATIN COATED ORAL DAILY
Qty: 90 CAPSULE | Refills: 0 | Status: SHIPPED | OUTPATIENT
Start: 2021-07-22 | End: 2021-11-17 | Stop reason: SDUPTHER

## 2021-07-22 NOTE — PROGRESS NOTES
Sonny Kothari is here for her Subsequent Wellness visit  Health Risk Assessment:   Patient rates overall health as very good  Patient feels that their physical health rating is much better  Patient is satisfied with their life  Eyesight was rated as same  Hearing was rated as slightly worse  Patient feels that their emotional and mental health rating is much better  Patients states they are sometimes angry  Patient states they are often unusually tired/fatigued  Pain experienced in the last 7 days has been none  Patient states that she has experienced weight loss or gain in last 6 months  Depression Screening:   PHQ-2 Score: 0  PHQ-9 Score: 0      Fall Risk Screening: In the past year, patient has experienced: no history of falling in past year      Urinary Incontinence Screening:   Patient has not leaked urine accidently in the last six months  Home Safety:  Patient does not have trouble with stairs inside or outside of their home  Patient has working smoke alarms and has working carbon monoxide detector  Home safety hazards include: none  Nutrition:   Current diet is Regular  Medications:   Patient is currently taking over-the-counter supplements  OTC medications include: see medication list  Patient is able to manage medications  Activities of Daily Living (ADLs)/Instrumental Activities of Daily Living (IADLs):   Walk and transfer into and out of bed and chair?: Yes  Dress and groom yourself?: Yes    Bathe or shower yourself?: Yes    Feed yourself?  Yes  Do your laundry/housekeeping?: Yes  Manage your money, pay your bills and track your expenses?: Yes  Make your own meals?: Yes    Do your own shopping?: Yes    Previous Hospitalizations:   Any hospitalizations or ED visits within the last 12 months?: Yes    How many hospitalizations have you had in the last year?: 1-2    Advance Care Planning:   Living will: Yes    Advanced directive: Yes    Five wishes given: Yes      PREVENTIVE SCREENINGS Cardiovascular Screening:    General: Screening Not Indicated and History Lipid Disorder      Diabetes Screening:     General: Screening Current      Colorectal Cancer Screening:     General: Screening Current      Cervical Cancer Screening:    General: History Cervical Cancer      Lung Cancer Screening:     General: Screening Not Indicated    Screening, Brief Intervention, and Referral to Treatment (SBIRT)    Screening  Typical number of drinks in a day: 0  Typical number of drinks in a week: 0  Interpretation: Low risk drinking behavior      Single Item Drug Screening:  How often have you used an illegal drug (including marijuana) or a prescription medication for non-medical reasons in the past year? never    Single Item Drug Screen Score: 0  Interpretation: Negative screen for possible drug use disorder

## 2021-07-22 NOTE — PROGRESS NOTES
Subjective:    HPI  Philip Garcia is a 78 y o  female here today for:  Chief Complaint   Patient presents with    Medicare Wellness Visit    Follow-up     insect bite and blood work     ---Above per clinical staff & reviewed  ---  HPI:  82-year-old female here for follow-up in for her Medicare visit  Patient was seen 2 days going placed on antibiotics for an insect bite that appeared cellulitic  Arm is much improved, swelling has gone down significantly, minimal erythema is there  Patient is doing well  She is taking gabapentin and her pain is very well controlled  Patient has some diarrhea since starting the antibiotics  Patient declines hep C screening, pneumonia vaccine, and COVID vaccine  Discussed with patient that she should get these but she continues to decline them  Patient needs refill of blood pressure medication and Xanax  Blood pressure is always slightly higher here in the office than at home  Patient continues to smoke  We discussed quitting today  Patient will follow-up in 3 months  Patient will get labs that are due at earliest convenience       The following portions of the patient's history were reviewed and updated as appropriate: allergies, current medications, past family history, past medical history, past social history, past surgical history and problem list     Past Medical History:   Diagnosis Date    Anxiety     Atypical squamous cells cannot exclude high grade squamous intraepithelial lesion on cytologic smear of cervix (ASC-H)     Last Assessed 5/13/2014    Back pain     Cancer ([address]     Cervical cancer ([address] )     Depression     Ovarian cyst     Tinnitus of both ears 10/2/2020    Tobacco abuse 11/20/2018       Past Surgical History:   Procedure Laterality Date    ANKLE FRACTURE SURGERY      Last Assessed 07/26/2016    ANKLE SURGERY      CERVICAL BIOPSY  W/ LOOP ELECTRODE EXCISION      COLONOSCOPY  10/2019    DIAGNOSTIC LAPAROSCOPY      DILATION AND CURETTAGE OF UTERUS      ESOPHAGOGASTRODUODENOSCOPY N/A 9/12/2016    Procedure: ESOPHAGOGASTRODUODENOSCOPY (EGD); Surgeon: Lucrecia Andrews MD;  Location: [facility]; Service:     FRACTURE SURGERY      ORTHOPEDIC SURGERY      OH COLONOSCOPY FLX DX W/COLLJ [facility] REHABILIT[facility] WHEN PFRMD N/A 9/12/2016    Procedure: Beltran Uribe;  Surgeon: Lucrecia Andrews MD;  Location: [facility]; Service: Colorectal    SALPINGOOPHORECTOMY Left     TONSILLECTOMY      UPPER GASTROINTESTINAL ENDOSCOPY  2016       Social History     Socioeconomic History    Marital status:      Spouse name: None    Number of children: None    Years of education: 13    Highest education level: None   Occupational History    None   Tobacco Use    Smoking status: Never Smoker    Smokeless tobacco: Never Used   Vaping Use    Vaping Use: Never used   Substance and Sexual Activity    Alcohol use: No    Drug use: Never    Sexual activity: Not Currently   Other Topics Concern    None   Social History Narrative    ** Merged History Encounter **           since 2015 after 13 year marriage  1 daughter from 1st marriage  1 granddaughter, Isaac Davenport, whom she raised  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  Social Determinants of Health     Financial Resource Strain:     Difficulty of Paying Living Expenses:    Food Insecurity: Unknown    Worried About Running Out of Food in the Last Year: Never true    [place] of Food in the Last Year: Not on file   Transportation Needs: Unknown    Lack of Transportation (Medical): No    Lack of Transportation (Non-Medical):  Not on file   Physical Activity:     Days of Exercise per Week:     Minutes of Exercise per Session:    Stress:     Feeling of Stress :    Social Connections:     Frequency of Communication with Friends and Family:     Frequency of Social Gatherings with Friends and Family:     Attends [employer] Services:     Active Member of Clubs or change  Slight erythema, edema almost completely resolved   Neurological: Negative  Negative for seizures and syncope  Psychiatric/Behavioral: The patient is nervous/anxious  All other systems reviewed and are negative  Objective:    /88 (BP Location: Left arm, Patient Position: Sitting, Cuff Size: Adult)   Pulse 72   Temp (!) 97 4 °F (36 3 °C) (Temporal)   Ht 5' 6" (1 676 m)   Wt 67 3 kg (148 lb 6 4 oz)   SpO2 96%   BMI 23 95 kg/m²   Wt Readings from Last 3 Encounters:   07/22/21 67 3 kg (148 lb 6 4 oz)   07/20/21 67 2 kg (148 lb 1 6 oz)   06/15/21 67 6 kg (149 lb)     BP Readings from Last 3 Encounters:   07/22/21 140/88   07/20/21 140/88   06/15/21 140/80       Lab Results   Component Value Date    WBC 6 59 07/20/2021    HGB 16 0 (H) 07/20/2021    HCT 49 9 (H) 07/20/2021     07/20/2021    TRIG 111 12/11/2018    HDL 58 12/11/2018    ALT 18 07/20/2021    AST 12 07/20/2021     12/10/2015    K 4 3 07/20/2021     07/20/2021    CREATININE 1 00 07/20/2021    BUN 15 07/20/2021    CO2 26 07/20/2021    INR 0 99 04/25/2014    GLUF 87 07/20/2021    HGBA1C 5 6 05/18/2019       Physical Exam  Vitals and nursing note reviewed  Constitutional:       Appearance: Normal appearance  She is well-developed  HENT:      Head: Normocephalic and atraumatic  Right Ear: Tympanic membrane and external ear normal       Left Ear: Tympanic membrane and external ear normal       Nose: Nose normal       Mouth/Throat:      Mouth: Mucous membranes are moist    Eyes:      Conjunctiva/sclera: Conjunctivae normal       Pupils: Pupils are equal, round, and reactive to light  Neck:      Thyroid: No thyromegaly  Comments: No carotid bruits  Cardiovascular:      Rate and Rhythm: Normal rate and regular rhythm  Heart sounds: Normal heart sounds  No murmur heard  Pulmonary:      Effort: Pulmonary effort is normal  No respiratory distress  Breath sounds: Normal breath sounds  Abdominal:      General: Bowel sounds are normal  There is no distension  Palpations: Abdomen is soft  Tenderness: There is no abdominal tenderness  Musculoskeletal:         General: Normal range of motion  Cervical back: Normal range of motion and neck supple  Skin:     General: Skin is warm  Capillary Refill: Capillary refill takes less than 2 seconds  Neurological:      Mental Status: She is alert and oriented to person, place, and time  Cranial Nerves: No cranial nerve deficit  Psychiatric:         Mood and Affect: Mood normal          Thought Content: Thought content normal                        Assessment/Plan:   Heather Garza was seen today for medicare wellness visit and follow-up  Diagnoses and all orders for this visit:    Medicare annual wellness visit, subsequent  -     Lipid panel; Future  -     TSH, 3rd generation with Free T4 reflex; Future  -     Vitamin D 25 hydroxy; Future    White coat syndrome with high blood pressure but without hypertension  -     hydrochlorothiazide (MICROZIDE) 12 5 mg capsule; Take 1 capsule (12 5 mg total) by mouth daily  -     Lipid panel; Future  -     TSH, 3rd generation with Free T4 reflex; Future  -     Vitamin D 25 hydroxy; Future    Generalized anxiety disorder  -     ALPRAZolam (XANAX) 0 5 mg tablet; Take 1 tablet (0 5 mg total) by mouth daily at bedtime as needed for anxiety  -     Lipid panel; Future  -     TSH, 3rd generation with Free T4 reflex; Future  -     Vitamin D 25 hydroxy; Future    Screening for cardiovascular condition  -     Lipid panel; Future  -     TSH, 3rd generation with Free T4 reflex; Future  -     Vitamin D 25 hydroxy; Future    Irritable bowel syndrome with diarrhea  -     Lipid panel; Future  -     TSH, 3rd generation with Free T4 reflex; Future  -     Vitamin D 25 hydroxy; Future    Essential hypertension  -     Lipid panel; Future  -     TSH, 3rd generation with Free T4 reflex;  Future  -     Vitamin D 25 hydroxy; Future    Stage 3a chronic kidney disease ([facility] [address] [facility] )  -     Lipid panel; Future  -     TSH, 3rd generation with Free T4 reflex; Future  -     Vitamin D 25 hydroxy; Future    Mixed hyperlipidemia  -     Lipid panel; Future  -     TSH, 3rd generation with Free T4 reflex; Future  -     Vitamin D 25 hydroxy; Future    Cellulitis of left upper extremity      Return in about 3 months (around 10/22/2021) for Recheck  Patient Instructions     Please get your labwork done fasting  Do not eat/drink for about 8-12 hours prior to getting the labwork done, however you may drink water or black coffee while you are fasting  Please use a [facility] lab if possible, as we receive the lab results more quickly  We will notify you of your labwork results even if they are normal   Please contact us if you do not hear about your lab results after one week  Finish antibiotics as directed  Drink plenty of fluids  Low Carb Recommendations:  Please follow a low carbohydrate, high protein diet  [employer] is a good bhavna/computer program to keep food logs  Your meals should be less than 30 grams of carbohydrates  Your snacks should be less than 15 grams of carbohydrates  You should drink at least 64 ounces of water daily  You should walk at least 30 minutes daily  Follow up in 3 months  Medicare Preventive Visit Patient Instructions  Thank you for completing your Welcome to Medicare Visit or Medicare Annual Wellness Visit today  Your next wellness visit will be due in one year (7/23/2022)  The screening/preventive services that you may require over the next 5-10 years are detailed below  Some tests may not apply to you based off risk factors and/or age  Screening tests ordered at today's visit but not completed yet may show as past due  Also, please note that scanned in results may not display below    Preventive Screenings:  Service Recommendations Previous Testing/Comments   Colorectal Cancer Screening  * Colonoscopy    * Fecal Occult Blood Test (FOBT)/Fecal Immunochemical Test (FIT)  * Fecal DNA/Cologuard Test  * Flexible Sigmoidoscopy Age: 54-65 years old   Colonoscopy: every 10 years (may be performed more frequently if at higher risk)  OR  FOBT/FIT: every 1 year  OR  Cologuard: every 3 years  OR  Sigmoidoscopy: every 5 years  Screening may be recommended earlier than age 48 if at higher risk for colorectal cancer  Also, an individualized decision between you and your healthcare provider will decide whether screening between the ages of 74-80 would be appropriate  Colonoscopy: 10/07/2019  FOBT/FIT: Not on file  Cologuard: Not on file  Sigmoidoscopy: Not on file    Screening Current     Breast Cancer Screening Age: 36 years old  Frequency: every 1-2 years  Not required if history of left and right mastectomy Mammogram: 05/16/2014        Cervical Cancer Screening Between the ages of 21-29, pap smear recommended once every 3 years  Between the ages of 33-67, can perform pap smear with HPV co-testing every 5 years  Recommendations may differ for women with a history of total hysterectomy, cervical cancer, or abnormal pap smears in past  Pap Smear: Not on file    History Cervical Cancer   Hepatitis C Screening Once for adults born between 1945 and 1965  More frequently in patients at high risk for Hepatitis C Hep C Antibody: Not on file        Diabetes Screening 1-2 times per year if you're at risk for diabetes or have pre-diabetes Fasting glucose: 87 mg/dL   A1C: 5 6 %    Screening Current   Cholesterol Screening Once every 5 years if you don't have a lipid disorder  May order more often based on risk factors  Lipid panel: 12/11/2018    Screening Not Indicated  History Lipid Disorder     Other Preventive Screenings Covered by Medicare:  1  Abdominal Aortic Aneurysm (AAA) Screening: covered once if your at risk  You're considered to be at risk if you have a family history of AAA    2  Lung Cancer Screening: covers low dose CT scan once per year if you meet all of the following conditions: (1) Age 50-69; (2) No signs or symptoms of lung cancer; (3) Current smoker or have quit smoking within the last 15 years; (4) You have a tobacco smoking history of at least 30 pack years (packs per day multiplied by number of years you smoked); (5) You get a written order from a healthcare provider  3  Glaucoma Screening: covered annually if you're considered high risk: (1) You have diabetes OR (2) Family history of glaucoma OR (3)  aged 48 and older OR (3)  American aged 72 and older  3  Osteoporosis Screening: covered every 2 years if you meet one of the following conditions: (1) You're estrogen deficient and at risk for osteoporosis based off medical history and other findings; (2) Have a vertebral abnormality; (3) On glucocorticoid therapy for more than 3 months; (4) Have primary hyperparathyroidism; (5) On osteoporosis medications and need to assess response to drug therapy  · Last bone density test (DXA Scan): Not on file  5  HIV Screening: covered annually if you're between the age of 12-76  Also covered annually if you are younger than 13 and older than 72 with risk factors for HIV infection  For pregnant patients, it is covered up to 3 times per pregnancy  Immunizations:  Immunization Recommendations   Influenza Vaccine Annual influenza vaccination during flu season is recommended for all persons aged >= 6 months who do not have contraindications   Pneumococcal Vaccine (Prevnar and Pneumovax)  * Prevnar = PCV13  * Pneumovax = PPSV23   Adults 25-60 years old: 1-3 doses may be recommended based on certain risk factors  Adults 72 years old: Prevnar (PCV13) vaccine recommended followed by Pneumovax (PPSV23) vaccine  If already received PPSV23 since turning 65, then PCV13 recommended at least one year after PPSV23 dose     Hepatitis B Vaccine 3 dose series if at intermediate or high risk (ex: diabetes, end stage renal disease, liver disease)   Tetanus (Td) Vaccine - COST NOT COVERED BY MEDICARE PART B Following completion of primary series, a booster dose should be given every 10 years to maintain immunity against tetanus  Td may also be given as tetanus wound prophylaxis  Tdap Vaccine - COST NOT COVERED BY MEDICARE PART B Recommended at least once for all adults  For pregnant patients, recommended with each pregnancy  Shingles Vaccine (Shingrix) - COST NOT COVERED BY MEDICARE PART B  2 shot series recommended in those aged 48 and above     Health Maintenance Due:      Topic Date Due    DXA SCAN  Never done    Breast Cancer Screening: Mammogram  05/16/2015    Hepatitis C Screening  07/22/2022 (Originally 1945)    Colorectal Cancer Screening  10/07/2022     Immunizations Due:      Topic Date Due    Influenza Vaccine (1) 09/01/2021     Advance Directives   What are advance directives? Advance directives are legal documents that state your wishes and plans for medical care  These plans are made ahead of time in case you lose your ability to make decisions for yourself  Advance directives can apply to any medical decision, such as the treatments you want, and if you want to donate organs  What are the types of advance directives? There are many types of advance directives, and each state has rules about how to use them  You may choose a combination of any of the following:  · Living will: This is a written record of the treatment you want  You can also choose which treatments you do not want, which to limit, and which to stop at a certain time  This includes surgery, medicine, IV fluid, and tube feedings  · Durable power of  for healthcare [place]): This is a written record that states who you want to make healthcare choices for you when you are unable to make them for yourself  This person, called a proxy, is usually a family member or a friend  You may choose more than 1 proxy    · Do not resuscitate (DNR) order:  A DNR order is used in case your heart stops beating or you stop breathing  It is a request not to have certain forms of treatment, such as CPR  A DNR order may be included in other types of advance directives  · Medical directive: This covers the care that you want if you are in a coma, near death, or unable to make decisions for yourself  You can list the treatments you want for each condition  Treatment may include pain medicine, surgery, blood transfusions, dialysis, IV or tube feedings, and a ventilator (breathing machine)  · Values history: This document has questions about your views, beliefs, and how you feel and think about life  This information can help others choose the care that you would choose  Why are advance directives important? An advance directive helps you control your care  Although spoken wishes may be used, it is better to have your wishes written down  Spoken wishes can be misunderstood, or not followed  Treatments may be given even if you do not want them  An advance directive may make it easier for your family to make difficult choices about your care  Urinary Incontinence   Urinary incontinence (UI)  is when you lose control of your bladder  UI develops because your bladder cannot store or empty urine properly  The 3 most common types of UI are stress incontinence, urge incontinence, or both  Medicines:   · May be given to help strengthen your bladder control  Report any side effects of medication to your healthcare provider  Do pelvic muscle exercises often:  Your pelvic muscles help you stop urinating  Squeeze these muscles tight for 5 seconds, then relax for 5 seconds  Gradually work up to squeezing for 10 seconds  Do 3 sets of 15 repetitions a day, or as directed  This will help strengthen your pelvic muscles and improve bladder control    Train your bladder:  Go to the bathroom at set times, such as every 2 hours, even if you do not feel the urge to go  You can also try to hold your urine when you feel the urge to go  For example, hold your urine for 5 minutes when you feel the urge to go  As that becomes easier, hold your urine for 10 minutes  Self-care:   · Keep a UI record  Write down how often you leak urine and how much you leak  Make a note of what you were doing when you leaked urine  · Drink liquids as directed  You may need to limit the amount of liquid you drink to help control your urine leakage  Do not drink any liquid right before you go to bed  Limit or do not have drinks that contain caffeine or alcohol  · Prevent constipation  Eat a variety of high-fiber foods  Good examples are high-fiber cereals, beans, vegetables, and whole-grain breads  Walking is the best way to trigger your intestines to have a bowel movement  · Exercise regularly and maintain a healthy weight  Weight loss and exercise will decrease pressure on your bladder and help you control your leakage  · Use a catheter as directed  to help empty your bladder  A catheter is a tiny, plastic tube that is put into your bladder to drain your urine  · Go to behavior therapy as directed  Behavior therapy may be used to help you learn to control your urge to urinate  © Copyright [employer] 2018 Information is for End User's use only and may not be sold, redistributed or otherwise used for commercial purposes   All illustrations and images included in CareNotes® are the copyrighted property of A D A M , [employer]  or [address] [place] [address] [employer]

## 2021-07-22 NOTE — PATIENT INSTRUCTIONS
Please get your labwork done fasting  Do not eat/drink for about 8-12 hours prior to getting the labwork done, however you may drink water or black coffee while you are fasting  Please use a [facility] lab if possible, as we receive the lab results more quickly  We will notify you of your labwork results even if they are normal   Please contact us if you do not hear about your lab results after one week  Finish antibiotics as directed  Drink plenty of fluids  Low Carb Recommendations:  Please follow a low carbohydrate, high protein diet  Just Eat is a good bhavna/computer program to keep food logs  Your meals should be less than 30 grams of carbohydrates  Your snacks should be less than 15 grams of carbohydrates  You should drink at least 64 ounces of water daily  You should walk at least 30 minutes daily  Follow up in 3 months  Medicare Preventive Visit Patient Instructions  Thank you for completing your Welcome to Medicare Visit or Medicare Annual Wellness Visit today  Your next wellness visit will be due in one year (7/23/2022)  The screening/preventive services that you may require over the next 5-10 years are detailed below  Some tests may not apply to you based off risk factors and/or age  Screening tests ordered at today's visit but not completed yet may show as past due  Also, please note that scanned in results may not display below  Preventive Screenings:  Service Recommendations Previous Testing/Comments   Colorectal Cancer Screening  * Colonoscopy    * Fecal Occult Blood Test (FOBT)/Fecal Immunochemical Test (FIT)  * Fecal DNA/Cologuard Test  * Flexible Sigmoidoscopy Age: 54-65 years old   Colonoscopy: every 10 years (may be performed more frequently if at higher risk)  OR  FOBT/FIT: every 1 year  OR  Cologuard: every 3 years  OR  Sigmoidoscopy: every 5 years  Screening may be recommended earlier than age 48 if at higher risk for colorectal cancer   Also, an individualized decision between you and your healthcare provider will decide whether screening between the ages of 74-80 would be appropriate  Colonoscopy: 10/07/2019  FOBT/FIT: Not on file  Cologuard: Not on file  Sigmoidoscopy: Not on file    Screening Current     Breast Cancer Screening Age: 36 years old  Frequency: every 1-2 years  Not required if history of left and right mastectomy Mammogram: 05/16/2014        Cervical Cancer Screening Between the ages of 21-29, pap smear recommended once every 3 years  Between the ages of 33-67, can perform pap smear with HPV co-testing every 5 years  Recommendations may differ for women with a history of total hysterectomy, cervical cancer, or abnormal pap smears in past  Pap Smear: Not on file    History Cervical Cancer   Hepatitis C Screening Once for adults born between 1945 and 1965  More frequently in patients at high risk for Hepatitis C Hep C Antibody: Not on file        Diabetes Screening 1-2 times per year if you're at risk for diabetes or have pre-diabetes Fasting glucose: 87 mg/dL   A1C: 5 6 %    Screening Current   Cholesterol Screening Once every 5 years if you don't have a lipid disorder  May order more often based on risk factors  Lipid panel: 12/11/2018    Screening Not Indicated  History Lipid Disorder     Other Preventive Screenings Covered by Medicare:  1  Abdominal Aortic Aneurysm (AAA) Screening: covered once if your at risk  You're considered to be at risk if you have a family history of AAA  2  Lung Cancer Screening: covers low dose CT scan once per year if you meet all of the following conditions: (1) Age 50-69; (2) No signs or symptoms of lung cancer; (3) Current smoker or have quit smoking within the last 15 years; (4) You have a tobacco smoking history of at least 30 pack years (packs per day multiplied by number of years you smoked); (5) You get a written order from a healthcare provider    3  Glaucoma Screening: covered annually if you're considered high risk: (1) You have diabetes OR (2) Family history of glaucoma OR (3)  aged 48 and older OR (4)  American aged 72 and older  3  Osteoporosis Screening: covered every 2 years if you meet one of the following conditions: (1) You're estrogen deficient and at risk for osteoporosis based off medical history and other findings; (2) Have a vertebral abnormality; (3) On glucocorticoid therapy for more than 3 months; (4) Have primary hyperparathyroidism; (5) On osteoporosis medications and need to assess response to drug therapy  · Last bone density test (DXA Scan): Not on file  5  HIV Screening: covered annually if you're between the age of 12-76  Also covered annually if you are younger than 13 and older than 72 with risk factors for HIV infection  For pregnant patients, it is covered up to 3 times per pregnancy  Immunizations:  Immunization Recommendations   Influenza Vaccine Annual influenza vaccination during flu season is recommended for all persons aged >= 6 months who do not have contraindications   Pneumococcal Vaccine (Prevnar and Pneumovax)  * Prevnar = PCV13  * Pneumovax = PPSV23   Adults 25-60 years old: 1-3 doses may be recommended based on certain risk factors  Adults 72 years old: Prevnar (PCV13) vaccine recommended followed by Pneumovax (PPSV23) vaccine  If already received PPSV23 since turning 65, then PCV13 recommended at least one year after PPSV23 dose  Hepatitis B Vaccine 3 dose series if at intermediate or high risk (ex: diabetes, end stage renal disease, liver disease)   Tetanus (Td) Vaccine - COST NOT COVERED BY MEDICARE PART B Following completion of primary series, a booster dose should be given every 10 years to maintain immunity against tetanus  Td may also be given as tetanus wound prophylaxis  Tdap Vaccine - COST NOT COVERED BY MEDICARE PART B Recommended at least once for all adults  For pregnant patients, recommended with each pregnancy  Shingles Vaccine (Shingrix) - COST NOT COVERED BY MEDICARE PART B  2 shot series recommended in those aged 48 and above     Health Maintenance Due:      Topic Date Due    DXA SCAN  Never done    Breast Cancer Screening: Mammogram  05/16/2015    Hepatitis C Screening  07/22/2022 (Originally 1945)    Colorectal Cancer Screening  10/07/2022     Immunizations Due:      Topic Date Due    Influenza Vaccine (1) 09/01/2021     Advance Directives   What are advance directives? Advance directives are legal documents that state your wishes and plans for medical care  These plans are made ahead of time in case you lose your ability to make decisions for yourself  Advance directives can apply to any medical decision, such as the treatments you want, and if you want to donate organs  What are the types of advance directives? There are many types of advance directives, and each state has rules about how to use them  You may choose a combination of any of the following:  · Living will: This is a written record of the treatment you want  You can also choose which treatments you do not want, which to limit, and which to stop at a certain time  This includes surgery, medicine, IV fluid, and tube feedings  · Durable power of  for healthcare [facility]): This is a written record that states who you want to make healthcare choices for you when you are unable to make them for yourself  This person, called a proxy, is usually a family member or a friend  You may choose more than 1 proxy  · Do not resuscitate (DNR) order:  A DNR order is used in case your heart stops beating or you stop breathing  It is a request not to have certain forms of treatment, such as CPR  A DNR order may be included in other types of advance directives  · Medical directive: This covers the care that you want if you are in a coma, near death, or unable to make decisions for yourself  You can list the treatments you want for each condition  Treatment may include pain medicine, surgery, blood transfusions, dialysis, IV or tube feedings, and a ventilator (breathing machine)  · Values history: This document has questions about your views, beliefs, and how you feel and think about life  This information can help others choose the care that you would choose  Why are advance directives important? An advance directive helps you control your care  Although spoken wishes may be used, it is better to have your wishes written down  Spoken wishes can be misunderstood, or not followed  Treatments may be given even if you do not want them  An advance directive may make it easier for your family to make difficult choices about your care  Urinary Incontinence   Urinary incontinence (UI)  is when you lose control of your bladder  UI develops because your bladder cannot store or empty urine properly  The 3 most common types of UI are stress incontinence, urge incontinence, or both  Medicines:   · May be given to help strengthen your bladder control  Report any side effects of medication to your healthcare provider  Do pelvic muscle exercises often:  Your pelvic muscles help you stop urinating  Squeeze these muscles tight for 5 seconds, then relax for 5 seconds  Gradually work up to squeezing for 10 seconds  Do 3 sets of 15 repetitions a day, or as directed  This will help strengthen your pelvic muscles and improve bladder control  Train your bladder:  Go to the bathroom at set times, such as every 2 hours, even if you do not feel the urge to go  You can also try to hold your urine when you feel the urge to go  For example, hold your urine for 5 minutes when you feel the urge to go  As that becomes easier, hold your urine for 10 minutes  Self-care:   · Keep a UI record  Write down how often you leak urine and how much you leak  Make a note of what you were doing when you leaked urine  · Drink liquids as directed   You may need to limit the amount of liquid you drink to help control your urine leakage  Do not drink any liquid right before you go to bed  Limit or do not have drinks that contain caffeine or alcohol  · Prevent constipation  Eat a variety of high-fiber foods  Good examples are high-fiber cereals, beans, vegetables, and whole-grain breads  Walking is the best way to trigger your intestines to have a bowel movement  · Exercise regularly and maintain a healthy weight  Weight loss and exercise will decrease pressure on your bladder and help you control your leakage  · Use a catheter as directed  to help empty your bladder  A catheter is a tiny, plastic tube that is put into your bladder to drain your urine  · Go to behavior therapy as directed  Behavior therapy may be used to help you learn to control your urge to urinate  © Copyright [employer] 2018 Information is for End User's use only and may not be sold, redistributed or otherwise used for commercial purposes   All illustrations and images included in CareNotes® are the copyrighted property of A D A M , Inc  or [address] [place] [address] [employer]

## 2021-09-03 ENCOUNTER — HOSPITAL ENCOUNTER (OUTPATIENT)
Dept: MAMMOGRAPHY | Facility: MEDICAL CENTER | Age: 76
Discharge: HOME | End: 2021-09-03
Payer: MEDICARE

## 2021-09-03 ENCOUNTER — HOSPITAL ENCOUNTER (OUTPATIENT)
Dept: BONE DENSITY | Facility: MEDICAL CENTER | Age: 76
Discharge: HOME | End: 2021-09-03
Payer: MEDICARE

## 2021-09-03 VITALS — BODY MASS INDEX: 23.78 KG/M2 | WEIGHT: 148 LBS | HEIGHT: 66 IN

## 2021-09-03 DIAGNOSIS — Z13.820 SCREENING FOR OSTEOPOROSIS: ICD-10-CM

## 2021-09-03 DIAGNOSIS — Z12.31 SCREENING MAMMOGRAM, ENCOUNTER FOR: ICD-10-CM

## 2021-09-03 PROCEDURE — 77067 SCR MAMMO BI INCL CAD: CPT

## 2021-09-03 PROCEDURE — 77063 BREAST TOMOSYNTHESIS BI: CPT

## 2021-09-03 PROCEDURE — 77080 DXA BONE DENSITY AXIAL: CPT

## 2021-09-14 ENCOUNTER — TELEPHONE (OUTPATIENT)
Dept: FAMILY MEDICINE CLINIC | Facility: CLINIC | Age: 76
End: 2021-09-14

## 2021-09-14 NOTE — TELEPHONE ENCOUNTER
----- Message from Jules Evans MD sent at 9/14/2021 10:54 AM EDT -----  Pt should be taking at least 1200 calcium and 2000 vitamin D, be doing weight bearing and muscle strengthening exercises, and avoid tobacco and alcohol  Bone density shows low bone mass, will need to repeat in 18-24 months

## 2021-10-21 ENCOUNTER — OFFICE VISIT (OUTPATIENT)
Dept: FAMILY MEDICINE CLINIC | Facility: CLINIC | Age: 76
End: 2021-10-21
Payer: MEDICARE

## 2021-10-21 ENCOUNTER — APPOINTMENT (OUTPATIENT)
Dept: LAB | Facility: CLINIC | Age: 76
End: 2021-10-21
Payer: MEDICARE

## 2021-10-21 VITALS
HEART RATE: 66 BPM | TEMPERATURE: 96.4 F | OXYGEN SATURATION: 96 % | BODY MASS INDEX: 23.46 KG/M2 | DIASTOLIC BLOOD PRESSURE: 92 MMHG | HEIGHT: 66 IN | SYSTOLIC BLOOD PRESSURE: 140 MMHG | WEIGHT: 146 LBS

## 2021-10-21 DIAGNOSIS — F41.1 GENERALIZED ANXIETY DISORDER: ICD-10-CM

## 2021-10-21 DIAGNOSIS — K58.0 IRRITABLE BOWEL SYNDROME WITH DIARRHEA: ICD-10-CM

## 2021-10-21 DIAGNOSIS — I10 ESSENTIAL HYPERTENSION: Primary | ICD-10-CM

## 2021-10-21 DIAGNOSIS — M46.1 SACROILIITIS (HCC): ICD-10-CM

## 2021-10-21 DIAGNOSIS — I10 ESSENTIAL HYPERTENSION: ICD-10-CM

## 2021-10-21 DIAGNOSIS — Z00.00 MEDICARE ANNUAL WELLNESS VISIT, SUBSEQUENT: ICD-10-CM

## 2021-10-21 DIAGNOSIS — N18.31 STAGE 3A CHRONIC KIDNEY DISEASE (HCC): ICD-10-CM

## 2021-10-21 DIAGNOSIS — E78.2 MIXED HYPERLIPIDEMIA: ICD-10-CM

## 2021-10-21 DIAGNOSIS — R03.0 WHITE COAT SYNDROME WITH HIGH BLOOD PRESSURE BUT WITHOUT HYPERTENSION: ICD-10-CM

## 2021-10-21 DIAGNOSIS — Z72.0 TOBACCO ABUSE: ICD-10-CM

## 2021-10-21 DIAGNOSIS — K59.1 FUNCTIONAL DIARRHEA: ICD-10-CM

## 2021-10-21 DIAGNOSIS — Z13.6 SCREENING FOR CARDIOVASCULAR CONDITION: ICD-10-CM

## 2021-10-21 LAB
25(OH)D3 SERPL-MCNC: 27 NG/ML (ref 30–100)
CHOLEST SERPL-MCNC: 254 MG/DL (ref 50–200)
HDLC SERPL-MCNC: 55 MG/DL
LDLC SERPL CALC-MCNC: 181 MG/DL (ref 0–100)
NONHDLC SERPL-MCNC: 199 MG/DL
TRIGL SERPL-MCNC: 90 MG/DL
TSH SERPL DL<=0.05 MIU/L-ACNC: 1.09 UIU/ML (ref 0.36–3.74)

## 2021-10-21 PROCEDURE — 84443 ASSAY THYROID STIM HORMONE: CPT

## 2021-10-21 PROCEDURE — 99213 OFFICE O/P EST LOW 20 MIN: CPT | Performed by: FAMILY MEDICINE

## 2021-10-21 PROCEDURE — 82306 VITAMIN D 25 HYDROXY: CPT

## 2021-10-21 PROCEDURE — 36415 COLL VENOUS BLD VENIPUNCTURE: CPT

## 2021-10-21 PROCEDURE — 80061 LIPID PANEL: CPT

## 2021-11-17 DIAGNOSIS — R03.0 WHITE COAT SYNDROME WITH HIGH BLOOD PRESSURE BUT WITHOUT HYPERTENSION: ICD-10-CM

## 2021-11-17 RX ORDER — HYDROCHLOROTHIAZIDE 12.5 MG/1
12.5 CAPSULE, GELATIN COATED ORAL DAILY
Qty: 90 CAPSULE | Refills: 0 | Status: SHIPPED | OUTPATIENT
Start: 2021-11-17 | End: 2021-11-26

## 2021-11-26 DIAGNOSIS — R03.0 WHITE COAT SYNDROME WITH HIGH BLOOD PRESSURE BUT WITHOUT HYPERTENSION: ICD-10-CM

## 2021-11-26 RX ORDER — HYDROCHLOROTHIAZIDE 12.5 MG/1
CAPSULE, GELATIN COATED ORAL
Qty: 90 CAPSULE | Refills: 0 | Status: SHIPPED | OUTPATIENT
Start: 2021-11-26 | End: 2021-11-29

## 2021-11-29 DIAGNOSIS — R03.0 WHITE COAT SYNDROME WITH HIGH BLOOD PRESSURE BUT WITHOUT HYPERTENSION: ICD-10-CM

## 2021-11-29 RX ORDER — HYDROCHLOROTHIAZIDE 12.5 MG/1
CAPSULE, GELATIN COATED ORAL
Qty: 90 CAPSULE | Refills: 0 | Status: SHIPPED | OUTPATIENT
Start: 2021-11-29 | End: 2022-03-15 | Stop reason: SDUPTHER

## 2022-02-03 ENCOUNTER — OFFICE VISIT (OUTPATIENT)
Dept: FAMILY MEDICINE CLINIC | Facility: CLINIC | Age: 77
End: 2022-02-03
Payer: MEDICARE

## 2022-02-03 VITALS
HEART RATE: 68 BPM | HEIGHT: 66 IN | SYSTOLIC BLOOD PRESSURE: 137 MMHG | WEIGHT: 144.8 LBS | BODY MASS INDEX: 23.27 KG/M2 | OXYGEN SATURATION: 97 % | TEMPERATURE: 97.1 F | DIASTOLIC BLOOD PRESSURE: 80 MMHG

## 2022-02-03 DIAGNOSIS — N30.00 ACUTE CYSTITIS WITHOUT HEMATURIA: ICD-10-CM

## 2022-02-03 DIAGNOSIS — J44.9 CHRONIC OBSTRUCTIVE PULMONARY DISEASE, UNSPECIFIED COPD TYPE (HCC): ICD-10-CM

## 2022-02-03 DIAGNOSIS — R30.0 DYSURIA: Primary | ICD-10-CM

## 2022-02-03 DIAGNOSIS — F33.9 DEPRESSION, RECURRENT (HCC): ICD-10-CM

## 2022-02-03 DIAGNOSIS — I10 ESSENTIAL HYPERTENSION: ICD-10-CM

## 2022-02-03 DIAGNOSIS — N18.31 STAGE 3A CHRONIC KIDNEY DISEASE (HCC): ICD-10-CM

## 2022-02-03 DIAGNOSIS — F41.1 GENERALIZED ANXIETY DISORDER: ICD-10-CM

## 2022-02-03 DIAGNOSIS — R30.0 DIFFICULT OR PAINFUL URINATION: ICD-10-CM

## 2022-02-03 DIAGNOSIS — K58.0 IRRITABLE BOWEL SYNDROME WITH DIARRHEA: ICD-10-CM

## 2022-02-03 DIAGNOSIS — R35.0 FREQUENCY OF URINATION: ICD-10-CM

## 2022-02-03 LAB
BILIRUB UR QL STRIP: NEGATIVE
CLARITY UR: CLEAR
COLOR UR: YELLOW
GLUCOSE UR STRIP-MCNC: NEGATIVE MG/DL
HGB UR QL STRIP.AUTO: NEGATIVE
KETONES UR STRIP-MCNC: NEGATIVE MG/DL
LEUKOCYTE ESTERASE UR QL STRIP: NEGATIVE
NITRITE UR QL STRIP: NEGATIVE
PH UR STRIP.AUTO: 5.5 [PH]
PROT UR STRIP-MCNC: NEGATIVE MG/DL
SL AMB  POCT GLUCOSE, UA: ABNORMAL
SL AMB LEUKOCYTE ESTERASE,UA: ABNORMAL
SL AMB POCT BILIRUBIN,UA: ABNORMAL
SL AMB POCT BLOOD,UA: ABNORMAL
SL AMB POCT CLARITY,UA: CLEAR
SL AMB POCT COLOR,UA: YELLOW
SL AMB POCT KETONES,UA: ABNORMAL
SL AMB POCT NITRITE,UA: ABNORMAL
SL AMB POCT PH,UA: 5
SL AMB POCT SPECIFIC GRAVITY,UA: 1
SL AMB POCT URINE PROTEIN: ABNORMAL
SL AMB POCT UROBILINOGEN: 0.2
SP GR UR STRIP.AUTO: 1.01 (ref 1–1.03)
UROBILINOGEN UR QL STRIP.AUTO: 0.2 E.U./DL

## 2022-02-03 PROCEDURE — 81003 URINALYSIS AUTO W/O SCOPE: CPT | Performed by: FAMILY MEDICINE

## 2022-02-03 PROCEDURE — 99214 OFFICE O/P EST MOD 30 MIN: CPT | Performed by: FAMILY MEDICINE

## 2022-02-03 PROCEDURE — 87086 URINE CULTURE/COLONY COUNT: CPT | Performed by: FAMILY MEDICINE

## 2022-02-03 PROCEDURE — 81002 URINALYSIS NONAUTO W/O SCOPE: CPT | Performed by: FAMILY MEDICINE

## 2022-02-03 RX ORDER — ALPRAZOLAM 0.5 MG/1
0.5 TABLET ORAL
Qty: 30 TABLET | Refills: 0 | Status: SHIPPED | OUTPATIENT
Start: 2022-02-03 | End: 2022-08-04 | Stop reason: SDUPTHER

## 2022-02-03 RX ORDER — SULFAMETHOXAZOLE AND TRIMETHOPRIM 800; 160 MG/1; MG/1
1 TABLET ORAL 2 TIMES DAILY
Qty: 6 TABLET | Refills: 0 | Status: SHIPPED | OUTPATIENT
Start: 2022-02-03 | End: 2022-02-06

## 2022-02-03 RX ORDER — ALPRAZOLAM 0.5 MG/1
0.5 TABLET ORAL
Qty: 30 TABLET | Refills: 0 | Status: CANCELLED | OUTPATIENT
Start: 2022-02-03

## 2022-02-03 NOTE — PROGRESS NOTES
Subjective:    JILLIAN Ventura is a 78 y o  female here today for:  Chief Complaint   Patient presents with    Follow-up     3 month           ---Above per clinical staff & reviewed  ---  HPI:  80yof here for 3 month follow up   Doing well except for urine complaints  Having frequency, urgency, bladder pain  Symptoms for over a week  No fevers, nausea, vomiting  Refuses flu, COVID vaccine  Not interested in quitting smoking  Urine dipped- leukocytes  Will send for culture, will treat due to symptoms  Needs refill of Xanax    Controlled Substance Review    [place]MP or [place]  reviewed: No red flags were identified; safe to proceed with prescription           The following portions of the patient's history were reviewed and updated as appropriate: allergies, current medications, past family history, past medical history, past social history, past surgical history and problem list     Past Medical History:   Diagnosis Date    Anxiety     Atypical squamous cells cannot exclude high grade squamous intraepithelial lesion on cytologic smear of cervix (ASC-H)     Last Assessed 5/13/2014    Back pain     Cancer [facility]     Cervical cancer ([facility] )     Depression     Ovarian cyst     Tinnitus of both ears 10/2/2020    Tobacco abuse 11/20/2018       Past Surgical History:   Procedure Laterality Date    ANKLE FRACTURE SURGERY      Last Assessed 07/26/2016    ANKLE SURGERY      CERVICAL BIOPSY  W/ LOOP ELECTRODE EXCISION      COLONOSCOPY  10/2019    DIAGNOSTIC LAPAROSCOPY      DILATION AND CURETTAGE OF UTERUS      ESOPHAGOGASTRODUODENOSCOPY N/A 9/12/2016    Procedure: ESOPHAGOGASTRODUODENOSCOPY (EGD); Surgeon: Marilyn Phillips MD;  [facility]: [facility]; Service:     FRACTURE SURGERY      ORTHOPEDIC SURGERY      PA COLONOSCOPY FLX DX W/[facility] WHEN PFRMD N/A 9/12/2016    Procedure: Yaz Hayward;  Surgeon: Marilyn Phillips MD;  Location: [facility];   Service: Colorectal    SALPINGOOPHORECTOMY Left  TONSILLECTOMY      UPPER GASTROINTESTINAL ENDOSCOPY  2016       Social History     Socioeconomic History    Marital status:      Spouse name: None    Number of children: None    Years of education: 13    Highest education level: None   Occupational History    None   Tobacco Use    Smoking status: Never Smoker    Smokeless tobacco: Never Used   Vaping Use    Vaping Use: Never used   Substance and Sexual Activity    Alcohol use: No    Drug use: Never    Sexual activity: Not Currently   Other Topics Concern    None   Social History Narrative    ** Merged History Encounter **           since 2015 after 13 year marriage  1 daughter from 1st marriage  1 granddaughter, Maria R Roach, whom she raised  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  Social Determinants of Health     Financial Resource Strain: Not on file   Food Insecurity: Unknown    Worried About Running Out of Food in the Last Year: Never true    [place] of Food in the Last Year: Not on file   Transportation Needs: Unknown    Lack of Transportation (Medical): No    Lack of Transportation (Non-Medical):  Not on file   Physical Activity: Not on file   Stress: Not on file   Social Connections: Not on file   Intimate Partner Violence: Not on file   Housing Stability: Not on file       Current Outpatient Medications   Medication Sig Dispense Refill    ALPRAZolam (XANAX) 0 5 mg tablet Take 1 tablet (0 5 mg total) by mouth daily at bedtime as needed for anxiety 30 tablet 0    Ascorbic Acid (Vitamin C) 500 MG CAPS Take by mouth      gabapentin (NEURONTIN) 100 mg capsule Take 1 capsule (100 mg total) by mouth 3 (three) times a day 90 capsule 0    hydrochlorothiazide (MICROZIDE) 12 5 mg capsule Take 1 capsule by mouth once daily 90 capsule 0    Zinc 15 MG CAPS Take by mouth daily      ASPIRIN 81 PO Take 81 mg by mouth daily (Patient not taking: Reported on 10/21/2021)      dicyclomine (BENTYL) 10 mg capsule Take 10 mg by mouth (Patient not taking: Reported on 10/21/2021)      dicyclomine (BENTYL) 20 mg tablet Take 1 tablet (20 mg total) by mouth 2 (two) times a day for 7 days 20 tablet 0    sulfamethoxazole-trimethoprim (BACTRIM DS) 800-160 mg per tablet Take 1 tablet by mouth 2 (two) times a day for 3 days 6 tablet 0     No current facility-administered medications for this visit  Review of Systems   Constitutional: Negative  Negative for chills and fever  HENT: Negative  Negative for ear pain and sore throat  Eyes: Negative for pain and visual disturbance  Respiratory: Negative  Negative for cough and shortness of breath  Cardiovascular: Negative  Negative for chest pain and palpitations  Gastrointestinal: Positive for diarrhea  Negative for abdominal pain and vomiting  Genitourinary: Positive for dysuria, frequency and urgency  Negative for hematuria  Musculoskeletal: Negative for arthralgias and back pain  Skin: Negative for color change and rash  Neurological: Negative  Negative for seizures and syncope  Psychiatric/Behavioral: The patient is nervous/anxious           Objective:    /80 (BP Location: Left arm, Patient Position: Sitting, Cuff Size: Standard)   Pulse 68   Temp (!) 97 1 °F (36 2 °C) (Temporal)   Ht 5' 6" (1 676 m)   Wt 65 7 kg (144 lb 12 8 oz)   SpO2 97%   BMI 23 37 kg/m²   Wt Readings from Last 3 Encounters:   02/03/22 65 7 kg (144 lb 12 8 oz)   10/21/21 66 2 kg (146 lb)   09/03/21 67 1 kg (148 lb)     BP Readings from Last 3 Encounters:   02/03/22 137/80   10/21/21 140/92   07/22/21 140/88       Lab Results   Component Value Date    WBC 6 59 07/20/2021    HGB 16 0 (H) 07/20/2021    HCT 49 9 (H) 07/20/2021     07/20/2021    TRIG 90 10/21/2021    HDL 55 10/21/2021    ALT 18 07/20/2021    AST 12 07/20/2021     12/10/2015    K 4 3 07/20/2021     07/20/2021    CREATININE 1 00 07/20/2021    BUN 15 07/20/2021    CO2 26 07/20/2021    INR 0 99 04/25/2014    GLUF 87 07/20/2021    HGBA1C 5 6 05/18/2019       Physical Exam  Vitals and nursing note reviewed  Constitutional:       Appearance: Normal appearance  She is well-developed  HENT:      Head: Normocephalic and atraumatic  Eyes:      Pupils: Pupils are equal, round, and reactive to light  Cardiovascular:      Rate and Rhythm: Normal rate and regular rhythm  Heart sounds: No murmur heard  Pulmonary:      Effort: Pulmonary effort is normal       Breath sounds: Normal breath sounds  Abdominal:      Palpations: Abdomen is soft  Tenderness: There is abdominal tenderness  There is rebound  There is no right CVA tenderness, left CVA tenderness or guarding  Musculoskeletal:      Cervical back: Normal range of motion and neck supple  Skin:     General: Skin is warm  Capillary Refill: Capillary refill takes less than 2 seconds  Neurological:      Mental Status: She is alert and oriented to person, place, and time  Cranial Nerves: No cranial nerve deficit  Psychiatric:         Mood and Affect: Mood normal          Thought Content: Thought content normal                        Assessment/Plan:   Andreas Garcia was seen today for follow-up  Diagnoses and all orders for this visit:    Dysuria  -     POCT urine dip  -     sulfamethoxazole-trimethoprim (BACTRIM DS) 800-160 mg per tablet; Take 1 tablet by mouth 2 (two) times a day for 3 days  -     UA (URINE) with reflex to Scope  -     Urine culture; Future  -     Urine culture    Frequency of urination  -     POCT urine dip  -     sulfamethoxazole-trimethoprim (BACTRIM DS) 800-160 mg per tablet; Take 1 tablet by mouth 2 (two) times a day for 3 days  -     UA (URINE) with reflex to Scope  -     Urine culture; Future  -     Urine culture    Generalized anxiety disorder  -     POCT urine dip  -     ALPRAZolam (XANAX) 0 5 mg tablet;  Take 1 tablet (0 5 mg total) by mouth daily at bedtime as needed for anxiety    Chronic obstructive pulmonary disease, unspecified COPD type ([facility] )    Stage 3a chronic kidney disease ([facility] )    Depression, recurrent ([place] [address] )    Acute cystitis without hematuria  -     UA (URINE) with reflex to Scope  -     Urine culture; Future  -     Urine culture    Difficult or painful urination  -     UA (URINE) with reflex to Scope  -     Urine culture; Future  -     Urine culture    Essential hypertension    Irritable bowel syndrome with diarrhea      Return in about 6 months (around 7/25/2022) for Annual physical   There are no Patient Instructions on file for this visit

## 2022-02-04 LAB — BACTERIA UR CULT: NORMAL

## 2022-03-15 DIAGNOSIS — R03.0 WHITE COAT SYNDROME WITH HIGH BLOOD PRESSURE BUT WITHOUT HYPERTENSION: ICD-10-CM

## 2022-03-15 RX ORDER — HYDROCHLOROTHIAZIDE 12.5 MG/1
12.5 CAPSULE, GELATIN COATED ORAL DAILY
Qty: 90 CAPSULE | Refills: 0 | Status: SHIPPED | OUTPATIENT
Start: 2022-03-15 | End: 2022-08-04 | Stop reason: SDUPTHER

## 2022-03-22 ENCOUNTER — TELEPHONE (OUTPATIENT)
Dept: GYNECOLOGIC ONCOLOGY | Facility: CLINIC | Age: 77
End: 2022-03-22

## 2022-03-22 NOTE — TELEPHONE ENCOUNTER
Intake Form   Patient Details   Raymond Schlatter     1945     Reason For Appointment   Who is Calling? Patient   If not patient, Name? Who is the Referring Doctor? Dr Angela Desouza   What is the diagnosis? Abdominal Pain, cervical cancer in remission   Has this diagnosis been confirmed by a biopsy/surgery? If yes, what is the date it was done? No   Biopsy done at [place] [id number]? If not, where was it done? No   Was imaging done, and was it done at [facility]? If not, where was it done? US currently scheduled, through [facility]   For 2nd Opinions Only: Are you currently undergoing treatment, or are you scheduled to start treatment? If yes, name of facility, city and state     For "History Of" only: Have you completed treatment? Have you had Genetic Testing done in the past?  If so, advise to bring test results to their visit No   Record Gathering Information   Did you advise to have records faxed to [phone]? All through [facility]    Did you advise to bring discs to office visit? Yes   Scheduling Information   What is the best call back number?   [phone]   What Insurance does patient have & is an insurance referral required Ryan Mcmahon and taylor   If patient is NEW to [facility]  [employer] a new patient packet (Titled Breast/Gyn) Not New to [facility]

## 2022-03-22 NOTE — TELEPHONE ENCOUNTER
This is not a new pt  Last seen 8/2019 by Dr Farooq San  Patient wants to only see Dr Farooq San -- scheduled for earliest available appt 4/19  Declines earlier appt with another provider  Will have u/s done beforehand

## 2022-04-06 ENCOUNTER — HOSPITAL ENCOUNTER (OUTPATIENT)
Dept: ULTRASOUND IMAGING | Facility: MEDICAL CENTER | Age: 77
Discharge: HOME | End: 2022-04-06
Payer: MEDICARE

## 2022-04-06 DIAGNOSIS — R10.2 PELVIC AND PERINEAL PAIN: ICD-10-CM

## 2022-04-06 PROCEDURE — 76830 TRANSVAGINAL US NON-OB: CPT

## 2022-04-06 PROCEDURE — 76856 US EXAM PELVIC COMPLETE: CPT

## 2022-04-19 ENCOUNTER — TELEPHONE (OUTPATIENT)
Dept: SURGICAL ONCOLOGY | Facility: CLINIC | Age: 77
End: 2022-04-19

## 2022-04-19 ENCOUNTER — OFFICE VISIT (OUTPATIENT)
Dept: GYNECOLOGIC ONCOLOGY | Facility: CLINIC | Age: 77
End: 2022-04-19
Payer: MEDICARE

## 2022-04-19 VITALS
TEMPERATURE: 98.2 F | HEIGHT: 66 IN | BODY MASS INDEX: 30.05 KG/M2 | HEART RATE: 77 BPM | DIASTOLIC BLOOD PRESSURE: 80 MMHG | SYSTOLIC BLOOD PRESSURE: 146 MMHG | WEIGHT: 187 LBS | OXYGEN SATURATION: 99 % | RESPIRATION RATE: 16 BRPM

## 2022-04-19 DIAGNOSIS — G89.4 CHRONIC PAIN SYNDROME: ICD-10-CM

## 2022-04-19 DIAGNOSIS — K63.5 POLYP OF COLON, UNSPECIFIED PART OF COLON, UNSPECIFIED TYPE: ICD-10-CM

## 2022-04-19 DIAGNOSIS — Z85.41 HISTORY OF MALIGNANT NEOPLASM OF CERVIX: ICD-10-CM

## 2022-04-19 DIAGNOSIS — R10.2 PAIN IN PELVIS: Primary | ICD-10-CM

## 2022-04-19 PROCEDURE — 99213 OFFICE O/P EST LOW 20 MIN: CPT | Performed by: OBSTETRICS & GYNECOLOGY

## 2022-04-19 NOTE — ASSESSMENT & PLAN NOTE
Recent pelvic ultrasound normal   Exam unrevealing  There is pain to the palpation of the internal iliac us muscles on both sides  Patient referred to pelvic physical therapy

## 2022-04-19 NOTE — ASSESSMENT & PLAN NOTE
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

## 2022-04-19 NOTE — ASSESSMENT & PLAN NOTE
Reminded patient of the importance of continued follow-up  She is due for recall 3 year colonoscopy in October of this year  She will have this done with Dr Nandini Zuñiga

## 2022-04-19 NOTE — PROGRESS NOTES
Assessment/Plan:    Problem List Items Addressed This Visit        Digestive    Colon polyps     Reminded patient of the importance of continued follow-up  She is due for recall 3 year colonoscopy in October of this year  She will have this done with Dr Evi Barker  Other    History of malignant neoplasm of cervix     Stage I B2 treated with curative intent chemo radiotherapy in 2014  No evidence of disease  At this point her risk of recurrence is negligible  No additional oncologic follow-up is needed  Pain in pelvis - Primary     Recent pelvic ultrasound normal   Exam unrevealing  There is pain to the palpation of the internal iliac us muscles on both sides  Patient referred to pelvic physical therapy  Relevant Orders    Ambulatory referral to Physical Therapy    Chronic pain syndrome     Patient complains today again of persistent on bearable pelvic and abdominal pain  She requests consideration for a hysterectomy  I explained to her how this is not indicated nor will it help with her chronic pain syndrome  I discussed the possible benefit that she could derive from pelvic physical therapy  She is agreeable  Referral made  Relevant Orders    Ambulatory referral to Physical Therapy            CHIEF COMPLAINT:   Patient presents for re-evaluation for pelvic pain, remote history of cervical cancer  Patient ID: Rui Leos is a 78 y o  female  HPI  72-year-old white menopausal female well known to me with history of chronic pain syndrome, hypertension, anxiety and stage I B2 cervical cancer treated with curative intent chemo radiotherapy in 2014  She last saw me in 2019  At that time she had no evidence of disease  She has been seen her primary gynecologist Dr Shauna Butt yearly for routine follow-up  She has longstanding history of chronic pelvic and abdominal pain    While on active surveillance, this was assessed many times by means of cross-sectional imaging such as CT scan, etcetera  Most recently, she complained of similar symptoms to her primary gynecologist, recent pelvic ultrasound is unrevealing  I have personally reviewed report and images  She has a history of colonic polyps  Last colonoscopy in October 2019 with instructions for 3 year recall by Dr Damian Jeff  The following portions of the patient's history were reviewed and updated as appropriate: allergies, current medications, past family history, past medical history, past social history, past surgical history and problem list     Review of Systems  Denies vaginal bleeding, drainage or discharge  Reports normal bowel function  Reports persistent mid abdominal pain, radiated to both ovaries"  Twelve point review of systems otherwise noncontributory  Current Outpatient Medications   Medication Sig Dispense Refill    ALPRAZolam (XANAX) 0 5 mg tablet Take 1 tablet (0 5 mg total) by mouth daily at bedtime as needed for anxiety 30 tablet 0    hydrochlorothiazide (MICROZIDE) 12 5 mg capsule Take 1 capsule (12 5 mg total) by mouth daily 90 capsule 0    Zinc 15 MG CAPS Take by mouth daily      Ascorbic Acid (Vitamin C) 500 MG CAPS Take by mouth (Patient not taking: Reported on 4/19/2022 )      ASPIRIN 81 PO Take 81 mg by mouth daily   (Patient not taking: Reported on 4/19/2022 )      dicyclomine (BENTYL) 10 mg capsule Take 10 mg by mouth   (Patient not taking: Reported on 4/19/2022 )      dicyclomine (BENTYL) 20 mg tablet Take 1 tablet (20 mg total) by mouth 2 (two) times a day for 7 days 20 tablet 0    gabapentin (NEURONTIN) 100 mg capsule Take 1 capsule (100 mg total) by mouth 3 (three) times a day (Patient not taking: Reported on 4/19/2022 ) 90 capsule 0     No current facility-administered medications for this visit  Objective:    Blood pressure 146/80, pulse 77, temperature 98 2 °F (36 8 °C), temperature source Temporal, resp   rate 16, height 5' 6" (1 676 m), weight 84 8 kg (187 lb), SpO2 99 %, not currently breastfeeding  Body mass index is 30 18 kg/m²  Body surface area is 1 94 meters squared  Physical Exam  Vitals reviewed  Exam conducted with a chaperone present  Constitutional:       Appearance: Normal appearance  She is not ill-appearing or toxic-appearing  Eyes:      General: No scleral icterus  Right eye: No discharge  Left eye: No discharge  Conjunctiva/sclera: Conjunctivae normal    Pulmonary:      Effort: Pulmonary effort is normal    Abdominal:      General: Abdomen is flat  There is no distension  Palpations: Abdomen is soft  There is no mass  Tenderness: There is no abdominal tenderness  There is no guarding or rebound  Hernia: No hernia is present  Genitourinary:     Comments: Normal external female genitalia  Normal Bartholin's and [place]'s glands  Normal urethral meatus and no evidence of urethral discharge or masses  Bladder without fullness mass or tenderness  Vagina without lesion or discharge  No significant pelvic organ prolapse noted  Cervix flushed with upper vagina, no lesions  Small uterus nontender   Adnexae without mass or tenderness  There is discrete but distractible pain upon palpation of bilateral internal iliac us muscles, left greater than right  Anus without fissure of lesion  Musculoskeletal:      Right lower leg: No edema  Left lower leg: No edema  Neurological:      Mental Status: She is alert         Ainsley Lopez MD, [facility]  4/19/2022  1:34 PM

## 2022-04-19 NOTE — PATIENT INSTRUCTIONS
Follow-up with primary gynecologist   Keep appointment for colonoscopy in October of 2022  Keep appointment with pelvic physical therapy

## 2022-04-19 NOTE — ASSESSMENT & PLAN NOTE
Patient complains today again of persistent on bearable pelvic and abdominal pain  She requests consideration for a hysterectomy  I explained to her how this is not indicated nor will it help with her chronic pain syndrome  I discussed the possible benefit that she could derive from pelvic physical therapy  She is agreeable  Referral made

## 2022-04-19 NOTE — LETTER
April 19, 2022     Eva Delgado, [address] [place] [address]    Patient: Edison Gutierrez   YOB: 1945   Date of Visit: 4/19/2022       Dear Dr Natividad Lovelace:    Thank you for referring Edison Gutierrez to me for evaluation  Below are my notes for this consultation  If you have questions, please do not hesitate to call me  I look forward to following your patient along with you  Sincerely,        Radha Zhao MD        CC: DO Blossom Sanabria MD Larita Ludwig, MD  4/19/2022  1:34 PM  Incomplete  Assessment/Plan:    Problem List Items Addressed This Visit        Digestive    Colon polyps     Reminded patient of the importance of continued follow-up  She is due for recall 3 year colonoscopy in October of this year  She will have this done with Dr Linh Cardoza  Other    History of malignant neoplasm of cervix     Stage I B2 treated with curative intent chemo radiotherapy in 2014  No evidence of disease  At this point her risk of recurrence is negligible  No additional oncologic follow-up is needed  Pain in pelvis - Primary     Recent pelvic ultrasound normal   Exam unrevealing  There is pain to the palpation of the internal iliac us muscles on both sides  Patient referred to pelvic physical therapy  Relevant Orders    Ambulatory referral to Physical Therapy    Chronic pain syndrome     Patient complains today again of persistent on bearable pelvic and abdominal pain  She requests consideration for a hysterectomy  I explained to her how this is not indicated nor will it help with her chronic pain syndrome  I discussed the possible benefit that she could derive from pelvic physical therapy  She is agreeable  Referral made           Relevant Orders    Ambulatory referral to Physical Therapy            CHIEF COMPLAINT:   Patient presents for re-evaluation for pelvic pain, remote history of cervical cancer  Patient ID: Corwin Long is a 78 y o  female  HPI  21-year-old white menopausal female well known to me with history of chronic pain syndrome, hypertension, anxiety and stage I B2 cervical cancer treated with curative intent chemo radiotherapy in 2014  She last saw me in 2019  At that time she had no evidence of disease  She has been seen her primary gynecologist Dr Nieves Keating yearly for routine follow-up  She has longstanding history of chronic pelvic and abdominal pain  While on active surveillance, this was assessed many times by means of cross-sectional imaging such as CT scan, etcetera  Most recently, she complained of similar symptoms to her primary gynecologist, recent pelvic ultrasound is unrevealing  I have personally reviewed report and images  She has a history of colonic polyps  Last colonoscopy in October 2019 with instructions for 3 year recall by Dr Steve French  The following portions of the patient's history were reviewed and updated as appropriate: allergies, current medications, past family history, past medical history, past social history, past surgical history and problem list     Review of Systems  Denies vaginal bleeding, drainage or discharge  Reports normal bowel function  Reports persistent mid abdominal pain, radiated to both ovaries"  Twelve point review of systems otherwise noncontributory    Current Outpatient Medications   Medication Sig Dispense Refill    ALPRAZolam (XANAX) 0 5 mg tablet Take 1 tablet (0 5 mg total) by mouth daily at bedtime as needed for anxiety 30 tablet 0    hydrochlorothiazide (MICROZIDE) 12 5 mg capsule Take 1 capsule (12 5 mg total) by mouth daily 90 capsule 0    Zinc 15 MG CAPS Take by mouth daily      Ascorbic Acid (Vitamin C) 500 MG CAPS Take by mouth (Patient not taking: Reported on 4/19/2022 )      ASPIRIN 81 PO Take 81 mg by mouth daily   (Patient not taking: Reported on 4/19/2022 )      dicyclomine (BENTYL) 10 mg capsule Take 10 mg by mouth   (Patient not taking: Reported on 4/19/2022 )      dicyclomine (BENTYL) 20 mg tablet Take 1 tablet (20 mg total) by mouth 2 (two) times a day for 7 days 20 tablet 0    gabapentin (NEURONTIN) 100 mg capsule Take 1 capsule (100 mg total) by mouth 3 (three) times a day (Patient not taking: Reported on 4/19/2022 ) 90 capsule 0     No current facility-administered medications for this visit  Objective:    Blood pressure 146/80, pulse 77, temperature 98 2 °F (36 8 °C), temperature source Temporal, resp  rate 16, height 5' 6" (1 676 m), weight 84 8 kg (187 lb), SpO2 99 %, not currently breastfeeding  Body mass index is 30 18 kg/m²  Body surface area is 1 94 meters squared  Physical Exam  Vitals reviewed  Exam conducted with a chaperone present  Constitutional:       Appearance: Normal appearance  She is not ill-appearing or toxic-appearing  Eyes:      General: No scleral icterus  Right eye: No discharge  Left eye: No discharge  Conjunctiva/sclera: Conjunctivae normal    Pulmonary:      Effort: Pulmonary effort is normal    Abdominal:      General: Abdomen is flat  There is no distension  Palpations: Abdomen is soft  There is no mass  Tenderness: There is no abdominal tenderness  There is no guarding or rebound  Hernia: No hernia is present  Genitourinary:     Comments: Normal external female genitalia  Normal Bartholin's and [place]'s glands  Normal urethral meatus and no evidence of urethral discharge or masses  Bladder without fullness mass or tenderness  Vagina without lesion or discharge  No significant pelvic organ prolapse noted  Cervix flushed with upper vagina, no lesions  Small uterus nontender   Adnexae without mass or tenderness  There is discrete but distractible pain upon palpation of bilateral internal iliac us muscles, left greater than right  Anus without fissure of lesion      Musculoskeletal:      Right lower leg: No edema  Left lower leg: No edema  Neurological:      Mental Status: She is alert         Sugey High MD, [facility] [address]  4/19/2022  1:34 PM

## 2022-05-10 ENCOUNTER — EVALUATION (OUTPATIENT)
Dept: PHYSICAL THERAPY | Facility: CLINIC | Age: 77
End: 2022-05-10
Payer: MEDICARE

## 2022-05-10 DIAGNOSIS — M62.89 PELVIC FLOOR DYSFUNCTION: ICD-10-CM

## 2022-05-10 DIAGNOSIS — G89.4 CHRONIC PAIN SYNDROME: ICD-10-CM

## 2022-05-10 DIAGNOSIS — N39.41 URGE INCONTINENCE OF URINE: Primary | ICD-10-CM

## 2022-05-10 DIAGNOSIS — R10.2 PAIN IN PELVIS: ICD-10-CM

## 2022-05-10 PROCEDURE — 97162 PT EVAL MOD COMPLEX 30 MIN: CPT

## 2022-05-10 PROCEDURE — 97112 NEUROMUSCULAR REEDUCATION: CPT

## 2022-05-10 NOTE — PROGRESS NOTES
PT Evaluation     Today's date: 5/10/2022  Patient name: Sven Carrera  : 1945  MRN: [id number]  Referring provider: Wai Maravilla MD  Dx:   Encounter Diagnosis     ICD-10-CM    1  Urge incontinence of urine  N39 41    2  Pain in pelvis  R10 2 Ambulatory referral to Physical Therapy   3  Chronic pain syndrome  G89 4 Ambulatory referral to Physical Therapy   4  Pelvic floor dysfunction  M62 89        Start Time: 1100  Stop Time: 1200  Total time in clinic (min): 60 minutes    Assessment  Assessment details: Patient is a 79 yo female presenting to pelvic floor physical therapy with symptoms consistent with urinary incontinence, pelvic pain that started about a few months ago  Patient presents with the following urge incontinence, stress incontinence, decreased hip and core strength and poor posture  Patient is also limited in standing for a period of time, stepping up on to her horses, sitting for a period of time and walking for a period of time  Patient also notes diarrhea  Due to these impairments the patient is unable to fully participate in taking care of her horses, ADLs and recreational activities without incontinence and pelvic pain  PT educated the patient on the pelvic floor anatomy and function as well as bathroom mechanics and bladder logs  PT will address the noted impairments by performing hip, core and pelvic floor strengthening, stretching, balance, biofeedback, functional activities and manual techniques to allow the patient to return to her PLOF  PT recommended 1x/week for 6-8 weeks c a good prognosis 2* PLOF  Impairments: abnormal gait, abnormal or restricted ROM, activity intolerance, impaired physical strength, lacks appropriate home exercise program, pain with function, poor posture  and poor body mechanics    Symptom irritability: moderateUnderstanding of Dx/Px/POC: good   Prognosis: good    Goals  STG: In four weeks the patient will:    1  Be (I) with her HEP    2  Increase hip and core strength to 4+/5 MMT score to assist c functional activities  3  Complete daily voiding and bowel log  4  Perform x10 diaphragmatic breathes without cues  5  Perform x 10 Kegels with a 4-5 second hold with proper breathing and relaxation of the pelvic floor muscles  6  Use a squatty potty for bowel movements  LTG: In eight weeks, the patient will:    1  Perform x10 TA contractions with proper activation and no cues provided by PT  2   Have a bowel movement daily that is a 4 on the [place] stool scale  3  Step up on the L LE to get on her horse with < 2/10 pain in the pelvis  4  Increase hip and core strength to 5/5 MMT score to assist c prolonged activities  5  Void every 2-3 hours to decrease incontinence  6  Cough and sneeze without incontinence  7  Note urinary incontinence 1-2x/ week  Plan  Patient would benefit from: skilled physical therapy and women's health eval  Planned modality interventions: biofeedback, cryotherapy and thermotherapy: hydrocollator packs  Planned therapy interventions: abdominal trunk stabilization, joint mobilization, manual therapy, massage, Goetz taping, balance, body mechanics training, patient education, postural training, neuromuscular re-education, strengthening, stretching, breathing training, therapeutic activities, therapeutic exercise, transfer training, home exercise program, gait training, functional ROM exercises and flexibility  Frequency: 1x week  Duration in visits: 8  Duration in weeks: 8  Plan of Care beginning date: 5/10/2022  Plan of Care expiration date: 7/5/2022  Treatment plan discussed with: patient        PT Pelvic Floor Subjective:   History of Present Illness:   Patient noted about a few months ago she started to have urinary incontinence  Patient noted that when she has an urge to void she does not make it to the bathroom  Patient also noted coughing as well as bending forward leads to incontinence  Patient noted she has more incontinence with standing positions  Patient noted she wears a depend during the day  Patient also noted a hx of cancer in 2014 with radiation  Patient noted since the radiation she has had lower abdomin pain that wraps to her lumbar spine  Patient noted that she has also had increased L LE pain that is a burning sensation at times with prolonged sitting and movement  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 and feels better when she is sitting on the horse, however she has increased pain and difficulty with stepping up onto the horse  Patient noted she wants to get back to showing her horses, however with her pain this has limited her  Patient also has increased abdominal pain and then diarrhea once a day  Patient does not use a stool  Patient takes imodium a few times a week            Recurrent probem    Quality of life: fair    Social Support:     Lives in:  Multiple-level home (2 MEKA, 14 in the house- buring pain in L LE)    Lives with:  Alone (3 dogs)    Relationship status: never     Work status: retired (Horse farmer)    History of Depression: yes  Diet and Exercise:    Diet:balanced nutrition    Exercise type: strengthening exercise program    Lifting hay and 50# bag    Exercise frequency: daily    Not exercising due to: pain  OB/ gyn History    Gestational History:     Prior Pregnancy: Yes      Number of prior pregnancies: 1    Number of term pregnancies: 1    Delivery Type: vaginal delivery      Number of vaginal deliveries: 1    Delivery Complications:  Tearing    Menstrual History:      Menstrual irregularities regular menses (prior to menopause)    Menopausal: menopause (47 yo)  Bladder Function:     Voiding Difficulties positive for: urgency and frequent urination      Voiding Difficulties negative for: straining and incomplete emptying      Voiding Difficulties comments:     Voiding frequency: every 1-2 hours    Urinary leakage: urine leakage Urinary leakage aggravated by: coughing, sneezing, bending (picking up bails of hay), standing up and walking to the bathroom    Nocturia (episodes per night): 0 and 1    Painful urination: No      Fluid Intake Type: Water, tea, coffee and soda    Intake (ounces): Water intake (oz): 5 bottles of water/ day  Soda intake (oz): once every 3 days  Incontinence Management:     Pads/Diaper Use:  Day    Pads/Diapers Additional Comments: Depends during the day    Patient has attempted at least 4 weeks of independent pelvic floor strengthening exercises without a resolution of symptoms  Bowel Function:     Bowel frequency: every 2 days, daily and multiple times a day    [place] Stool Scale: type 7    Stool softener use: no stool softeners    Uses "squatty potty": no Squatty Potty  Sexual Function:     Sexually Active:  Not sexually active  Pain:     Current pain ratin    At best pain ratin    At worst pain ratin    Location:  Lower abdomin to the low back    Onset:  More than 2 years ago    Quality:  Throbbing (twisting)    Aggravating factors: Bowel movements and exercise (sitting)    Duration of symptoms:  Brief and does not go away    Alleviating factors: walking, sitting on her horse      Progression:  Worsening  Patient Goals:     Patient goals for therapy:  Improved pain management, relaxation, return to sport/leisure activities, improved comfort, improved bladder or bowel function, fully empty bladder or bowels, decreased pain and decreased interpersonal problems    Other patient goals:  "to get a stronger core " "to ride her horse " "to walk on the treadmill "      Objective     Postural Observations  Seated posture: fair  Standing posture: fair  Correction of posture: makes symptoms better        Strength/Myotome Testing     Left Hip   Planes of Motion   Flexion: 4-  Extension: 4-  Abduction: 4-  Adduction: 4-    Right Hip   Planes of Motion   Flexion: 4-  Extension: 4-  Abduction: 4-  Adduction: 4-  Pelvic Floor Exam   Abdominal assessment: To be assessed at upcoming sessions  Diastatis   Palpation of linea alba: To be assessed at upcoming sessions  General Perineum Exam:     General perineum exam comments: To be assessed at upcoming sessions                  Precautions: hx of CA      Manuals 5/10            Hip flexor release nv            External exam nv            Abdominal assessment nv                         Neuro Re-Ed             Pelvic floor anatomy and function, bladder logs and bathroom mechanics edu MW            TA nv            kegel nv            Diaphragmatic breathing nv                                                   Ther Ex             Open books nv            Butterfly stretch nv            Piriformis stetch nv            Hip add nv            Hip abd nv                                                   Ther Activity                                       Gait Training                                       Modalities

## 2022-05-20 ENCOUNTER — OFFICE VISIT (OUTPATIENT)
Dept: PHYSICAL THERAPY | Facility: CLINIC | Age: 77
End: 2022-05-20
Payer: MEDICARE

## 2022-05-20 DIAGNOSIS — R10.2 PAIN IN PELVIS: ICD-10-CM

## 2022-05-20 DIAGNOSIS — M62.89 PELVIC FLOOR DYSFUNCTION: ICD-10-CM

## 2022-05-20 DIAGNOSIS — N39.41 URGE INCONTINENCE OF URINE: Primary | ICD-10-CM

## 2022-05-20 DIAGNOSIS — G89.4 CHRONIC PAIN SYNDROME: ICD-10-CM

## 2022-05-20 PROCEDURE — 97110 THERAPEUTIC EXERCISES: CPT

## 2022-05-20 PROCEDURE — 97112 NEUROMUSCULAR REEDUCATION: CPT

## 2022-05-20 NOTE — PROGRESS NOTES
Daily Note     Today's date: 2022  Patient name: Tor Ramos  : 1945  MRN: [id number]  Referring provider: Jay Hutchinson MD  Dx:   Encounter Diagnosis     ICD-10-CM    1  Urge incontinence of urine  N39 41    2  Pain in pelvis  R10 2    3  Chronic pain syndrome  G89 4    4  Pelvic floor dysfunction  M62 89        Start Time: 930  Stop Time: 1030  Total time in clinic (min): 60 minutes    Subjective: Patient noted that she moved boxes yesterday as well as took care of her horses  Ptaient noted she is very fatigued and took imodium due to some diarrhea this morning  Objective: See treatment diary below      Assessment: Patient performed various stretches throughout the session to assist c flexibility in pelvic floor  Patient noted some hypomobility in the hip and shoulders with the stretches  Patient required min VCs for form throughout the session  PT educated the patient on sitting prior to voiding to assist c incontinence  PT added to her HEP  Patient would benefit from continued PT to allow the patient to return to her PLOF  Plan: Continue per plan of care        Precautions: hx of CA  [place] ACCESS CODE: [id number]    Manuals 5/10 5/20           Hip flexor release nv            External exam nv            Abdominal assessment nv                         Neuro Re-Ed             Pelvic floor anatomy and function, bladder logs and bathroom mechanics edu MW MW sitting prior to voiding           TA nv            kegel nv            Diaphragmatic breathing nv                                                   Ther Ex             Open books nv 5x15" ea           Butterfly stretch nv 3x30"           Piriformis stetch nv 3x30" ea           Hip add nv            Hip abd nv            Lower trunk rotations  5x15" ea           Happy baby pose  3x20"                        Ther Activity                                       Gait Training Modalities

## 2022-05-23 ENCOUNTER — OFFICE VISIT (OUTPATIENT)
Dept: PHYSICAL THERAPY | Facility: CLINIC | Age: 77
End: 2022-05-23
Payer: MEDICARE

## 2022-05-23 DIAGNOSIS — G89.4 CHRONIC PAIN SYNDROME: ICD-10-CM

## 2022-05-23 DIAGNOSIS — N39.41 URGE INCONTINENCE OF URINE: Primary | ICD-10-CM

## 2022-05-23 DIAGNOSIS — M62.89 PELVIC FLOOR DYSFUNCTION: ICD-10-CM

## 2022-05-23 DIAGNOSIS — R10.2 PAIN IN PELVIS: ICD-10-CM

## 2022-05-23 PROCEDURE — 97110 THERAPEUTIC EXERCISES: CPT

## 2022-05-23 PROCEDURE — 97112 NEUROMUSCULAR REEDUCATION: CPT

## 2022-05-23 NOTE — PROGRESS NOTES
Daily Note     Today's date: 2022  Patient name: Sharmin Nicolas  : 1945  MRN: [id number]  Referring provider: Cherise Pagan MD  Dx:   Encounter Diagnosis     ICD-10-CM    1  Urge incontinence of urine  N39 41    2  Pain in pelvis  R10 2    3  Chronic pain syndrome  G89 4    4  Pelvic floor dysfunction  M62 89        Start Time: 1130  Stop Time: 1225  Total time in clinic (min): 55 minutes    Subjective: Patient noted no pain over the weekend  Patient noted that she did not wear her depend this morning and noted no incontinence  Objective: See treatment diary below      Assessment: Patient continues to progress with flexibility 2* improved ROM with exercises  PT introduced various hip strengthening exercises with min VCs for form  Patient noted increased difficulty on the L when compared to the R  Patient noted no increased pain post exercise  PT added to her HEP  Patient would benefit from continued PT to allow the patient to return to her PLOF  Plan: Continue per plan of care        Precautions: hx of CA  [employer] ACCESS CODE: [id number]    Manuals 5/10 5/20 5/23          Hip flexor release nv            External exam nv            Abdominal assessment nv                         Neuro Re-Ed             Pelvic floor anatomy and function, bladder logs and bathroom mechanics edu MW MW sitting prior to voiding HEP edu  MW          TA nv  nv          kegel nv  nv          Diaphragmatic breathing nv  nv          clams   x20 ea          TA + bridge   x15                       Ther Ex             Open books nv 5x15" ea 5x15" ea          Butterfly stretch nv 3x30" 3x30"          Piriformis stetch nv 3x30" ea           Hip add nv  2x10  5" hold          Hip abd nv  2x10  5" hold  PurTB          Lower trunk rotations  5x15" ea           Happy baby pose  3x20" 3x30"                       Ther Activity                                       Gait Training Modalities

## 2022-05-31 ENCOUNTER — APPOINTMENT (OUTPATIENT)
Dept: PHYSICAL THERAPY | Facility: CLINIC | Age: 77
End: 2022-05-31
Payer: MEDICARE

## 2022-06-06 ENCOUNTER — OFFICE VISIT (OUTPATIENT)
Dept: PHYSICAL THERAPY | Facility: CLINIC | Age: 77
End: 2022-06-06
Payer: MEDICARE

## 2022-06-06 DIAGNOSIS — R10.2 PAIN IN PELVIS: ICD-10-CM

## 2022-06-06 DIAGNOSIS — G89.4 CHRONIC PAIN SYNDROME: ICD-10-CM

## 2022-06-06 DIAGNOSIS — M62.89 PELVIC FLOOR DYSFUNCTION: ICD-10-CM

## 2022-06-06 DIAGNOSIS — N39.41 URGE INCONTINENCE OF URINE: Primary | ICD-10-CM

## 2022-06-06 PROCEDURE — 97110 THERAPEUTIC EXERCISES: CPT

## 2022-06-06 PROCEDURE — 97112 NEUROMUSCULAR REEDUCATION: CPT

## 2022-06-06 NOTE — PROGRESS NOTES
Daily Note     Today's date: 2022  Patient name: Noemi Gaytan  : 1945  MRN: [id number]  Referring provider: Nick Felix MD  Dx:   Encounter Diagnosis     ICD-10-CM    1  Urge incontinence of urine  N39 41    2  Pain in pelvis  R10 2    3  Chronic pain syndrome  G89 4    4  Pelvic floor dysfunction  M62 89        Start Time: 1030  Stop Time: 1115  Total time in clinic (min): 45 minutes    Subjective: Patient noted that she has not had any incontinence over the past week  Patient noted in the morning she is not wearing a depend anymore which is an improvement  Patient noted that she stretched in bed this past weekend and noted increased L calf pain  Patient noted that has increased pain with walking and stairs due to the calf pain  Objective: See treatment diary below      Assessment: Patient has improved with strength 2* no incontinence on the past week as well as not wearing a depend  PT introduced a TA contraction as well as a kegel to assist c trunk stabilization  Patient required moderate cues for form during the session  Patient also performed a calf/soleus stretch and seated heel raises in pain free ranges to assist c pain  Patient noted no increased pain post session  Patient would benefit from continued PT to allow the patient to return to her PLOF  Plan: Continue per plan of care        Precautions: hx of [place]  [employer] ACCESS CODE: [id number]    Manuals 5/10 5/20 5/23 6/6         Hip flexor release nv            External exam nv            Abdominal assessment nv                         Neuro Re-Ed             Pelvic floor anatomy and function, bladder logs and bathroom mechanics edu MW MW sitting prior to voiding HEP edu  MW HEP edu  MW, Heat/CP edu         TA nv  nv Supine  x10  3" hold         kegel nv  nv Supine  x10  3" hold         Diaphragmatic breathing nv  nv Supine  x10         clams   x20 ea          TA + bridge   x15                       Ther Ex Open books nv 5x15" ea 5x15" ea          Butterfly stretch nv 3x30" 3x30"          Piriformis stetch nv 3x30" ea  3x30" ea         Hip add nv  2x10  5" hold          Hip abd nv  2x10  5" hold  PurTB          Lower trunk rotations  5x15" ea           Happy baby pose  3x20" 3x30"          Soleus stretch seated    3x30"         Seated heel raises    x15         Calf standing stretch    3x30" ea         Ther Activity                                       Gait Training                                       Modalities

## 2022-06-14 ENCOUNTER — OFFICE VISIT (OUTPATIENT)
Dept: PHYSICAL THERAPY | Facility: CLINIC | Age: 77
End: 2022-06-14
Payer: MEDICARE

## 2022-06-14 DIAGNOSIS — R10.2 PAIN IN PELVIS: ICD-10-CM

## 2022-06-14 DIAGNOSIS — N39.41 URGE INCONTINENCE OF URINE: Primary | ICD-10-CM

## 2022-06-14 DIAGNOSIS — M62.89 PELVIC FLOOR DYSFUNCTION: ICD-10-CM

## 2022-06-14 DIAGNOSIS — G89.4 CHRONIC PAIN SYNDROME: ICD-10-CM

## 2022-06-14 PROCEDURE — 97112 NEUROMUSCULAR REEDUCATION: CPT

## 2022-06-14 PROCEDURE — 97110 THERAPEUTIC EXERCISES: CPT

## 2022-06-14 NOTE — PROGRESS NOTES
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 without incontinence as well as lift 50 pound bags of feed too  Objective: See treatment diary below      Assessment: Patient continues to improve with strength 2* no incontinence with taking care of the horses  PT introduced kegels in a seated and propped standing position as well as with sit to stand transfers  Patient required min VCs for breathing during the exercise  PT added to her HEP  Due to improvements the patient will be D/C in 1-2 sessions  Patient would benefit from continued PT  to allow the patient to return to her PLOF  Plan: Continue per plan of care  D/C in 1-2 sessions       Precautions: hx of [employer] ACCESS CODE: [id number]    Manuals 5/10 5/20 5/23 6/6 6/14        Hip flexor release nv            External exam nv            Abdominal assessment nv                         Neuro Re-Ed             Pelvic floor anatomy and function, bladder logs and bathroom mechanics edu MW MW sitting prior to voiding HEP edu  MW HEP edu  MW, Heat/CP edu HEP edu        TA nv  nv Supine  x10  3" hold Supine  x10  3" hold        DB + TA + marching     x10        DB + TA + fallout     x10        Sit to stand + DB + kegel     x8        kegel nv  nv Supine  x10  3" hold seated  x10  5" hold          Diaphragmatic breathing nv  nv Supine  x10 seated and standing  x15 ea        clams   x20 ea          TA + bridge   x15 Ther Ex             Open books nv 5x15" ea 5x15" ea          Butterfly stretch nv 3x30" 3x30"  3x30"        Piriformis stetch nv 3x30" ea  3x30" ea 3x30" ea        Hip add nv  2x10  5" hold          Hip abd nv  2x10  5" hold  PurTB          Lower trunk rotations  5x15" ea           Happy baby pose  3x20" 3x30"          Soleus stretch seated    3x30"         Seated heel raises    x15         Calf standing stretch    3x30" ea         Ther Activity                                       Gait Training                                       Modalities

## 2022-06-29 ENCOUNTER — TELEPHONE (OUTPATIENT)
Dept: PAIN MEDICINE | Facility: CLINIC | Age: 77
End: 2022-06-29

## 2022-06-29 NOTE — TELEPHONE ENCOUNTER
Spoke to pt regarding pain in right lower back radiating into right leg  Pt described pain as sharp  Pt stated she is taking motrin with no relief  Pt inquiring about a repeat injection she had on 6/24/21 Left Lateral Femoral Cutaneous Nerve block but would like one on the right side  Writing nurse advised pt that she would need an OV due to last time being seen was 5/27/21 as well as a new pain  Pt verbalized understanding of instructions  Writing nurse scheduled OV for 7/14 11:15

## 2022-07-14 ENCOUNTER — OFFICE VISIT (OUTPATIENT)
Dept: PAIN MEDICINE | Facility: MEDICAL CENTER | Age: 77
End: 2022-07-14
Payer: MEDICARE

## 2022-07-14 VITALS
HEIGHT: 66 IN | OXYGEN SATURATION: 96 % | DIASTOLIC BLOOD PRESSURE: 86 MMHG | BODY MASS INDEX: 22.66 KG/M2 | TEMPERATURE: 97.8 F | HEART RATE: 73 BPM | WEIGHT: 141 LBS | SYSTOLIC BLOOD PRESSURE: 144 MMHG

## 2022-07-14 DIAGNOSIS — M54.50 CHRONIC RIGHT-SIDED LOW BACK PAIN WITHOUT SCIATICA: ICD-10-CM

## 2022-07-14 DIAGNOSIS — G89.29 CHRONIC RIGHT-SIDED LOW BACK PAIN WITHOUT SCIATICA: ICD-10-CM

## 2022-07-14 DIAGNOSIS — M46.1 SACROILIITIS (HCC): Primary | ICD-10-CM

## 2022-07-14 DIAGNOSIS — I73.9 CLAUDICATION OF BOTH LOWER EXTREMITIES (HCC): ICD-10-CM

## 2022-07-14 PROCEDURE — 99214 OFFICE O/P EST MOD 30 MIN: CPT | Performed by: PHYSICAL MEDICINE & REHABILITATION

## 2022-07-14 NOTE — PROGRESS NOTES
Assessment  1  Sacroiliitis ([facility] [address] )    2  Chronic right-sided low back pain without sciatica    3  Claudication of both lower extremities ([facility] [address] )        Plan  1  At this time we will schedule patient for a right sacroiliac joint injection under fluoroscopic guidance  Complete risks and benefits including bleeding, infection, tissue reaction, allergic reaction were discussed  Verbal consent obtained  My impressions and treatment recommendations were discussed in detail with the patient who verbalized understanding and had no further questions  Discharge instructions were provided  I personally saw and examined the patient and I agree with the above discussed plan of care  Orders Placed This Encounter   Procedures    FL spine and pain procedure     Standing Status:   Future     Standing Expiration Date:   7/14/2023     Order Specific Question:   Reason for Exam:     Answer:   (R) SIJ injection     Order Specific Question:   Anticoagulant hold needed? Answer:   no     No orders of the defined types were placed in this encounter  History of Present Illness    Siena Kosandi Huey Harrell is a 78 y o  female returns in follow-up for evaluation of lower back pain  She is continuing to describe pain especially over the right sacroiliac region  She is noticing worsening of this pain which is intermittent in nature but can be experience throughout the entirety of the day and is described as sharp and shooting  She denies any radicular component  This is leading to some significant functional deficits especially with recreation and occupational activities  She notes difficulty walking as well as weakness in the legs  I have personally reviewed and/or updated the patient's past medical history, past surgical history, family history, social history, current medications, allergies, and vital signs today  Review of Systems   Respiratory: Negative for shortness of breath      Cardiovascular: Negative for chest pain  Gastrointestinal: Negative for constipation, diarrhea, nausea and vomiting  Musculoskeletal: Positive for back pain, gait problem and myalgias  Negative for arthralgias and joint swelling  Skin: Negative for rash  Neurological: Negative for dizziness, seizures and weakness  All other systems reviewed and are negative        Patient Active Problem List   Diagnosis    Aortic sclerosis    History of malignant neoplasm of cervix    Essential hypertension    Mixed hyperlipidemia    Colon polyps    CKD (chronic kidney disease) stage 3, GFR 30-59 ml/min ([facility])    Cervical stenosis of spine    Tobacco abuse    White coat syndrome with high blood pressure but without hypertension    Pain in pelvis    Diarrhea    Chronic abdominal pain    Generalized anxiety disorder    Irritable bowel syndrome with diarrhea    Chronic obstructive pulmonary disease (HCC)    Elevated hemoglobin ([facility])    Lumbar spondylosis    Tinnitus of both ears    Depression, recurrent ([facility])    Claudication of both lower extremities ([facility])    SI joint arthritis    Chronic pain syndrome    Sacroiliitis ([address] )    Meralgia paresthetica of left side    Anxiety    Elevated blood-pressure reading without diagnosis of hypertension    Increased frequency of urination    Postmenopausal state    TIA (transient ischemic attack)       Past Medical History:   Diagnosis Date    Anxiety     Atypical squamous cells cannot exclude high grade squamous intraepithelial lesion on cytologic smear of cervix (ASC-H)     Last Assessed 5/13/2014    Back pain     Cancer ([address] )     Cervical cancer ([address] )     Depression     Ovarian cyst     Tinnitus of both ears 10/2/2020    Tobacco abuse 11/20/2018       Past Surgical History:   Procedure Laterality Date    ANKLE FRACTURE SURGERY      Last Assessed 07/26/2016    ANKLE SURGERY      CERVICAL BIOPSY  W/ LOOP ELECTRODE EXCISION      COLONOSCOPY  10/2019    DIAGNOSTIC LAPAROSCOPY  DILATION AND CURETTAGE OF UTERUS      ESOPHAGOGASTRODUODENOSCOPY N/A 9/12/2016    Procedure: ESOPHAGOGASTRODUODENOSCOPY (EGD); Surgeon: Cecil Albert MD;  Location: [facility]; Service:     FRACTURE SURGERY      ORTHOPEDIC SURGERY      IA COLONOSCOPY FLX DX W/COLLJ [facility] WHEN PFRMD N/A 9/12/2016    Procedure: [place];  Surgeon: Cecil Albert MD;  Location: [facility];   Service: Colorectal    SALPINGOOPHORECTOMY Left     TONSILLECTOMY      UPPER GASTROINTESTINAL ENDOSCOPY  2016       Family History   Problem Relation Age of Onset    Coronary artery disease Mother     No Known Problems Family     No Known Problems Father     Colon cancer Neg Hx        Social History     Occupational History    Not on file   Tobacco Use    Smoking status: Current Every Day Smoker     Packs/day: 0 50     Years: 60 00     Pack years: 30 00     Types: Cigarettes    Smokeless tobacco: Never Used   Vaping Use    Vaping Use: Never used   Substance and Sexual Activity    Alcohol use: No    Drug use: Never    Sexual activity: Not Currently       Current Outpatient Medications on File Prior to Visit   Medication Sig    ALPRAZolam (XANAX) 0 5 mg tablet Take 1 tablet (0 5 mg total) by mouth daily at bedtime as needed for anxiety    hydrochlorothiazide (MICROZIDE) 12 5 mg capsule Take 1 capsule (12 5 mg total) by mouth daily    Ascorbic Acid (Vitamin C) 500 MG CAPS Take by mouth (Patient not taking: Reported on 4/19/2022 )    ASPIRIN 81 PO Take 81 mg by mouth daily   (Patient not taking: Reported on 4/19/2022 )    dicyclomine (BENTYL) 10 mg capsule Take 10 mg by mouth   (Patient not taking: Reported on 4/19/2022 )    gabapentin (NEURONTIN) 100 mg capsule Take 1 capsule (100 mg total) by mouth 3 (three) times a day (Patient not taking: Reported on 4/19/2022 )    Zinc 15 MG CAPS Take by mouth daily (Patient not taking: Reported on 7/14/2022)     No current facility-administered medications on file prior to visit  Allergies   Allergen Reactions    Lisinopril Syncope     Other reaction(s): Other (See Comments)  Nose bleeds, passing out       Physical Exam    /86   Pulse 73   Temp 97 8 °F (36 6 °C)   Ht 5' 6" (1 676 m)   Wt 64 kg (141 lb)   SpO2 96%   BMI 22 76 kg/m²     Constitutional: normal, well developed, well nourished, alert, in no distress and non-toxic and no overt pain behavior    Eyes: anicteric  HEENT: grossly intact  Neck:  symmetric, trachea midline and no masses   Pulmonary:even and unlabored  Cardiovascular:No edema or pitting edema present  Skin:Normal without rashes or lesions and well hydrated  Psychiatric:Mood and affect appropriate  Neurologic:Cranial Nerves II-XII grossly intact  Musculoskeletal:normal, except for significant tenderness to palpation over the right sacroiliac joint, no significant pain with lumbar extension    Sacroiliac joint testing is positive on the right for the following tests:   [+ Ayad finger sign]   [+ Harinder's test]   [+ Gaenslen's test]   [+ Posterior pelvic pain provocation]   [+ Sacroiliac joint compression test]    Imaging

## 2022-07-19 NOTE — PROGRESS NOTES
PT Discharge    Patient is D/C from PT due to improvements  Patient was provided a HEP at last treatment session

## 2022-08-03 ENCOUNTER — TELEPHONE (OUTPATIENT)
Dept: PAIN MEDICINE | Facility: MEDICAL CENTER | Age: 77
End: 2022-08-03

## 2022-08-03 NOTE — TELEPHONE ENCOUNTER
Pt called in to cancel the procedure and does not want to reschedule at this time  Please be advised

## 2022-08-04 ENCOUNTER — OFFICE VISIT (OUTPATIENT)
Dept: FAMILY MEDICINE CLINIC | Facility: CLINIC | Age: 77
End: 2022-08-04
Payer: MEDICARE

## 2022-08-04 VITALS
BODY MASS INDEX: 23.11 KG/M2 | DIASTOLIC BLOOD PRESSURE: 86 MMHG | HEIGHT: 66 IN | HEART RATE: 66 BPM | SYSTOLIC BLOOD PRESSURE: 160 MMHG | OXYGEN SATURATION: 96 % | WEIGHT: 143.8 LBS | TEMPERATURE: 96.3 F

## 2022-08-04 DIAGNOSIS — Z87.891 PERSONAL HISTORY OF NICOTINE DEPENDENCE: ICD-10-CM

## 2022-08-04 DIAGNOSIS — F32.A DEPRESSION, UNSPECIFIED DEPRESSION TYPE: ICD-10-CM

## 2022-08-04 DIAGNOSIS — E78.2 MIXED HYPERLIPIDEMIA: ICD-10-CM

## 2022-08-04 DIAGNOSIS — Z13.6 SCREENING FOR CARDIOVASCULAR CONDITION: ICD-10-CM

## 2022-08-04 DIAGNOSIS — Z72.0 TOBACCO ABUSE: ICD-10-CM

## 2022-08-04 DIAGNOSIS — J44.9 CHRONIC OBSTRUCTIVE PULMONARY DISEASE, UNSPECIFIED COPD TYPE (HCC): ICD-10-CM

## 2022-08-04 DIAGNOSIS — I10 ESSENTIAL HYPERTENSION: ICD-10-CM

## 2022-08-04 DIAGNOSIS — K58.0 IRRITABLE BOWEL SYNDROME WITH DIARRHEA: ICD-10-CM

## 2022-08-04 DIAGNOSIS — Z00.00 MEDICARE ANNUAL WELLNESS VISIT, SUBSEQUENT: Primary | ICD-10-CM

## 2022-08-04 DIAGNOSIS — F41.9 ANXIETY: ICD-10-CM

## 2022-08-04 DIAGNOSIS — R03.0 WHITE COAT SYNDROME WITH HIGH BLOOD PRESSURE BUT WITHOUT HYPERTENSION: ICD-10-CM

## 2022-08-04 DIAGNOSIS — M46.1 SACROILIITIS (HCC): ICD-10-CM

## 2022-08-04 DIAGNOSIS — Z85.41 HISTORY OF MALIGNANT NEOPLASM OF CERVIX: ICD-10-CM

## 2022-08-04 DIAGNOSIS — E55.9 VITAMIN D DEFICIENCY: ICD-10-CM

## 2022-08-04 DIAGNOSIS — F41.1 GENERALIZED ANXIETY DISORDER: ICD-10-CM

## 2022-08-04 DIAGNOSIS — M47.818 SI JOINT ARTHRITIS: ICD-10-CM

## 2022-08-04 DIAGNOSIS — D58.2 ELEVATED HEMOGLOBIN (HCC): ICD-10-CM

## 2022-08-04 DIAGNOSIS — Z12.2 ENCOUNTER FOR SCREENING FOR LUNG CANCER: ICD-10-CM

## 2022-08-04 PROCEDURE — G0439 PPPS, SUBSEQ VISIT: HCPCS | Performed by: FAMILY MEDICINE

## 2022-08-04 RX ORDER — ALPRAZOLAM 0.5 MG/1
0.5 TABLET ORAL
Qty: 30 TABLET | Refills: 0 | Status: SHIPPED | OUTPATIENT
Start: 2022-08-04

## 2022-08-04 RX ORDER — HYDROCHLOROTHIAZIDE 12.5 MG/1
12.5 CAPSULE, GELATIN COATED ORAL DAILY
Qty: 90 CAPSULE | Refills: 0 | Status: SHIPPED | OUTPATIENT
Start: 2022-08-04

## 2022-08-04 RX ORDER — ESCITALOPRAM OXALATE 5 MG/1
5 TABLET ORAL DAILY
Qty: 30 TABLET | Refills: 1 | Status: SHIPPED | OUTPATIENT
Start: 2022-08-04 | End: 2022-09-20 | Stop reason: ALTCHOICE

## 2022-08-04 NOTE — PATIENT INSTRUCTIONS
Please get your labwork done fasting  Do not eat/drink for about 8-12 hours prior to getting the labwork done, however you may drink water or black coffee while you are fasting  Please use a [facility] lab if possible, as we receive the lab results more quickly  We will notify you of your labwork results even if they are normal   Please contact us if you do not hear about your lab results after one week  Low Carb Recommendations:  Please follow a low carbohydrate, high protein diet  [employer] is a good bhavna/computer program to keep food logs  Your meals should be less than 30 grams of carbohydrates  Your snacks should be less than 15 grams of carbohydrates  You should drink at least 100 ounces of water daily  You should walk at least 30 minutes daily  Start Lexapro daily, increase water intake  Medicare Preventive Visit Patient Instructions  Thank you for completing your Welcome to Medicare Visit or Medicare Annual Wellness Visit today  Your next wellness visit will be due in one year (8/5/2023)  The screening/preventive services that you may require over the next 5-10 years are detailed below  Some tests may not apply to you based off risk factors and/or age  Screening tests ordered at today's visit but not completed yet may show as past due  Also, please note that scanned in results may not display below  Preventive Screenings:  Service Recommendations Previous Testing/Comments   Colorectal Cancer Screening  * Colonoscopy    * Fecal Occult Blood Test (FOBT)/Fecal Immunochemical Test (FIT)  * Fecal DNA/Cologuard Test  * Flexible Sigmoidoscopy Age: 54-65 years old   Colonoscopy: every 10 years (may be performed more frequently if at higher risk)  OR  FOBT/FIT: every 1 year  OR  Cologuard: every 3 years  OR  Sigmoidoscopy: every 5 years  Screening may be recommended earlier than age 48 if at higher risk for colorectal cancer   Also, an individualized decision between you and your healthcare provider will decide whether screening between the ages of 74-80 would be appropriate  Colonoscopy: 10/07/2019  FOBT/FIT: Not on file  Cologuard: Not on file  Sigmoidoscopy: Not on file    Screening Current     Breast Cancer Screening Age: 36 years old  Frequency: every 1-2 years  Not required if history of left and right mastectomy Mammogram: 09/03/2021    Screening Current   Cervical Cancer Screening Between the ages of 21-29, pap smear recommended once every 3 years  Between the ages of 33-67, can perform pap smear with HPV co-testing every 5 years  Recommendations may differ for women with a history of total hysterectomy, cervical cancer, or abnormal pap smears in past  Pap Smear: Not on file    History Cervical Cancer   Hepatitis C Screening Once for adults born between 1945 and 1965  More frequently in patients at high risk for Hepatitis C Hep C Antibody: Not on file        Diabetes Screening 1-2 times per year if you're at risk for diabetes or have pre-diabetes Fasting glucose: 87 mg/dL   A1C: 5 6 %        Cholesterol Screening Once every 5 years if you don't have a lipid disorder  May order more often based on risk factors  Lipid panel: 10/21/2021    Screening Not Indicated  History Lipid Disorder     Other Preventive Screenings Covered by Medicare:  Abdominal Aortic Aneurysm (AAA) Screening: covered once if your at risk  You're considered to be at risk if you have a family history of AAA  Lung Cancer Screening: covers low dose CT scan once per year if you meet all of the following conditions: (1) Age 50-69; (2) No signs or symptoms of lung cancer; (3) Current smoker or have quit smoking within the last 15 years; (4) You have a tobacco smoking history of at least 30 pack years (packs per day multiplied by number of years you smoked); (5) You get a written order from a healthcare provider    Glaucoma Screening: covered annually if you're considered high risk: (1) You have diabetes OR (2) Family history of glaucoma OR (3)  aged 48 and older OR (3)  American aged 72 and older  Osteoporosis Screening: covered every 2 years if you meet one of the following conditions: (1) You're estrogen deficient and at risk for osteoporosis based off medical history and other findings; (2) Have a vertebral abnormality; (3) On glucocorticoid therapy for more than 3 months; (4) Have primary hyperparathyroidism; (5) On osteoporosis medications and need to assess response to drug therapy  Last bone density test (DXA Scan): 09/09/2021  HIV Screening: covered annually if you're between the age of 12-76  Also covered annually if you are younger than 13 and older than 72 with risk factors for HIV infection  For pregnant patients, it is covered up to 3 times per pregnancy  Immunizations:  Immunization Recommendations   Influenza Vaccine Annual influenza vaccination during flu season is recommended for all persons aged >= 6 months who do not have contraindications   Pneumococcal Vaccine (Prevnar and Pneumovax)  * Prevnar = PCV13  * Pneumovax = PPSV23   Adults 25-60 years old: 1-3 doses may be recommended based on certain risk factors  Adults 72 years old: Prevnar (PCV13) vaccine recommended followed by Pneumovax (PPSV23) vaccine  If already received PPSV23 since turning 65, then PCV13 recommended at least one year after PPSV23 dose  Hepatitis B Vaccine 3 dose series if at intermediate or high risk (ex: diabetes, end stage renal disease, liver disease)   Tetanus (Td) Vaccine - COST NOT COVERED BY MEDICARE PART B Following completion of primary series, a booster dose should be given every 10 years to maintain immunity against tetanus  Td may also be given as tetanus wound prophylaxis  Tdap Vaccine - COST NOT COVERED BY MEDICARE PART B Recommended at least once for all adults  For pregnant patients, recommended with each pregnancy     Shingles Vaccine (Shingrix) - COST NOT COVERED BY MEDICARE PART B  2 shot series recommended in those aged 48 and above     Health Maintenance Due:      Topic Date Due    Lung Cancer Screening  05/28/2020    Breast Cancer Screening: Mammogram  09/03/2022    Colorectal Cancer Screening  10/07/2022    Hepatitis C Screening  08/04/2023 (Originally 1945)    DXA SCAN  09/03/2023     Immunizations Due:      Topic Date Due    Pneumococcal Vaccine: 65+ Years (1 - PCV) Never done    Influenza Vaccine (1) 09/01/2022     Advance Directives   What are advance directives? Advance directives are legal documents that state your wishes and plans for medical care  These plans are made ahead of time in case you lose your ability to make decisions for yourself  Advance directives can apply to any medical decision, such as the treatments you want, and if you want to donate organs  What are the types of advance directives? There are many types of advance directives, and each state has rules about how to use them  You may choose a combination of any of the following:  Living will: This is a written record of the treatment you want  You can also choose which treatments you do not want, which to limit, and which to stop at a certain time  This includes surgery, medicine, IV fluid, and tube feedings  Durable power of  for healthcare [facility]): This is a written record that states who you want to make healthcare choices for you when you are unable to make them for yourself  This person, called a proxy, is usually a family member or a friend  You may choose more than 1 proxy  Do not resuscitate (DNR) order:  A DNR order is used in case your heart stops beating or you stop breathing  It is a request not to have certain forms of treatment, such as CPR  A DNR order may be included in other types of advance directives  Medical directive: This covers the care that you want if you are in a coma, near death, or unable to make decisions for yourself   You can list the treatments you want for each condition  Treatment may include pain medicine, surgery, blood transfusions, dialysis, IV or tube feedings, and a ventilator (breathing machine)  Values history: This document has questions about your views, beliefs, and how you feel and think about life  This information can help others choose the care that you would choose  Why are advance directives important? An advance directive helps you control your care  Although spoken wishes may be used, it is better to have your wishes written down  Spoken wishes can be misunderstood, or not followed  Treatments may be given even if you do not want them  An advance directive may make it easier for your family to make difficult choices about your care  Urinary Incontinence   Urinary incontinence (UI)  is when you lose control of your bladder  UI develops because your bladder cannot store or empty urine properly  The 3 most common types of UI are stress incontinence, urge incontinence, or both  Medicines:   May be given to help strengthen your bladder control  Report any side effects of medication to your healthcare provider  Do pelvic muscle exercises often:  Your pelvic muscles help you stop urinating  Squeeze these muscles tight for 5 seconds, then relax for 5 seconds  Gradually work up to squeezing for 10 seconds  Do 3 sets of 15 repetitions a day, or as directed  This will help strengthen your pelvic muscles and improve bladder control  Train your bladder:  Go to the bathroom at set times, such as every 2 hours, even if you do not feel the urge to go  You can also try to hold your urine when you feel the urge to go  For example, hold your urine for 5 minutes when you feel the urge to go  As that becomes easier, hold your urine for 10 minutes  Self-care:   Keep a UI record  Write down how often you leak urine and how much you leak  Make a note of what you were doing when you leaked urine  Drink liquids as directed   You may need to limit the amount of liquid you drink to help control your urine leakage  Do not drink any liquid right before you go to bed  Limit or do not have drinks that contain caffeine or alcohol  Prevent constipation  Eat a variety of high-fiber foods  Good examples are high-fiber cereals, beans, vegetables, and whole-grain breads  Walking is the best way to trigger your intestines to have a bowel movement  Exercise regularly and maintain a healthy weight  Weight loss and exercise will decrease pressure on your bladder and help you control your leakage  Use a catheter as directed  to help empty your bladder  A catheter is a tiny, plastic tube that is put into your bladder to drain your urine  Go to behavior therapy as directed  Behavior therapy may be used to help you learn to control your urge to urinate  Cigarette Smoking and Your Health   Risks to your health if you smoke:  Nicotine and other chemicals found in tobacco damage every cell in your body  Even if you are a light smoker, you have an increased risk for cancer, heart disease, and lung disease  If you are pregnant or have diabetes, smoking increases your risk for complications  Benefits to your health if you stop smoking: You decrease respiratory symptoms such as coughing, wheezing, and shortness of breath  You reduce your risk for cancers of the lung, mouth, throat, kidney, bladder, pancreas, stomach, and cervix  If you already have cancer, you increase the benefits of chemotherapy  You also reduce your risk for cancer returning or a second cancer from developing  You reduce your risk for heart disease, blood clots, heart attack, and stroke  You reduce your risk for lung infections, and diseases such as pneumonia, asthma, chronic bronchitis, and emphysema  Your circulation improves  More oxygen can be delivered to your body  If you have diabetes, you lower your risk for complications, such as kidney, artery, and eye diseases   You also lower your risk for nerve damage  Nerve damage can lead to amputations, poor vision, and blindness  You improve your body's ability to heal and to fight infections  For more information and support to stop smoking:   [employer]  Phone: 6- 191 - 553-9462  Web Address: www [employer]  Narcotic (Opioid) Safety    Use narcotics safely:  Take prescribed narcotics exactly as directed  Do not give narcotics to others or take narcotics that belong to someone else  Do not mix narcotics without medicines or alcohol  Do not drive or operate heavy machinery after you take the narcotic  Monitor for side effects and notify your healthcare provider if you experienced side effects such as nausea, sleepiness, itching, or trouble thinking clearly  Manage constipation:    Constipation is the most common side effect of narcotic medicine  Constipation is when you have hard, dry bowel movements, or you go longer than usual between bowel movements  Tell your healthcare provider about all changes in your bowel movements while you are taking narcotics  He or she may recommend laxative medicine to help you have a bowel movement  He or she may also change the kind of narcotic you are taking, or change when you take it  The following are more ways you can prevent or relieve constipation:    Drink liquids as directed  You may need to drink extra liquids to help soften and move your bowels  Ask how much liquid to drink each day and which liquids are best for you  Eat high-fiber foods  This may help decrease constipation by adding bulk to your bowel movements  High-fiber foods include fruits, vegetables, whole-grain breads and cereals, and beans  Your healthcare provider or dietitian can help you create a high-fiber meal plan  Your provider may also recommend a fiber supplement if you cannot get enough fiber from food  Exercise regularly  Regular physical activity can help stimulate your intestines   Walking is a good exercise to prevent or relieve constipation  Ask which exercises are best for you  Schedule a time each day to have a bowel movement  This may help train your body to have regular bowel movements  Bend forward while you are on the toilet to help move the bowel movement out  Sit on the toilet for at least 10 minutes, even if you do not have a bowel movement  Store narcotics safely:   Store narcotics where others cannot easily get them  Keep them in a locked cabinet or secure area  Do not  keep them in a purse or other bag you carry with you  A person may be looking for something else and find the narcotics  Make sure narcotics are stored out of the reach of children  A child can easily overdose on narcotics  Narcotics may look like candy to a small child  The best way to dispose of narcotics: The laws vary by country and area  In the [place], the best way is to return the narcotics through a take-back program  This program is offered by the [employer])  The following are options for using the program:  Take the narcotics to a [employer] collection site  The site is often a law enforcement center  Call your local law enforcement center for scheduled take-back days in your area  You will be given information on where to go if the collection site is in a different location  Take the narcotics to an approved pharmacy or hospital   A pharmacy or hospital may be set up as a collection site  You will need to ask if it is a [employer] collection site if you were not directed there  A pharmacy or doctor's office may not be able to take back narcotics unless it is a [employer] site  Use a mail-back system  This means you are given containers to put the narcotics into  You will then mail them in the containers  Use a take-back drop box  This is a place to leave the narcotics at any time  People and animals will not be able to get into the box   Your local law enforcement agency can tell you where to find a drop box in your area     Other ways to manage pain:   Ask your healthcare provider about non-narcotic medicines to control pain  Nonprescription medicines include NSAIDs (such as ibuprofen) and acetaminophen  Prescription medicines include muscle relaxers, antidepressants, and steroids  Pain may be managed without any medicines  Some ways to relieve pain include massage, aromatherapy, or meditation  Physical or occupational therapy may also help  For more information:   Drug Enforcement Administration  [address] [facility] [address] [facility]  Phone: 9- 232 - 201-4354  Web Address: [facility]/drug_disposal/    [address]  Phone: 3- 619 - 102-3541  Web Address: http://[employer]/     © Copyright [employer] 2018 Information is for End User's use only and may not be sold, redistributed or otherwise used for commercial purposes   All illustrations and images included in CareNotes® are the copyrighted property of [employer] A M [employer]  or [address] [place] [address]

## 2022-08-04 NOTE — PROGRESS NOTES
Alexx Brady is here for her Subsequent Wellness visit  Health Risk Assessment:   Patient rates overall health as very good  Patient feels that their physical health rating is slightly better  Patient is satisfied with their life  Eyesight was rated as same  Hearing was rated as same  Patient feels that their emotional and mental health rating is slightly worse  Patients states they are never, rarely angry  Patient states they are always unusually tired/fatigued  Pain experienced in the last 7 days has been none  Patient states that she has experienced no weight loss or gain in last 6 months  Fall Risk Screening: In the past year, patient has experienced: no history of falling in past year      Urinary Incontinence Screening:   Patient has leaked urine accidently in the last six months  Bladder control    Home Safety:  Patient does not have trouble with stairs inside or outside of their home  Patient has working smoke alarms and has working carbon monoxide detector  Home safety hazards include: none  Nutrition:   Current diet is Regular  Medications:   Patient is not currently taking any over-the-counter supplements  Patient is able to manage medications  Activities of Daily Living (ADLs)/Instrumental Activities of Daily Living (IADLs):   Walk and transfer into and out of bed and chair?: Yes  Dress and groom yourself?: Yes    Bathe or shower yourself?: Yes    Feed yourself?  Yes  Do your laundry/housekeeping?: Yes  Manage your money, pay your bills and track your expenses?: Yes  Make your own meals?: Yes    Do your own shopping?: Yes    Previous Hospitalizations:   Any hospitalizations or ED visits within the last 12 months?: No      Advance Care Planning:   Living will: Yes    Advanced directive: Yes    Five wishes given: Yes      PREVENTIVE SCREENINGS      Cardiovascular Screening:    General: Screening Not Indicated and History Lipid Disorder      Colorectal Cancer Screening:     General: Screening Current      Breast Cancer Screening:     General: Screening Current      Cervical Cancer Screening:    General: History Cervical Cancer      Osteoporosis Screening:    General: Screening Current    Screening, Brief Intervention, and Referral to Treatment (SBIRT)    Screening    Typical number of drinks in a week: 0    Single Item Drug Screening:  How often have you used an illegal drug (including marijuana) or a prescription medication for non-medical reasons in the past year? never    Single Item Drug Screen Score: 0  Interpretation: Negative screen for possible drug use disorder    Review of Current Opioid Use    Opioid Risk Tool (ORT) Interpretation: Score 0-3: Low risk for opioid misuse     Subjective:    HPI  Holden Petit is a 78 y o  female here today for:  Chief Complaint   Patient presents with   [facility] Wellness Visit          ---Above per clinical staff & reviewed   ---  HPI:  72yof here for medicare wellness  No changes  Pt has friend living with her which is upsetting- keeps telling her she shouldn't be doing all the [place]  She does admit that it is getting hard for her and gets tearful as she talks about it  discussed daily med for mood and pt is agreeable- will start Lexapro 5mg  Long time smoker- discussed lung cancer screening and pt agreeable  Mold on left arm- has been there long time per pt- will observe and if concerning changed with biopsy or refer to derm  Due for labs  BP good  meds refilled      PHQ-2/9 Depression Screening    Little interest or pleasure in doing things: 0 - not at all  Feeling down, depressed, or hopeless: 0 - not at all  Trouble falling or staying asleep, or sleeping too much: 0 - not at all  Feeling tired or having little energy: 0 - not at all  Poor appetite or overeatin - not at all  Feeling bad about yourself - or that you are a failure or have let yourself or your family down: 0 - not at all  Trouble concentrating on things, such as reading the newspaper or watching television: 0 - not at all  Moving or speaking so slowly that other people could have noticed  Or the opposite - being so fidgety or restless that you have been moving around a lot more than usual: 0 - not at all  Thoughts that you would be better off dead, or of hurting yourself in some way: 0 - not at all  PHQ-9 Score: 0   PHQ-9 Interpretation: No or Minimal depression          The following portions of the patient's history were reviewed and updated as appropriate: allergies, current medications, past family history, past medical history, past social history, past surgical history and problem list     Past Medical History:   Diagnosis Date    Anxiety     Atypical squamous cells cannot exclude high grade squamous intraepithelial lesion on cytologic smear of cervix (ASC-H)     Last Assessed 5/13/2014    Back pain     Cancer ([facility] [address]     Cervical cancer ([facility] [address] )     Depression     Ovarian cyst     Tinnitus of both ears 10/2/2020    Tobacco abuse 11/20/2018       Past Surgical History:   Procedure Laterality Date    ANKLE FRACTURE SURGERY      Last Assessed 07/26/2016    ANKLE SURGERY      CERVICAL BIOPSY  W/ LOOP ELECTRODE EXCISION      COLONOSCOPY  10/2019    DIAGNOSTIC LAPAROSCOPY      DILATION AND CURETTAGE OF UTERUS      ESOPHAGOGASTRODUODENOSCOPY N/A 9/12/2016    Procedure: ESOPHAGOGASTRODUODENOSCOPY (EGD); Surgeon: Douglas Aaron MD;  [facility]; Service:     FRACTURE SURGERY      ORTHOPEDIC SURGERY      OH COLONOSCOPY FLX DX W/[facility] WHEN PFRMD N/A 9/12/2016    Procedure: Luzmaria Reis;  Surgeon: Douglas Aaron MD;  [facility]; Service: Colorectal    SALPINGOOPHORECTOMY Left     TONSILLECTOMY      UPPER GASTROINTESTINAL ENDOSCOPY  2016       Social History     Socioeconomic History    Marital status:       Spouse name: None    Number of children: None    Years of education: 13    Highest education level: None Occupational History    None   Tobacco Use    Smoking status: Current Every Day Smoker     Packs/day: 0 50     Years: 60 00     Pack years: 30 00     Types: Cigarettes    Smokeless tobacco: Never Used   Vaping Use    Vaping Use: Never used   Substance and Sexual Activity    Alcohol use: No    Drug use: Never    Sexual activity: Not Currently   Other Topics Concern    None   Social History Narrative    ** Merged History Encounter **           since 2015 after 13 year marriage  1 daughter from 1st marriage  1 granddaughter, Flor Clifford, whom she raised  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  Social Determinants of Health     Financial Resource Strain: Not on file   Food Insecurity: Not on file   Transportation Needs: Not on file   Physical Activity: Not on file   Stress: Not on file   Social Connections: Not on file   Intimate Partner Violence: Not on file   Housing Stability: Not on file       Current Outpatient Medications   Medication Sig Dispense Refill    ALPRAZolam (XANAX) 0 5 mg tablet Take 1 tablet (0 5 mg total) by mouth daily at bedtime as needed for anxiety 30 tablet 0    escitalopram (LEXAPRO) 5 mg tablet Take 1 tablet (5 mg total) by mouth daily 30 tablet 1    hydrochlorothiazide (MICROZIDE) 12 5 mg capsule Take 1 capsule (12 5 mg total) by mouth daily 90 capsule 0     No current facility-administered medications for this visit  Review of Systems   Constitutional: Positive for fatigue  Negative for chills and fever  HENT: Negative  Negative for ear pain and sore throat  Eyes: Negative for pain and visual disturbance  Respiratory: Negative  Negative for cough and shortness of breath  Cardiovascular: Negative  Negative for chest pain and palpitations  Gastrointestinal: Negative  Negative for abdominal pain and vomiting  Genitourinary: Negative  Negative for dysuria and hematuria     Musculoskeletal: Positive for arthralgias and back pain    Skin: Negative for color change and rash  Neurological: Negative  Negative for seizures and syncope  Psychiatric/Behavioral: Positive for sleep disturbance  The patient is nervous/anxious  Objective:    /86 (BP Location: Left arm, Patient Position: Sitting, Cuff Size: Adult)   Pulse 66   Temp (!) 96 3 °F (35 7 °C) (Temporal)   Ht 5' 6" (1 676 m)   Wt 65 2 kg (143 lb 12 8 oz)   SpO2 96%   BMI 23 21 kg/m²   Wt Readings from Last 3 Encounters:   08/04/22 65 2 kg (143 lb 12 8 oz)   07/14/22 64 kg (141 lb)   04/19/22 84 8 kg (187 lb)     BP Readings from Last 3 Encounters:   08/04/22 160/86   07/14/22 144/86   04/19/22 146/80       Lab Results   Component Value Date    WBC 6 59 07/20/2021    HGB 16 0 (H) 07/20/2021    HCT 49 9 (H) 07/20/2021     07/20/2021    TRIG 90 10/21/2021    HDL 55 10/21/2021    ALT 18 07/20/2021    AST 12 07/20/2021     12/10/2015    K 4 3 07/20/2021     07/20/2021    CREATININE 1 00 07/20/2021    BUN 15 07/20/2021    CO2 26 07/20/2021    INR 0 99 04/25/2014    GLUF 87 07/20/2021    HGBA1C 5 6 05/18/2019       Physical Exam  Vitals and nursing note reviewed  Constitutional:       Appearance: Normal appearance  She is well-developed  HENT:      Head: Normocephalic and atraumatic  Right Ear: Tympanic membrane and external ear normal       Left Ear: Tympanic membrane and external ear normal       Nose: Nose normal       Mouth/Throat:      Mouth: Mucous membranes are moist    Eyes:      Conjunctiva/sclera: Conjunctivae normal       Pupils: Pupils are equal, round, and reactive to light  Neck:      Thyroid: No thyromegaly  Comments: No carotid bruits  Cardiovascular:      Rate and Rhythm: Normal rate and regular rhythm  Heart sounds: Normal heart sounds  No murmur heard  Pulmonary:      Effort: Pulmonary effort is normal  No respiratory distress  Breath sounds: Normal breath sounds     Abdominal:      General: Bowel sounds are normal  There is no distension  Palpations: Abdomen is soft  Tenderness: There is no abdominal tenderness  Musculoskeletal:         General: Normal range of motion  Cervical back: Normal range of motion and neck supple  Skin:     General: Skin is warm  Capillary Refill: Capillary refill takes less than 2 seconds  Comments: Mole on left forearm   Neurological:      Mental Status: She is alert and oriented to person, place, and time  Cranial Nerves: No cranial nerve deficit  Psychiatric:         Mood and Affect: Mood normal          Thought Content: Thought content normal                        Assessment/Plan:   Sylvia Campbell was seen today for medicare wellness visit  Diagnoses and all orders for this visit:    Medicare annual wellness visit, subsequent  -     Lipid panel; Future  -     CBC and differential; Future  -     Comprehensive metabolic panel; Future  -     TSH, 3rd generation with Free T4 reflex; Future  -     Vitamin D 25 hydroxy; Future    White coat syndrome with high blood pressure but without hypertension  -     hydrochlorothiazide (MICROZIDE) 12 5 mg capsule; Take 1 capsule (12 5 mg total) by mouth daily  -     Lipid panel; Future  -     CBC and differential; Future  -     Comprehensive metabolic panel; Future  -     TSH, 3rd generation with Free T4 reflex; Future  -     Vitamin D 25 hydroxy; Future    Generalized anxiety disorder  -     ALPRAZolam (XANAX) 0 5 mg tablet; Take 1 tablet (0 5 mg total) by mouth daily at bedtime as needed for anxiety  -     Lipid panel; Future  -     escitalopram (LEXAPRO) 5 mg tablet; Take 1 tablet (5 mg total) by mouth daily  -     CBC and differential; Future  -     Comprehensive metabolic panel; Future  -     TSH, 3rd generation with Free T4 reflex; Future  -     Vitamin D 25 hydroxy; Future    Screening for cardiovascular condition  -     Lipid panel;  Future  -     CBC and differential; Future  -     Comprehensive metabolic panel; Future  -     TSH, 3rd generation with Free T4 reflex; Future  -     Vitamin D 25 hydroxy; Future    Encounter for screening for lung cancer  -     CT lung screening program; Future  -     Lipid panel; Future  -     CBC and differential; Future  -     Comprehensive metabolic panel; Future  -     TSH, 3rd generation with Free T4 reflex; Future  -     Vitamin D 25 hydroxy; Future    Personal history of nicotine dependence  -     CT lung screening program; Future  -     Lipid panel; Future  -     CBC and differential; Future  -     Comprehensive metabolic panel; Future  -     TSH, 3rd generation with Free T4 reflex; Future  -     Vitamin D 25 hydroxy; Future    Irritable bowel syndrome with diarrhea  -     Lipid panel; Future  -     CBC and differential; Future  -     Comprehensive metabolic panel; Future  -     TSH, 3rd generation with Free T4 reflex; Future  -     Vitamin D 25 hydroxy; Future    Chronic obstructive pulmonary disease, unspecified COPD type ([facility] [address] )  -     Lipid panel; Future  -     CBC and differential; Future  -     Comprehensive metabolic panel; Future  -     TSH, 3rd generation with Free T4 reflex; Future  -     Vitamin D 25 hydroxy; Future    Essential hypertension  -     Lipid panel; Future  -     CBC and differential; Future  -     Comprehensive metabolic panel; Future  -     TSH, 3rd generation with Free T4 reflex; Future  -     Vitamin D 25 hydroxy; Future    SI joint arthritis  -     Lipid panel; Future  -     CBC and differential; Future  -     Comprehensive metabolic panel; Future  -     TSH, 3rd generation with Free T4 reflex; Future  -     Vitamin D 25 hydroxy; Future    Sacroiliitis (HCC)  -     Lipid panel; Future  -     CBC and differential; Future  -     Comprehensive metabolic panel; Future  -     TSH, 3rd generation with Free T4 reflex; Future  -     Vitamin D 25 hydroxy; Future    History of malignant neoplasm of cervix  -     Lipid panel;  Future  -     CBC and differential; Future  - Comprehensive metabolic panel; Future  -     TSH, 3rd generation with Free T4 reflex; Future  -     Vitamin D 25 hydroxy; Future    Mixed hyperlipidemia  -     Lipid panel; Future  -     CBC and differential; Future  -     Comprehensive metabolic panel; Future  -     TSH, 3rd generation with Free T4 reflex; Future  -     Vitamin D 25 hydroxy; Future    Tobacco abuse  -     Lipid panel; Future  -     CBC and differential; Future  -     Comprehensive metabolic panel; Future  -     TSH, 3rd generation with Free T4 reflex; Future  -     Vitamin D 25 hydroxy; Future    Anxiety  -     Lipid panel; Future  -     CBC and differential; Future  -     Comprehensive metabolic panel; Future  -     TSH, 3rd generation with Free T4 reflex; Future  -     Vitamin D 25 hydroxy; Future    Depression, unspecified depression type  -     Lipid panel; Future  -     escitalopram (LEXAPRO) 5 mg tablet; Take 1 tablet (5 mg total) by mouth daily  -     CBC and differential; Future  -     Comprehensive metabolic panel; Future  -     TSH, 3rd generation with Free T4 reflex; Future  -     Vitamin D 25 hydroxy; Future    Vitamin D deficiency  -     Lipid panel; Future  -     CBC and differential; Future  -     Comprehensive metabolic panel; Future  -     TSH, 3rd generation with Free T4 reflex; Future  -     Vitamin D 25 hydroxy; Future    Elevated hemoglobin ([facility] [place] [address] )      Return in about 6 weeks (around 9/15/2022) for Recheck  Patient Instructions     Please get your labwork done fasting  Do not eat/drink for about 8-12 hours prior to getting the labwork done, however you may drink water or black coffee while you are fasting  Please use a [facility] lab if possible, as we receive the lab results more quickly  We will notify you of your labwork results even if they are normal   Please contact us if you do not hear about your lab results after one week  Low Carb Recommendations:  Please follow a low carbohydrate, high protein diet  [employer] is a good bhavna/computer program to keep food logs  Your meals should be less than 30 grams of carbohydrates  Your snacks should be less than 15 grams of carbohydrates  You should drink at least 100 ounces of water daily  You should walk at least 30 minutes daily  Start Lexapro daily, increase water intake  Medicare Preventive Visit Patient Instructions  Thank you for completing your Welcome to Medicare Visit or Medicare Annual Wellness Visit today  Your next wellness visit will be due in one year (8/5/2023)  The screening/preventive services that you may require over the next 5-10 years are detailed below  Some tests may not apply to you based off risk factors and/or age  Screening tests ordered at today's visit but not completed yet may show as past due  Also, please note that scanned in results may not display below  Preventive Screenings:  Service Recommendations Previous Testing/Comments   Colorectal Cancer Screening  * Colonoscopy    * Fecal Occult Blood Test (FOBT)/Fecal Immunochemical Test (FIT)  * Fecal DNA/Cologuard Test  * Flexible Sigmoidoscopy Age: 54-65 years old   Colonoscopy: every 10 years (may be performed more frequently if at higher risk)  OR  FOBT/FIT: every 1 year  OR  Cologuard: every 3 years  OR  Sigmoidoscopy: every 5 years  Screening may be recommended earlier than age 48 if at higher risk for colorectal cancer  Also, an individualized decision between you and your healthcare provider will decide whether screening between the ages of 74-80 would be appropriate  Colonoscopy: 10/07/2019  FOBT/FIT: Not on file  Cologuard: Not on file  Sigmoidoscopy: Not on file    Screening Current     Breast Cancer Screening Age: 36 years old  Frequency: every 1-2 years  Not required if history of left and right mastectomy Mammogram: 09/03/2021    Screening Current   Cervical Cancer Screening Between the ages of 21-29, pap smear recommended once every 3 years     Between the ages of 33-67, can perform pap smear with HPV co-testing every 5 years  Recommendations may differ for women with a history of total hysterectomy, cervical cancer, or abnormal pap smears in past  Pap Smear: Not on file    History Cervical Cancer   Hepatitis C Screening Once for adults born between 1945 and 1965  More frequently in patients at high risk for Hepatitis C Hep C Antibody: Not on file        Diabetes Screening 1-2 times per year if you're at risk for diabetes or have pre-diabetes Fasting glucose: 87 mg/dL   A1C: 5 6 %        Cholesterol Screening Once every 5 years if you don't have a lipid disorder  May order more often based on risk factors  Lipid panel: 10/21/2021    Screening Not Indicated  History Lipid Disorder     Other Preventive Screenings Covered by Medicare:  1  Abdominal Aortic Aneurysm (AAA) Screening: covered once if your at risk  You're considered to be at risk if you have a family history of AAA  2  Lung Cancer Screening: covers low dose CT scan once per year if you meet all of the following conditions: (1) Age 50-69; (2) No signs or symptoms of lung cancer; (3) Current smoker or have quit smoking within the last 15 years; (4) You have a tobacco smoking history of at least 30 pack years (packs per day multiplied by number of years you smoked); (5) You get a written order from a healthcare provider  3  Glaucoma Screening: covered annually if you're considered high risk: (1) You have diabetes OR (2) Family history of glaucoma OR (3)  aged 48 and older OR (3)  American aged 72 and older  3   Osteoporosis Screening: covered every 2 years if you meet one of the following conditions: (1) You're estrogen deficient and at risk for osteoporosis based off medical history and other findings; (2) Have a vertebral abnormality; (3) On glucocorticoid therapy for more than 3 months; (4) Have primary hyperparathyroidism; (5) On osteoporosis medications and need to assess response to drug therapy  · Last bone density test (DXA Scan): 09/09/2021   5  HIV Screening: covered annually if you're between the age of 15-65  Also covered annually if you are younger than 13 and older than 72 with risk factors for HIV infection  For pregnant patients, it is covered up to 3 times per pregnancy  Immunizations:  Immunization Recommendations   Influenza Vaccine Annual influenza vaccination during flu season is recommended for all persons aged >= 6 months who do not have contraindications   Pneumococcal Vaccine (Prevnar and Pneumovax)  * Prevnar = PCV13  * Pneumovax = PPSV23   Adults 25-60 years old: 1-3 doses may be recommended based on certain risk factors  Adults 72 years old: Prevnar (PCV13) vaccine recommended followed by Pneumovax (PPSV23) vaccine  If already received PPSV23 since turning 65, then PCV13 recommended at least one year after PPSV23 dose  Hepatitis B Vaccine 3 dose series if at intermediate or high risk (ex: diabetes, end stage renal disease, liver disease)   Tetanus (Td) Vaccine - COST NOT COVERED BY MEDICARE PART B Following completion of primary series, a booster dose should be given every 10 years to maintain immunity against tetanus  Td may also be given as tetanus wound prophylaxis  Tdap Vaccine - COST NOT COVERED BY MEDICARE PART B Recommended at least once for all adults  For pregnant patients, recommended with each pregnancy     Shingles Vaccine (Shingrix) - COST NOT COVERED BY MEDICARE PART B  2 shot series recommended in those aged 48 and above     Health Maintenance Due:      Topic Date Due    Lung Cancer Screening  05/28/2020    Breast Cancer Screening: Mammogram  09/03/2022    Colorectal Cancer Screening  10/07/2022    Hepatitis C Screening  08/04/2023 (Originally 1945)    DXA SCAN  09/03/2023     Immunizations Due:      Topic Date Due    Pneumococcal Vaccine: 65+ Years (1 - PCV) Never done    Influenza Vaccine (1) 09/01/2022     Advance Directives   What are advance directives? Advance directives are legal documents that state your wishes and plans for medical care  These plans are made ahead of time in case you lose your ability to make decisions for yourself  Advance directives can apply to any medical decision, such as the treatments you want, and if you want to donate organs  What are the types of advance directives? There are many types of advance directives, and each state has rules about how to use them  You may choose a combination of any of the following:  · Living will: This is a written record of the treatment you want  You can also choose which treatments you do not want, which to limit, and which to stop at a certain time  This includes surgery, medicine, IV fluid, and tube feedings  · Durable power of  for healthcare [facility]): This is a written record that states who you want to make healthcare choices for you when you are unable to make them for yourself  This person, called a proxy, is usually a family member or a friend  You may choose more than 1 proxy  · Do not resuscitate (DNR) order:  A DNR order is used in case your heart stops beating or you stop breathing  It is a request not to have certain forms of treatment, such as CPR  A DNR order may be included in other types of advance directives  · Medical directive: This covers the care that you want if you are in a coma, near death, or unable to make decisions for yourself  You can list the treatments you want for each condition  Treatment may include pain medicine, surgery, blood transfusions, dialysis, IV or tube feedings, and a ventilator (breathing machine)  · Values history: This document has questions about your views, beliefs, and how you feel and think about life  This information can help others choose the care that you would choose  Why are advance directives important? An advance directive helps you control your care   Although spoken wishes may be used, it is better to have your wishes written down  Spoken wishes can be misunderstood, or not followed  Treatments may be given even if you do not want them  An advance directive may make it easier for your family to make difficult choices about your care  Urinary Incontinence   Urinary incontinence (UI)  is when you lose control of your bladder  UI develops because your bladder cannot store or empty urine properly  The 3 most common types of UI are stress incontinence, urge incontinence, or both  Medicines:   · May be given to help strengthen your bladder control  Report any side effects of medication to your healthcare provider  Do pelvic muscle exercises often:  Your pelvic muscles help you stop urinating  Squeeze these muscles tight for 5 seconds, then relax for 5 seconds  Gradually work up to squeezing for 10 seconds  Do 3 sets of 15 repetitions a day, or as directed  This will help strengthen your pelvic muscles and improve bladder control  Train your bladder:  Go to the bathroom at set times, such as every 2 hours, even if you do not feel the urge to go  You can also try to hold your urine when you feel the urge to go  For example, hold your urine for 5 minutes when you feel the urge to go  As that becomes easier, hold your urine for 10 minutes  Self-care:   · Keep a UI record  Write down how often you leak urine and how much you leak  Make a note of what you were doing when you leaked urine  · Drink liquids as directed  You may need to limit the amount of liquid you drink to help control your urine leakage  Do not drink any liquid right before you go to bed  Limit or do not have drinks that contain caffeine or alcohol  · Prevent constipation  Eat a variety of high-fiber foods  Good examples are high-fiber cereals, beans, vegetables, and whole-grain breads  Walking is the best way to trigger your intestines to have a bowel movement  · Exercise regularly and maintain a healthy weight    Weight loss and exercise will decrease pressure on your bladder and help you control your leakage  · Use a catheter as directed  to help empty your bladder  A catheter is a tiny, plastic tube that is put into your bladder to drain your urine  · Go to behavior therapy as directed  Behavior therapy may be used to help you learn to control your urge to urinate  Cigarette Smoking and Your Health   Risks to your health if you smoke:  Nicotine and other chemicals found in tobacco damage every cell in your body  Even if you are a light smoker, you have an increased risk for cancer, heart disease, and lung disease  If you are pregnant or have diabetes, smoking increases your risk for complications  Benefits to your health if you stop smoking:   · You decrease respiratory symptoms such as coughing, wheezing, and shortness of breath  · You reduce your risk for cancers of the lung, mouth, throat, kidney, bladder, pancreas, stomach, and cervix  If you already have cancer, you increase the benefits of chemotherapy  You also reduce your risk for cancer returning or a second cancer from developing  · You reduce your risk for heart disease, blood clots, heart attack, and stroke  · You reduce your risk for lung infections, and diseases such as pneumonia, asthma, chronic bronchitis, and emphysema  · Your circulation improves  More oxygen can be delivered to your body  If you have diabetes, you lower your risk for complications, such as kidney, artery, and eye diseases  You also lower your risk for nerve damage  Nerve damage can lead to amputations, poor vision, and blindness  · You improve your body's ability to heal and to fight infections  For more information and support to stop smoking:   · "[employer]"  Phone: 8- 511 - [phone]  Web Address: www [employer]  Narcotic (Opioid) Safety    Use narcotics safely:  · Take prescribed narcotics exactly as directed  · Do not give narcotics to others or take narcotics that belong to someone else  · Do not mix narcotics without medicines or alcohol  · Do not drive or operate heavy machinery after you take the narcotic  · Monitor for side effects and notify your healthcare provider if you experienced side effects such as nausea, sleepiness, itching, or trouble thinking clearly  Manage constipation:    Constipation is the most common side effect of narcotic medicine  Constipation is when you have hard, dry bowel movements, or you go longer than usual between bowel movements  Tell your healthcare provider about all changes in your bowel movements while you are taking narcotics  He or she may recommend laxative medicine to help you have a bowel movement  He or she may also change the kind of narcotic you are taking, or change when you take it  The following are more ways you can prevent or relieve constipation:    · Drink liquids as directed  You may need to drink extra liquids to help soften and move your bowels  Ask how much liquid to drink each day and which liquids are best for you  · Eat high-fiber foods  This may help decrease constipation by adding bulk to your bowel movements  High-fiber foods include fruits, vegetables, whole-grain breads and cereals, and beans  Your healthcare provider or dietitian can help you create a high-fiber meal plan  Your provider may also recommend a fiber supplement if you cannot get enough fiber from food  · Exercise regularly  Regular physical activity can help stimulate your intestines  Walking is a good exercise to prevent or relieve constipation  Ask which exercises are best for you  · Schedule a time each day to have a bowel movement  This may help train your body to have regular bowel movements  Bend forward while you are on the toilet to help move the bowel movement out  Sit on the toilet for at least 10 minutes, even if you do not have a bowel movement  Store narcotics safely:   · Store narcotics where others cannot easily get them    Keep them in a locked cabinet or secure area  Do not  keep them in a purse or other bag you carry with you  A person may be looking for something else and find the narcotics  · Make sure narcotics are stored out of the reach of children  A child can easily overdose on narcotics  Narcotics may look like candy to a small child  The best way to dispose of narcotics: The laws vary by country and area  In the [place], the best way is to return the narcotics through a take-back program  This program is offered by the [employer]  The following are options for using the program:  · Take the narcotics to a [employer] collection site  The site is often a law enforcement center  Call your local law enforcement center for scheduled take-back days in your area  You will be given information on where to go if the collection site is in a different location  · Take the narcotics to an approved pharmacy or hospital   A pharmacy or hospital may be set up as a collection site  You will need to ask if it is a [employer] collection site if you were not directed there  A pharmacy or doctor's office may not be able to take back narcotics unless it is a [employer] site  · Use a mail-back system  This means you are given containers to put the narcotics into  You will then mail them in the containers  · Use a take-back drop box  This is a place to leave the narcotics at any time  People and animals will not be able to get into the box  Your local law enforcement agency can tell you where to find a drop box in your area  Other ways to manage pain:   · Ask your healthcare provider about non-narcotic medicines to control pain  Nonprescription medicines include NSAIDs (such as ibuprofen) and acetaminophen  Prescription medicines include muscle relaxers, antidepressants, and steroids  · Pain may be managed without any medicines  Some ways to relieve pain include massage, aromatherapy, or meditation   Physical or occupational therapy may also help  For more information:   · Drug Enforcement Administration  [address] [facility] [address]  Phone: 6- 973 - [phone]  Web Address: [facility]/drug_disposal/    · [address] and Drug Administration  [place] Kristal Klein , [address]  Phone: 5- 299 - [phone]  Web Address: http://[employer]/     © Copyright [employer] 2018 Information is for End User's use only and may not be sold, redistributed or otherwise used for commercial purposes   All illustrations and images included in CareNotes® are the copyrighted property of [employer]  or Pamella Townsend

## 2022-08-11 ENCOUNTER — APPOINTMENT (OUTPATIENT)
Dept: LAB | Facility: CLINIC | Age: 77
End: 2022-08-11
Payer: MEDICARE

## 2022-08-11 DIAGNOSIS — R03.0 WHITE COAT SYNDROME WITH HIGH BLOOD PRESSURE BUT WITHOUT HYPERTENSION: ICD-10-CM

## 2022-08-11 DIAGNOSIS — E78.2 MIXED HYPERLIPIDEMIA: ICD-10-CM

## 2022-08-11 DIAGNOSIS — E55.9 VITAMIN D DEFICIENCY: ICD-10-CM

## 2022-08-11 DIAGNOSIS — F41.1 GENERALIZED ANXIETY DISORDER: ICD-10-CM

## 2022-08-11 DIAGNOSIS — Z00.00 MEDICARE ANNUAL WELLNESS VISIT, SUBSEQUENT: ICD-10-CM

## 2022-08-11 DIAGNOSIS — Z87.891 PERSONAL HISTORY OF NICOTINE DEPENDENCE: ICD-10-CM

## 2022-08-11 DIAGNOSIS — J44.9 CHRONIC OBSTRUCTIVE PULMONARY DISEASE, UNSPECIFIED COPD TYPE (HCC): ICD-10-CM

## 2022-08-11 DIAGNOSIS — Z85.41 HISTORY OF MALIGNANT NEOPLASM OF CERVIX: ICD-10-CM

## 2022-08-11 DIAGNOSIS — Z13.6 SCREENING FOR CARDIOVASCULAR CONDITION: ICD-10-CM

## 2022-08-11 DIAGNOSIS — K58.0 IRRITABLE BOWEL SYNDROME WITH DIARRHEA: ICD-10-CM

## 2022-08-11 DIAGNOSIS — Z72.0 TOBACCO ABUSE: ICD-10-CM

## 2022-08-11 DIAGNOSIS — F32.A DEPRESSION, UNSPECIFIED DEPRESSION TYPE: ICD-10-CM

## 2022-08-11 DIAGNOSIS — I10 ESSENTIAL HYPERTENSION: ICD-10-CM

## 2022-08-11 DIAGNOSIS — M47.818 SI JOINT ARTHRITIS: ICD-10-CM

## 2022-08-11 DIAGNOSIS — Z12.2 ENCOUNTER FOR SCREENING FOR LUNG CANCER: ICD-10-CM

## 2022-08-11 DIAGNOSIS — M46.1 SACROILIITIS (HCC): ICD-10-CM

## 2022-08-11 DIAGNOSIS — F41.9 ANXIETY: ICD-10-CM

## 2022-08-11 LAB
25(OH)D3 SERPL-MCNC: 21.4 NG/ML (ref 30–100)
ALBUMIN SERPL BCP-MCNC: 3.3 G/DL (ref 3.5–5)
ALP SERPL-CCNC: 84 U/L (ref 46–116)
ALT SERPL W P-5'-P-CCNC: 24 U/L (ref 12–78)
ANION GAP SERPL CALCULATED.3IONS-SCNC: 6 MMOL/L (ref 4–13)
AST SERPL W P-5'-P-CCNC: 20 U/L (ref 5–45)
BASOPHILS # BLD AUTO: 0.04 THOUSANDS/ΜL (ref 0–0.1)
BASOPHILS NFR BLD AUTO: 1 % (ref 0–1)
BILIRUB SERPL-MCNC: 0.49 MG/DL (ref 0.2–1)
BUN SERPL-MCNC: 12 MG/DL (ref 5–25)
CALCIUM ALBUM COR SERPL-MCNC: 9.9 MG/DL (ref 8.3–10.1)
CALCIUM SERPL-MCNC: 9.3 MG/DL (ref 8.3–10.1)
CHLORIDE SERPL-SCNC: 106 MMOL/L (ref 96–108)
CHOLEST SERPL-MCNC: 220 MG/DL
CO2 SERPL-SCNC: 29 MMOL/L (ref 21–32)
CREAT SERPL-MCNC: 1.1 MG/DL (ref 0.6–1.3)
EOSINOPHIL # BLD AUTO: 0.19 THOUSAND/ΜL (ref 0–0.61)
EOSINOPHIL NFR BLD AUTO: 3 % (ref 0–6)
ERYTHROCYTE [DISTWIDTH] IN BLOOD BY AUTOMATED COUNT: 13.5 % (ref 11.6–15.1)
GFR SERPL CREATININE-BSD FRML MDRD: 49 ML/MIN/1.73SQ M
GLUCOSE P FAST SERPL-MCNC: 85 MG/DL (ref 65–99)
HCT VFR BLD AUTO: 48.8 % (ref 34.8–46.1)
HDLC SERPL-MCNC: 56 MG/DL
HGB BLD-MCNC: 15.9 G/DL (ref 11.5–15.4)
IMM GRANULOCYTES # BLD AUTO: 0.02 THOUSAND/UL (ref 0–0.2)
IMM GRANULOCYTES NFR BLD AUTO: 0 % (ref 0–2)
LDLC SERPL CALC-MCNC: 144 MG/DL (ref 0–100)
LYMPHOCYTES # BLD AUTO: 1 THOUSANDS/ΜL (ref 0.6–4.47)
LYMPHOCYTES NFR BLD AUTO: 18 % (ref 14–44)
MCH RBC QN AUTO: 30.3 PG (ref 26.8–34.3)
MCHC RBC AUTO-ENTMCNC: 32.6 G/DL (ref 31.4–37.4)
MCV RBC AUTO: 93 FL (ref 82–98)
MONOCYTES # BLD AUTO: 0.41 THOUSAND/ΜL (ref 0.17–1.22)
MONOCYTES NFR BLD AUTO: 7 % (ref 4–12)
NEUTROPHILS # BLD AUTO: 3.94 THOUSANDS/ΜL (ref 1.85–7.62)
NEUTS SEG NFR BLD AUTO: 71 % (ref 43–75)
NONHDLC SERPL-MCNC: 164 MG/DL
NRBC BLD AUTO-RTO: 0 /100 WBCS
PLATELET # BLD AUTO: 277 THOUSANDS/UL (ref 149–390)
PMV BLD AUTO: 10.4 FL (ref 8.9–12.7)
POTASSIUM SERPL-SCNC: 3.6 MMOL/L (ref 3.5–5.3)
PROT SERPL-MCNC: 6.6 G/DL (ref 6.4–8.4)
RBC # BLD AUTO: 5.24 MILLION/UL (ref 3.81–5.12)
SODIUM SERPL-SCNC: 141 MMOL/L (ref 135–147)
TRIGL SERPL-MCNC: 101 MG/DL
TSH SERPL DL<=0.05 MIU/L-ACNC: 1.27 UIU/ML (ref 0.45–4.5)
WBC # BLD AUTO: 5.6 THOUSAND/UL (ref 4.31–10.16)

## 2022-08-11 PROCEDURE — 84443 ASSAY THYROID STIM HORMONE: CPT

## 2022-08-11 PROCEDURE — 82306 VITAMIN D 25 HYDROXY: CPT

## 2022-08-11 PROCEDURE — 80053 COMPREHEN METABOLIC PANEL: CPT

## 2022-08-11 PROCEDURE — 80061 LIPID PANEL: CPT

## 2022-08-11 PROCEDURE — 85025 COMPLETE CBC W/AUTO DIFF WBC: CPT

## 2022-08-11 PROCEDURE — 36415 COLL VENOUS BLD VENIPUNCTURE: CPT

## 2022-09-13 ENCOUNTER — OFFICE VISIT (OUTPATIENT)
Dept: FAMILY MEDICINE CLINIC | Facility: CLINIC | Age: 77
End: 2022-09-13
Payer: MEDICARE

## 2022-09-13 VITALS
BODY MASS INDEX: 23.11 KG/M2 | OXYGEN SATURATION: 97 % | WEIGHT: 143.8 LBS | HEIGHT: 66 IN | DIASTOLIC BLOOD PRESSURE: 100 MMHG | HEART RATE: 67 BPM | SYSTOLIC BLOOD PRESSURE: 162 MMHG | TEMPERATURE: 94.4 F

## 2022-09-13 DIAGNOSIS — K58.0 IRRITABLE BOWEL SYNDROME WITH DIARRHEA: Primary | ICD-10-CM

## 2022-09-13 DIAGNOSIS — F33.9 DEPRESSION, RECURRENT (HCC): ICD-10-CM

## 2022-09-13 DIAGNOSIS — I10 ESSENTIAL HYPERTENSION: ICD-10-CM

## 2022-09-13 DIAGNOSIS — K59.1 FUNCTIONAL DIARRHEA: ICD-10-CM

## 2022-09-13 DIAGNOSIS — Z12.31 ENCOUNTER FOR SCREENING MAMMOGRAM FOR MALIGNANT NEOPLASM OF BREAST: ICD-10-CM

## 2022-09-13 DIAGNOSIS — F41.1 GENERALIZED ANXIETY DISORDER: ICD-10-CM

## 2022-09-13 PROCEDURE — 99214 OFFICE O/P EST MOD 30 MIN: CPT | Performed by: FAMILY MEDICINE

## 2022-09-13 NOTE — PATIENT INSTRUCTIONS
Please send me some BP readings in next 2 weeks  Restart the Lexapro, may do 1/2 tablet daily for a week or so  Call to let me know if you are tolerating this  GI visit due in October

## 2022-09-13 NOTE — PROGRESS NOTES
Subjective:    HPI  Sylvia Campbell is a 78 y o  female here today for:  Chief Complaint   Patient presents with    Follow-up     6 week follow up           ---Above per clinical staff & reviewed  ---  HPI:  72yof here for follow up  Pt only took 1 tablet of lexapro- said it made her feel dizzy  Discussed trying it again, maybe half tablet to start  Encouraged increase water intake  Reviewed GI notes- pt had seemed to have improvement on [place] at one time  Pt due for GI visit in October- recommended she call now for visit  Due for mammogram   BP elevated today- normal at home, anxious at office  Will do home readings and get back to me      The following portions of the patient's history were reviewed and updated as appropriate: allergies, current medications, past family history, past medical history, past social history, past surgical history and problem list     Past Medical History:   Diagnosis Date    Anxiety     Atypical squamous cells cannot exclude high grade squamous intraepithelial lesion on cytologic smear of cervix (ASC-H)     Last Assessed 5/13/2014    Back pain     Cancer [facility] [address]     Cervical cancer [facility] [address] [facility]     Depression     Ovarian cyst     Tinnitus of both ears 10/2/2020    Tobacco abuse 11/20/2018       Past Surgical History:   Procedure Laterality Date    ANKLE FRACTURE SURGERY      Last Assessed 07/26/2016    ANKLE SURGERY      CERVICAL BIOPSY  W/ LOOP ELECTRODE EXCISION      COLONOSCOPY  10/2019    DIAGNOSTIC LAPAROSCOPY      DILATION AND CURETTAGE OF UTERUS      ESOPHAGOGASTRODUODENOSCOPY N/A 9/12/2016    Procedure: ESOPHAGOGASTRODUODENOSCOPY (EGD); Surgeon: Elaine Kaba MD;  [facility]; Service:     FRACTURE SURGERY      ORTHOPEDIC SURGERY      HI COLONOSCOPY FLX DX W/[facility] REHABILITATION WHEN PFRMD N/A 9/12/2016    Procedure: Chana Shafer;  Surgeon: Elaine Kaba MD;  [facility];   Service: Colorectal    SALPINGOOPHORECTOMY Left     TONSILLECTOMY      UPPER GASTROINTESTINAL ENDOSCOPY  2016       Social History     Socioeconomic History    Marital status:      Spouse name: None    Number of children: None    Years of education: 13    Highest education level: None   Occupational History    None   Tobacco Use    Smoking status: Current Every Day Smoker     Packs/day: 0 50     Years: 60 00     Pack years: 30 00     Types: Cigarettes    Smokeless tobacco: Never Used   Vaping Use    Vaping Use: Never used   Substance and Sexual Activity    Alcohol use: No    Drug use: Never    Sexual activity: Not Currently   Other Topics Concern    None   Social History Narrative    ** Merged History Encounter **           since 2015 after 13 year marriage  1 daughter from 1st marriage  1 granddaughter, Andrade Sarmiento, whom she raised  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  Social Determinants of Health     Financial Resource Strain: Not on file   Food Insecurity: Not on file   Transportation Needs: Not on file   Physical Activity: Not on file   Stress: Not on file   Social Connections: Not on file   Intimate Partner Violence: Not on file   Housing Stability: Not on file       Current Outpatient Medications   Medication Sig Dispense Refill    ALPRAZolam (XANAX) 0 5 mg tablet Take 1 tablet (0 5 mg total) by mouth daily at bedtime as needed for anxiety 30 tablet 0    escitalopram (LEXAPRO) 5 mg tablet Take 1 tablet (5 mg total) by mouth daily 30 tablet 1    hydrochlorothiazide (MICROZIDE) 12 5 mg capsule Take 1 capsule (12 5 mg total) by mouth daily 90 capsule 0     No current facility-administered medications for this visit  Review of Systems   Constitutional: Negative  Negative for chills and fever  HENT: Negative  Negative for ear pain and sore throat  Eyes: Negative for pain and visual disturbance  Respiratory: Negative  Negative for cough and shortness of breath  Cardiovascular: Negative    Negative for chest pain and palpitations  Gastrointestinal: Positive for abdominal pain and diarrhea  Negative for vomiting  Genitourinary: Negative  Negative for dysuria and hematuria  Musculoskeletal: Negative for arthralgias and back pain  Skin: Negative for color change and rash  Neurological: Negative  Negative for seizures and syncope  Psychiatric/Behavioral: The patient is nervous/anxious  Objective:    /100 (BP Location: Left arm, Patient Position: Sitting, Cuff Size: Large)   Pulse 67   Temp (!) 94 4 °F (34 7 °C) (Temporal)   Ht 5' 6" (1 676 m)   Wt 65 2 kg (143 lb 12 8 oz)   SpO2 97%   BMI 23 21 kg/m²   Wt Readings from Last 3 Encounters:   09/13/22 65 2 kg (143 lb 12 8 oz)   08/04/22 65 2 kg (143 lb 12 8 oz)   07/14/22 64 kg (141 lb)     BP Readings from Last 3 Encounters:   09/13/22 162/100   08/04/22 160/86   07/14/22 144/86       Lab Results   Component Value Date    WBC 5 60 08/11/2022    HGB 15 9 (H) 08/11/2022    HCT 48 8 (H) 08/11/2022     08/11/2022    TRIG 101 08/11/2022    HDL 56 08/11/2022    ALT 24 08/11/2022    AST 20 08/11/2022     12/10/2015    K 3 6 08/11/2022     08/11/2022    CREATININE 1 10 08/11/2022    BUN 12 08/11/2022    CO2 29 08/11/2022    INR 0 99 04/25/2014    GLUF 85 08/11/2022    HGBA1C 5 6 05/18/2019       Physical Exam  Vitals and nursing note reviewed  Constitutional:       Appearance: Normal appearance  She is well-developed  HENT:      Head: Normocephalic and atraumatic  Eyes:      Pupils: Pupils are equal, round, and reactive to light  Cardiovascular:      Rate and Rhythm: Normal rate and regular rhythm  Heart sounds: No murmur heard  Pulmonary:      Effort: Pulmonary effort is normal       Breath sounds: Normal breath sounds  Musculoskeletal:      Cervical back: Normal range of motion and neck supple  Skin:     General: Skin is warm  Capillary Refill: Capillary refill takes less than 2 seconds     Neurological: Mental Status: She is alert and oriented to person, place, and time  Cranial Nerves: No cranial nerve deficit  Psychiatric:         Mood and Affect: Mood normal          Thought Content: Thought content normal                        Assessment/Plan:   Darryl Lott was seen today for follow-up  Diagnoses and all orders for this visit:    Irritable bowel syndrome with diarrhea    Essential hypertension    Functional diarrhea    Generalized anxiety disorder    Depression, recurrent ([facility] [address] )    Encounter for screening mammogram for malignant neoplasm of breast  -     Mammo screening bilateral w 3d & cad; Future      Return in about 3 months (around 12/13/2022) for Recheck  Patient Instructions   Please send me some BP readings in next 2 weeks  Restart the Lexapro, may do 1/2 tablet daily for a week or so  Call to let me know if you are tolerating this  GI visit due in October

## 2022-09-14 ENCOUNTER — TELEPHONE (OUTPATIENT)
Dept: FAMILY MEDICINE CLINIC | Facility: CLINIC | Age: 77
End: 2022-09-14

## 2022-09-14 NOTE — TELEPHONE ENCOUNTER
Patient called she states that she is dizzy and feel out of it  She took her first lexapro yesterday and she thinks its from the medication  She said she is very sensitive to medications and she believes it is from this  I advised her that this medication usually takes about a week to kick in, but I did advise for her not to take it anymore until you advise what to do  She also mentioned that her blood pressure is 184/104 I did tell her that if she continues to feel like this to have someone drive her to the emergency room since her blood pressure is so high, and you are not in the office today

## 2022-09-20 ENCOUNTER — OFFICE VISIT (OUTPATIENT)
Dept: FAMILY MEDICINE CLINIC | Facility: CLINIC | Age: 77
End: 2022-09-20
Payer: MEDICARE

## 2022-09-20 VITALS
HEIGHT: 66 IN | SYSTOLIC BLOOD PRESSURE: 169 MMHG | DIASTOLIC BLOOD PRESSURE: 110 MMHG | HEART RATE: 72 BPM | BODY MASS INDEX: 23.01 KG/M2 | TEMPERATURE: 95.8 F | WEIGHT: 143.2 LBS | OXYGEN SATURATION: 97 %

## 2022-09-20 DIAGNOSIS — F41.1 GENERALIZED ANXIETY DISORDER: ICD-10-CM

## 2022-09-20 DIAGNOSIS — Z72.0 TOBACCO ABUSE: ICD-10-CM

## 2022-09-20 DIAGNOSIS — R42 VERTIGO: ICD-10-CM

## 2022-09-20 DIAGNOSIS — H93.13 TINNITUS OF BOTH EARS: ICD-10-CM

## 2022-09-20 DIAGNOSIS — R42 DIZZINESS: Primary | ICD-10-CM

## 2022-09-20 DIAGNOSIS — N18.31 STAGE 3A CHRONIC KIDNEY DISEASE (HCC): ICD-10-CM

## 2022-09-20 DIAGNOSIS — I10 ESSENTIAL HYPERTENSION: ICD-10-CM

## 2022-09-20 PROCEDURE — 99214 OFFICE O/P EST MOD 30 MIN: CPT | Performed by: FAMILY MEDICINE

## 2022-09-20 RX ORDER — MECLIZINE HYDROCHLORIDE 25 MG/1
25 TABLET ORAL 3 TIMES DAILY PRN
Qty: 60 TABLET | Refills: 0 | Status: SHIPPED | OUTPATIENT
Start: 2022-09-20

## 2022-09-20 NOTE — PROGRESS NOTES
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 and states it is not as severe, but does notice it when she is bending over to pick things up   Pt has never had any sort of testing or medication for this, has not mentioned it much to me either  Discussed bloodflow and getting up slowly and gradually to avoid dizziness  Discussed wearing compression socks  Discussed increasing water intake as she does not drink hardly much at all  Reviewed labs- GFR decreased slightly more- had been stable in mid 50 range- pt unsure of when it started decreasing and does not remember it ever being mentioned   Will look back in chart but did discuss possible nephrology referral  Discussed everything with daughter on phone after visit    The following portions of the patient's history were reviewed and updated as appropriate: allergies, current medications, past family history, past medical history, past social history, past surgical history and problem list     Past Medical History:   Diagnosis Date    Anxiety     Atypical squamous cells cannot exclude high grade squamous intraepithelial lesion on cytologic smear of cervix (ASC-H)     Last Assessed 5/13/2014    Back pain     Cancer [facility] [address]     Cervical cancer ([facility] [address] [facility]     Depression     Ovarian cyst     Tinnitus of both ears 10/2/2020    Tobacco abuse 11/20/2018       Past Surgical History:   Procedure Laterality Date    ANKLE FRACTURE SURGERY      Last Assessed 07/26/2016    ANKLE SURGERY      CERVICAL BIOPSY  W/ LOOP ELECTRODE EXCISION      COLONOSCOPY  10/2019    DIAGNOSTIC LAPAROSCOPY      DILATION AND CURETTAGE OF UTERUS      ESOPHAGOGASTRODUODENOSCOPY N/A 9/12/2016    Procedure: ESOPHAGOGASTRODUODENOSCOPY (EGD); Surgeon: Joe Chase MD;  [facility]; Service:     FRACTURE SURGERY      ORTHOPEDIC SURGERY      VT COLONOSCOPY FLX DX W/[facility]PATIENT [facility] WHEN PFRMD N/A 9/12/2016    Procedure: Lynita Manifold;  Surgeon: Joe Chase MD;  [facility]; Service: Colorectal    SALPINGOOPHORECTOMY Left     TONSILLECTOMY      UPPER GASTROINTESTINAL ENDOSCOPY  2016       Social History     Socioeconomic History    Marital status:      Spouse name: None    Number of children: None    Years of education: 13    Highest education level: None   Occupational History    None   Tobacco Use    Smoking status: Current Every Day Smoker     Packs/day: 0 50     Years: 60 00     Pack years: 30 00     Types: Cigarettes    Smokeless tobacco: Never Used   Vaping Use    Vaping Use: Never used   Substance and Sexual Activity    Alcohol use: No    Drug use: Never    Sexual activity: Not Currently   Other Topics Concern    None   Social History Narrative    ** Merged History Encounter **           since 2015 after 13 year marriage  1 daughter from 1st marriage  1 granddaughter, Andrea Santillan, whom she raised  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       Social Determinants of Health     Financial Resource Strain: Not on file   Food Insecurity: Not on file   Transportation Needs: Not on file   Physical Activity: Not on file   Stress: Not on file   Social Connections: Not on file   Intimate Partner Violence: Not on file   Housing Stability: Not on file       Current Outpatient Medications   Medication Sig Dispense Refill    ALPRAZolam (XANAX) 0 5 mg tablet Take 1 tablet (0 5 mg total) by mouth daily at bedtime as needed for anxiety 30 tablet 0    hydrochlorothiazide (MICROZIDE) 12 5 mg capsule Take 1 capsule (12 5 mg total) by mouth daily 90 capsule 0    meclizine (ANTIVERT) 25 mg tablet Take 1 tablet (25 mg total) by mouth 3 (three) times a day as needed for dizziness 60 tablet 0     No current facility-administered medications for this visit  Review of Systems   Constitutional: Negative  Negative for chills and fever  HENT: Negative  Negative for ear pain and sore throat  Eyes: Negative for pain and visual disturbance  Respiratory: Negative  Negative for cough and shortness of breath  Cardiovascular: Negative  Negative for chest pain and palpitations  Gastrointestinal: Negative  Negative for abdominal pain and vomiting  Genitourinary: Negative  Negative for dysuria and hematuria  Musculoskeletal: Positive for neck pain  Negative for arthralgias and back pain  Skin: Negative for color change and rash  Neurological: Positive for dizziness and headaches  Negative for seizures and syncope  Psychiatric/Behavioral: The patient is nervous/anxious           Objective:    BP (!) 169/110 (BP Location: Left arm, Patient Position: Sitting, Cuff Size: Large)   Pulse 72   Temp (!) 95 8 °F (35 4 °C) (Temporal)   Ht 5' 6" (1 676 m)   Wt 65 kg (143 lb 3 2 oz)   SpO2 97%   BMI 23 11 kg/m²   Wt Readings from Last 3 Encounters:   09/20/22 65 kg (143 lb 3 2 oz)   09/13/22 65 2 kg (143 lb 12 8 oz)   08/04/22 65 2 kg (143 lb 12 8 oz)     BP Readings from Last 3 Encounters:   09/20/22 (!) 169/110   09/13/22 162/100   08/04/22 160/86       Lab Results   Component Value Date    WBC 5 60 08/11/2022    HGB 15 9 (H) 08/11/2022    HCT 48 8 (H) 08/11/2022     08/11/2022    TRIG 101 08/11/2022    HDL 56 08/11/2022    ALT 24 08/11/2022    AST 20 08/11/2022     12/10/2015    K 3 6 08/11/2022     08/11/2022    CREATININE 1 10 08/11/2022    BUN 12 08/11/2022    CO2 29 08/11/2022    INR 0 99 04/25/2014    GLUF 85 08/11/2022    HGBA1C 5 6 05/18/2019       Physical Exam  Vitals and nursing note reviewed  Constitutional:       Appearance: Normal appearance  She is well-developed  HENT:      Head: Normocephalic and atraumatic  Eyes:      Pupils: Pupils are equal, round, and reactive to light  Cardiovascular:      Rate and Rhythm: Normal rate and regular rhythm  Heart sounds: No murmur heard  Pulmonary:      Effort: Pulmonary effort is normal       Breath sounds: Normal breath sounds  Musculoskeletal:      Cervical back: Normal range of motion and neck supple  Skin:     General: Skin is warm  Capillary Refill: Capillary refill takes less than 2 seconds  Neurological:      Mental Status: She is alert and oriented to person, place, and time  Cranial Nerves: No cranial nerve deficit  Comments: Worsening dizziness when sitting up fast   Psychiatric:         Mood and Affect: Mood normal          Thought Content: Thought content normal                  Urinary Incontinence Plan of Care: counseling topics discussed: practice Kegel (pelvic floor strengthening) exercises  Assessment/Plan:   Dane Johnson was seen today for dizziness  Diagnoses and all orders for this visit:    Dizziness  -     Ambulatory Referral to Physical Therapy; Future  -     meclizine (ANTIVERT) 25 mg tablet; Take 1 tablet (25 mg total) by mouth 3 (three) times a day as needed for dizziness    Vertigo  -     Ambulatory Referral to Physical Therapy; Future  -     meclizine (ANTIVERT) 25 mg tablet; Take 1 tablet (25 mg total) by mouth 3 (three) times a day as needed for dizziness    Tinnitus of both ears  -     Ambulatory Referral to Physical Therapy; Future  -     meclizine (ANTIVERT) 25 mg tablet;  Take 1 tablet (25 mg total) by mouth 3 (three) times a day as needed for dizziness    Essential hypertension    Stage 3a chronic kidney disease ([facility] [address] )    Tobacco abuse    Generalized anxiety disorder      Return for Recheck  Patient Instructions   Please increase your water intake at least 80 ounces water daily!!  Use compression socks during day  Please schedule evaluation for vestibular testing with physical therapy  STOP SMOKING!!!!  Use Meclizine as needed during the day  Please sit up and stand up slowly before starting to move

## 2022-09-20 NOTE — PATIENT INSTRUCTIONS
Please increase your water intake at least 80 ounces water daily!!  Use compression socks during day  Please schedule evaluation for vestibular testing with physical therapy  STOP SMOKING!!!!  Use Meclizine as needed during the day  Please sit up and stand up slowly before starting to move

## 2022-09-23 ENCOUNTER — EVALUATION (OUTPATIENT)
Dept: PHYSICAL THERAPY | Facility: CLINIC | Age: 77
End: 2022-09-23
Payer: MEDICARE

## 2022-09-23 DIAGNOSIS — H93.13 TINNITUS OF BOTH EARS: ICD-10-CM

## 2022-09-23 DIAGNOSIS — R42 DIZZINESS: ICD-10-CM

## 2022-09-23 DIAGNOSIS — R42 VERTIGO: ICD-10-CM

## 2022-09-23 PROCEDURE — 97162 PT EVAL MOD COMPLEX 30 MIN: CPT | Performed by: PHYSICAL THERAPIST

## 2022-09-23 PROCEDURE — 97112 NEUROMUSCULAR REEDUCATION: CPT | Performed by: PHYSICAL THERAPIST

## 2022-09-23 NOTE — PROGRESS NOTES
PT Evaluation     Today's date: 2022  Patient name: Grace Smalls  : 1945  MRN: [id number]  Referring provider: Favian Martinez MD  Dx:   Encounter Diagnosis     ICD-10-CM    1  Dizziness  R42 Ambulatory Referral to Physical Therapy   2  Vertigo  R42 Ambulatory Referral to Physical Therapy   3  Tinnitus of both ears  H93 13 Ambulatory Referral to Physical Therapy                  Assessment  Assessment details: 78year old female patient reports to PT with complaints of dizziness, and pressure in her head  No red flags noted and patient denies numbness/tingling  Patient symptoms seem to have cervicogenic contribution as patient's symptoms were reproduced with palpation to cervical musculature and certain cervical movements  Patient will benefit from skilled PT services to address current impairments and functional limitations to help patient return to her PLOF  Impairments: abnormal muscle firing, abnormal muscle tone, abnormal or restricted ROM, abnormal movement, activity intolerance, lacks appropriate home exercise program, pain with function, poor posture  and poor body mechanics    Symptom irritability: moderateUnderstanding of Dx/Px/POC: good   Prognosis: good    Goals  STG  1  Patient will be independent with completion of HEP throughout therapy  2  Patient will sit for prolonged periods without increase in symptoms in 4 weeks  LTG  1  Patient will supine to sit without increase in symptoms in 5 weeks    2  Patient will reach predicted FOTO score in predicted number of visits     Plan  Patient would benefit from: skilled physical therapy  Planned therapy interventions: abdominal trunk stabilization, activity modification, joint mobilization, manual therapy, motor coordination training, balance, neuromuscular re-education, patient education, strengthening, therapeutic activities, stretching, therapeutic exercise, home exercise program, functional ROM exercises and flexibility  Frequency: 1x week  Duration in weeks: 6  Treatment plan discussed with: patient        Subjective Evaluation    History of Present Illness  Mechanism of injury: Patient reports with dizziness that has been going on for 4 years and is getting worse  Patient notes she feels pressure in the back of your head, her eyes "don't feel right," nausea  Patient notes 4 years ago she "cracked" her head when she fell backward on the back of her head  Patient denies any imaging  Patient notes her symptoms started getting worse about 3 months ago  Patient denies difficulty sleeping, watching tv, driving a car  Patient does have some issues reading and the lights in [place]  Patient notes when she is working she feels better, but when she stops and sits she feels it worse again  Patient notes when she has her symptoms sometimes she has stomach issues along with it  Objective     Concurrent Complaints  Positive for nausea/motion sickness and tinnitus  Negative for visual change, hearing loss, memory loss, aural fullness, poor concentration and peripheral neuropathyHeadaches: just pressure     Active Range of Motion   Cervical/Thoracic Spine       Cervical    Left rotation:  with pain Restriction level: minimal  Right rotation:  with pain Restriction level: moderate  Neuro Exam:     Dizziness  Positive for disequilibrium, vertigo, oscillopsia (not main complaint) and light-headedness (not main complaint)  Negative for motion sickness, rocking or swaying, diplopia and floating or swimming  Exacerbating factors  Positive for turning head, supine to/from sitting (lightheadedness) and optokinetic movement (different complaint)  Negative for bending over, rolling in bed, looking up, walking and walking in busy environment       Symptoms   Duration: "little episodes all day long"  Frequency: throughout day  Intensity at best: 0/10  Intensity at worst: 5/10  Average intensity: 3/10    Headaches Headaches: just pressure      Cervical exam   Ligament Laxity Testing   Alar ligament: WNL  Sharp Shanta: WNL  Modified VBI   Left: asymptomatic  Right: asymptomatic  Seated posture: forward head posture and internally rotated shoulders    Oculomotor exam   Oculomotor ROM: WNL  Resting nystagmus: not present   Smooth pursuits: within normal limits  Vertical saccades: normal  Horizontal saccades: normal  Convergence: normal  Left normal: symptomatic but most likely due to cervical motion  Right normal: symptomatic but most likely due to cervical movement               Precautions: none according to patient       Manuals 9/23            SOR             Cervical mobs             Thoracic mobs             Cervical myofascial work             Neuro Re-Ed             [employer] tucks/elongation                          scap 4                                                                  Ther Ex             SOR r            Supine thoracic ext over hor r                         Treadmill or UBE                                                                 Ther Activity                                       Gait Training                                       Modalities

## 2022-09-27 ENCOUNTER — OFFICE VISIT (OUTPATIENT)
Dept: PHYSICAL THERAPY | Facility: CLINIC | Age: 77
End: 2022-09-27
Payer: MEDICARE

## 2022-09-27 DIAGNOSIS — H93.13 TINNITUS OF BOTH EARS: ICD-10-CM

## 2022-09-27 DIAGNOSIS — R42 VERTIGO: ICD-10-CM

## 2022-09-27 DIAGNOSIS — R42 DIZZINESS: Primary | ICD-10-CM

## 2022-09-27 PROCEDURE — 97140 MANUAL THERAPY 1/> REGIONS: CPT | Performed by: PHYSICAL THERAPIST

## 2022-09-27 PROCEDURE — 97110 THERAPEUTIC EXERCISES: CPT | Performed by: PHYSICAL THERAPIST

## 2022-09-27 PROCEDURE — 97112 NEUROMUSCULAR REEDUCATION: CPT | Performed by: PHYSICAL THERAPIST

## 2022-09-27 NOTE — PROGRESS NOTES
Daily Note     Today's date: 2022  Patient name: Weston Harris  : 1945  MRN: [id number]  Referring provider: Yovany Munoz MD  Dx:   Encounter Diagnosis     ICD-10-CM    1  Dizziness  R42    2  Vertigo  R42    3  Tinnitus of both ears  H93 13                   Subjective: Patient notes didn't do HEP as lost her paper  Objective: See treatment diary below      Assessment: Tolerated treatment well  Patient would benefit from continued PT  Treatment plan initiated  Plan: Progress treatment as tolerated         Precautions: none according to patient       Manuals            SOR   [address]           Cervical mobs   [address]           Thoracic mobs             Cervical myofascial work   [address]           Neuro Re-Ed             [employer] tucks/elongation  10x 3" holds                         scap 4   2x10 low rows grn, rows blue                                                                Ther Ex             SOR r            Supine thoracic ext over hor r 2 min                         Treadmill or UBE  UBE 4 min                                                                Ther Activity                                       Gait Training                                       Modalities

## 2022-10-04 ENCOUNTER — APPOINTMENT (OUTPATIENT)
Dept: PHYSICAL THERAPY | Facility: CLINIC | Age: 77
End: 2022-10-04

## 2022-10-11 ENCOUNTER — OFFICE VISIT (OUTPATIENT)
Dept: PHYSICAL THERAPY | Facility: CLINIC | Age: 77
End: 2022-10-11
Payer: MEDICARE

## 2022-10-11 DIAGNOSIS — R42 VERTIGO: ICD-10-CM

## 2022-10-11 DIAGNOSIS — R42 DIZZINESS: Primary | ICD-10-CM

## 2022-10-11 DIAGNOSIS — H93.13 TINNITUS OF BOTH EARS: ICD-10-CM

## 2022-10-11 PROCEDURE — 97110 THERAPEUTIC EXERCISES: CPT | Performed by: PHYSICAL THERAPIST

## 2022-10-11 PROCEDURE — 97140 MANUAL THERAPY 1/> REGIONS: CPT | Performed by: PHYSICAL THERAPIST

## 2022-10-11 NOTE — PROGRESS NOTES
Daily Note     Today's date: 10/11/2022  Patient name: Raymond Schlatter  : 1945  MRN: [id number]  Referring provider: Danielle Putnam MD  Dx:   Encounter Diagnosis     ICD-10-CM    1  Dizziness  R42    2  Vertigo  R42    3  Tinnitus of both ears  H93 13                   Subjective: Patient notes feels much better  Objective: See treatment diary below      Assessment: Tolerated treatment well  Patient would benefit from continued PT  Treatment plan progressed  Plan: Progress treatment as tolerated         Precautions: none according to patient       Manuals 9/23 9/27 10/11          SOR  [address]          Cervical mobs  [address]          Thoracic mobs             Cervical myofascial work   [address]          Neuro Re-Ed             Chin tucks/elongation  10x 3" holds  12x 3" holds                        scap 4   2x10 low rows grn, rows blue  2x12 low rows grn, rows blue                                                               Ther Ex             SOR r            Supine thoracic ext over hor r 2 min  2 min                        Treadmill or UBE  UBE 4 min  4 min                                                               Ther Activity                                       Gait Training                                       Modalities

## 2022-10-18 ENCOUNTER — APPOINTMENT (OUTPATIENT)
Dept: PHYSICAL THERAPY | Facility: CLINIC | Age: 77
End: 2022-10-18

## 2022-10-25 ENCOUNTER — OFFICE VISIT (OUTPATIENT)
Dept: PHYSICAL THERAPY | Facility: CLINIC | Age: 77
End: 2022-10-25
Payer: MEDICARE

## 2022-10-25 DIAGNOSIS — R42 DIZZINESS: ICD-10-CM

## 2022-10-25 DIAGNOSIS — H93.13 TINNITUS OF BOTH EARS: ICD-10-CM

## 2022-10-25 DIAGNOSIS — R42 VERTIGO: Primary | ICD-10-CM

## 2022-10-25 PROCEDURE — 97140 MANUAL THERAPY 1/> REGIONS: CPT

## 2022-10-25 PROCEDURE — 97110 THERAPEUTIC EXERCISES: CPT

## 2022-10-25 NOTE — PROGRESS NOTES
Daily Note     Today's date: 10/25/2022  Patient name: Deejay Chauhan  : 1945  MRN: [id number]  Referring provider: Nae Wilder MD  Dx:   Encounter Diagnosis     ICD-10-CM    1  Vertigo  R42    2  Dizziness  R42    3  Tinnitus of both ears  H93 13                   Subjective: Pt has been feeling good  TTP in R [place] and [place]  Objective: See treatment diary below     Precautions: none according to patient     Manuals 9/23 9/27 10/11 10/25         SOR  [address]         [address] mobs   [address]          Thoracic mobs             Cervical myofascial work   [address]         Neuro Re-Ed    10/25         Chin tucks/elongation  10x 3" holds  12x 3" holds                        scap 4   2x10 low rows grn, rows blue  2x12 low rows grn, rows blue  2x12 low rows grn, rows blue                                                             Ther Ex    10/25         SOR r            Supine thoracic ext over hor r 2 min  2 min  2' 1/2 foam                      Treadmill or UBE  UBE 4 min  4 min  2' ea                                                                                           Assessment: Tolerated treatment well  Patient demonstrated fatigue post treatment, exhibited good technique with therapeutic exercises and would benefit from continued PT    Plan: Continue per plan of care  Progress treatment as tolerated      Shabnam Upton

## 2022-12-19 ENCOUNTER — APPOINTMENT (EMERGENCY)
Dept: CT IMAGING | Facility: HOSPITAL | Age: 77
End: 2022-12-19

## 2022-12-19 ENCOUNTER — HOSPITAL ENCOUNTER (EMERGENCY)
Facility: HOSPITAL | Age: 77
Discharge: HOME | End: 2022-12-20
Attending: EMERGENCY MEDICINE

## 2022-12-19 DIAGNOSIS — R10.9 ABDOMINAL PAIN: ICD-10-CM

## 2022-12-19 DIAGNOSIS — K52.9 ENTERITIS: Primary | ICD-10-CM

## 2022-12-19 DIAGNOSIS — R11.0 NAUSEA: ICD-10-CM

## 2022-12-19 DIAGNOSIS — R19.7 DIARRHEA: ICD-10-CM

## 2022-12-19 LAB
ALBUMIN SERPL BCP-MCNC: 3.5 G/DL (ref 3.5–5)
ALP SERPL-CCNC: 88 U/L (ref 46–116)
ALT SERPL W P-5'-P-CCNC: 21 U/L (ref 12–78)
ANION GAP SERPL CALCULATED.3IONS-SCNC: 10 MMOL/L (ref 4–13)
AST SERPL W P-5'-P-CCNC: 14 U/L (ref 5–45)
BASOPHILS # BLD AUTO: 0.03 THOUSANDS/ÂΜL (ref 0–0.1)
BASOPHILS NFR BLD AUTO: 0 % (ref 0–1)
BILIRUB SERPL-MCNC: 0.5 MG/DL (ref 0.2–1)
BILIRUB UR QL STRIP: NEGATIVE
BUN SERPL-MCNC: 14 MG/DL (ref 5–25)
CALCIUM SERPL-MCNC: 10.2 MG/DL (ref 8.3–10.1)
CHLORIDE SERPL-SCNC: 105 MMOL/L (ref 96–108)
CLARITY UR: CLEAR
CO2 SERPL-SCNC: 27 MMOL/L (ref 21–32)
COLOR UR: YELLOW
CREAT SERPL-MCNC: 0.89 MG/DL (ref 0.6–1.3)
EOSINOPHIL # BLD AUTO: 0.09 THOUSAND/ÂΜL (ref 0–0.61)
EOSINOPHIL NFR BLD AUTO: 1 % (ref 0–6)
ERYTHROCYTE [DISTWIDTH] IN BLOOD BY AUTOMATED COUNT: 13.5 % (ref 11.6–15.1)
GFR SERPL CREATININE-BSD FRML MDRD: 63 ML/MIN/1.73SQ M
GLUCOSE SERPL-MCNC: 112 MG/DL (ref 65–140)
GLUCOSE UR STRIP-MCNC: NEGATIVE MG/DL
HCT VFR BLD AUTO: 49 % (ref 34.8–46.1)
HGB BLD-MCNC: 16.3 G/DL (ref 11.5–15.4)
HGB UR QL STRIP.AUTO: ABNORMAL
IMM GRANULOCYTES # BLD AUTO: 0.02 THOUSAND/UL (ref 0–0.2)
IMM GRANULOCYTES NFR BLD AUTO: 0 % (ref 0–2)
KETONES UR STRIP-MCNC: NEGATIVE MG/DL
LEUKOCYTE ESTERASE UR QL STRIP: NEGATIVE
LIPASE SERPL-CCNC: 47 U/L (ref 73–393)
LYMPHOCYTES # BLD AUTO: 1.02 THOUSANDS/ÂΜL (ref 0.6–4.47)
LYMPHOCYTES NFR BLD AUTO: 15 % (ref 14–44)
MCH RBC QN AUTO: 30.7 PG (ref 26.8–34.3)
MCHC RBC AUTO-ENTMCNC: 33.3 G/DL (ref 31.4–37.4)
MCV RBC AUTO: 92 FL (ref 82–98)
MONOCYTES # BLD AUTO: 0.48 THOUSAND/ÂΜL (ref 0.17–1.22)
MONOCYTES NFR BLD AUTO: 7 % (ref 4–12)
NEUTROPHILS # BLD AUTO: 5.21 THOUSANDS/ÂΜL (ref 1.85–7.62)
NEUTS SEG NFR BLD AUTO: 77 % (ref 43–75)
NITRITE UR QL STRIP: NEGATIVE
NRBC BLD AUTO-RTO: 0 /100 WBCS
PH UR STRIP.AUTO: 5 [PH] (ref 4.5–8)
PLATELET # BLD AUTO: 243 THOUSANDS/UL (ref 149–390)
PMV BLD AUTO: 10 FL (ref 8.9–12.7)
POTASSIUM SERPL-SCNC: 4.2 MMOL/L (ref 3.5–5.3)
PROT SERPL-MCNC: 6.9 G/DL (ref 6.4–8.4)
PROT UR STRIP-MCNC: NEGATIVE MG/DL
RBC # BLD AUTO: 5.31 MILLION/UL (ref 3.81–5.12)
SODIUM SERPL-SCNC: 142 MMOL/L (ref 135–147)
SP GR UR STRIP.AUTO: >=1.03 (ref 1–1.03)
UROBILINOGEN UR QL STRIP.AUTO: 0.2 E.U./DL
WBC # BLD AUTO: 6.85 THOUSAND/UL (ref 4.31–10.16)

## 2022-12-19 RX ADMIN — IOHEXOL 100 ML: 350 INJECTION, SOLUTION INTRAVENOUS at 23:50

## 2022-12-19 RX ADMIN — SODIUM CHLORIDE 500 ML: 0.9 INJECTION, SOLUTION INTRAVENOUS at 22:27

## 2022-12-20 VITALS
WEIGHT: 138.89 LBS | HEART RATE: 84 BPM | RESPIRATION RATE: 18 BRPM | TEMPERATURE: 98 F | DIASTOLIC BLOOD PRESSURE: 106 MMHG | OXYGEN SATURATION: 98 % | BODY MASS INDEX: 22.42 KG/M2 | SYSTOLIC BLOOD PRESSURE: 209 MMHG

## 2022-12-20 LAB
BACTERIA UR QL AUTO: NORMAL /HPF
NON-SQ EPI CELLS URNS QL MICRO: NORMAL /HPF
RBC #/AREA URNS AUTO: NORMAL /HPF
WBC #/AREA URNS AUTO: NORMAL /HPF

## 2022-12-20 NOTE — DISCHARGE INSTRUCTIONS
Your CT scan appears to show enteritis  This is usually self-resolving  It is important to drink plenty of fluids and get rest over the coming days as well as to followup with your Gastroenterology team if your symptoms are not better within the next 4-5 days

## 2022-12-20 NOTE — ED PROVIDER NOTES
History  Chief Complaint   Patient presents with   • Abdominal Pain     Lower abdominal pain radiating into left flank x2 days  C/o diarrhea     19-year-old female with a past medical history significant for chronic abdominal pain is now presenting with acute on chronic abdominal pain x3 days  Patient states that approximately 3 days ago she began noticing a worsening sensation that she described as a "fullness" in the left lower quadrant  She states that this pain has increased over the past several days and is now accompanied by diarrhea and a nauseous feeling  She denies any episodes of emesis  She denies any laron fevers, but does note that she has had chills especially when the pain reaches its maximum in severity  He is denying any blood in her stool  She endorses some radiation around bilateral flanks into the back  She denies prior history of kidney stones or renal pathology  Patient states this pain is very similar to pain that she has had for "several years" however is acutely worse than her typical pain  Prior to Admission Medications   Prescriptions Last Dose Informant Patient Reported? Taking?    ALPRAZolam (XANAX) 0 5 mg tablet   No No   Sig: Take 1 tablet (0 5 mg total) by mouth daily at bedtime as needed for anxiety   hydrochlorothiazide (MICROZIDE) 12 5 mg capsule   No Yes   Sig: Take 1 capsule (12 5 mg total) by mouth daily   meclizine (ANTIVERT) 25 mg tablet   No No   Sig: Take 1 tablet (25 mg total) by mouth 3 (three) times a day as needed for dizziness      Facility-Administered Medications: None       Past Medical History:   Diagnosis Date   • Anxiety    • Atypical squamous cells cannot exclude high grade squamous intraepithelial lesion on cytologic smear of cervix (ASC-H)     Last Assessed 5/13/2014   • Back pain    • Cancer [facility])    • Cervical cancer ([facility] [address] )    • Depression    • Ovarian cyst    • Tinnitus of both ears 10/2/2020   • Tobacco abuse 11/20/2018       Past Surgical History:   Procedure Laterality Date   • ANKLE FRACTURE SURGERY      Last Assessed 07/26/2016   • ANKLE SURGERY     • CERVICAL BIOPSY  W/ LOOP ELECTRODE EXCISION     • COLONOSCOPY  10/2019   • DIAGNOSTIC LAPAROSCOPY     • DILATION AND CURETTAGE OF UTERUS     • ESOPHAGOGASTRODUODENOSCOPY N/A 9/12/2016    Procedure: ESOPHAGOGASTRODUODENOSCOPY (EGD); Surgeon: Quang Haddad MD;  Location: [facility]; Service:    • FRACTURE SURGERY     • ORTHOPEDIC SURGERY     • IN COLONOSCOPY FLX DX W/COLLJ SPEC WHEN PFRMD N/A 9/12/2016    Procedure: Jose Chen;  Surgeon: Quang Haddad MD;  Location: [facility]; Service: Colorectal   • SALPINGOOPHORECTOMY Left    • TONSILLECTOMY     • UPPER GASTROINTESTINAL ENDOSCOPY  2016       Family History   Problem Relation Age of Onset   • Coronary artery disease Mother    • No Known Problems Family    • No Known Problems Father    • Colon cancer Neg Hx      I have reviewed and agree with the history as documented  E-Cigarette/Vaping   • E-Cigarette Use Never User      E-Cigarette/Vaping Substances   • Nicotine No    • THC No    • CBD No    • Flavoring No      Social History     Tobacco Use   • Smoking status: Every Day     Packs/day: 0 50     Years: 60 00     Pack years: 30 00     Types: Cigarettes   • Smokeless tobacco: Never   Vaping Use   • Vaping Use: Never used   Substance Use Topics   • Alcohol use: No   • Drug use: Never        Review of Systems   Constitutional: Positive for appetite change  Negative for chills and fever  HENT: Negative for ear pain and sore throat  Eyes: Negative for pain and visual disturbance  Respiratory: Negative for cough and shortness of breath  Cardiovascular: Negative for chest pain and palpitations  Gastrointestinal: Positive for abdominal pain, diarrhea and nausea  Negative for vomiting  Genitourinary: Negative for dysuria and hematuria  Musculoskeletal: Negative for arthralgias and back pain     Skin: Negative for color change and rash  Neurological: Negative for seizures and syncope  All other systems reviewed and are negative  Physical Exam  ED Triage Vitals [12/19/22 1902]   Temperature Pulse Respirations Blood Pressure SpO2   98 °F (36 7 °C) 94 18 (!) 191/113 97 %      Temp Source Heart Rate Source Patient Position - Orthostatic VS BP Location FiO2 (%)   Oral Monitor Sitting Right arm --      Pain Score       4             Orthostatic Vital Signs  Vitals:    12/19/22 1902 12/19/22 2229 12/20/22 0027 12/20/22 0030   BP: (!) 191/113 (!) 200/101 (!) 207/108 (!) 209/106   Pulse: 94 77 81 84   Patient Position - Orthostatic VS: Sitting Lying Lying Lying       Physical Exam  Vitals and nursing note reviewed  Constitutional:       General: She is not in acute distress  Appearance: She is well-developed  HENT:      Head: Normocephalic and atraumatic  Right Ear: External ear normal       Left Ear: External ear normal       Nose: Nose normal  No congestion or rhinorrhea  Mouth/Throat:      Mouth: Mucous membranes are moist       Pharynx: Oropharynx is clear  No oropharyngeal exudate or posterior oropharyngeal erythema  Eyes:      General: No scleral icterus  Extraocular Movements: Extraocular movements intact  Conjunctiva/sclera: Conjunctivae normal       Pupils: Pupils are equal, round, and reactive to light  Cardiovascular:      Rate and Rhythm: Normal rate and regular rhythm  Pulses: Normal pulses  Heart sounds: Normal heart sounds  No murmur heard  Pulmonary:      Effort: Pulmonary effort is normal  No respiratory distress  Breath sounds: Normal breath sounds  No wheezing or rhonchi  Abdominal:      General: Abdomen is flat  There is no distension  Palpations: Abdomen is soft  Tenderness: There is abdominal tenderness in the suprapubic area and left lower quadrant  There is guarding  Negative signs include Johns's sign and McBurney's sign        Hernia: No hernia is present  Musculoskeletal:         General: No swelling  Cervical back: Neck supple  No rigidity  Right lower leg: No edema  Left lower leg: No edema  Lymphadenopathy:      Cervical: No cervical adenopathy  Skin:     General: Skin is warm and dry  Capillary Refill: Capillary refill takes less than 2 seconds  Coloration: Skin is not jaundiced  Findings: No rash  Neurological:      General: No focal deficit present  Mental Status: She is alert and oriented to person, place, and time  Mental status is at baseline     Psychiatric:         Mood and Affect: Mood normal          Behavior: Behavior normal          ED Medications  Medications   sodium chloride 0 9 % bolus 500 mL (0 mL Intravenous Stopped 12/20/22 0045)   iohexol (OMNIPAQUE) 350 MG/ML injection (SINGLE-DOSE) 100 mL (100 mL Intravenous Given 12/19/22 2350)       Diagnostic Studies  Results Reviewed     Procedure Component Value Units Date/Time    Urine Microscopic [[id number]]  (Normal) Collected: 12/19/22 2349    Lab Status: Final result Specimen: Urine, Clean Catch Updated: 12/20/22 0210     RBC, UA 2-4 /hpf      WBC, UA None Seen /hpf      Epithelial Cells Occasional /hpf      Bacteria, UA None Seen /hpf     Urine Macroscopic, POC [[id number]]  (Abnormal) Collected: 12/19/22 2349    Lab Status: Final result Specimen: Urine Updated: 12/19/22 2350     Color, UA Yellow     Clarity, UA Clear     pH, UA 5 0     Leukocytes, UA Negative     Nitrite, UA Negative     Protein, UA Negative mg/dl      Glucose, UA Negative mg/dl      Ketones, UA Negative mg/dl      Urobilinogen, UA 0 2 E U /dl      Bilirubin, UA Negative     Occult Blood, UA Trace     Specific Gravity, UA >=1 030    Narrative:      CLINITEK RESULT    Comprehensive metabolic panel [[id number]]  (Abnormal) Collected: 12/19/22 2226    Lab Status: Final result Specimen: Blood from Arm, Right Updated: 12/19/22 2328     Sodium 142 mmol/L      Potassium 4 2 mmol/L Chloride 105 mmol/L      CO2 27 mmol/L      ANION GAP 10 mmol/L      BUN 14 mg/dL      Creatinine 0 89 mg/dL      Glucose 112 mg/dL      Calcium 10 2 mg/dL      AST 14 U/L      ALT 21 U/L      Alkaline Phosphatase 88 U/L      Total Protein 6 9 g/dL      Albumin 3 5 g/dL      Total Bilirubin 0 50 mg/dL      eGFR 63 ml/min/1 73sq m     Narrative:      National Kidney Disease Foundation guidelines for Chronic Kidney Disease (CKD):   •  Stage 1 with normal or high GFR (GFR > 90 mL/min/1 73 square meters)  •  Stage 2 Mild CKD (GFR = 60-89 mL/min/1 73 square meters)  •  Stage 3A Moderate CKD (GFR = 45-59 mL/min/1 73 square meters)  •  Stage 3B Moderate CKD (GFR = 30-44 mL/min/1 73 square meters)  •  Stage 4 Severe CKD (GFR = 15-29 mL/min/1 73 square meters)  •  Stage 5 End Stage CKD (GFR <15 mL/min/1 73 square meters)  Note: GFR calculation is accurate only with a steady state creatinine    Lipase [[id number]]  (Abnormal) Collected: 12/19/22 2226    Lab Status: Final result Specimen: Blood from Arm, Right Updated: 12/19/22 2328     Lipase 47 u/L     CBC and differential [[id number]]  (Abnormal) Collected: 12/19/22 2226    Lab Status: Final result Specimen: Blood from Arm, Right Updated: 12/19/22 2253     WBC 6 85 Thousand/uL      RBC 5 31 Million/uL      Hemoglobin 16 3 g/dL      Hematocrit 49 0 %      MCV 92 fL      MCH 30 7 pg      MCHC 33 3 g/dL      RDW 13 5 %      MPV 10 0 fL      Platelets 262 Thousands/uL      nRBC 0 /100 WBCs      Neutrophils Relative 77 %      Immat GRANS % 0 %      Lymphocytes Relative 15 %      Monocytes Relative 7 %      Eosinophils Relative 1 %      Basophils Relative 0 %      Neutrophils Absolute 5 21 Thousands/µL      Immature Grans Absolute 0 02 Thousand/uL      Lymphocytes Absolute 1 02 Thousands/µL      Monocytes Absolute 0 48 Thousand/µL      Eosinophils Absolute 0 09 Thousand/µL      Basophils Absolute 0 03 Thousands/µL                  CT abdomen pelvis with contrast   Final Result by [facility] (12/20 0142)      Moderate wall thickening of multiple distal small bowel loops extending to the terminal ileum with associated surrounding inflammatory changes as well as a small amount of fluid in the pelvis  Liquid stool in the colon  Findings suspicious for an    enteritis  Correlation with the patient's symptoms and laboratory values recommended  No discrete evidence of bowel obstruction  Partially distended bladder  Mild to moderate circumferential bladder wall thickening noted, probably exaggerated by underdistention  Superimposed cystitis considered in the appropriate clinical setting  Pulmonary emphysematous changes, right-sided nephrolithiasis, renal cysts, hiatal hernia, and other findings as above  Workstation performed: [id number]               Procedures  Procedures      ED Course  ED Course as of 12/21/22 1554   Mon Dec 19, 2022   7879 CBC and differential(!)  At recent baseline                             SBIRT 20yo+    [place] Most Recent Value   SBIRT (25 yo +)    In order to provide better care to our patients, we are screening all of our patients for alcohol and drug use  Would it be okay to ask you these screening questions? No Filed at: 12/19/2022 2231                [facility]  Number of Diagnoses or Management Options  Abdominal pain  Diarrhea  Enteritis  Nausea  Diagnosis management comments:     Assessment/Plan: 26-year-old female presenting with acute on chronic abdominal pain  Patient has been worked up multiple times in the past with gastroenterology specialists  Her EGD and colonoscopies recently have been unrevealing of a cause of the symptoms  She does have acutely worsened symptoms over the past 3 days  These symptoms seem to be getting worse even over the last 24 hours  Her presentation does warrant further work-up and evaluation  Will screen further with abdominal labs to include a CMP, CBC, and lipase    I will screen for other alternative abdominal pathologies with CT of the abdomen and pelvis  I suspect that if her work-up is otherwise negative she will need to follow-up with her gastroenterology team if able to tolerate pain at home  Otherwise she may need to come in for pain control if not passing p o  challenge  Reassessment/disposition: On CT scan, patient has concern for enteritis  There is no change on exam on reexamination  Patient does feel somewhat symptomatically improved after dose of Toradol  Patient will be discharged home with strict return precautions for worsening abdominal pain, inability to tolerate p o , or refractory fevers or other concerning symptoms  Patient expresses understanding of these return precautions and agrees to come back if she develops any of them otherwise she will follow-up with her PCP and GI doctor  Disposition  Final diagnoses:   Enteritis   Nausea   Diarrhea   Abdominal pain     Time reflects when diagnosis was documented in both MDM as applicable and the Disposition within this note     Time User Action Codes Description Comment    12/20/2022  2:01 AM Annette Cruel Add [K52 9] Enteritis     12/20/2022  2:01 AM Annette Cruel Add [R11 0] Nausea     12/20/2022  2:01 AM Annette Cruel Add [R19 7] Diarrhea     12/20/2022  2:01 AM Annette Cruel Add [R10 9] Abdominal pain       ED Disposition     ED Disposition   Discharge    Condition   Stable    Date/Time   Tue Dec 20, 2022  2:00 AM    Comment   [address] discharge to home/self care                 Follow-up Information     Follow up With Specialties Details Why Contact Info Additional Information    [address] [facility] Emergency Medicine Go to  [facility] symptoms worsen, [facility] [address]  [address] [facility] Emergency Department, [address] , Lauren Whitt MD [facility]  [phone]             Discharge Medication List as of 12/20/2022  2:04 AM      CONTINUE these medications which have NOT CHANGED    Details   ALPRAZolam (XANAX) 0 5 mg tablet Take 1 tablet (0 5 mg total) by mouth daily at bedtime as needed for anxiety, Starting Thu 8/4/2022, Normal      hydrochlorothiazide (MICROZIDE) 12 5 mg capsule Take 1 capsule (12 5 mg total) by mouth daily, Starting Thu 8/4/2022, Normal      meclizine (ANTIVERT) 25 mg tablet Take 1 tablet (25 mg total) by mouth 3 (three) times a day as needed for dizziness, Starting Tue 9/20/2022, Normal           No discharge procedures on file  PDMP Review       Value Time User    PDMP Reviewed  Yes 8/4/2022 10:34 AM Tian Soliz MD           ED Provider  Attending physically available and evaluated [id number] [facility] managed the patient along with the ED Attending      Electronically Signed by         Ny Cannon MD  12/21/22 5304

## 2022-12-20 NOTE — ED ATTENDING ATTESTATION
12/19/2022  IToney MD, saw and evaluated the patient  I have discussed the patient with the resident/non-physician practitioner and agree with the resident's/non-physician practitioner's findings, Plan of Care, and MDM as documented in the resident's/non-physician practitioner's note, except where noted  All available labs and Radiology studies were reviewed  I was present for key portions of any procedure(s) performed by the resident/non-physician practitioner and I was immediately available to provide assistance  At this point I agree with the current assessment done in the Emergency Department  I have conducted an independent evaluation of this patient a history and physical is as follows:  30-year-old female presents for evaluation of sharp lower abdominal pain which radiates into the bilateral flanks over the past several days  Pain is rated as severe  10 systems reviewed and otherwise negative  On exam no distress, lungs normal cardiac normal, abdomen tender ablation over the left lower quadrant with guarding, no CVA tenderness  Medical decision making cervical-acute abdominal pain-we will do abdominal labs, urine dip, CT to rule out  acute abdominal pathology,    Pain meds, reassess  ED Course         Critical Care Time  Procedures

## 2022-12-22 ENCOUNTER — OFFICE VISIT (OUTPATIENT)
Dept: FAMILY MEDICINE CLINIC | Facility: CLINIC | Age: 77
End: 2022-12-22

## 2022-12-22 VITALS
HEIGHT: 66 IN | HEART RATE: 74 BPM | SYSTOLIC BLOOD PRESSURE: 142 MMHG | DIASTOLIC BLOOD PRESSURE: 76 MMHG | BODY MASS INDEX: 22.98 KG/M2 | WEIGHT: 143 LBS | OXYGEN SATURATION: 98 % | TEMPERATURE: 97.8 F

## 2022-12-22 DIAGNOSIS — R10.84 GENERALIZED ABDOMINAL PAIN: ICD-10-CM

## 2022-12-22 DIAGNOSIS — I10 ESSENTIAL HYPERTENSION: ICD-10-CM

## 2022-12-22 DIAGNOSIS — K58.0 IRRITABLE BOWEL SYNDROME WITH DIARRHEA: ICD-10-CM

## 2022-12-22 DIAGNOSIS — K63.5 POLYP OF COLON, UNSPECIFIED PART OF COLON, UNSPECIFIED TYPE: ICD-10-CM

## 2022-12-22 DIAGNOSIS — K52.9 ENTERITIS: Primary | ICD-10-CM

## 2022-12-22 DIAGNOSIS — Z72.0 TOBACCO ABUSE: ICD-10-CM

## 2022-12-22 DIAGNOSIS — D58.2 ELEVATED HEMOGLOBIN (HCC): ICD-10-CM

## 2022-12-22 NOTE — PROGRESS NOTES
Subjective:    HPI  Joshua Berkowitz is a 78 y o  female here today for:  Chief Complaint   Patient presents with   • [address] [place] [address] f/u          ---Above per clinical staff & reviewed  ---  HPI:   72yof here for follow up after being seen in ER with severe abdominal pain  Pt has had abdominal for long time but has gotten more severe lately  Pt had CT scan done, see results below  Pt state bowel movements are never normal  Her main complaints of abdominal pain and severe pain most of the time  Pt was told possible due to effects of radiation in the past  Pt had very high blood pressure when in ER, much better today    IMPRESSION:     Moderate wall thickening of multiple distal small bowel loops extending to the terminal ileum with associated surrounding inflammatory changes as well as a small amount of fluid in the pelvis  Liquid stool in the colon  Findings suspicious for an   enteritis  Correlation with the patient's symptoms and laboratory values recommended      No discrete evidence of bowel obstruction      Partially distended bladder  Mild to moderate circumferential bladder wall thickening noted, probably exaggerated by underdistention    Superimposed cystitis considered in the appropriate clinical setting        Pulmonary emphysematous changes, right-sided nephrolithiasis, renal cysts, hiatal hernia, and other findings as above          The following portions of the patient's history were reviewed and updated as appropriate: allergies, current medications, past family history, past medical history, past social history, past surgical history and problem list     Past Medical History:   Diagnosis Date   • Anxiety    • Atypical squamous cells cannot exclude high grade squamous intraepithelial lesion on cytologic smear of cervix (ASC-H)     Last Assessed 5/13/2014   • Back pain    • Cancer [facility])    • Cervical cancer ([facility] [address] )    • Depression    • Ovarian cyst    • Tinnitus of both ears 10/2/2020   • Tobacco abuse 11/20/2018       Past Surgical History:   Procedure Laterality Date   • ANKLE FRACTURE SURGERY      Last Assessed 07/26/2016   • ANKLE SURGERY     • CERVICAL BIOPSY  W/ LOOP ELECTRODE EXCISION     • COLONOSCOPY  10/2019   • DIAGNOSTIC LAPAROSCOPY     • DILATION AND CURETTAGE OF UTERUS     • ESOPHAGOGASTRODUODENOSCOPY N/A 9/12/2016    Procedure: ESOPHAGOGASTRODUODENOSCOPY (EGD); Surgeon: Esperanza Bennett MD;  [facility]; Service:    • FRACTURE SURGERY     • ORTHOPEDIC SURGERY     • HI COLONOSCOPY FLX DX W/COLLJ SPEC WHEN PFRMD N/A 9/12/2016    Procedure: Clarion Huh;  Surgeon: Esperanza Bennett MD;  [facility]; Service: Colorectal   • SALPINGOOPHORECTOMY Left    • TONSILLECTOMY     • UPPER GASTROINTESTINAL ENDOSCOPY  2016       Social History     Socioeconomic History   • Marital status:      Spouse name: None   • Number of children: None   • Years of education: 15   • Highest education level: None   Occupational History   • None   Tobacco Use   • Smoking status: Every Day     Packs/day: 0 50     Years: 60 00     Pack years: 30 00     Types: Cigarettes   • Smokeless tobacco: Never   Vaping Use   • Vaping Use: Never used   Substance and Sexual Activity   • Alcohol use: No   • Drug use: Never   • Sexual activity: Not Currently   Other Topics Concern   • None   Social History Narrative    ** Merged History Encounter **           since 2015 after 15 year marriage  1 daughter from 1st marriage  1 granddaughter, Edward Remy, whom she raised  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       Social Determinants of Health     Financial Resource Strain: Not on file   Food Insecurity: Not on file   Transportation Needs: Not on file   Physical Activity: Not on file   Stress: Not on file   Social Connections: Not on file   Intimate Partner Violence: Not on file   Housing Stability: Not on file       Current Outpatient Medications   Medication Sig Dispense Refill   • ALPRAZolam (XANAX) 0 5 mg tablet Take 1 tablet (0 5 mg total) by mouth daily at bedtime as needed for anxiety 30 tablet 0   • hydrochlorothiazide (MICROZIDE) 12 5 mg capsule Take 1 capsule (12 5 mg total) by mouth daily 90 capsule 0   • meclizine (ANTIVERT) 25 mg tablet Take 1 tablet (25 mg total) by mouth 3 (three) times a day as needed for dizziness 60 tablet 0     No current facility-administered medications for this visit  Review of Systems   Constitutional: Negative  Negative for chills and fever  HENT: Negative  Negative for ear pain and sore throat  Eyes: Negative for pain and visual disturbance  Respiratory: Negative  Negative for cough and shortness of breath  Cardiovascular: Negative  Negative for chest pain and palpitations  Gastrointestinal: Positive for abdominal distention, abdominal pain and diarrhea  Negative for vomiting  Genitourinary: Negative  Negative for dysuria and hematuria  Musculoskeletal: Negative for arthralgias and back pain  Skin: Negative for color change and rash  Neurological: Negative  Negative for seizures and syncope  Psychiatric/Behavioral: The patient is nervous/anxious           Objective:    /76 (BP Location: Left arm, Patient Position: Sitting, Cuff Size: Standard)   Pulse 74   Temp 97 8 °F (36 6 °C) (Temporal)   Ht 5' 6" (1 676 m)   Wt 64 9 kg (143 lb)   SpO2 98%   BMI 23 08 kg/m²   Wt Readings from Last 3 Encounters:   12/22/22 64 9 kg (143 lb)   12/19/22 63 kg (138 lb 14 2 oz)   09/20/22 65 kg (143 lb 3 2 oz)     BP Readings from Last 3 Encounters:   12/22/22 142/76   12/20/22 (!) 209/106   09/20/22 (!) 169/110       Lab Results   Component Value Date    WBC 6 85 12/19/2022    HGB 16 3 (H) 12/19/2022    HCT 49 0 (H) 12/19/2022     12/19/2022    TRIG 101 08/11/2022    HDL 56 08/11/2022    ALT 21 12/19/2022    AST 14 12/19/2022     12/10/2015    K 4 2 12/19/2022     12/19/2022    CREATININE 0 89 12/19/2022 BUN 14 12/19/2022    CO2 27 12/19/2022    INR 0 99 04/25/2014    GLUF 85 08/11/2022    HGBA1C 5 6 05/18/2019       Physical Exam  Vitals and nursing note reviewed  Constitutional:       Appearance: Normal appearance  She is well-developed  HENT:      Head: Normocephalic and atraumatic  Eyes:      Pupils: Pupils are equal, round, and reactive to light  Cardiovascular:      Rate and Rhythm: Normal rate and regular rhythm  Heart sounds: No murmur heard  Pulmonary:      Effort: Pulmonary effort is normal       Breath sounds: Normal breath sounds  Abdominal:      Palpations: Abdomen is soft  There is no mass  Tenderness: There is abdominal tenderness  There is no guarding  Musculoskeletal:      Cervical back: Normal range of motion and neck supple  Skin:     General: Skin is warm  Capillary Refill: Capillary refill takes less than 2 seconds  Neurological:      Mental Status: She is alert and oriented to person, place, and time  Cranial Nerves: No cranial nerve deficit  Psychiatric:         Mood and Affect: Mood normal          Thought Content: Thought content normal                        Assessment/Plan:   Phyllis Johnston was seen today for follow-up  Diagnoses and all orders for this visit:    Enteritis  -     Ambulatory Referral to Gastroenterology; Future    Generalized abdominal pain  -     Ambulatory Referral to Gastroenterology; Future    Irritable bowel syndrome with diarrhea  -     Ambulatory Referral to Gastroenterology; Future    Elevated hemoglobin (HCC)    Polyp of colon, unspecified part of colon, unspecified type    Essential hypertension    Tobacco abuse      Return if symptoms worsen or fail to improve  There are no Patient Instructions on file for this visit

## 2022-12-29 ENCOUNTER — TRANSITIONAL CARE MANAGEMENT (OUTPATIENT)
Dept: FAMILY MEDICINE CLINIC | Facility: CLINIC | Age: 77
End: 2022-12-29

## 2022-12-29 DIAGNOSIS — R10.84 GENERALIZED ABDOMINAL PAIN: Primary | ICD-10-CM

## 2022-12-29 RX ORDER — DICYCLOMINE HCL 20 MG
20 TABLET ORAL EVERY 6 HOURS
COMMUNITY
End: 2022-12-29 | Stop reason: SDUPTHER

## 2023-01-02 RX ORDER — DICYCLOMINE HCL 20 MG
20 TABLET ORAL EVERY 6 HOURS
Qty: 14 TABLET | Refills: 0 | Status: SHIPPED | OUTPATIENT
Start: 2023-01-02 | End: 2023-01-05 | Stop reason: SDUPTHER

## 2023-01-04 ENCOUNTER — NURSE TRIAGE (OUTPATIENT)
Age: 78
End: 2023-01-04

## 2023-01-04 DIAGNOSIS — K63.5 POLYP OF COLON, UNSPECIFIED PART OF COLON, UNSPECIFIED TYPE: Primary | ICD-10-CM

## 2023-01-04 NOTE — LETTER
[address] [address] [place] [address]        FLEXIBLE COLONOSCOPY INSTRUCTIONS  PLEASE NOTE    AS OF JUNE 1, 2014, OUR OFFICE REQUIRES 72 HOURS NOTICE OF CANCELLATION/RESCHEDULE OF A PROCEDURE TO AVOID INCURRING A MISSED APPOINTMENT FEE  Your Colonoscopy Procedure has been scheduled at:  [address] [facility]  [address] [phone]    The Date of your Procedure is: January 30, 2023    Dr Elaine Kaba  will be performing the procedure  Report to the [facility] 45 minutes prior to the procedure  The total time at the facility will be approximately 1 1/2 hours  Please bring to your procedure at [facility]:   1  Insurance Cards and referrals if required by [employer] company   2  Valid Photo ID   3  Power of  Form if required  Use the bowel preparation as directed  Check with your family doctor if you are taking a blood thinner (Coumadin, Plavix, Xarelto, Pradaxa, Gingko biloba, Ginseng, Feverfew, St  Narayan's Wort)  We suggest stopping these for 3 days  Special instructions may be needed if you are taking aspirin or any aspirin-containing medication  Check with your family physician  If you are on DIABETIC MEDICATION (tablets or insulin) your doctor may make changes in your preparation  Take all medications usual unless otherwise instructed  Feel free to call the physician's office to answer any questions or address any concerns you have regarding your procedure or preparation  A nurse will be happy to assist you  Because anesthesia is given to you during the procedure, the center requires that you be discharged under supervision of an adult to take you home after the procedure is performed  They will also need to sign as a witness on your discharge instructions  Public Transportation such as VAST, BUS, TAXI is Not Acceptable      It is important you notify our office of any insurance changes prior to your procedure and, if necessary, supply us with referrals from your primary care physician  COLONOSCOPY PREPARATION INSTRUCTIONS    Purchase (prescription not required):  · 238 gram bottle of Miralax® (Glycolax®)  · 4 Dulcolax® (Bisacodyl) Laxative Tablets  · 64 oz  bottle of Gatorade® or your preference of a non-carbonated clear liquid - NOT RED OR PURPLE     One Day Prior to Colonoscopy Procedure  · Nothing to eat the day before your procedure, only clear liquids  · It is important that you drink plenty of clear liquids throughout the day to prevent dehydration  Clear Liquids include:  o Water/Iced Tea/Lemonade/Gatorade®/Black Coffee or tea (no milk or creamers  o Soft drinks: orange, ginger ale, cola, Pepsi®, Sprite®, 7Up®  o Dante-Aid® (lemonade or orange flavors only)  o Strained fruit juices without pulp such as apple, white grape, white cranberry  o Jell-O®, lemon, lime or orange (no fruit or toppings)  o Popsicles, [place] Ice (No Ice Cream, sherbets, or fruit bars)  o Chicken or beef bouillon/broth  DO NOT EAT OR DRINK ANYTHING RED OR PURPLE  DO NOT DRINK ANY ALCOHOLIC BEVERAGES  DIABETIC PATIENTS: Consult your physician    At 4:00 pm, take (2) Dulcolax® (Bisacodyl) Laxative Tablets  Swallow the tablets whole with an 8 oz  glass of water  At 8:00 pm, take the additional (2) Dulcolax® (Bisacodyl) Laxative Tablets with 8 oz  of water  The package may direct you not to exceed (2) tablets at any time but for the purpose of this examination, you should take (4) total     Mix the 238 gm of Miralax® in 64 oz  of Gatorade® and shake the solution until the Miralax® is dissolved  You will drink half (32 oz) of this solution this evening, beginning between 4 and 6 o'clock  Drink 8 oz glassfuls at your own pace  It may take several hours to drink the solution  Remember to stay close to toilet facilities      DAY OF COLONOSCOPY PROCEDURE    Five (5) hours before your procedure, drink the other half (32 oz) of the Miralax®/Gatorade® mixture within a two (2) hour period  This may require you to get up very early if you are scheduled for an early procedure  NOTHING IS TO BE TAKEN BY MOUTH 3 HOURS PRIOR TO PROCEDURE  If you use an inhaler, please bring it with you to your procedure

## 2023-01-04 NOTE — TELEPHONE ENCOUNTER
Patient calling to schedule follow up colonoscopy with Dr Donna Hodges  Recently seen in hospital for small bowel obstructions  Overdue for colonoscopy  Verified information  Please call to schedule

## 2023-01-05 ENCOUNTER — OFFICE VISIT (OUTPATIENT)
Dept: FAMILY MEDICINE CLINIC | Facility: CLINIC | Age: 78
End: 2023-01-05

## 2023-01-05 VITALS
SYSTOLIC BLOOD PRESSURE: 140 MMHG | OXYGEN SATURATION: 99 % | WEIGHT: 138.2 LBS | DIASTOLIC BLOOD PRESSURE: 94 MMHG | BODY MASS INDEX: 22.21 KG/M2 | HEART RATE: 78 BPM | HEIGHT: 66 IN | TEMPERATURE: 97.4 F

## 2023-01-05 DIAGNOSIS — R42 DIZZINESS: ICD-10-CM

## 2023-01-05 DIAGNOSIS — J44.9 CHRONIC OBSTRUCTIVE PULMONARY DISEASE, UNSPECIFIED COPD TYPE (HCC): ICD-10-CM

## 2023-01-05 DIAGNOSIS — R10.84 GENERALIZED ABDOMINAL PAIN: ICD-10-CM

## 2023-01-05 DIAGNOSIS — K58.0 IRRITABLE BOWEL SYNDROME WITH DIARRHEA: ICD-10-CM

## 2023-01-05 DIAGNOSIS — R42 VERTIGO: ICD-10-CM

## 2023-01-05 DIAGNOSIS — R53.1 WEAKNESS: Primary | ICD-10-CM

## 2023-01-05 DIAGNOSIS — H93.13 TINNITUS OF BOTH EARS: ICD-10-CM

## 2023-01-05 DIAGNOSIS — F41.1 GENERALIZED ANXIETY DISORDER: ICD-10-CM

## 2023-01-05 DIAGNOSIS — R26.9 GAIT DISORDER: ICD-10-CM

## 2023-01-05 DIAGNOSIS — K56.609 SBO (SMALL BOWEL OBSTRUCTION) (HCC): ICD-10-CM

## 2023-01-05 RX ORDER — ALPRAZOLAM 0.5 MG/1
0.5 TABLET ORAL
Qty: 30 TABLET | Refills: 0 | Status: CANCELLED | OUTPATIENT
Start: 2023-01-05

## 2023-01-05 RX ORDER — MECLIZINE HYDROCHLORIDE 25 MG/1
25 TABLET ORAL 3 TIMES DAILY PRN
Qty: 60 TABLET | Refills: 0 | Status: SHIPPED | OUTPATIENT
Start: 2023-01-05

## 2023-01-05 RX ORDER — DICYCLOMINE HCL 20 MG
20 TABLET ORAL EVERY 6 HOURS
Qty: 14 TABLET | Refills: 0 | Status: SHIPPED | OUTPATIENT
Start: 2023-01-05

## 2023-01-05 NOTE — PATIENT INSTRUCTIONS
Please make sure to be eating healthy, increase protein, increase fluids  Stop in PT for schedule appointment for strengthening  Follow up in 2-3 months or sooner if needed

## 2023-01-05 NOTE — PROGRESS NOTES
TCM Call     Date and time call was made  12/29/2022 12:10 PM    Patient was hospitialized at  [facility]    Date of Admission  12/22/22    Date of discharge  12/28/22    Diagnosis  SBO (small bowel obstruction) (HCC) (Primary Dx); Disposition  Home    Were the patients medications reviewed and updated  Yes    Current Symptoms  Loose Stools    Loose stool severity  Mild      TCM Call     Post hospital issues  None    Should patient be enrolled in anticoag monitoring? No    Scheduled for follow up? Yes    Did you obtain your prescribed medications  No    Why were you unable to obtain your medications  no prescriptions sent to the pharmacy    Is transportation to your appointment needed  No    I have advised the patient to call PCP with any new or worsening symptoms  Yogi Woody MA    Living Arrangements  Family members    Support System  Family    The type of support provided  Emotional    Do you have social support  Yes, as much as I need    Are you recieving any outpatient services  No    Are you recieving home care services  No    Are you using any community resources  No    Current waiver services  No    Have you fallen in the last 12 months  No    Interperter language line needed  No    Counseling  Family         Subjective:    HPI  Lia Rutledge is a 78 y o  female here today for:  Chief Complaint   Patient presents with   • Transition of Care Management     Was in the hospital late December for small intestinal blockage  • Fatigue     ---Above per clinical staff & reviewed   ---  HPI:  72yof here for hospital follow up  [facility]  Hospital records reviewed  Pt home for about a week  Feeling very fatigued and weak  Having diarrhea which isn't too abnormal for her  No further pain like prior to hospital  Pt overdue for colonoscopy, she called yesterday to get that schedule  Discussed importance of eating and drinking plenty  Also discussed getting therapy for strengthening- pt agreeable to this so referral placed for our PT  needs refills on meds  Will follow closely      The following portions of the patient's history were reviewed and updated as appropriate: allergies, current medications, past family history, past medical history, past social history, past surgical history and problem list     Past Medical History:   Diagnosis Date   • Anxiety    • Atypical squamous cells cannot exclude high grade squamous intraepithelial lesion on cytologic smear of cervix (ASC-H)     Last Assessed 5/13/2014   • Back pain    • Cancer [facility])    • Cervical cancer ([facility] [address] )    • Depression    • Ovarian cyst    • Tinnitus of both ears 10/2/2020   • Tobacco abuse 11/20/2018       Past Surgical History:   Procedure Laterality Date   • ANKLE FRACTURE SURGERY      Last Assessed 07/26/2016   • ANKLE SURGERY     • CERVICAL BIOPSY  W/ LOOP ELECTRODE EXCISION     • COLONOSCOPY  10/2019   • DIAGNOSTIC LAPAROSCOPY     • DILATION AND CURETTAGE OF UTERUS     • ESOPHAGOGASTRODUODENOSCOPY N/A 9/12/2016    Procedure: ESOPHAGOGASTRODUODENOSCOPY (EGD); Surgeon: Radha Schneider MD;  [facility]; Service:    • FRACTURE SURGERY     • ORTHOPEDIC SURGERY     • MT COLONOSCOPY FLX DX W/COLLJ SPEC WHEN PFRMD N/A 9/12/2016    Procedure: Enid Melvin;  Surgeon: Rdaha Schneider MD;  [facility]; Service: Colorectal   • SALPINGOOPHORECTOMY Left    • TONSILLECTOMY     • UPPER GASTROINTESTINAL ENDOSCOPY  2016       Social History     Socioeconomic History   • Marital status:       Spouse name: None   • Number of children: None   • Years of education: 15   • Highest education level: None   Occupational History   • None   Tobacco Use   • Smoking status: Every Day     Packs/day: 0 50     Years: 60 00     Pack years: 30 00     Types: Cigarettes   • Smokeless tobacco: Never   Vaping Use   • Vaping Use: Never used   Substance and Sexual Activity   • Alcohol use: No   • Drug use: Never   • Sexual activity: Not Currently   Other Topics Concern   • None   Social History Narrative    ** Merged History Encounter **           since 2015 after 15 year marriage  1 daughter from 1st marriage  1 granddaughter, Aris Sarmiento, whom she raised  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  Social Determinants of Health     Financial Resource Strain: Not on file   Food Insecurity: Not on file   Transportation Needs: Not on file   Physical Activity: Not on file   Stress: Not on file   Social Connections: Not on file   Intimate Partner Violence: Not on file   Housing Stability: Not on file       Current Outpatient Medications   Medication Sig Dispense Refill   • dicyclomine (BENTYL) 20 mg tablet Take 1 tablet (20 mg total) by mouth every 6 (six) hours Take one tablet by mouth 2 times a day for 7 days 14 tablet 0   • meclizine (ANTIVERT) 25 mg tablet Take 1 tablet (25 mg total) by mouth 3 (three) times a day as needed for dizziness 60 tablet 0   • ALPRAZolam (XANAX) 0 5 mg tablet Take 1 tablet (0 5 mg total) by mouth daily at bedtime as needed for anxiety (Patient not taking: Reported on 1/5/2023) 30 tablet 0   • hydrochlorothiazide (MICROZIDE) 12 5 mg capsule Take 1 capsule (12 5 mg total) by mouth daily (Patient not taking: Reported on 1/5/2023) 90 capsule 0     No current facility-administered medications for this visit  Review of Systems   Constitutional: Positive for fatigue  Negative for chills and fever  HENT: Negative  Negative for ear pain and sore throat  Eyes: Negative for pain and visual disturbance  Respiratory: Negative  Negative for cough and shortness of breath  Cardiovascular: Negative  Negative for chest pain and palpitations  Gastrointestinal: Positive for diarrhea  Negative for abdominal pain and vomiting  Genitourinary: Negative  Negative for dysuria and hematuria  Musculoskeletal: Negative for arthralgias and back pain     Skin: Negative for color change and rash  Neurological: Positive for dizziness  Negative for seizures and syncope  Psychiatric/Behavioral: Negative  Objective:    /94 (BP Location: Left arm, Patient Position: Sitting, Cuff Size: Adult)   Pulse 78   Temp (!) 97 4 °F (36 3 °C) (Temporal)   Ht 5' 6" (1 676 m)   Wt 62 7 kg (138 lb 3 2 oz)   SpO2 99%   BMI 22 31 kg/m²   Wt Readings from Last 3 Encounters:   01/05/23 62 7 kg (138 lb 3 2 oz)   12/22/22 64 9 kg (143 lb)   12/19/22 63 kg (138 lb 14 2 oz)     BP Readings from Last 3 Encounters:   01/05/23 140/94   12/22/22 142/76   12/20/22 (!) 209/106       Lab Results   Component Value Date    WBC 6 85 12/19/2022    HGB 16 3 (H) 12/19/2022    HCT 49 0 (H) 12/19/2022     12/19/2022    TRIG 101 08/11/2022    HDL 56 08/11/2022    ALT 21 12/19/2022    AST 14 12/19/2022     12/10/2015    K 4 2 12/19/2022     12/19/2022    CREATININE 0 89 12/19/2022    BUN 14 12/19/2022    CO2 27 12/19/2022    INR 0 99 04/25/2014    GLUF 85 08/11/2022    HGBA1C 5 6 05/18/2019       Physical Exam  Vitals and nursing note reviewed  Constitutional:       Appearance: Normal appearance  She is well-developed  HENT:      Head: Normocephalic and atraumatic  Eyes:      Pupils: Pupils are equal, round, and reactive to light  Cardiovascular:      Rate and Rhythm: Normal rate and regular rhythm  Heart sounds: No murmur heard  Pulmonary:      Effort: Pulmonary effort is normal       Breath sounds: Normal breath sounds  Musculoskeletal:      Cervical back: Normal range of motion and neck supple  Skin:     General: Skin is warm  Capillary Refill: Capillary refill takes less than 2 seconds  Neurological:      Mental Status: She is alert and oriented to person, place, and time  Cranial Nerves: No cranial nerve deficit  Psychiatric:         Mood and Affect: Mood normal          Thought Content:  Thought content normal  Assessment/Plan:   [facility] was seen today for transition of care management and fatigue  Diagnoses and all orders for this visit:    Weakness  -     Ambulatory Referral to Physical Therapy; Future    Generalized anxiety disorder    Generalized abdominal pain  -     dicyclomine (BENTYL) 20 mg tablet; Take 1 tablet (20 mg total) by mouth every 6 (six) hours Take one tablet by mouth 2 times a day for 7 days    Dizziness  -     meclizine (ANTIVERT) 25 mg tablet; Take 1 tablet (25 mg total) by mouth 3 (three) times a day as needed for dizziness  -     Ambulatory Referral to Physical Therapy; Future    Vertigo  -     meclizine (ANTIVERT) 25 mg tablet; Take 1 tablet (25 mg total) by mouth 3 (three) times a day as needed for dizziness    Tinnitus of both ears  -     meclizine (ANTIVERT) 25 mg tablet; Take 1 tablet (25 mg total) by mouth 3 (three) times a day as needed for dizziness    Gait disorder  -     Ambulatory Referral to Physical Therapy; Future    SBO (small bowel obstruction) (HCC)    Irritable bowel syndrome with diarrhea    [facility], [facility]      Return in about 3 months (around 4/5/2023) for Recheck  Patient Instructions   Please make sure to be eating healthy, increase protein, increase fluids  Stop in PT for schedule appointment for strengthening  Follow up in 2-3 months or sooner if needed

## 2023-01-06 NOTE — TELEPHONE ENCOUNTER
DE 3 yr recall, last fc 10/7/19 sessile serrated x1, BMI 22 3     Scheduled DE 1/30 BEC   Mailed PW to patient     Patient states she was inpatient for SBO, now having diarrhea  Patient will call for OV if this persists    Additional Information  • Negative: Is this a new patient under the age of 48? • Negative: Is this a patient over the age of 76 that has never had a colonoscopy? • Negative: Is this patient over the age of [de-identified] with a history of cancer and/or polyps? • Negative: Has the patient had a colonoscopy within the last year and when was it? • Negative: Does the patient have a family history of colon or rectal cancer? • Negative: Does the patient experience chest pain or shortness of breath when climbing stairs? • Negative: Has the patient had a cardiac procedure within the last year? • Negative: Has the patient had a stroke or blood clots? • Negative: Is the patient currently on any blood thinners such as Coumadin, Plavix, Eliquis, Pradaxa, Xarelto, etc?  (Check the patient's current medication list and document reason for taking medication)  • Negative: Advised Patient - Initial Assessment  1  Patient advised that our office requires 72 hours notice for a cancellation of a procedure to avoid incurring a missed appointment fee  2  Asked patient to please contact insurance company to ask how they will be processing the individual claim for the procedure  3  Advised patient that she is welcome to see the doctor in the office prior to the procedure  4  Advised patient to be at the location of the procedure at 30 minutes prior to the scheduled time      Protocols used: [facility] COLONOSCOPY SCHEDULING

## 2023-01-13 DIAGNOSIS — F41.1 GENERALIZED ANXIETY DISORDER: ICD-10-CM

## 2023-01-13 NOTE — TELEPHONE ENCOUNTER
Patient is scheduledfor colonoscopy on 1/30/23, but wants to have endoscopy that day as well  Doc Barron will not add this unless we update her referral/order  Please advise

## 2023-01-17 RX ORDER — ALPRAZOLAM 0.5 MG/1
0.5 TABLET ORAL
Qty: 30 TABLET | Refills: 0 | Status: SHIPPED | OUTPATIENT
Start: 2023-01-17

## 2023-01-23 NOTE — TELEPHONE ENCOUNTER
Spoke with GI office about this and they were going to coordinate and try and do colonoscopy and endoscopy at same time

## 2023-01-31 ENCOUNTER — LAB REQUISITION (OUTPATIENT)
Dept: LAB | Facility: HOSPITAL | Age: 78
End: 2023-01-31

## 2023-01-31 DIAGNOSIS — Z86.010 PERSONAL HISTORY OF COLONIC POLYPS: ICD-10-CM

## 2023-01-31 DIAGNOSIS — R19.7 DIARRHEA, UNSPECIFIED: ICD-10-CM

## 2023-01-31 DIAGNOSIS — K63.5 POLYP OF COLON: ICD-10-CM

## 2023-02-13 ENCOUNTER — OFFICE VISIT (OUTPATIENT)
Dept: GASTROENTEROLOGY | Facility: MEDICAL CENTER | Age: 78
End: 2023-02-13

## 2023-02-13 VITALS
DIASTOLIC BLOOD PRESSURE: 91 MMHG | WEIGHT: 138.6 LBS | BODY MASS INDEX: 22.37 KG/M2 | HEART RATE: 78 BPM | TEMPERATURE: 98.4 F | SYSTOLIC BLOOD PRESSURE: 157 MMHG

## 2023-02-13 DIAGNOSIS — K58.0 IRRITABLE BOWEL SYNDROME WITH DIARRHEA: Primary | ICD-10-CM

## 2023-02-13 DIAGNOSIS — K56.609 SBO (SMALL BOWEL OBSTRUCTION) (HCC): ICD-10-CM

## 2023-02-13 DIAGNOSIS — R10.9 CHRONIC ABDOMINAL PAIN: ICD-10-CM

## 2023-02-13 DIAGNOSIS — K59.1 FUNCTIONAL DIARRHEA: ICD-10-CM

## 2023-02-13 DIAGNOSIS — G89.29 CHRONIC ABDOMINAL PAIN: ICD-10-CM

## 2023-02-13 NOTE — PROGRESS NOTES
Outpatient Follow up  [address]  [address]  [facility] [address]  Silas Bentley MD  Ph : [phone]  Fax : [phone]  Mobile : [phone]  Email : [email or web address]  Also available on [employer]    Teddy Ayoub 78 y o  female MRN: [id number]    PCP: Aaliyah Flowers MD  Referring: No referring provider defined for this encounter  Kenneth Mcclellan presented for a follow up visit  My recommendations are included  Please do not hesitate to contact me with any questions you may have  ASSESSMENT AND PLAN:      No problem-specific Assessment & Plan notes found for this encounter  Diagnoses and all orders for this visit:    Irritable bowel syndrome with diarrhea    SBO (small bowel obstruction) (HCC)    Functional diarrhea      1  May try Lupe-star, Align or Visbiome (or store brands for the same)  2  Continue the Bentyl - take it everyday once in the morning  3  Try yoga  4  Continue the fiber  5  Give me an update in 2 - 3 months  Follow up as needed  ______________________________________________________________________    SUBJECTIVE:  77 y/o who presents for follow up  She had a small bowel obstruction from adhesions and was admitted to [facility] for 10 days  CT 12/22 : Moderate wall thickening of multiple distal small bowel loops extending to the terminal ileum with associated surrounding inflammatory changes as well as a small amount of fluid in the pelvis  Liquid stool in the colon  Findings suspicious for an   enteritis  Correlation with the patient's symptoms and laboratory values recommended  No discrete evidence of bowel obstruction  Partially distended bladder  Mild to moderate circumferential bladder wall thickening noted, probably exaggerated by underdistention  Superimposed cystitis considered in the appropriate clinical setting      Pulmonary emphysematous changes, right-sided nephrolithiasis, renal cysts, hiatal hernia, and other findings as above  Recent colonoscopy 1/23 - one polyps was removed and recommended to follow up in 3 years  She has pain in the lower abdomen  No particular time  She could be eating or working  The pain starts above the navel  The pin is there for a few seconds to a few minutes  Then she has to go to the bathroom  She doesn't get pain every day  Sometimes not even during the week  It is sporadic  She takes Bentyl when she gets pain and then it helps for the rest of the day  No nausea, vomiting  She has increased fiber and that makes her feel better  She takes Benefiber  She drinks a lot of water  No bloating or gassiness  REVIEW OF SYSTEMS IS OTHERWISE NEGATIVE  Historical Information   Past Medical History:   Diagnosis Date   • Anxiety    • Atypical squamous cells cannot exclude high grade squamous intraepithelial lesion on cytologic smear of cervix (ASC-H)     Last Assessed 5/13/2014   • Back pain    • Cancer [facility])    • Cervical cancer ([facility] [address] )    • Depression    • Ovarian cyst    • Tinnitus of both ears 10/2/2020   • Tobacco abuse 11/20/2018     Past Surgical History:   Procedure Laterality Date   • ANKLE FRACTURE SURGERY      Last Assessed 07/26/2016   • ANKLE SURGERY     • CERVICAL BIOPSY  W/ LOOP ELECTRODE EXCISION     • COLONOSCOPY  10/2019   • DIAGNOSTIC LAPAROSCOPY     • DILATION AND CURETTAGE OF UTERUS     • ESOPHAGOGASTRODUODENOSCOPY N/A 9/12/2016    Procedure: ESOPHAGOGASTRODUODENOSCOPY (EGD); Surgeon: Faby Leonard MD;  [facility]; Service:    • FRACTURE SURGERY     • ORTHOPEDIC SURGERY     • [place] COLONOSCOPY FLX DX W/COLLJ SPEC WHEN PFRMD N/A 9/12/2016    Procedure: Tong Lamb;  Surgeon: Faby Leonard MD;  Location: [facility];   Service: Colorectal   • SALPINGOOPHORECTOMY Left    • TONSILLECTOMY     • UPPER GASTROINTESTINAL ENDOSCOPY  2016     Social History   Social History     Substance and Sexual Activity   Alcohol Use No     Social History     Substance and Sexual Activity   Drug Use Never     Social History     Tobacco Use   Smoking Status Every Day   • Packs/day: 0 50   • Years: 60 00   • Pack years: 30 00   • Types: Cigarettes   Smokeless Tobacco Never     Family History   Problem Relation Age of Onset   • Coronary artery disease Mother    • No Known Problems Family    • No Known Problems Father    • Colon cancer Neg Hx        Meds/Allergies       Current Outpatient Medications:   •  ALPRAZolam (XANAX) 0 5 mg tablet  •  dicyclomine (BENTYL) 20 mg tablet  •  meclizine (ANTIVERT) 25 mg tablet  •  hydrochlorothiazide (MICROZIDE) 12 5 mg capsule    Allergies   Allergen Reactions   • Lisinopril Syncope     Other reaction(s): Other (See Comments)  Nose bleeds, passing out           Objective     Blood pressure 157/91, pulse 78, temperature 98 4 °F (36 9 °C), temperature source Tympanic, weight 62 9 kg (138 lb 9 6 oz), not currently breastfeeding  Body mass index is 22 37 kg/m²  PHYSICAL EXAM:      Physical Exam  Constitutional:       Appearance: Normal appearance  She is well-developed  HENT:      Head: Normocephalic and atraumatic  Eyes:      General: No scleral icterus  Conjunctiva/sclera: Conjunctivae normal       Pupils: Pupils are equal, round, and reactive to light  Cardiovascular:      Rate and Rhythm: Normal rate and regular rhythm  Heart sounds: Normal heart sounds  Pulmonary:      Effort: Pulmonary effort is normal  No respiratory distress  Breath sounds: Normal breath sounds  Abdominal:      General: Bowel sounds are normal  There is no distension  Palpations: Abdomen is soft  There is no mass  Tenderness: There is no abdominal tenderness  Hernia: No hernia is present  Musculoskeletal:         General: Normal range of motion  Cervical back: Normal range of motion     Lymphadenopathy:      Cervical: No cervical adenopathy  Skin:     General: Skin is warm  Neurological:      Mental Status: She is alert and oriented to person, place, and time  Psychiatric:         Behavior: Behavior normal          Thought Content: Thought content normal          Lab Results:   No visits with results within 1 Day(s) from this visit  Latest known visit with results is:   Lab Requisition on 01/30/2023   Component Date Value   • Case Report 01/30/2023                      Value:Surgical Pathology Report                         Case: [id number]                                   Authorizing Provider:  Rhea Campbell MD      Collected:           01/30/2023 0900              Ordering Location:     [address] [place] [address]      Received:            01/31/2023 [address] [place] [address] Specialty                                                                                  Laboratory                                                                   Pathologist:           Rishabh Mcqueen DO                                                          Specimens:   A) - Polyp, Colorectal, Polypectomy Rectosigmoid Snare                                              B) - Terminal Ileum, Biopsy In The Terminal Ileum                                                   C) - Colon, Biopsy In The Random Colon                                                    • Final Diagnosis 01/30/2023                      Value: This result contains rich text formatting which cannot be displayed here  • Additional Information 01/30/2023                      Value: This result contains rich text formatting which cannot be displayed here  • Synoptic Checklist 01/30/2023                      Value:                            COLON/RECTUM POLYP FORM - GI - A                                                                                     :    Adenoma(s)     • Gross Description 01/30/2023                      Value: This result contains rich text formatting which cannot be displayed here  Radiology Results:   No results found

## 2023-02-13 NOTE — PATIENT INSTRUCTIONS
May try Lupe-star, Align or Visbiome (or store brands for the same)  Continue the Bentyl - take it everyday once in the morning  Try yoga  Continue the fiber

## 2023-02-23 DIAGNOSIS — R10.84 GENERALIZED ABDOMINAL PAIN: ICD-10-CM

## 2023-02-23 RX ORDER — DICYCLOMINE HCL 20 MG
20 TABLET ORAL EVERY 6 HOURS
Qty: 14 TABLET | Refills: 0 | Status: SHIPPED | OUTPATIENT
Start: 2023-02-23 | End: 2023-02-23 | Stop reason: SDUPTHER

## 2023-02-24 RX ORDER — DICYCLOMINE HCL 20 MG
20 TABLET ORAL EVERY 6 HOURS
Qty: 60 TABLET | Refills: 1 | Status: SHIPPED | OUTPATIENT
Start: 2023-02-24

## 2023-03-07 ENCOUNTER — TELEPHONE (OUTPATIENT)
Dept: FAMILY MEDICINE CLINIC | Facility: CLINIC | Age: 78
End: 2023-03-07

## 2023-03-07 ENCOUNTER — HOSPITAL ENCOUNTER (EMERGENCY)
Facility: HOSPITAL | Age: 78
Discharge: HOME | End: 2023-03-07
Attending: EMERGENCY MEDICINE

## 2023-03-07 ENCOUNTER — APPOINTMENT (EMERGENCY)
Dept: CT IMAGING | Facility: HOSPITAL | Age: 78
End: 2023-03-07

## 2023-03-07 VITALS
DIASTOLIC BLOOD PRESSURE: 88 MMHG | OXYGEN SATURATION: 98 % | TEMPERATURE: 98.3 F | HEART RATE: 70 BPM | SYSTOLIC BLOOD PRESSURE: 197 MMHG | RESPIRATION RATE: 26 BRPM

## 2023-03-07 DIAGNOSIS — I10 UNCONTROLLED HYPERTENSION: ICD-10-CM

## 2023-03-07 DIAGNOSIS — R51.9 HEADACHE: ICD-10-CM

## 2023-03-07 DIAGNOSIS — Z91.14 NONCOMPLIANCE WITH MEDICATIONS: ICD-10-CM

## 2023-03-07 DIAGNOSIS — I10 PRIMARY HYPERTENSION: Primary | ICD-10-CM

## 2023-03-07 LAB
ANION GAP SERPL CALCULATED.3IONS-SCNC: 7 MMOL/L (ref 4–13)
BASOPHILS # BLD AUTO: 0.06 THOUSANDS/ÂΜL (ref 0–0.1)
BASOPHILS NFR BLD AUTO: 1 % (ref 0–1)
BUN SERPL-MCNC: 15 MG/DL (ref 5–25)
CALCIUM SERPL-MCNC: 9.2 MG/DL (ref 8.4–10.2)
CARDIAC TROPONIN I PNL SERPL HS: 4 NG/L
CHLORIDE SERPL-SCNC: 105 MMOL/L (ref 96–108)
CO2 SERPL-SCNC: 28 MMOL/L (ref 21–32)
CREAT SERPL-MCNC: 0.89 MG/DL (ref 0.6–1.3)
EOSINOPHIL # BLD AUTO: 0.14 THOUSAND/ÂΜL (ref 0–0.61)
EOSINOPHIL NFR BLD AUTO: 1 % (ref 0–6)
ERYTHROCYTE [DISTWIDTH] IN BLOOD BY AUTOMATED COUNT: 13.2 % (ref 11.6–15.1)
GFR SERPL CREATININE-BSD FRML MDRD: 63 ML/MIN/1.73SQ M
GLUCOSE SERPL-MCNC: 103 MG/DL (ref 65–140)
HCT VFR BLD AUTO: 49.7 % (ref 34.8–46.1)
HGB BLD-MCNC: 16.4 G/DL (ref 11.5–15.4)
IMM GRANULOCYTES # BLD AUTO: 0.04 THOUSAND/UL (ref 0–0.2)
IMM GRANULOCYTES NFR BLD AUTO: 0 % (ref 0–2)
LYMPHOCYTES # BLD AUTO: 0.99 THOUSANDS/ÂΜL (ref 0.6–4.47)
LYMPHOCYTES NFR BLD AUTO: 10 % (ref 14–44)
MCH RBC QN AUTO: 30.4 PG (ref 26.8–34.3)
MCHC RBC AUTO-ENTMCNC: 33 G/DL (ref 31.4–37.4)
MCV RBC AUTO: 92 FL (ref 82–98)
MONOCYTES # BLD AUTO: 0.53 THOUSAND/ÂΜL (ref 0.17–1.22)
MONOCYTES NFR BLD AUTO: 5 % (ref 4–12)
NEUTROPHILS # BLD AUTO: 8.23 THOUSANDS/ÂΜL (ref 1.85–7.62)
NEUTS SEG NFR BLD AUTO: 83 % (ref 43–75)
NRBC BLD AUTO-RTO: 0 /100 WBCS
PLATELET # BLD AUTO: 253 THOUSANDS/UL (ref 149–390)
PMV BLD AUTO: 10 FL (ref 8.9–12.7)
POTASSIUM SERPL-SCNC: 3.3 MMOL/L (ref 3.5–5.3)
RBC # BLD AUTO: 5.39 MILLION/UL (ref 3.81–5.12)
SODIUM SERPL-SCNC: 140 MMOL/L (ref 135–147)
WBC # BLD AUTO: 9.99 THOUSAND/UL (ref 4.31–10.16)

## 2023-03-07 RX ORDER — HYDROCHLOROTHIAZIDE 25 MG/1
25 TABLET ORAL ONCE
Status: COMPLETED | OUTPATIENT
Start: 2023-03-07 | End: 2023-03-07

## 2023-03-07 RX ORDER — HYDROCHLOROTHIAZIDE 25 MG/1
25 TABLET ORAL DAILY
Status: DISCONTINUED | OUTPATIENT
Start: 2023-03-08 | End: 2023-03-07

## 2023-03-07 RX ADMIN — HYDROCHLOROTHIAZIDE 25 MG: 25 TABLET ORAL at 18:41

## 2023-03-07 NOTE — TELEPHONE ENCOUNTER
Patient called to ask about her BP reports it has been high all day, and her most recent reading was 180/111 and she has been dizzy all day with headache  Lowest reading today was 137/90  Recommended she go to the nearest emergency department for further evaluation

## 2023-03-07 NOTE — ED PROVIDER NOTES
History  Chief Complaint   Patient presents with   • Hypertension     Patient c/o hypertension  Patient states she was having a headache so she checked her BP and found it to be in the 180s  Patient c/o shakiness and headache at this time  Patient taking OTC cold medications at this time  HPI  68-year-old female with a history of hypertension noncompliant with her medications presents with a complaint of hypertension  Patient states that earlier today she noticed that she was having a mild headache at the top of her head and decided to check her blood pressure and noticed it to be high  Patient is supposed be taking hydrochlorothiazide but does not take it  Patient states that her headache was a 2 out of 10 at onset and has been waxing and waning  She denies any lateralizing weakness, vision change, nausea, vomiting, chest pain, shortness of breath, fever, chills  Patient states that she has been having a mild cold for the last week and a half with runny nose and congestion and cough  Prior to Admission Medications   Prescriptions Last Dose Informant Patient Reported? Taking?    ALPRAZolam (XANAX) 0 5 mg tablet   No No   Sig: Take 1 tablet (0 5 mg total) by mouth daily at bedtime as needed for anxiety   dicyclomine (BENTYL) 20 mg tablet   No No   Sig: Take 1 tablet (20 mg total) by mouth every 6 (six) hours   hydrochlorothiazide (MICROZIDE) 12 5 mg capsule 3/7/2023 at 0830  No Yes   Sig: Take 1 capsule (12 5 mg total) by mouth daily   meclizine (ANTIVERT) 25 mg tablet   No No   Sig: Take 1 tablet (25 mg total) by mouth 3 (three) times a day as needed for dizziness      Facility-Administered Medications: None       Past Medical History:   Diagnosis Date   • Anxiety    • Atypical squamous cells cannot exclude high grade squamous intraepithelial lesion on cytologic smear of cervix (ASC-H)     Last Assessed 5/13/2014   • Back pain    • Cancer [facility])    • Cervical cancer ([facility] [address] )    • Depression    • Ovarian cyst • Tinnitus of both ears 10/2/2020   • Tobacco abuse 11/20/2018       Past Surgical History:   Procedure Laterality Date   • ANKLE FRACTURE SURGERY      Last Assessed 07/26/2016   • ANKLE SURGERY     • CERVICAL BIOPSY  W/ LOOP ELECTRODE EXCISION     • COLONOSCOPY  10/2019   • DIAGNOSTIC LAPAROSCOPY     • DILATION AND CURETTAGE OF UTERUS     • ESOPHAGOGASTRODUODENOSCOPY N/A 9/12/2016    Procedure: ESOPHAGOGASTRODUODENOSCOPY (EGD); Surgeon: Ghislaine Dixon MD;  [facility]; Service:    • FRACTURE SURGERY     • ORTHOPEDIC SURGERY     • DE COLONOSCOPY FLX DX W/COLLJ SPEC WHEN PFRMD N/A 9/12/2016    Procedure: Groton Eric;  Surgeon: Ghislaine Dixon MD;  [facility]; Service: Colorectal   • SALPINGOOPHORECTOMY Left    • TONSILLECTOMY     • UPPER GASTROINTESTINAL ENDOSCOPY  2016       Family History   Problem Relation Age of Onset   • Coronary artery disease Mother    • No Known Problems Family    • No Known Problems Father    • Colon cancer Neg Hx      I have reviewed and agree with the history as documented  E-Cigarette/Vaping   • E-Cigarette Use Never User      E-Cigarette/Vaping Substances   • Nicotine No    • THC No    • CBD No    • Flavoring No      Social History     Tobacco Use   • Smoking status: Every Day     Packs/day: 0 50     Years: 60 00     Pack years: 30 00     Types: Cigarettes   • Smokeless tobacco: Never   Vaping Use   • Vaping Use: Never used   Substance Use Topics   • Alcohol use: No   • Drug use: Never        Review of Systems   Constitutional: Negative for chills and fever  HENT: Negative for congestion, ear pain, rhinorrhea and sore throat  Eyes: Negative for pain  Respiratory: Negative for apnea, cough, choking, chest tightness, shortness of breath, wheezing and stridor  Cardiovascular: Negative for chest pain, palpitations and leg swelling  Gastrointestinal: Negative for abdominal pain, constipation, diarrhea, nausea and vomiting     Genitourinary: Negative for hematuria  Musculoskeletal: Negative for arthralgias and back pain  Skin: Negative for rash and wound  Neurological: Positive for headaches  Negative for dizziness  Psychiatric/Behavioral: Negative for agitation and hallucinations  All other systems reviewed and are negative  Physical Exam  ED Triage Vitals   Temperature Pulse Respirations Blood Pressure SpO2   03/07/23 1651 03/07/23 1651 03/07/23 1648 03/07/23 1648 03/07/23 1648   98 3 °F (36 8 °C) 84 18 (!) 237/140 97 %      Temp Source Heart Rate Source Patient Position - Orthostatic VS BP Location FiO2 (%)   03/07/23 1651 03/07/23 1651 03/07/23 1648 03/07/23 1648 --   Temporal Monitor Lying Right arm       Pain Score       03/07/23 1648       No Pain             Orthostatic Vital Signs  Vitals:    03/07/23 1745 03/07/23 1815 03/07/23 1821 03/07/23 1915   BP: (!) 190/93 (!) 193/89 (!) 193/94 (!) 197/88   Pulse:  73 69 70   Patient Position - Orthostatic VS:    Lying       Physical Exam  Vitals reviewed  Constitutional:       General: She is not in acute distress  Appearance: She is well-developed  HENT:      Head: Normocephalic and atraumatic  Right Ear: External ear normal       Left Ear: External ear normal       Nose: Nose normal  No congestion or rhinorrhea  Mouth/Throat:      Mouth: Mucous membranes are moist       Pharynx: No oropharyngeal exudate or posterior oropharyngeal erythema  Eyes:      General:         Right eye: No discharge  Left eye: No discharge  Extraocular Movements: Extraocular movements intact  Conjunctiva/sclera: Conjunctivae normal       Pupils: Pupils are equal, round, and reactive to light  Cardiovascular:      Rate and Rhythm: Normal rate and regular rhythm  Pulses: Normal pulses  Heart sounds: Normal heart sounds  Pulmonary:      Effort: Pulmonary effort is normal  No respiratory distress  Breath sounds: Normal breath sounds  No wheezing or rales  Abdominal:      Palpations: Abdomen is soft  Tenderness: There is no abdominal tenderness  Musculoskeletal:         General: No tenderness  Normal range of motion  Cervical back: Normal range of motion and neck supple  No rigidity  Skin:     General: Skin is warm  Findings: No erythema or rash  Neurological:      General: No focal deficit present  Mental Status: She is alert and oriented to person, place, and time  Cranial Nerves: No cranial nerve deficit  Sensory: No sensory deficit  Motor: No weakness        Coordination: Coordination normal    Psychiatric:         Mood and Affect: Mood normal          ED Medications  Medications   hydrochlorothiazide (HYDRODIURIL) tablet 25 mg (25 mg Oral Given 3/7/23 1841)       Diagnostic Studies  Results Reviewed     Procedure Component Value Units Date/Time    HS Troponin 0hr (reflex protocol) [[id number]]  (Normal) Collected: 03/07/23 1806    Lab Status: Final result Specimen: Blood from Arm, Left Updated: 03/07/23 1837     hs TnI 0hr 4 ng/L     Basic metabolic panel [[id number]]  (Abnormal) Collected: 03/07/23 1806    Lab Status: Final result Specimen: Blood from Arm, Left Updated: 03/07/23 1830     Sodium 140 mmol/L      Potassium 3 3 mmol/L      Chloride 105 mmol/L      CO2 28 mmol/L      ANION GAP 7 mmol/L      BUN 15 mg/dL      Creatinine 0 89 mg/dL      Glucose 103 mg/dL      Calcium 9 2 mg/dL      eGFR 63 ml/min/1 73sq m     Narrative:      [place] guidelines for Chronic Kidney Disease (CKD):   •  Stage 1 with normal or high GFR (GFR > 90 mL/min/1 73 square meters)  •  Stage 2 Mild CKD (GFR = 60-89 mL/min/1 73 square meters)  •  Stage 3A Moderate CKD (GFR = 45-59 mL/min/1 73 square meters)  •  Stage 3B Moderate CKD (GFR = 30-44 mL/min/1 73 square meters)  •  Stage 4 Severe CKD (GFR = 15-29 mL/min/1 73 square meters)  •  Stage 5 End Stage CKD (GFR <15 mL/min/1 73 square meters)  Note: GFR calculation is accurate only with a steady state creatinine    CBC and differential [[id number]]  (Abnormal) Collected: 03/07/23 1806    Lab Status: Final result Specimen: Blood from Arm, Left Updated: 03/07/23 1813     WBC 9 99 Thousand/uL      RBC 5 39 Million/uL      Hemoglobin 16 4 g/dL      Hematocrit 49 7 %      MCV 92 fL      MCH 30 4 pg      MCHC 33 0 g/dL      RDW 13 2 %      MPV 10 0 fL      Platelets 346 Thousands/uL      nRBC 0 /100 WBCs      Neutrophils Relative 83 %      Immat GRANS % 0 %      Lymphocytes Relative 10 %      Monocytes Relative 5 %      Eosinophils Relative 1 %      Basophils Relative 1 %      Neutrophils Absolute 8 23 Thousands/µL      Immature Grans Absolute 0 04 Thousand/uL      Lymphocytes Absolute 0 99 Thousands/µL      Monocytes Absolute 0 53 Thousand/µL      Eosinophils Absolute 0 14 Thousand/µL      Basophils Absolute 0 06 Thousands/µL                  CT head without contrast   Final Result by Emerald Rasmussen MD (03/07 1913)      No acute intracranial abnormality  Mild chronic small vessel ischemic changes and volume loss  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 infarct                 Workstation performed: [id number]               Procedures  ECG 12 Lead Documentation Only    Date/Time: 3/8/2023 2:30 PM  Performed by: Lety Orozco DO  Authorized by: Lety Orozco DO     Indications / Diagnosis:  Htn  ECG reviewed by me, the ED Provider: yes    Patient location:  ED  Interpretation:     Interpretation: normal    Rate:     ECG rate:  70    ECG rate assessment: normal    Rhythm:     Rhythm: sinus rhythm    Ectopy:     Ectopy: none    QRS:     QRS axis:  Normal  Conduction:     Conduction: normal    ST segments:     ST segments:  Normal  T waves:     T waves: normal            ED Course                             SBIRT 22yo+    Flowsheet Row Most Recent Value   SBIRT (23 yo +)    In order to provide better care to our patients, we are screening all of our patients for alcohol and drug use  Would it be okay to ask you these screening questions? Yes Filed at: 03/07/2023 1820   Initial Alcohol Screen: US AUDIT-C     1  How often do you have a drink containing alcohol? 0 Filed at: 03/07/2023 1820   2  How many drinks containing alcohol do you have on a typical day you are drinking? 0 Filed at: 03/07/2023 1820   3a  Male UNDER 65: How often do you have five or more drinks on one occasion? 0 Filed at: 03/07/2023 1820   3b  FEMALE Any Age, or MALE 65+: How often do you have 4 or more drinks on one occassion? 0 Filed at: 03/07/2023 1820   Audit-C Score 0 Filed at: 03/07/2023 1820   COLLIN: How many times in the past year have you    Used an illegal drug or used a prescription medication for non-medical reasons? Never Filed at: 03/07/2023 [address]                Medical Decision Making  68-year-old female with a history of hypertension noncompliant with her medications presents with a complaint of hypertension  Hypertension  Hypertensive urgency  No signs of end organ damage  Given dose of home medication here  Head CT negative  Troponin negative  Patient is discharged given follow-up and return precautions  Amount and/or Complexity of Data Reviewed  Labs: ordered  Radiology: ordered  Risk  Prescription drug management              Disposition  Final diagnoses:   Primary hypertension   Headache   Uncontrolled hypertension   Noncompliance with medications     Time reflects when diagnosis was documented in both MDM as applicable and the Disposition within this note     Time User Action Codes Description Comment    3/7/2023  7:19 PM Mcmahon Shelly Add [I10] Primary hypertension     3/7/2023  7:19 PM Mcmahon Shelly Add [R51 9] Headache     3/7/2023  9:25 PM Lizzy Newman Add [I10] Uncontrolled hypertension     3/7/2023  9:26 PM Irvin Lora [Z91 14] Noncompliance with medications       ED Disposition     ED Disposition   Discharge    Condition   Stable    Date/Time   Tue Mar 7, 2023 7:19 PM    Comment   [facility] discharge to home/self care  Follow-up Information     Follow up With Specialties Details [facility] [address]  Emergency Department Emergency Medicine Go to  If symptoms worsen or if you have additional concerns [place] [id number]  [address] [facility] Emergency Department, [address] , Anitha Rivers MD Family Medicine Schedule an appointment as soon as possible for a visit   [address], [place] [id number]  [phone]             Discharge Medication List as of 3/7/2023  7:21 PM      CONTINUE these medications which have NOT CHANGED    Details   ALPRAZolam (XANAX) 0 5 mg tablet Take 1 tablet (0 5 mg total) by mouth daily at bedtime as needed for anxiety, Starting Tue 1/17/2023, Normal      dicyclomine (BENTYL) 20 mg tablet Take 1 tablet (20 mg total) by mouth every 6 (six) hours, Starting Fri 2/24/2023, Normal      hydrochlorothiazide (MICROZIDE) 12 5 mg capsule Take 1 capsule (12 5 mg total) by mouth daily, Starting Thu 8/4/2022, Normal      meclizine (ANTIVERT) 25 mg tablet Take 1 tablet (25 mg total) by mouth 3 (three) times a day as needed for dizziness, Starting Thu 1/5/2023, Normal           No discharge procedures on file  PDMP Review       Value Time User    PDMP Reviewed  Yes 1/17/2023  3:09 PM Wally Burroughs MD           ED Provider  Attending physically available and evaluated [address] [facility]  I managed the patient along with the ED Attending      Electronically Signed by         Simran Palmer DO  03/08/23 9891

## 2023-03-07 NOTE — ED ATTENDING ATTESTATION
3/7/2023  ILizzy, , saw and evaluated the patient  I have discussed the patient with the resident/non-physician practitioner and agree with the resident's/non-physician practitioner's findings, Plan of Care, and MDM as documented in the resident's/non-physician practitioner's note, except where noted  All available labs and Radiology studies were reviewed  I was present for key portions of any procedure(s) performed by the resident/non-physician practitioner and I was immediately available to provide assistance  At this point I agree with the current assessment done in the Emergency Department  I have conducted an independent evaluation of this patient a history and physical is as follows:77y F w/ known hypertension, non-complaint w/ her outpt antihypertensives  Has had congestion and today a headache - noted elevated BP and called her PCM and told to come to the ED for evaluation  STALLWORTH has wax/wane since onset - currently asymptomatic  Exam WNWD nad, mmm, neck supple, resp non-labored, cv regular rate, abd nd, ext no cce, skin dry, neuro non-focal, normal mood  ED Course     Labs Reviewed   BASIC METABOLIC PANEL - Abnormal       Result Value Ref Range Status    Sodium 140  135 - 147 mmol/L Final    Potassium 3 3 (*) 3 5 - 5 3 mmol/L Final    Chloride 105  96 - 108 mmol/L Final    CO2 28  21 - 32 mmol/L Final    ANION GAP 7  4 - 13 mmol/L Final    BUN 15  5 - 25 mg/dL Final    Creatinine 0 89  0 60 - 1 30 mg/dL Final    Comment: Standardized to IDMS reference method    Glucose 103  65 - 140 mg/dL Final    Comment: If the patient is fasting, the ADA then defines impaired fasting glucose as > 100 mg/dL and diabetes as > or equal to 123 mg/dL  Specimen collection should occur prior to Sulfasalazine administration due to the potential for falsely depressed results  Specimen collection should occur prior to Sulfapyridine administration due to the potential for falsely elevated results  Calcium 9 2  8 4 - 10 2 mg/dL Final    eGFR 63  ml/min/1 73sq m Final    Narrative:     [place] guidelines for Chronic Kidney Disease (CKD):   •  Stage 1 with normal or high GFR (GFR > 90 mL/min/1 73 square meters)  •  Stage 2 Mild CKD (GFR = 60-89 mL/min/1 73 square meters)  •  Stage 3A Moderate CKD (GFR = 45-59 mL/min/1 73 square meters)  •  Stage 3B Moderate CKD (GFR = 30-44 mL/min/1 73 square meters)  •  Stage 4 Severe CKD (GFR = 15-29 mL/min/1 73 square meters)  •  Stage 5 End Stage CKD (GFR <15 mL/min/1 73 square meters)  Note: GFR calculation is accurate only with a steady state creatinine   CBC AND DIFFERENTIAL - Abnormal    WBC 9 99  4 31 - 10 16 Thousand/uL Final    RBC 5 39 (*) 3 81 - 5 12 Million/uL Final    Hemoglobin 16 4 (*) 11 5 - 15 4 g/dL Final    Hematocrit 49 7 (*) 34 8 - 46 1 % Final    MCV 92  82 - 98 fL Final    MCH 30 4  26 8 - 34 3 pg Final    MCHC 33 0  31 4 - 37 4 g/dL Final    RDW 13 2  11 6 - 15 1 % Final    MPV 10 0  8 9 - 12 7 fL Final    Platelets 012  692 - 390 Thousands/uL Final    nRBC 0  /100 WBCs Final    Neutrophils Relative 83 (*) 43 - 75 % Final    Immat GRANS % 0  0 - 2 % Final    Lymphocytes Relative 10 (*) 14 - 44 % Final    Monocytes Relative 5  4 - 12 % Final    Eosinophils Relative 1  0 - 6 % Final    Basophils Relative 1  0 - 1 % Final    Neutrophils Absolute 8 23 (*) 1 85 - 7 62 Thousands/µL Final    Immature Grans Absolute 0 04  0 00 - 0 20 Thousand/uL Final    Lymphocytes Absolute 0 99  0 60 - 4 47 Thousands/µL Final    Monocytes Absolute 0 53  0 17 - 1 22 Thousand/µL Final    Eosinophils Absolute 0 14  0 00 - 0 61 Thousand/µL Final    Basophils Absolute 0 06  0 00 - 0 10 Thousands/µL Final   HS TROPONIN I 0HR - Normal    hs TnI 0hr 4  "Refer to ACS Flowchart"- see link ng/L Final    Comment:                                              Initial (time 0) result  If >=50 ng/L, Myocardial injury suggested ;  Type of myocardial injury and treatment strategy  to be determined  If 5-49 ng/L, a delta result at 2 hours and or 4 hours will be needed to further evaluate  If <4 ng/L, and chest pain has been >3 hours since onset, patient may qualify for discharge based on the HEART score in the ED  If <5 ng/L and <3hours since onset of chest pain, a delta result at 2 hours will be needed to further evaluate  HS Troponin 99th Percentile URL of a Health Population=12 ng/L with a 95% Confidence Interval of 8-18 ng/L  Second Troponin (time 2 hours)  If calculated delta >= 20 ng/L,  Myocardial injury suggested ; Type of myocardial injury and treatment strategy to be determined  If 5-49 ng/L and the calculated delta is 5-19 ng/L, consult medical service for evaluation  Continue evaluation for ischemia on ecg and other possible etiology and repeat hs troponin at 4 hours  If delta is <5 ng/L at 2 hours, consider discharge based on risk stratification via the HEART score (if in ED), or MYLES risk score in IP/Observation  HS Troponin 99th Percentile URL of a Health Population=12 ng/L with a 95% Confidence Interval of 8-18 ng/L  CT head without contrast   Final Result      No acute intracranial abnormality  Mild chronic small vessel ischemic changes and volume loss  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

## 2023-03-08 ENCOUNTER — VBI (OUTPATIENT)
Dept: FAMILY MEDICINE CLINIC | Facility: CLINIC | Age: 78
End: 2023-03-08

## 2023-03-08 LAB
ATRIAL RATE: 70 BPM
P AXIS: 83 DEGREES
PR INTERVAL: 162 MS
QRS AXIS: 60 DEGREES
QRSD INTERVAL: 86 MS
QT INTERVAL: 450 MS
QTC INTERVAL: 486 MS
T WAVE AXIS: 76 DEGREES
VENTRICULAR RATE: 70 BPM

## 2023-03-08 NOTE — DISCHARGE INSTRUCTIONS
Please continue to take your blood pressure medication as prescribed  Please schedule and appointment with your primary care doctor

## 2023-03-08 NOTE — TELEPHONE ENCOUNTER
Patient did go to the ED and gave her the Hydrochlorothiazide  She hasn't been taking it  She's at home and feeling much better

## 2023-03-08 NOTE — TELEPHONE ENCOUNTER
[id number]  [facility]    ED Visit Information     Ed visit date: 3/7/2023  Diagnosis Description: Hypertension  In Network? Yes Pauline Linton  Discharge status: Home  Discharged with meds ? No  Number of ED visits to date: 1  ED Severity:2     Outreach Information    Outreach successful: Yes 1  Date letter mailed:no adonis  Date Finalized:3/8/2023    Care Coordination    Follow up appointment with pcp: no patient spoke with pcp office this morning  Transportation issues ? No    [facility] [employer] type: 7 Day Outreach  [facility] PCP: Yes  Transportation: Friend/Family Transport  Ambulance - Was Pt given choice of of ED: No  If able to choose an ED   Would you have chosen [facility]: Yes  Called PCP first?: No  Feels able to call PCP for urgent problems ?: Yes  Understands what emergencies can be handled by PCP ?: Yes  Ever any problems getting appointment with PCP for minor emergency/urgency problems?: No  Practice Contacted Patient ?: No  Pt had ED follow up with pcp/staff ?: No    Seen for follow-up out of network ?: No  Reason Patient went to ED instead of Urgent Care or PCP?: Perceived Severity of Illness  03/08/2023 10:00 AM EST by Paulo Nageotte, MA 03/08/2023 10:00 AM EST by Paulo Nageotte, MA  [facility] Bench (Self) [phone] (IEUNDC)[phone] (Mobile)  [phone] (Mobile) Remove  - Communicated - Review ED Question

## 2023-03-09 NOTE — TELEPHONE ENCOUNTER
I called the patient and she will keep track and let us know in a couple of days what they have been running

## 2023-04-24 ENCOUNTER — TELEPHONE (OUTPATIENT)
Dept: FAMILY MEDICINE CLINIC | Facility: CLINIC | Age: 78
End: 2023-04-24

## 2023-04-24 NOTE — TELEPHONE ENCOUNTER
Patient asking which OTC allergy meds she can take with Hydrochorlothiazide and if there are any interactions  Thanks!

## 2023-05-03 NOTE — PROGRESS NOTES
Outpatient Consultation  TOBI LIND  Ph : [phone]  Fax : [phone]  Mobile : [phone]  Email : [email or web address]  Also available on Tiger Text      Jose Camara 69 y o  female MRN: [id number]    PCP: Forrest Castro MD  Referring: Referral [address] [place] [address]  [place], [address] was seen in consultation  My recommendations are included  Please do not hesitate to contact me with any questions you may have  ASSESSMENT AND PLAN:      1  Abdominal pain which is intermittent and quite significant  It appears that she may have pain either due to adhesions or she could also have intermittent intussusception  I would recommend getting a CT enterography  2  Regarding her diarrhea I think she could have IBS with diarrhea  I would recommend using Xifaxan and probiotics  Also recommend using cholestyramine and have instructed her to space medications at least 2 hours before and after  Lactose-free diet  FODMAP diet  3  She also feels that her anxiety Dr  Her diarrhea  Recommend evaluation by psychiatry/psychology  Prescribed her amitriptyline 25 mg to see if that would help  Referred her to [facility] clinic for Psychiatry  4  She also has fecal incontinence for which I recommended for her to follow up with Dr Alexandria Kelly regarding possible InterStim placement  Diagnoses and all orders for this visit:    Polyp of colon, unspecified part of colon, unspecified type    Chronic abdominal pain  -     CT small bowel enterography; Future    Functional diarrhea  -     CT small bowel enterography; Future  -     cholestyramine (QUESTRAN) 4 g packet; Take 1 packet (4 g total) by mouth 2 (two) times a day with meals    Full incontinence of feces  -     cholestyramine (QUESTRAN) 4 g packet;  Take 1 packet (4 g total) by mouth 2 (two) times a day with meals    Generalized anxiety disorder    Irritable bowel syndrome with diarrhea  -     rifaximin (XIFAXAN) 550 mg tablet; Take 1 tablet (550 mg total) by mouth every 8 (eight) hours for 14 days  -     nortriptyline (PAMELOR) 25 mg capsule; Take 1 capsule (25 mg total) by mouth daily at bedtime  -     ECG 12 lead; Future        ______________________________________________________________________    HPI:  Pain for 6 years in the periumbilical area  It started after she had her left ovary removed  The pain is severe and feels like she is being ripped in half and has cramping  She has sweating during the episode followed by a cold sensation  The pain occurs once or twice a month  She has diarrhea with that as well  She has tried a FODMAP diet and did not improve  She tried Bentyl and still had the episode  She has had diarrhea and has had accidents as well  She has seen Dr Rach Oshea and Asif Leigh  EGD in 2016 : The esophagus appeared to be normal  There was no   evidence of Robertson's esophagus or esophagitis in the esophagus  Stomach: There was a 4 cm hiatus hernia visible in the stomach  Otherwise, the   stomach appeared to be normal  There was no evidence of ulcers or   erythematous mucosae in the stomach  Duodenum: The duodenum appeared to   be normal  There was no evidence of ulcers in the duodenum  Four random   cold forceps biopsies was taken from the 2nd portion of the duodenum   Colonoscopy 10/19 : polyps and radiation changes to the rectum  Random biopsies were negative  She has a h/o cervical cancer and had radiation for 12 weeks  PRIOR ENDOSCOPIC EVALUATION :     Endoscopy : yes    Colonoscopy : yes      REVIEW OF SYSTEMS:    CONSTITUTIONAL: Denies any fever, chills, rigors, and weight loss  HEENT: No earache or tinnitus  Denies hearing loss or visual disturbances  CARDIOVASCULAR: No chest pain or palpitations     RESPIRATORY: Denies any cough, hemoptysis, shortness of breath or dyspnea on exertion  GASTROINTESTINAL: As noted in the History of Present Illness  GENITOURINARY: No problems with urination  Denies any hematuria or dysuria  NEUROLOGIC: No dizziness or vertigo, denies headaches  MUSCULOSKELETAL: Denies any muscle or joint pain  SKIN: Denies skin rashes or itching  ENDOCRINE: Denies excessive thirst  Denies intolerance to heat or cold  PSYCHOSOCIAL: Denies depression or anxiety  Denies any recent memory loss  Historical Information   Past Medical History:   Diagnosis Date    Anxiety     Atypical squamous cells cannot exclude high grade squamous intraepithelial lesion on cytologic smear of cervix (ASC-H)     Last Assessed 5/13/2014    Depression     Ovarian cyst      Past Surgical History:   Procedure Laterality Date    ANKLE FRACTURE SURGERY      Last Assessed 07/26/2016    ANKLE SURGERY      CERVICAL BIOPSY  W/ LOOP ELECTRODE EXCISION      COLONOSCOPY  10/2019    DIAGNOSTIC LAPAROSCOPY      DILATION AND CURETTAGE OF UTERUS      ESOPHAGOGASTRODUODENOSCOPY N/A 9/12/2016    Procedure: ESOPHAGOGASTRODUODENOSCOPY (EGD); Surgeon: Juvencio Chaves MD;  [facility]; Service:     IA COLONOSCOPY FLX DX W/COLLJ SPEC WHEN PFRMD N/A 9/12/2016    Procedure: Angel Valles;  Surgeon: Juvencio Chaves MD;  [facility];   Service: Colorectal    SALPINGOOPHORECTOMY Left     TONSILLECTOMY      UPPER GASTROINTESTINAL ENDOSCOPY  2016     Social History   Social History     Substance and Sexual Activity   Alcohol Use No     Social History     Substance and Sexual Activity   Drug Use Never     Social History     Tobacco Use   Smoking Status Current Some Day Smoker    Packs/day: 0 50    Years: 60 00    Pack years: 30 00    Types: Cigarettes   Smokeless Tobacco Never Used     Family History   Problem Relation Age of Onset    Coronary artery disease Mother     No Known Problems Family        Meds/Allergies       Current Outpatient Medications:     dicyclomine (BENTYL) 10 mg capsule    escitalopram (LEXAPRO) 5 mg tablet    hydrochlorothiazide (MICROZIDE) 12 5 mg capsule    hyoscyamine (ANASPAZ,LEVSIN) 0 125 MG tablet    cholestyramine (QUESTRAN) 4 g packet    doxycycline (ADOXA) 100 MG tablet    nortriptyline (PAMELOR) 25 mg capsule    rifaximin (XIFAXAN) 550 mg tablet    Allergies   Allergen Reactions    Lisinopril      Other reaction(s): Other (See Comments)  Nose bleeds, passing out           Objective     Blood pressure 110/74, pulse 84, temperature 98 °F (36 7 °C), height 5' 6" (1 676 m), weight 65 3 kg (144 lb), not currently breastfeeding  Body mass index is 23 24 kg/m²  PHYSICAL EXAM:      Physical Exam   Constitutional: She is oriented to person, place, and time  Vital signs are normal  She appears well-developed and well-nourished  HENT:   Head: Normocephalic and atraumatic  Eyes: Pupils are equal, round, and reactive to light  Conjunctivae are normal  No scleral icterus  Neck: Normal range of motion  Cardiovascular: Normal rate, regular rhythm and normal heart sounds  Pulmonary/Chest: Effort normal and breath sounds normal  No respiratory distress  Abdominal: Soft  Normal appearance and bowel sounds are normal  She exhibits no distension, no ascites and no mass  There is no hepatosplenomegaly  There is tenderness in the periumbilical area  No hernia  Musculoskeletal: Normal range of motion  Lymphadenopathy:     She has no cervical adenopathy  Neurological: She is alert and oriented to person, place, and time  Skin: Skin is warm  Psychiatric: She has a normal mood and affect   Her behavior is normal  Thought content normal            Lab Results:     Lab Results   Component Value Date    WBC 6 01 05/28/2019    HGB 16 4 (H) 05/28/2019    HCT 49 4 (H) 05/28/2019    MCV 94 05/28/2019     05/28/2019       Lab Results   Component Value Date     12/10/2015    K 3 5 05/28/2019    CL 105 05/28/2019    CO2 26 05/28/2019    ANIONGAP 18 12/10/2015    BUN 13 05/28/2019    CREATININE 1 01 05/28/2019    GLUCOSE 91 12/10/2015    CALCIUM 9 6 05/28/2019    AST 16 05/28/2019    ALT 21 05/28/2019    ALKPHOS 90 05/28/2019    PROT 8 0 12/10/2015    BILITOT 0 65 12/10/2015    EGFR 56 05/28/2019       Lab Results   Component Value Date    INR 0 99 04/25/2014    PROTIME 12 6 04/25/2014         Radiology Results:   No results found  negative...

## 2023-05-04 DIAGNOSIS — R03.0 WHITE COAT SYNDROME WITH HIGH BLOOD PRESSURE BUT WITHOUT HYPERTENSION: ICD-10-CM

## 2023-05-04 RX ORDER — HYDROCHLOROTHIAZIDE 12.5 MG/1
12.5 CAPSULE, GELATIN COATED ORAL DAILY
Qty: 90 CAPSULE | Refills: 1 | Status: SHIPPED | OUTPATIENT
Start: 2023-05-04

## 2023-05-31 ENCOUNTER — OFFICE VISIT (OUTPATIENT)
Dept: GASTROENTEROLOGY | Facility: MEDICAL CENTER | Age: 78
End: 2023-05-31
Payer: MEDICARE

## 2023-05-31 VITALS
WEIGHT: 141.4 LBS | SYSTOLIC BLOOD PRESSURE: 185 MMHG | HEART RATE: 60 BPM | TEMPERATURE: 97.2 F | BODY MASS INDEX: 22.82 KG/M2 | DIASTOLIC BLOOD PRESSURE: 102 MMHG

## 2023-05-31 DIAGNOSIS — K56.609 SBO (SMALL BOWEL OBSTRUCTION) (HCC): ICD-10-CM

## 2023-05-31 DIAGNOSIS — K58.0 IRRITABLE BOWEL SYNDROME WITH DIARRHEA: Primary | ICD-10-CM

## 2023-05-31 PROCEDURE — 99214 OFFICE O/P EST MOD 30 MIN: CPT | Performed by: INTERNAL MEDICINE

## 2023-05-31 RX ORDER — NORTRIPTYLINE HYDROCHLORIDE 25 MG/1
25 CAPSULE ORAL
Qty: 30 CAPSULE | Refills: 3 | Status: SHIPPED | OUTPATIENT
Start: 2023-05-31 | End: 2023-06-30

## 2023-05-31 NOTE — PROGRESS NOTES
Outpatient Follow up  [address]  [address]  [facility] [address]  Sj Porras MD  Ph : [phone]  Fax : [phone]  Mobile : [phone]  Email : [email or web address]  Also available on Cono-C    Meena Ayoub 70 y o  female MRN: [id number]    PCP: Daniel Sarah MD  Referring: No referring provider defined for this encounter  Christine Mireles presented for a follow up visit  My recommendations are included  Please do not hesitate to contact me with any questions you may have  ASSESSMENT AND PLAN:      No problem-specific Assessment & Plan notes found for this encounter  Diagnoses and all orders for this visit:    Irritable bowel syndrome with diarrhea  -     nortriptyline (PAMELOR) 25 mg capsule; Take 1 capsule (25 mg total) by mouth daily at bedtime  -     rifaximin (XIFAXAN) 550 mg tablet; Take 1 tablet (550 mg total) by mouth every 8 (eight) hours for 14 days    SBO (small bowel obstruction) (HCC)  -     CT small bowel enterography; Future      53-year-old lady presents was for follow-up  She has had a history of cervical cancer with radiation  She has had bowel obstruction from adhesions  She presents with evaluation for ongoing diarrhea and abdominal pain  Her symptoms could be secondary to radiation enteritis, adhesion causing obstruction, abdominal pathology or IBS  I have recommended the following  1  CT enterography  2  Pamelor  25 mg daily and can increase to 50 mg if she tolerates it well  I am not sure whey she had stopped it as it was helping her  3  May consider rifaximin as well if it is approved  4  Follow up in 3 months   ______________________________________________________________________    SUBJECTIVE:  79 y/o who presents for follow up  She has a history of cervical cancer and had radiation   She had a small bowel obstruction from adhesions and was [facility] to [facility] last year       CT 12/22 : Moderate wall thickening of multiple distal small bowel loops extending to the terminal ileum with associated surrounding inflammatory changes as well as a small amount of fluid in the pelvis   Liquid stool in the colon   Findings suspicious for an   enteritis    Correlation with the patient's symptoms and laboratory values recommended  No discrete evidence of bowel obstruction  Partially distended bladder   Mild to moderate circumferential bladder wall thickening noted, probably exaggerated by underdistention   Superimposed cystitis considered in the appropriate clinical setting     Pulmonary emphysematous changes, right-sided nephrolithiasis, renal cysts, hiatal hernia, and other findings as above      Recent colonoscopy 1/23 - one polyps was removed and recommended to follow up in 3 years  Biopsies were unremarkable  Last seen in 2/23  CT enterography in 2020 : normal      She has been on Pamelor in the past but does not recall why she stopped it  Interval history :   She still has diarrhea every day  Her pain is in the mid day  It is every day  Takes her breath away  It lasts a few seconds  The stomach then feels sore  At times she may get it with eating or without  She has used dicyclomine and helps a little  No change in appetite and no change in weight  No bleeding  She uses Xanax once a week for anxiety  No nausea/ vomiting    REVIEW OF SYSTEMS IS OTHERWISE NEGATIVE        Historical Information   Past Medical History:   Diagnosis Date   • Anxiety    • Atypical squamous cells cannot exclude high grade squamous intraepithelial lesion on cytologic smear of cervix (ASC-H)     Last Assessed 5/13/2014   • Back pain    • Cancer [facility])    • Cervical cancer ([facility] [address] )    • Depression    • Ovarian cyst    • Tinnitus of both ears 10/2/2020   • Tobacco abuse 11/20/2018     Past Surgical History:   Procedure Laterality Date   • ANKLE FRACTURE SURGERY Last Assessed 07/26/2016   • ANKLE SURGERY     • CERVICAL BIOPSY  W/ LOOP ELECTRODE EXCISION     • COLONOSCOPY  10/2019   • DIAGNOSTIC LAPAROSCOPY     • DILATION AND CURETTAGE OF UTERUS     • ESOPHAGOGASTRODUODENOSCOPY N/A 9/12/2016    Procedure: ESOPHAGOGASTRODUODENOSCOPY (EGD); Surgeon: Aura Coulter MD;  [facility]; Service:    • FRACTURE SURGERY     • ORTHOPEDIC SURGERY     • [place] COLONOSCOPY FLX DX W/COLLJ SPEC WHEN PFRMD N/A 9/12/2016    Procedure: Cristofer Rubin;  Surgeon: Aura Coulter MD;  [facility]; Service: Colorectal   • SALPINGOOPHORECTOMY Left    • TONSILLECTOMY     • UPPER GASTROINTESTINAL ENDOSCOPY  2016     Social History   Social History     Substance and Sexual Activity   Alcohol Use No     Social History     Substance and Sexual Activity   Drug Use Never     Social History     Tobacco Use   Smoking Status Every Day   • Packs/day: 0 50   • Years: 60 00   • Total pack years: 30 00   • Types: Cigarettes   Smokeless Tobacco Never     Family History   Problem Relation Age of Onset   • Coronary artery disease Mother    • No Known Problems Family    • No Known Problems Father    • Colon cancer Neg Hx        Meds/Allergies       Current Outpatient Medications:   •  ALPRAZolam (XANAX) 0 5 mg tablet  •  dicyclomine (BENTYL) 20 mg tablet  •  hydrochlorothiazide (MICROZIDE) 12 5 mg capsule  •  meclizine (ANTIVERT) 25 mg tablet  •  nortriptyline (PAMELOR) 25 mg capsule  •  rifaximin (XIFAXAN) 550 mg tablet    Allergies   Allergen Reactions   • Lisinopril Syncope     Other reaction(s): Other (See Comments)  Nose bleeds, passing out           Objective     Blood pressure (!) 185/102, pulse 60, temperature (!) 97 2 °F (36 2 °C), temperature source Tympanic, weight 64 1 kg (141 lb 6 4 oz), not currently breastfeeding  Body mass index is 22 82 kg/m²  PHYSICAL EXAM:      Physical Exam  Constitutional:       Appearance: Normal appearance  She is well-developed     HENT: Head: Normocephalic and atraumatic  Eyes:      General: No scleral icterus  Conjunctiva/sclera: Conjunctivae normal       Pupils: Pupils are equal, round, and reactive to light  Cardiovascular:      Rate and Rhythm: Normal rate and regular rhythm  Heart sounds: Normal heart sounds  Pulmonary:      Effort: Pulmonary effort is normal  No respiratory distress  Breath sounds: Normal breath sounds  Abdominal:      General: Bowel sounds are normal  There is no distension  Palpations: Abdomen is soft  There is no mass  Tenderness: There is no abdominal tenderness  Hernia: No hernia is present  Musculoskeletal:         General: Normal range of motion  Cervical back: Normal range of motion  Lymphadenopathy:      Cervical: No cervical adenopathy  Skin:     General: Skin is warm  Neurological:      Mental Status: She is alert and oriented to person, place, and time  Psychiatric:         Behavior: Behavior normal          Thought Content: Thought content normal          Lab Results:   No visits with results within 1 Day(s) from this visit     Latest known visit with results is:   Admission on 03/07/2023, Discharged on 03/07/2023   Component Date Value   • Sodium 03/07/2023 140    • Potassium 03/07/2023 3 3 (L)    • Chloride 03/07/2023 105    • CO2 03/07/2023 28    • ANION GAP 03/07/2023 7    • BUN 03/07/2023 15    • Creatinine 03/07/2023 0 89    • Glucose 03/07/2023 103    • Calcium 03/07/2023 9 2    • eGFR 03/07/2023 63    • WBC 03/07/2023 9 99    • RBC 03/07/2023 5 39 (H)    • Hemoglobin 03/07/2023 16 4 (H)    • Hematocrit 03/07/2023 49 7 (H)    • MCV 03/07/2023 92    • MCH 03/07/2023 30 4    • MCHC 03/07/2023 33 0    • RDW 03/07/2023 13 2    • MPV 03/07/2023 10 0    • Platelets 86/19/9101 253    • nRBC 03/07/2023 0    • Neutrophils Relative 03/07/2023 83 (H)    • Immat GRANS % 03/07/2023 0    • Lymphocytes Relative 03/07/2023 10 (L)    • Monocytes Relative 03/07/2023 5    • Eosinophils Relative 03/07/2023 1    • Basophils Relative 03/07/2023 1    • Neutrophils Absolute 03/07/2023 8 23 (H)    • Immature Grans Absolute 03/07/2023 0 04    • Lymphocytes Absolute 03/07/2023 0 99    • Monocytes Absolute 03/07/2023 0 53    • Eosinophils Absolute 03/07/2023 0 14    • Basophils Absolute 03/07/2023 0 06    • hs TnI 0hr 03/07/2023 4    • Ventricular Rate 03/07/2023 70    • Atrial Rate 03/07/2023 70    • MN Interval 03/07/2023 162    • QRSD Interval 03/07/2023 86    • QT Interval 03/07/2023 450    • QTC Interval 03/07/2023 486    • P Axis 03/07/2023 83    • QRS Axis 03/07/2023 60    • T Wave Axis 03/07/2023 76          Radiology Results:   No results found

## 2023-06-01 ENCOUNTER — TELEPHONE (OUTPATIENT)
Dept: GASTROENTEROLOGY | Facility: MEDICAL CENTER | Age: 78
End: 2023-06-01

## 2023-06-01 NOTE — TELEPHONE ENCOUNTER
Started a prior auth through [address] for Xifaxan 550 tablet   APPROVED CASE# [id number]  Valid 06/01/2023-06/15/2023

## 2023-06-12 DIAGNOSIS — G89.29 CHRONIC ABDOMINAL PAIN: ICD-10-CM

## 2023-06-12 DIAGNOSIS — R10.9 CHRONIC ABDOMINAL PAIN: ICD-10-CM

## 2023-06-12 DIAGNOSIS — K56.609 SBO (SMALL BOWEL OBSTRUCTION) (HCC): Primary | ICD-10-CM

## 2023-06-15 ENCOUNTER — TELEPHONE (OUTPATIENT)
Dept: GASTROENTEROLOGY | Facility: MEDICAL CENTER | Age: 78
End: 2023-06-15

## 2023-06-15 NOTE — TELEPHONE ENCOUNTER
----- Message from Alize Ram MD sent at 6/12/2023  3:11 PM EDT -----  Please of the patient know to obtain blood work prior to the imaging study

## 2023-06-15 NOTE — TELEPHONE ENCOUNTER
Spoke to patient to obtain blood work prior to her CT scan scheduled for 06/22  Pt verbalized understanding and will complete

## 2023-06-19 ENCOUNTER — LAB (OUTPATIENT)
Dept: LAB | Facility: CLINIC | Age: 78
End: 2023-06-19
Payer: MEDICARE

## 2023-06-19 DIAGNOSIS — G89.29 CHRONIC ABDOMINAL PAIN: ICD-10-CM

## 2023-06-19 DIAGNOSIS — R10.9 CHRONIC ABDOMINAL PAIN: ICD-10-CM

## 2023-06-19 DIAGNOSIS — K56.609 SBO (SMALL BOWEL OBSTRUCTION) (HCC): ICD-10-CM

## 2023-06-19 LAB
ANION GAP SERPL CALCULATED.3IONS-SCNC: 1 MMOL/L (ref 4–13)
BUN SERPL-MCNC: 16 MG/DL (ref 5–25)
CALCIUM SERPL-MCNC: 8.9 MG/DL (ref 8.3–10.1)
CHLORIDE SERPL-SCNC: 108 MMOL/L (ref 96–108)
CO2 SERPL-SCNC: 31 MMOL/L (ref 21–32)
CREAT SERPL-MCNC: 1.03 MG/DL (ref 0.6–1.3)
GFR SERPL CREATININE-BSD FRML MDRD: 52 ML/MIN/1.73SQ M
GLUCOSE P FAST SERPL-MCNC: 95 MG/DL (ref 65–99)
POTASSIUM SERPL-SCNC: 3.6 MMOL/L (ref 3.5–5.3)
SODIUM SERPL-SCNC: 140 MMOL/L (ref 135–147)

## 2023-06-19 PROCEDURE — 36415 COLL VENOUS BLD VENIPUNCTURE: CPT

## 2023-06-19 PROCEDURE — 80048 BASIC METABOLIC PNL TOTAL CA: CPT

## 2023-06-20 NOTE — RESULT ENCOUNTER NOTE
Inform patient via Rebel[employer]  Please review the pathology/lab result of further discussion  Copied from Mobi message :       [address]ena,     Your blood work was unremarkable       Best regards,     Arsenio Camacho MD

## 2023-06-22 ENCOUNTER — HOSPITAL ENCOUNTER (OUTPATIENT)
Dept: CT IMAGING | Facility: HOSPITAL | Age: 78
Discharge: HOME | End: 2023-06-22
Attending: INTERNAL MEDICINE
Payer: MEDICARE

## 2023-06-22 DIAGNOSIS — K56.609 SBO (SMALL BOWEL OBSTRUCTION) (HCC): ICD-10-CM

## 2023-06-22 PROCEDURE — G1004 CDSM NDSC: HCPCS

## 2023-06-22 PROCEDURE — 74177 CT ABD & PELVIS W/CONTRAST: CPT

## 2023-06-22 RX ADMIN — IOHEXOL 100 ML: 350 INJECTION, SOLUTION INTRAVENOUS at 11:15

## 2023-06-29 NOTE — RESULT ENCOUNTER NOTE
Inform patient via Weotta  Please review the pathology/lab result of further discussion  Copied from Weotta message :       Rush Garcia,     The Ct scan was unremarkable  We will follow up in the office       Best regards,     Avi Spencer MD

## 2023-08-01 ENCOUNTER — RA CDI HCC (OUTPATIENT)
Dept: OTHER | Facility: HOSPITAL | Age: 78
End: 2023-08-01

## 2023-08-22 ENCOUNTER — OFFICE VISIT (OUTPATIENT)
Dept: FAMILY MEDICINE CLINIC | Facility: CLINIC | Age: 78
End: 2023-08-22
Payer: MEDICARE

## 2023-08-22 ENCOUNTER — TELEPHONE (OUTPATIENT)
Dept: FAMILY MEDICINE CLINIC | Facility: CLINIC | Age: 78
End: 2023-08-22

## 2023-08-22 VITALS
HEIGHT: 66 IN | TEMPERATURE: 98 F | OXYGEN SATURATION: 98 % | SYSTOLIC BLOOD PRESSURE: 152 MMHG | BODY MASS INDEX: 22.74 KG/M2 | WEIGHT: 141.5 LBS | DIASTOLIC BLOOD PRESSURE: 100 MMHG | HEART RATE: 68 BPM

## 2023-08-22 DIAGNOSIS — R30.0 DYSURIA: Primary | ICD-10-CM

## 2023-08-22 DIAGNOSIS — F41.1 GENERALIZED ANXIETY DISORDER: ICD-10-CM

## 2023-08-22 DIAGNOSIS — M46.1 SACROILIITIS (HCC): ICD-10-CM

## 2023-08-22 DIAGNOSIS — N39.41 URGENCY INCONTINENCE: ICD-10-CM

## 2023-08-22 DIAGNOSIS — I10 ESSENTIAL HYPERTENSION: ICD-10-CM

## 2023-08-22 DIAGNOSIS — H93.13 TINNITUS OF BOTH EARS: ICD-10-CM

## 2023-08-22 DIAGNOSIS — N18.31 STAGE 3A CHRONIC KIDNEY DISEASE (HCC): ICD-10-CM

## 2023-08-22 DIAGNOSIS — R42 VERTIGO: ICD-10-CM

## 2023-08-22 DIAGNOSIS — D58.2 ELEVATED HEMOGLOBIN (HCC): ICD-10-CM

## 2023-08-22 DIAGNOSIS — F33.9 DEPRESSION, RECURRENT (HCC): ICD-10-CM

## 2023-08-22 DIAGNOSIS — I73.9 CLAUDICATION OF BOTH LOWER EXTREMITIES (HCC): ICD-10-CM

## 2023-08-22 DIAGNOSIS — R42 DIZZINESS: ICD-10-CM

## 2023-08-22 LAB
SL AMB  POCT GLUCOSE, UA: NEGATIVE
SL AMB LEUKOCYTE ESTERASE,UA: NEGATIVE
SL AMB POCT BILIRUBIN,UA: NEGATIVE
SL AMB POCT BLOOD,UA: ABNORMAL
SL AMB POCT CLARITY,UA: CLEAR
SL AMB POCT COLOR,UA: YELLOW
SL AMB POCT KETONES,UA: NEGATIVE
SL AMB POCT NITRITE,UA: NEGATIVE
SL AMB POCT PH,UA: 5
SL AMB POCT SPECIFIC GRAVITY,UA: 1.03
SL AMB POCT URINE PROTEIN: NEGATIVE
SL AMB POCT UROBILINOGEN: 0.2

## 2023-08-22 PROCEDURE — 87077 CULTURE AEROBIC IDENTIFY: CPT | Performed by: FAMILY MEDICINE

## 2023-08-22 PROCEDURE — 81002 URINALYSIS NONAUTO W/O SCOPE: CPT | Performed by: FAMILY MEDICINE

## 2023-08-22 PROCEDURE — 87186 SC STD MICRODIL/AGAR DIL: CPT | Performed by: FAMILY MEDICINE

## 2023-08-22 PROCEDURE — 99214 OFFICE O/P EST MOD 30 MIN: CPT | Performed by: FAMILY MEDICINE

## 2023-08-22 PROCEDURE — 87086 URINE CULTURE/COLONY COUNT: CPT | Performed by: FAMILY MEDICINE

## 2023-08-22 RX ORDER — ALPRAZOLAM 0.5 MG/1
0.5 TABLET ORAL
Qty: 30 TABLET | Refills: 0 | Status: CANCELLED | OUTPATIENT
Start: 2023-08-22

## 2023-08-22 RX ORDER — ALPRAZOLAM 0.5 MG/1
0.5 TABLET ORAL
Qty: 30 TABLET | Refills: 0 | Status: SHIPPED | OUTPATIENT
Start: 2023-08-22

## 2023-08-22 RX ORDER — MECLIZINE HYDROCHLORIDE 25 MG/1
25 TABLET ORAL 3 TIMES DAILY PRN
Qty: 60 TABLET | Refills: 0 | Status: CANCELLED | OUTPATIENT
Start: 2023-08-22

## 2023-08-22 RX ORDER — MECLIZINE HYDROCHLORIDE 25 MG/1
25 TABLET ORAL 3 TIMES DAILY PRN
Qty: 60 TABLET | Refills: 0 | Status: SHIPPED | OUTPATIENT
Start: 2023-08-22

## 2023-08-22 NOTE — PROGRESS NOTES
Subjective:    HPI  Debora Gilbert is a 70 y.o. female here today for:  Chief Complaint   Patient presents with   • Urinary Frequency     With some burning   .      ---Above per clinical staff & reviewed.  ---  HPI:  77yof here with concerns for uti  Had some issues with dysuria this past week, those symptoms have resolved  Now having issues with incontinence and trouble holding urine  Urine dip looks ok- will send for culture  Discussed making sure she is drinking enough fluids which she states has been tough  Has not taken blood pressure meds for a few weeks  States blood pressure at home is normal  Pt does a lot of yard/farm work- is extremely tired when she takes the BP meds  Does not want to take it- feels much better off  Will keep BP log- give me readings 1-2 weeks and bring to well visit in 1 month    Results for orders placed or performed in visit on 08/22/23   POCT urine dip   Result Value Ref Range    LEUKOCYTE ESTERASE,UA negative     NITRITE,UA negative     SL AMB POCT UROBILINOGEN 0.2     POCT URINE PROTEIN negative      PH,UA 5.0     BLOOD,UA trace     SPECIFIC GRAVITY,UA 1.030     KETONES,UA negative     BILIRUBIN,UA negative     GLUCOSE, UA negative      COLOR,UA yellow     CLARITY,UA clear             The following portions of the patient's history were reviewed and updated as appropriate: allergies, current medications, past family history, past medical history, past social history, past surgical history and problem list.    Past Medical History:   Diagnosis Date   • Anxiety    • Atypical squamous cells cannot exclude high grade squamous intraepithelial lesion on cytologic smear of cervix (ASC-H)     Last Assessed 5/13/2014   • Back pain    • Cancer [facility])    • Cervical cancer ([facility])    • Depression    • Ovarian cyst    • Tinnitus of both ears 10/2/2020   • Tobacco abuse 11/20/2018       Past Surgical History:   Procedure Laterality Date   • ANKLE FRACTURE SURGERY      Last Assessed 07/26/2016   • ANKLE SURGERY     • CERVICAL BIOPSY  W/ LOOP ELECTRODE EXCISION     • COLONOSCOPY  10/2019   • DIAGNOSTIC LAPAROSCOPY     • DILATION AND CURETTAGE OF UTERUS     • ESOPHAGOGASTRODUODENOSCOPY N/A 9/12/2016    Procedure: ESOPHAGOGASTRODUODENOSCOPY (EGD); Surgeon: Zeinab Wells MD;  Location: [facility]; Service:    • FRACTURE SURGERY     • ORTHOPEDIC SURGERY     • FL COLONOSCOPY FLX DX W/COLLJ SPEC WHEN PFRMD N/A 9/12/2016    Procedure: Yamil Ledezma;  Surgeon: Zeinab Wells MD;  Location: [facility]; Service: Colorectal   • SALPINGOOPHORECTOMY Left    • TONSILLECTOMY     • UPPER GASTROINTESTINAL ENDOSCOPY  2016       Social History     Socioeconomic History   • Marital status:      Spouse name: None   • Number of children: None   • Years of education: 15   • Highest education level: None   Occupational History   • None   Tobacco Use   • Smoking status: Every Day     Packs/day: 0.50     Years: 60.00     Total pack years: 30.00     Types: Cigarettes   • Smokeless tobacco: Never   Vaping Use   • Vaping Use: Never used   Substance and Sexual Activity   • Alcohol use: No   • Drug use: Never   • Sexual activity: Not Currently   Other Topics Concern   • None   Social History Narrative    ** Merged History Encounter **           since 2015 after 15 year marriage. 1 daughter from 1st marriage. 1 granddaughter, oRchelle Dahl, whom she raised. 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 Social Determinants of Health     Financial Resource Strain: Not on file   Food Insecurity: Unknown (4/19/2021)    Hunger Vital Sign    • Worried About Running Out of Food in the Last Year: Never true    • Ran Out of Food in the Last Year: Not on file   Transportation Needs: Unknown (4/19/2021)    PRAPARE - Transportation    • Lack of Transportation (Medical): No    • Lack of Transportation (Non-Medical):  Not on file   Physical Activity: Not on file   Stress: Not on file   Social Connections: Not on file   Intimate Partner Violence: Not on file   Housing Stability: Not on file       Current Outpatient Medications   Medication Sig Dispense Refill   • ALPRAZolam (XANAX) 0.5 mg tablet Take 1 tablet (0.5 mg total) by mouth daily at bedtime as needed for anxiety 30 tablet 0   • dicyclomine (BENTYL) 20 mg tablet Take 1 tablet (20 mg total) by mouth every 6 (six) hours 60 tablet 1   • hydrochlorothiazide (MICROZIDE) 12.5 mg capsule Take 1 capsule (12.5 mg total) by mouth daily 90 capsule 1   • meclizine (ANTIVERT) 25 mg tablet Take 1 tablet (25 mg total) by mouth 3 (three) times a day as needed for dizziness 60 tablet 0   • nortriptyline (PAMELOR) 25 mg capsule Take 1 capsule (25 mg total) by mouth daily at bedtime 30 capsule 3     No current facility-administered medications for this visit. Review of Systems   Constitutional: Negative. Negative for chills and fever. HENT: Negative. Negative for ear pain and sore throat. Eyes: Negative for pain and visual disturbance. Respiratory: Negative. Negative for cough and shortness of breath. Cardiovascular: Negative. Negative for chest pain and palpitations. Gastrointestinal: Negative. Negative for abdominal pain and vomiting. Genitourinary: Positive for dysuria and urgency. Negative for hematuria. Musculoskeletal: Negative for arthralgias and back pain. Skin: Negative for color change and rash. Neurological: Negative. Negative for seizures and syncope. Psychiatric/Behavioral: Negative.          Objective:    /100 (BP Location: Left arm, Patient Position: Sitting, Cuff Size: Standard)   Pulse 68   Temp 98 °F (36.7 °C) (Temporal)   Ht 5' 6" (1.676 m)   Wt 64.2 kg (141 lb 8 oz)   SpO2 98%   BMI 22.84 kg/m²   Wt Readings from Last 3 Encounters:   08/22/23 64.2 kg (141 lb 8 oz)   05/31/23 64.1 kg (141 lb 6.4 oz)   02/13/23 62.9 kg (138 lb 9.6 oz)     BP Readings from Last 3 Encounters:   08/22/23 152/100   05/31/23 (!) 185/102 03/07/23 (!) 197/88       Lab Results   Component Value Date    WBC 9.99 03/07/2023    HGB 16.4 (H) 03/07/2023    HCT 49.7 (H) 03/07/2023     03/07/2023    TRIG 101 08/11/2022    HDL 56 08/11/2022    ALT 21 12/19/2022    AST 14 12/19/2022     12/10/2015    K 3.6 06/19/2023     06/19/2023    CREATININE 1.03 06/19/2023    BUN 16 06/19/2023    CO2 31 06/19/2023    INR 0.99 04/25/2014    GLUF 95 06/19/2023    HGBA1C 5.6 05/18/2019       Physical Exam  Vitals and nursing note reviewed. Constitutional:       Appearance: Normal appearance. She is well-developed. HENT:      Head: Normocephalic and atraumatic. Eyes:      Pupils: Pupils are equal, round, and reactive to light. Cardiovascular:      Rate and Rhythm: Normal rate and regular rhythm. Heart sounds: No murmur heard. Pulmonary:      Effort: Pulmonary effort is normal.      Breath sounds: Normal breath sounds. Musculoskeletal:      Cervical back: Normal range of motion and neck supple. Skin:     General: Skin is warm. Capillary Refill: Capillary refill takes less than 2 seconds. Neurological:      Mental Status: She is alert and oriented to person, place, and time. Cranial Nerves: No cranial nerve deficit. Psychiatric:         Mood and Affect: Mood normal.         Thought Content: Thought content normal.                       Assessment/Plan:   Jd Melton was seen today for urinary frequency. Diagnoses and all orders for this visit:    Dysuria  -     Urine culture; Future  -     POCT urine dip  -     Urine culture    Dizziness  -     meclizine (ANTIVERT) 25 mg tablet; Take 1 tablet (25 mg total) by mouth 3 (three) times a day as needed for dizziness    Vertigo  -     meclizine (ANTIVERT) 25 mg tablet; Take 1 tablet (25 mg total) by mouth 3 (three) times a day as needed for dizziness    Tinnitus of both ears  -     meclizine (ANTIVERT) 25 mg tablet;  Take 1 tablet (25 mg total) by mouth 3 (three) times a day as needed for dizziness    Urgency incontinence  -     Urine culture; Future  -     POCT urine dip  -     Urine culture    Essential hypertension    Generalized anxiety disorder  -     ALPRAZolam (XANAX) 0.5 mg tablet; Take 1 tablet (0.5 mg total) by mouth daily at bedtime as needed for anxiety    Sacroiliitis (HCC)    Depression, recurrent (HCC)    Claudication of both lower extremities (HCC)    Elevated hemoglobin (HCC)    Stage 3a chronic kidney disease ([address])      Return in about 4 weeks (around 9/19/2023) for Annual physical.  There are no Patient Instructions on file for this visit.

## 2023-08-24 DIAGNOSIS — R30.0 DYSURIA: Primary | ICD-10-CM

## 2023-08-24 DIAGNOSIS — N39.0 URINARY TRACT INFECTION WITHOUT HEMATURIA, SITE UNSPECIFIED: ICD-10-CM

## 2023-08-24 LAB
BACTERIA UR CULT: ABNORMAL
BACTERIA UR CULT: ABNORMAL

## 2023-08-24 RX ORDER — SULFAMETHOXAZOLE AND TRIMETHOPRIM 800; 160 MG/1; MG/1
1 TABLET ORAL 2 TIMES DAILY
Qty: 6 TABLET | Refills: 0 | Status: SHIPPED | OUTPATIENT
Start: 2023-08-24 | End: 2023-08-27

## 2023-11-04 DIAGNOSIS — R03.0 WHITE COAT SYNDROME WITH HIGH BLOOD PRESSURE BUT WITHOUT HYPERTENSION: ICD-10-CM

## 2023-11-07 RX ORDER — HYDROCHLOROTHIAZIDE 12.5 MG/1
12.5 CAPSULE, GELATIN COATED ORAL DAILY
Qty: 90 CAPSULE | Refills: 0 | Status: SHIPPED | OUTPATIENT
Start: 2023-11-07

## 2023-11-15 ENCOUNTER — TELEPHONE (OUTPATIENT)
Dept: FAMILY MEDICINE CLINIC | Facility: CLINIC | Age: 78
End: 2023-11-15

## 2023-11-15 ENCOUNTER — OFFICE VISIT (OUTPATIENT)
Dept: URGENT CARE | Facility: CLINIC | Age: 78
End: 2023-11-15
Payer: MEDICARE

## 2023-11-15 VITALS
HEART RATE: 79 BPM | RESPIRATION RATE: 18 BRPM | HEIGHT: 66 IN | WEIGHT: 141 LBS | BODY MASS INDEX: 22.66 KG/M2 | DIASTOLIC BLOOD PRESSURE: 90 MMHG | TEMPERATURE: 97 F | SYSTOLIC BLOOD PRESSURE: 144 MMHG | OXYGEN SATURATION: 96 %

## 2023-11-15 DIAGNOSIS — F41.9 ANXIETY: Primary | ICD-10-CM

## 2023-11-15 DIAGNOSIS — I10 ELEVATED BLOOD PRESSURE READING IN OFFICE WITH DIAGNOSIS OF HYPERTENSION: ICD-10-CM

## 2023-11-15 PROCEDURE — G0463 HOSPITAL OUTPT CLINIC VISIT: HCPCS

## 2023-11-15 PROCEDURE — 99213 OFFICE O/P EST LOW 20 MIN: CPT

## 2023-11-15 NOTE — PATIENT INSTRUCTIONS
Continue to monitor blood pressure recordings  Take all medication as prescribed  Increase fluid intake   Follow up with PCP in 3-5 days to discuss blood pressure and worsening anxiety  Proceed to  ER if symptoms worsen. Hypertension   AMBULATORY CARE:   Hypertension  is high blood pressure. Your blood pressure is the force of your blood moving against the walls of your arteries. Hypertension causes your blood pressure to get so high that your heart has to work much harder than normal. This can damage your heart. Hypertension that does not respond to medicines and lifestyle changes is called resistant hypertension. Hypertension is considered chronic when it continues for 3 months or longer. Signs and symptoms of hypertension:  You may have no signs or symptoms, or you may have any of the following:  Headache    Blurred vision    Chest pain    Dizziness or weakness    Trouble breathing    Nosebleeds    Call your local emergency number (911 in the [address]) or have someone call if:   You have chest pain. You have any of the following signs of a heart attack:      Squeezing, pressure, or pain in your chest    You may  also have any of the following:     Discomfort or pain in your back, neck, jaw, stomach, or arm    Shortness of breath    Nausea or vomiting    Lightheadedness or a sudden cold sweat    You become confused or have trouble speaking. You suddenly feel lightheaded or have trouble breathing. Seek care immediately if:   You have a severe headache or vision loss. You have weakness in an arm or leg. Call your doctor or cardiologist if:   You feel faint, dizzy, confused, or drowsy. You have been taking your blood pressure medicine but your pressure is higher than your provider says it should be. You have questions or concerns about your condition or care.     What you need to know about the stages of hypertension:  Your healthcare provider will give you a blood pressure goal based on your age, health, and risk for cardiovascular disease. The following are general guidelines on the stages of hypertension:  Normal blood pressure is 119/79 or lower . Your healthcare provider may only check your blood pressure each year if it stays at a normal level. Elevated blood pressure is 120/79 to 129/79 . This is sometimes called prehypertension. Your healthcare provider may suggest lifestyle changes to help lower your blood pressure to a normal level. He or she may then check it again in 3 to 6 months. Stage 1 hypertension is 130/80  to 139/89 . Your provider may recommend lifestyle changes, medication, and checks every 3 to 6 months until your blood pressure is controlled. Stage 2 hypertension is 140/90 or higher . Your provider will recommend lifestyle changes and have you take 2 kinds of hypertension medicines. You will also need to have your blood pressure checked monthly until it is controlled. Treatment  may include any of the following:  Antihypertensives  may be used to help lower your blood pressure. Several kinds of medicines are available. Your healthcare provider will choose medicines based on the kind of hypertension you have. You may need more than one type of medicine. Take the medicine exactly as directed. Diuretics  help decrease extra fluid that collects in your body. This will help lower your blood pressure. You may urinate more often while you take this medicine. Cholesterol medicine  helps lower your cholesterol level. A low cholesterol level helps prevent heart disease and makes it easier to control your blood pressure. Manage hypertension:   Check your blood pressure at home. Do not smoke, have caffeine, or exercise for at least 30 minutes before you check your blood pressure. Sit and rest for 5 minutes before you check your blood pressure. Extend your arm and support it on a flat surface. Your arm should be at the same level as your heart.  Follow the directions that came with your blood pressure monitor. Check your blood pressure 2 times, 1 minute apart, before you take your medicine in the morning. Also check your blood pressure before your evening meal. Keep a record of your readings and bring it to your follow-up visits. Ask your healthcare provider what your blood pressure should be. Manage any other health conditions you have. Health conditions such as diabetes can increase your risk for hypertension. Follow your healthcare provider's instructions and take all your medicines as directed. Ask about all medicines. Certain medicines can increase your blood pressure. Examples include oral birth control pills, decongestants, herbal supplements, and NSAIDs, such as ibuprofen. Your healthcare provider can tell you which medicines are safe for you to take. This includes prescription and over-the-counter medicines. Lifestyle changes you can make to manage hypertension:  Your healthcare provider may recommend you work with a team to manage hypertension. The team may include medical experts such as a dietitian, an exercise or physical therapist, and a behavior therapist. Your family members may be included in helping you create lifestyle changes. Limit sodium (salt) as directed. Too much sodium can affect your fluid balance. Check labels to find low-sodium or no-salt-added foods. Some low-sodium foods use potassium salts for flavor. Too much potassium can also cause health problems. Your healthcare provider will tell you how much sodium and potassium are safe for you to have in a day. He or she may recommend that you limit sodium to 2,300 mg a day. Follow the meal plan recommended by your healthcare provider. A dietitian or your provider can give you more information on low-sodium plans or the DASH (Dietary Approaches to Stop Hypertension) eating plan. The DASH plan is low in sodium, processed sugar, unhealthy fats, and total fat.  It is high in potassium, calcium, and fiber. These can be found in vegetables, fruit, and whole-grain foods. Be physically active throughout the day. Physical activity, such as exercise, can help control your blood pressure and your weight. Be physically active for at least 30 minutes per day, on most days of the week. Include aerobic activity, such as walking or riding a bicycle. Also include strength training at least 2 times each week. Your healthcare providers can help you create a physical activity plan. Decrease stress. This may help lower your blood pressure. Learn ways to relax, such as deep breathing or listening to music. Limit alcohol as directed. Alcohol can increase your blood pressure. A drink of alcohol is 12 ounces of beer, 5 ounces of wine, or 1½ ounces of liquor. Do not smoke. Nicotine and other chemicals in cigarettes and cigars can increase your blood pressure and also cause lung damage. Ask your healthcare provider for information if you currently smoke and need help to quit. E-cigarettes or smokeless tobacco still contain nicotine. Talk to your healthcare provider before you use these products. Follow up with your doctor or cardiologist as directed: You will need to return to have your blood pressure checked and to have other lab tests done. Write down your questions so you remember to ask them during your visits. © Copyright Nicole Riley 2023 Information is for End User's use only and may not be sold, redistributed or otherwise used for commercial purposes. The above information is an  only. It is not intended as medical advice for individual conditions or treatments. Talk to your doctor, nurse or pharmacist before following any medical regimen to see if it is safe and effective for you. Anxiety   AMBULATORY CARE:   Anxiety  is a condition that causes you to feel extremely worried or nervous.  The feelings are so strong that they can cause problems with your daily activities or sleep. Anxiety may be triggered by something you fear, or it may happen without a cause. Family or work stress, smoking, caffeine, and alcohol can increase your risk for anxiety. Certain medicines or health conditions can also increase your risk. Anxiety can become a long-term condition if it is not managed or treated. Common signs and symptoms:   Fatigue or muscle tightness    Shaking, restlessness, or irritability    Problems focusing    Trouble sleeping    Feeling jumpy, easily startled, or dizzy    Rapid heartbeat or shortness of breath    Call your local emergency number (911 in the [address]) if:   You have chest pain, tightness, or heaviness that may spread to your shoulders, arms, jaw, neck, or back. You think about harming yourself or someone else. Call your therapist or doctor if:   Your symptoms get worse or do not get better with treatment. Your anxiety keeps you from doing your regular daily activities. You have new symptoms since your last visit. You have questions or concerns about your condition or care. The following resources are available at any time to help you, if needed:   Contact a suicide prevention organization: For the [employer] Suicide and Crisis Lifeline:     Call or text 87731 41 94 73 a chat on https://One[employer].org/chat     Call 5-763.843.1768 (5-403-519-TALK)    For the Suicide Hotline, call 4-142.392.5850 (7-219-QMNTFOF)    For a list of international numbers: https://save.org/find-help/international-resources/  Treatment for anxiety  depends on how severe your symptoms are. The following are common treatments for anxiety:  Cognitive behavioral therapy (CBT)  teaches you how to identify and change negative thought patterns. Anxiety or antidepressant medicine  may help relieve or prevent anxiety. You may need to take the medicine for several weeks before you begin to feel better.  Tell your healthcare provider about any side effects or problems you have with your medicine. The type or amount of medicine may need to be changed. Medicines are usually used along with therapy. Self-care:   Talk to someone about your anxiety. Your healthcare provider may suggest counseling. You might feel more comfortable talking with a friend or family member about your anxiety. Choose someone you know will be supportive and encouraging. Get regular physical activity. Physical activity can lower your stress, improve your mood, and help you sleep better. Work with your healthcare provider to develop a plan that you enjoy. Create a regular sleep schedule. A routine can help you relax before bed. Listen to music, read, or do yoga. Try to go to bed and wake up at the same time every day. Sleep is important for emotional health. Find ways to relax. Activities such as meditation or listening to music can help you relax. Spend time with friends, or do things you enjoy. Do activities you enjoy. Spend time with friends, or do something fun. Choose activities you are familiar with or comfortable doing. This may help prevent anxiety. Practice deep breathing. Deep breathing can help you relax when you feel anxious. Focus on taking slow, deep breaths several times a day, or during an anxiety attack. Breathe in through your nose and out through your mouth. Deep breathing combined with meditation or listening to music may help you feel calmer. Do not have caffeine. Caffeine can make your symptoms worse. Do not have foods or drinks that are meant to increase your energy level. Do not drink alcohol or use drugs. Alcohol and drugs can worsen anxiety or make it hard to manage. Talk to your therapist or healthcare provider if you need help to quit. Follow up with your therapist or doctor as directed: Your healthcare provider will monitor your progress at follow-up visits.  Your provider will also monitor your medicine if you take antidepressants and ask if the medicine is helping. Tell your provider about any side effects or problems you have with your medicine. The type or amount of medicine may need to be changed. Write down your questions so you remember to ask them during your visits. For more information or support:   [employer] [facility]ness  [address] [place] [address] , [facility] , [address] [place] [address]  Phone: [phone]  Phone: [phone]  Web Address: http://[employer]/. org  [address] [facility] Suicide and [address] [place] [id number]  Phone: 0- 749 - 640  Web Address: [employer]. org OR https://[employer] Instituteorg/chat/    © Copyright [employer] 2023 Information is for End User's use only and may not be sold, redistributed or otherwise used for commercial purposes. The above information is an  only. It is not intended as medical advice for individual conditions or treatments. Talk to your doctor, nurse or pharmacist before following any medical regimen to see if it is safe and effective for you.

## 2023-11-15 NOTE — PROGRESS NOTES
[facility]        NAME: Roxie Dixon is a 70 y.o. female  : 1945    MRN: [id number]  DATE: November 15, 2023  TIME: 1:03 PM    Assessment and Plan   Anxiety [F41.9]  1. Anxiety        2. Elevated blood pressure reading in office with diagnosis of hypertension          BP re-checked by myself and found to be 144/90. Neuro exam intact. Do believe anxiety could be causing increased vital signs and worsening HTN. Recommend close follow up with PCP tomorrow to discuss medication changes and in the mean time, continue to monitor blood pressure at home with device. Encouraged continued supportive measures. Follow up with PCP in 3-5 days or proceed to emergency department for worsening symptoms. Patient verbalized understanding of instructions given. Patient Instructions     Patient Instructions   Continue to monitor blood pressure recordings  Take all medication as prescribed  Increase fluid intake   Follow up with PCP in 3-5 days to discuss blood pressure and worsening anxiety  Proceed to  ER if symptoms worsen. Hypertension   AMBULATORY CARE:   Hypertension  is high blood pressure. Your blood pressure is the force of your blood moving against the walls of your arteries. Hypertension causes your blood pressure to get so high that your heart has to work much harder than normal. This can damage your heart. Hypertension that does not respond to medicines and lifestyle changes is called resistant hypertension. Hypertension is considered chronic when it continues for 3 months or longer. Signs and symptoms of hypertension:  You may have no signs or symptoms, or you may have any of the following:  Headache    Blurred vision    Chest pain    Dizziness or weakness    Trouble breathing    Nosebleeds    Call your local emergency number (911 in the [address]) or have someone call if:   You have chest pain.     You have any of the following signs of a heart attack:      Squeezing, pressure, or pain in your chest    You may  also have any of the following:     Discomfort or pain in your back, neck, jaw, stomach, or arm    Shortness of breath    Nausea or vomiting    Lightheadedness or a sudden cold sweat    You become confused or have trouble speaking. You suddenly feel lightheaded or have trouble breathing. Seek care immediately if:   You have a severe headache or vision loss. You have weakness in an arm or leg. Call your doctor or cardiologist if:   You feel faint, dizzy, confused, or drowsy. You have been taking your blood pressure medicine but your pressure is higher than your provider says it should be. You have questions or concerns about your condition or care. What you need to know about the stages of hypertension:  Your healthcare provider will give you a blood pressure goal based on your age, health, and risk for cardiovascular disease. The following are general guidelines on the stages of hypertension:  Normal blood pressure is 119/79 or lower . Your healthcare provider may only check your blood pressure each year if it stays at a normal level. Elevated blood pressure is 120/79 to 129/79 . This is sometimes called prehypertension. Your healthcare provider may suggest lifestyle changes to help lower your blood pressure to a normal level. He or she may then check it again in 3 to 6 months. Stage 1 hypertension is 130/80  to 139/89 . Your provider may recommend lifestyle changes, medication, and checks every 3 to 6 months until your blood pressure is controlled. Stage 2 hypertension is 140/90 or higher . Your provider will recommend lifestyle changes and have you take 2 kinds of hypertension medicines. You will also need to have your blood pressure checked monthly until it is controlled. Treatment  may include any of the following:  Antihypertensives  may be used to help lower your blood pressure. Several kinds of medicines are available.  Your healthcare provider will choose medicines based on the kind of hypertension you have. You may need more than one type of medicine. Take the medicine exactly as directed. Diuretics  help decrease extra fluid that collects in your body. This will help lower your blood pressure. You may urinate more often while you take this medicine. Cholesterol medicine  helps lower your cholesterol level. A low cholesterol level helps prevent heart disease and makes it easier to control your blood pressure. Manage hypertension:   Check your blood pressure at home. Do not smoke, have caffeine, or exercise for at least 30 minutes before you check your blood pressure. Sit and rest for 5 minutes before you check your blood pressure. Extend your arm and support it on a flat surface. Your arm should be at the same level as your heart. Follow the directions that came with your blood pressure monitor. Check your blood pressure 2 times, 1 minute apart, before you take your medicine in the morning. Also check your blood pressure before your evening meal. Keep a record of your readings and bring it to your follow-up visits. Ask your healthcare provider what your blood pressure should be. Manage any other health conditions you have. Health conditions such as diabetes can increase your risk for hypertension. Follow your healthcare provider's instructions and take all your medicines as directed. Ask about all medicines. Certain medicines can increase your blood pressure. Examples include oral birth control pills, decongestants, herbal supplements, and NSAIDs, such as ibuprofen. Your healthcare provider can tell you which medicines are safe for you to take. This includes prescription and over-the-counter medicines. Lifestyle changes you can make to manage hypertension:  Your healthcare provider may recommend you work with a team to manage hypertension.  The team may include medical experts such as a dietitian, an exercise or physical therapist, and a behavior therapist. Your family members may be included in helping you create lifestyle changes. Limit sodium (salt) as directed. Too much sodium can affect your fluid balance. Check labels to find low-sodium or no-salt-added foods. Some low-sodium foods use potassium salts for flavor. Too much potassium can also cause health problems. Your healthcare provider will tell you how much sodium and potassium are safe for you to have in a day. He or she may recommend that you limit sodium to 2,300 mg a day. Follow the meal plan recommended by your healthcare provider. A dietitian or your provider can give you more information on low-sodium plans or the DASH (Dietary Approaches to Stop Hypertension) eating plan. The DASH plan is low in sodium, processed sugar, unhealthy fats, and total fat. It is high in potassium, calcium, and fiber. These can be found in vegetables, fruit, and whole-grain foods. Be physically active throughout the day. Physical activity, such as exercise, can help control your blood pressure and your weight. Be physically active for at least 30 minutes per day, on most days of the week. Include aerobic activity, such as walking or riding a bicycle. Also include strength training at least 2 times each week. Your healthcare providers can help you create a physical activity plan. Decrease stress. This may help lower your blood pressure. Learn ways to relax, such as deep breathing or listening to music. Limit alcohol as directed. Alcohol can increase your blood pressure. A drink of alcohol is 12 ounces of beer, 5 ounces of wine, or 1½ ounces of liquor. Do not smoke. Nicotine and other chemicals in cigarettes and cigars can increase your blood pressure and also cause lung damage. Ask your healthcare provider for information if you currently smoke and need help to quit. E-cigarettes or smokeless tobacco still contain nicotine.  Talk to your healthcare provider before you use these products. Follow up with your doctor or cardiologist as directed: You will need to return to have your blood pressure checked and to have other lab tests done. Write down your questions so you remember to ask them during your visits. © Copyright [employer] 2023 Information is for End User's use only and may not be sold, redistributed or otherwise used for commercial purposes. The above information is an  only. It is not intended as medical advice for individual conditions or treatments. Talk to your doctor, nurse or pharmacist before following any medical regimen to see if it is safe and effective for you. Anxiety   AMBULATORY CARE:   Anxiety  is a condition that causes you to feel extremely worried or nervous. The feelings are so strong that they can cause problems with your daily activities or sleep. Anxiety may be triggered by something you fear, or it may happen without a cause. Family or work stress, smoking, caffeine, and alcohol can increase your risk for anxiety. Certain medicines or health conditions can also increase your risk. Anxiety can become a long-term condition if it is not managed or treated. Common signs and symptoms:   Fatigue or muscle tightness    Shaking, restlessness, or irritability    Problems focusing    Trouble sleeping    Feeling jumpy, easily startled, or dizzy    Rapid heartbeat or shortness of breath    Call your local emergency number (911 in the [address]) if:   You have chest pain, tightness, or heaviness that may spread to your shoulders, arms, jaw, neck, or back. You think about harming yourself or someone else. Call your therapist or doctor if:   Your symptoms get worse or do not get better with treatment. Your anxiety keeps you from doing your regular daily activities. You have new symptoms since your last visit. You have questions or concerns about your condition or care.     The following resources are available at any time to help you, if needed:   Contact a suicide prevention organization: For the 988 Suicide and Crisis Lifeline:     Call or text 42020 41 68 73 a chat on https://Meta.org/chat     Call 4-484.544.3838 (9-748-841-TALK)    For the Suicide Hotline, call 7-184.668.2809 (5-092-ABHYXAO)    For a list of international numbers: https://save.org/find-help/international-resources/  Treatment for anxiety  depends on how severe your symptoms are. The following are common treatments for anxiety:  Cognitive behavioral therapy (CBT)  teaches you how to identify and change negative thought patterns. Anxiety or antidepressant medicine  may help relieve or prevent anxiety. You may need to take the medicine for several weeks before you begin to feel better. Tell your healthcare provider about any side effects or problems you have with your medicine. The type or amount of medicine may need to be changed. Medicines are usually used along with therapy. Self-care:   Talk to someone about your anxiety. Your healthcare provider may suggest counseling. You might feel more comfortable talking with a friend or family member about your anxiety. Choose someone you know will be supportive and encouraging. Get regular physical activity. Physical activity can lower your stress, improve your mood, and help you sleep better. Work with your healthcare provider to develop a plan that you enjoy. Create a regular sleep schedule. A routine can help you relax before bed. Listen to music, read, or do yoga. Try to go to bed and wake up at the same time every day. Sleep is important for emotional health. Find ways to relax. Activities such as meditation or listening to music can help you relax. Spend time with friends, or do things you enjoy. Do activities you enjoy. Spend time with friends, or do something fun. Choose activities you are familiar with or comfortable doing. This may help prevent anxiety.     Practice deep breathing. Deep breathing can help you relax when you feel anxious. Focus on taking slow, deep breaths several times a day, or during an anxiety attack. Breathe in through your nose and out through your mouth. Deep breathing combined with meditation or listening to music may help you feel calmer. Do not have caffeine. Caffeine can make your symptoms worse. Do not have foods or drinks that are meant to increase your energy level. Do not drink alcohol or use drugs. Alcohol and drugs can worsen anxiety or make it hard to manage. Talk to your therapist or healthcare provider if you need help to quit. Follow up with your therapist or doctor as directed: Your healthcare provider will monitor your progress at follow-up visits. Your provider will also monitor your medicine if you take antidepressants and ask if the medicine is helping. Tell your provider about any side effects or problems you have with your medicine. The type or amount of medicine may need to be changed. Write down your questions so you remember to ask them during your visits. For more information or support:   [employer] [facility]ness  [address] [place], [place] [address] , [address] [place] [address]  Phone: 3- [phone]  Phone: [phone]  Web Address: http://[employer]/. org  [address] [place] [address] Suicide and [address] [place] [phone]  Phone: 9- [phone] - [phone]  Web Address: [employer]. Ning OR https://[employer].Ning/chat/    © Copyright [employer] 2023 Information is for End User's use only and may not be sold, redistributed or otherwise used for commercial purposes. The above information is an  only. It is not intended as medical advice for individual conditions or treatments. Talk to your doctor, nurse or pharmacist before following any medical regimen to see if it is safe and effective for you.         Chief Complaint     Chief Complaint   Patient presents with    Dizziness Dizziness for the past couple of days but worse today then it has been. High Blood pressure this morning. She is on blood pressure medicine currently          History of Present Illness       53-year-old female with a past medical history significant for COPD, hypertension, and anxiety presents with complaints of increased anxiety and elevated blood pressure readings x1 week. Patient reports intermittent episodes of dizziness and headache and when symptoms occur, she noticed elevated blood pressure readings. States that she does take HCTZ at baseline and daily x years. She reports that today noticed increased dizziness as well as "woozy" sensation. Reports some vision changes due to this but denies numbness, tingling, weakness, or unsteady gait. No chest pain or shortness of breath. Patient reports checking blood pressure earlier today and found to be 158/102. She did take her blood pressure medication at 0930 as well as one dose of Xanax. BP then 128/81. States increased stress this week because of having a yard sale. Called her PCP today and recommended UC evaluation. She is scheduled to see PCP tomorrow to discuss uncontrolled HTN. Review of Systems   Review of Systems   Constitutional:  Negative for chills and fever. HENT:  Negative for congestion, ear discharge, ear pain, rhinorrhea, sore throat, trouble swallowing and voice change. Eyes:  Positive for visual disturbance. Negative for discharge. Respiratory:  Negative for cough, shortness of breath and wheezing. Cardiovascular:  Negative for chest pain and palpitations. Gastrointestinal:  Negative for abdominal pain, diarrhea, nausea and vomiting. Skin:  Negative for rash. Neurological:  Positive for dizziness and headaches. Negative for weakness and numbness. Psychiatric/Behavioral:  The patient is nervous/anxious.           Current Medications       Current Outpatient Medications:     ALPRAZolam (XANAX) 0.5 mg tablet, Take 1 tablet (0.5 mg total) by mouth daily at bedtime as needed for anxiety, Disp: 30 tablet, Rfl: 0    hydrochlorothiazide (MICROZIDE) 12.5 mg capsule, TAKE ONE CAPSULE BY MOUTH ONCE DAILY, Disp: 90 capsule, Rfl: 0    dicyclomine (BENTYL) 20 mg tablet, Take 1 tablet (20 mg total) by mouth every 6 (six) hours (Patient not taking: Reported on 11/15/2023), Disp: 60 tablet, Rfl: 1    meclizine (ANTIVERT) 25 mg tablet, Take 1 tablet (25 mg total) by mouth 3 (three) times a day as needed for dizziness (Patient not taking: Reported on 11/15/2023), Disp: 60 tablet, Rfl: 0    nortriptyline (PAMELOR) 25 mg capsule, Take 1 capsule (25 mg total) by mouth daily at bedtime, Disp: 30 capsule, Rfl: 3    Current Allergies     Allergies as of 11/15/2023 - Reviewed 11/15/2023   Allergen Reaction Noted    Lisinopril Syncope 01/30/2017            The following portions of the patient's history were reviewed and updated as appropriate: allergies, current medications, past family history, past medical history, past social history, past surgical history and problem list.     Past Medical History:   Diagnosis Date    Anxiety     Atypical squamous cells cannot exclude high grade squamous intraepithelial lesion on cytologic smear of cervix (ASC-H)     Last Assessed 5/13/2014    Back pain     Cancer ([address])     Cervical cancer ([address])     Depression     Ovarian cyst     Tinnitus of both ears 10/2/2020    Tobacco abuse 11/20/2018       Past Surgical History:   Procedure Laterality Date    ANKLE FRACTURE SURGERY      Last Assessed 07/26/2016    ANKLE SURGERY      CERVICAL BIOPSY  W/ LOOP ELECTRODE EXCISION      COLONOSCOPY  10/2019    DIAGNOSTIC LAPAROSCOPY      DILATION AND CURETTAGE OF UTERUS      ESOPHAGOGASTRODUODENOSCOPY N/A 9/12/2016    Procedure: ESOPHAGOGASTRODUODENOSCOPY (EGD); Surgeon: Desean Krishna MD;  Location: [facility];   Service:     FRACTURE SURGERY      ORTHOPEDIC SURGERY      AK COLONOSCOPY FLX DX W/COLLJ SPEC WHEN PFRMD N/A 9/12/2016    Procedure: Mark Souza;  Surgeon: Alida Gould MD;  Location: [facility]; Service: Colorectal    SALPINGOOPHORECTOMY Left     TONSILLECTOMY      UPPER GASTROINTESTINAL ENDOSCOPY  2016       Family History   Problem Relation Age of Onset    Coronary artery disease Mother     No Known Problems Family     No Known Problems Father     Colon cancer Neg Hx          Medications have been verified. Objective   /90   Pulse 79   Temp (!) 97 °F (36.1 °C)   Resp 18   Ht 5' 6" (1.676 m)   Wt 64 kg (141 lb)   SpO2 96%   BMI 22.76 kg/m²   No LMP recorded. Patient is postmenopausal.       Physical Exam     Physical Exam  Vitals and nursing note reviewed. Constitutional:       General: She is not in acute distress. Appearance: She is not toxic-appearing. HENT:      Head: Normocephalic. Right Ear: Tympanic membrane, ear canal and external ear normal.      Left Ear: Tympanic membrane, ear canal and external ear normal.      Nose: Nose normal.      Mouth/Throat:      Mouth: Mucous membranes are moist.      Pharynx: Oropharynx is clear. Eyes:      Extraocular Movements: Extraocular movements intact. Conjunctiva/sclera: Conjunctivae normal.      Pupils: Pupils are equal, round, and reactive to light. Cardiovascular:      Rate and Rhythm: Normal rate and regular rhythm. Heart sounds: Normal heart sounds. Pulmonary:      Effort: Pulmonary effort is normal. No respiratory distress. Breath sounds: Normal breath sounds. No stridor. No wheezing, rhonchi or rales. Lymphadenopathy:      Cervical: No cervical adenopathy. Skin:     General: Skin is warm and dry. Neurological:      Mental Status: She is alert and oriented to person, place, and time. GCS: GCS eye subscore is 4. GCS verbal subscore is 5. GCS motor subscore is 6. Sensory: Sensation is intact. Motor: Motor function is intact. Gait: Gait is intact.    Psychiatric:         Mood and Affect: Mood is anxious.          Behavior: Behavior normal.

## 2023-11-16 ENCOUNTER — OFFICE VISIT (OUTPATIENT)
Dept: FAMILY MEDICINE CLINIC | Facility: CLINIC | Age: 78
End: 2023-11-16
Payer: MEDICARE

## 2023-11-16 VITALS
SYSTOLIC BLOOD PRESSURE: 148 MMHG | BODY MASS INDEX: 22.5 KG/M2 | DIASTOLIC BLOOD PRESSURE: 86 MMHG | OXYGEN SATURATION: 98 % | HEIGHT: 66 IN | TEMPERATURE: 97.5 F | HEART RATE: 81 BPM | WEIGHT: 140 LBS

## 2023-11-16 DIAGNOSIS — F41.1 GENERALIZED ANXIETY DISORDER: ICD-10-CM

## 2023-11-16 DIAGNOSIS — R10.84 GENERALIZED ABDOMINAL PAIN: ICD-10-CM

## 2023-11-16 DIAGNOSIS — R42 VERTIGO: ICD-10-CM

## 2023-11-16 DIAGNOSIS — H93.13 TINNITUS OF BOTH EARS: ICD-10-CM

## 2023-11-16 DIAGNOSIS — R22.1 NECK MASS: Primary | ICD-10-CM

## 2023-11-16 DIAGNOSIS — R42 DIZZINESS: ICD-10-CM

## 2023-11-16 PROCEDURE — 99214 OFFICE O/P EST MOD 30 MIN: CPT | Performed by: FAMILY MEDICINE

## 2023-11-16 RX ORDER — ALPRAZOLAM 0.5 MG/1
0.5 TABLET ORAL
Qty: 30 TABLET | Refills: 0 | Status: CANCELLED | OUTPATIENT
Start: 2023-11-16

## 2023-11-16 RX ORDER — MECLIZINE HYDROCHLORIDE 25 MG/1
25 TABLET ORAL 3 TIMES DAILY PRN
Qty: 60 TABLET | Refills: 0 | Status: SHIPPED | OUTPATIENT
Start: 2023-11-16

## 2023-11-16 RX ORDER — DICYCLOMINE HCL 20 MG
20 TABLET ORAL EVERY 6 HOURS
Qty: 60 TABLET | Refills: 1 | Status: SHIPPED | OUTPATIENT
Start: 2023-11-16

## 2023-11-16 RX ORDER — ALPRAZOLAM 0.5 MG/1
0.5 TABLET ORAL
Qty: 30 TABLET | Refills: 0 | Status: SHIPPED | OUTPATIENT
Start: 2023-11-16

## 2023-11-16 NOTE — PROGRESS NOTES
Subjective:    Hypertension  Pertinent negatives include no chest pain, palpitations or shortness of breath. Kera Uribe is a 70 y.o. female here today for:  Chief Complaint   Patient presents with    Hypertension     Patient states even with medications BP is high, after taking xanax BP comes down   . ---Above per clinical staff & reviewed. 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    Past Medical History:   Diagnosis Date    Anxiety     Atypical squamous cells cannot exclude high grade squamous intraepithelial lesion on cytologic smear of cervix (ASC-H)     Last Assessed 5/13/2014    Back pain     Cancer ([address])     Cervical cancer ([address])     Depression     Ovarian cyst     Tinnitus of both ears 10/2/2020    Tobacco abuse 11/20/2018       Past Surgical History:   Procedure Laterality Date    ANKLE FRACTURE SURGERY      Last Assessed 07/26/2016    ANKLE SURGERY      CERVICAL BIOPSY  W/ LOOP ELECTRODE EXCISION      COLONOSCOPY  10/2019    DIAGNOSTIC LAPAROSCOPY      DILATION AND CURETTAGE OF UTERUS      ESOPHAGOGASTRODUODENOSCOPY N/A 9/12/2016    Procedure: ESOPHAGOGASTRODUODENOSCOPY (EGD); Surgeon: Zeinab Wells MD;  Location: [facility];   Service:     FRACTURE SURGERY      ORTHOPEDIC SURGERY      OK COLONOSCOPY FLX DX W/COLLJ SPEC WHEN PFRMD N/A 9/12/2016    Procedure: Luisana Dgeroot COLONOSCOPY;  Surgeon: Yuki Moncada MD;  Location:  GI LAB; Service: Colorectal    SALPINGOOPHORECTOMY Left     TONSILLECTOMY      UPPER GASTROINTESTINAL ENDOSCOPY  2016       Social History     Socioeconomic History    Marital status:      Spouse name: None    Number of children: None    Years of education: 15    Highest education level: None   Occupational History    None   Tobacco Use    Smoking status: Former     Packs/day: 0.50     Years: 60.00     Total pack years: 30.00     Types: Cigarettes    Smokeless tobacco: Never   Vaping Use    Vaping Use: Never used   Substance and Sexual Activity    Alcohol use: No    Drug use: Never    Sexual activity: Not Currently   Other Topics Concern    None   Social History Narrative    ** Merged History Encounter **           since 2015 after 13 year marriage. 1 daughter from 1st marriage. 1 granddaughter, Catrachita Mcbrideer, whom she raised. 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 Social Determinants of Health     Financial Resource Strain: Not on file   Food Insecurity: Unknown (4/19/2021)    Hunger Vital Sign     Worried About Running Out of Food in the Last Year: Never true     Ran Out of Food in the Last Year: Not on file   Transportation Needs: Unknown (4/19/2021)    PRAPARE - Transportation     Lack of Transportation (Medical): No     Lack of Transportation (Non-Medical):  Not on file   Physical Activity: Not on file   Stress: Not on file   Social Connections: Not on file   Intimate Partner Violence: Not on file   Housing Stability: Not on file       Current Outpatient Medications   Medication Sig Dispense Refill    ALPRAZolam (XANAX) 0.5 mg tablet Take 1 tablet (0.5 mg total) by mouth daily at bedtime as needed for anxiety 30 tablet 0    dicyclomine (BENTYL) 20 mg tablet Take 1 tablet (20 mg total) by mouth every 6 (six) hours 60 tablet 1    hydrochlorothiazide (MICROZIDE) 12.5 mg capsule TAKE ONE CAPSULE BY MOUTH ONCE DAILY 90 capsule 0    meclizine (ANTIVERT) 25 mg tablet Take 1 tablet (25 mg total) by mouth 3 (three) times a day as needed for dizziness 60 tablet 0    nortriptyline (PAMELOR) 25 mg capsule Take 1 capsule (25 mg total) by mouth daily at bedtime 30 capsule 3     No current facility-administered medications for this visit. Review of Systems   Constitutional: Negative. Negative for chills and fever. HENT: Negative. Negative for ear pain and sore throat. Eyes:  Negative for pain and visual disturbance. Respiratory: Negative. Negative for cough and shortness of breath. Cardiovascular: Negative. Negative for chest pain and palpitations. Gastrointestinal: Negative. Negative for abdominal pain and vomiting. Genitourinary: Negative. Negative for dysuria and hematuria. Musculoskeletal:  Negative for arthralgias and back pain. Skin:  Negative for color change and rash. Neurological: Negative. Negative for seizures and syncope. Psychiatric/Behavioral:  The patient is nervous/anxious. Objective:    /86 (BP Location: Left arm, Patient Position: Sitting, Cuff Size: Adult)   Pulse 81   Temp 97.5 °F (36.4 °C) (Temporal)   Ht 5' 6" (1.676 m)   Wt 63.5 kg (140 lb)   SpO2 98%   BMI 22.60 kg/m²   Wt Readings from Last 3 Encounters:   11/16/23 63.5 kg (140 lb)   11/15/23 64 kg (141 lb)   08/22/23 64.2 kg (141 lb 8 oz)     BP Readings from Last 3 Encounters:   11/16/23 148/86   11/15/23 144/90   08/22/23 152/100       Lab Results   Component Value Date    WBC 9.99 03/07/2023    HGB 16.4 (H) 03/07/2023    HCT 49.7 (H) 03/07/2023     03/07/2023    TRIG 101 08/11/2022    HDL 56 08/11/2022    ALT 21 12/19/2022    AST 14 12/19/2022     12/10/2015    K 3.6 06/19/2023     06/19/2023    CREATININE 1.03 06/19/2023    BUN 16 06/19/2023    CO2 31 06/19/2023    INR 0.99 04/25/2014    GLUF 95 06/19/2023    HGBA1C 5.6 05/18/2019       Physical Exam  Vitals and nursing note reviewed. Constitutional:       Appearance: Normal appearance. She is well-developed. HENT:      Head: Normocephalic and atraumatic. Eyes:      Pupils: Pupils are equal, round, and reactive to light. Neck:     Cardiovascular:      Rate and Rhythm: Normal rate and regular rhythm. Heart sounds: No murmur heard. Pulmonary:      Effort: Pulmonary effort is normal.      Breath sounds: Normal breath sounds. Musculoskeletal:      Cervical back: Normal range of motion and neck supple. Skin:     General: Skin is warm. Capillary Refill: Capillary refill takes less than 2 seconds. Neurological:      Mental Status: She is alert and oriented to person, place, and time. Cranial Nerves: No cranial nerve deficit. Psychiatric:         Mood and Affect: Mood normal.         Thought Content: Thought content normal.                       Assessment/Plan:   Sharlene Sanz was seen today for hypertension. Diagnoses and all orders for this visit:    Neck mass  -     US head neck soft tissue; Future    Generalized anxiety disorder    Dizziness  -     meclizine (ANTIVERT) 25 mg tablet; Take 1 tablet (25 mg total) by mouth 3 (three) times a day as needed for dizziness    Vertigo  -     meclizine (ANTIVERT) 25 mg tablet; Take 1 tablet (25 mg total) by mouth 3 (three) times a day as needed for dizziness    Tinnitus of both ears  -     meclizine (ANTIVERT) 25 mg tablet; Take 1 tablet (25 mg total) by mouth 3 (three) times a day as needed for dizziness    Generalized abdominal pain  -     dicyclomine (BENTYL) 20 mg tablet; Take 1 tablet (20 mg total) by mouth every 6 (six) hours      Return in about 4 weeks (around 12/14/2023). Patient Instructions   Take 25mg of Hydrochlorothiazide- 2 tablet of your current pills  Please call in 1-2 weeks with some BP readings. Ultrasound of neck.   1 month for well exam

## 2023-11-16 NOTE — PATIENT INSTRUCTIONS
Take 25mg of Hydrochlorothiazide- 2 tablet of your current pills  Please call in 1-2 weeks with some BP readings. Ultrasound of neck.   1 month for well exam

## 2023-11-20 ENCOUNTER — TELEPHONE (OUTPATIENT)
Dept: FAMILY MEDICINE CLINIC | Facility: CLINIC | Age: 78
End: 2023-11-20

## 2023-11-20 NOTE — TELEPHONE ENCOUNTER
Patient called stating you wanted her to get a lab test done but there is nothing in her chart on it.  Please advise

## 2023-11-21 ENCOUNTER — APPOINTMENT (OUTPATIENT)
Dept: LAB | Facility: CLINIC | Age: 78
End: 2023-11-21
Payer: MEDICARE

## 2023-11-21 ENCOUNTER — TELEPHONE (OUTPATIENT)
Dept: FAMILY MEDICINE CLINIC | Facility: CLINIC | Age: 78
End: 2023-11-21

## 2023-11-21 DIAGNOSIS — I10 ESSENTIAL HYPERTENSION: ICD-10-CM

## 2023-11-21 DIAGNOSIS — I10 ESSENTIAL HYPERTENSION: Primary | ICD-10-CM

## 2023-11-21 LAB
ALBUMIN SERPL BCP-MCNC: 3.9 G/DL (ref 3.5–5)
ALP SERPL-CCNC: 86 U/L (ref 34–104)
ALT SERPL W P-5'-P-CCNC: 18 U/L (ref 7–52)
ANION GAP SERPL CALCULATED.3IONS-SCNC: 7 MMOL/L
AST SERPL W P-5'-P-CCNC: 18 U/L (ref 13–39)
BILIRUB SERPL-MCNC: 0.52 MG/DL (ref 0.2–1)
BUN SERPL-MCNC: 18 MG/DL (ref 5–25)
CALCIUM SERPL-MCNC: 9.3 MG/DL (ref 8.4–10.2)
CHLORIDE SERPL-SCNC: 104 MMOL/L (ref 96–108)
CO2 SERPL-SCNC: 32 MMOL/L (ref 21–32)
CREAT SERPL-MCNC: 0.93 MG/DL (ref 0.6–1.3)
GFR SERPL CREATININE-BSD FRML MDRD: 59 ML/MIN/1.73SQ M
GLUCOSE P FAST SERPL-MCNC: 82 MG/DL (ref 65–99)
POTASSIUM SERPL-SCNC: 3.6 MMOL/L (ref 3.5–5.3)
PROT SERPL-MCNC: 6.4 G/DL (ref 6.4–8.4)
SODIUM SERPL-SCNC: 143 MMOL/L (ref 135–147)

## 2023-11-21 PROCEDURE — 36415 COLL VENOUS BLD VENIPUNCTURE: CPT

## 2023-11-21 PROCEDURE — 80053 COMPREHEN METABOLIC PANEL: CPT

## 2023-11-21 NOTE — TELEPHONE ENCOUNTER
Patient called asking if you were going to place order for blood work for her. Please send message back to clinical and we can call the patient.

## 2023-12-06 ENCOUNTER — HOSPITAL ENCOUNTER (OUTPATIENT)
Dept: ULTRASOUND IMAGING | Facility: HOSPITAL | Age: 78
Discharge: HOME | End: 2023-12-06
Payer: MEDICARE

## 2023-12-06 DIAGNOSIS — R22.1 NECK MASS: ICD-10-CM

## 2023-12-06 PROCEDURE — 76536 US EXAM OF HEAD AND NECK: CPT

## 2023-12-14 ENCOUNTER — OFFICE VISIT (OUTPATIENT)
Dept: FAMILY MEDICINE CLINIC | Facility: CLINIC | Age: 78
End: 2023-12-14
Payer: MEDICARE

## 2023-12-14 VITALS
DIASTOLIC BLOOD PRESSURE: 82 MMHG | TEMPERATURE: 97.8 F | SYSTOLIC BLOOD PRESSURE: 124 MMHG | HEIGHT: 66 IN | WEIGHT: 140 LBS | HEART RATE: 54 BPM | BODY MASS INDEX: 22.5 KG/M2 | OXYGEN SATURATION: 99 %

## 2023-12-14 DIAGNOSIS — R10.9 CHRONIC ABDOMINAL PAIN: ICD-10-CM

## 2023-12-14 DIAGNOSIS — Z00.00 MEDICARE ANNUAL WELLNESS VISIT, SUBSEQUENT: Primary | ICD-10-CM

## 2023-12-14 DIAGNOSIS — F33.9 DEPRESSION, RECURRENT (HCC): ICD-10-CM

## 2023-12-14 DIAGNOSIS — G89.29 CHRONIC ABDOMINAL PAIN: ICD-10-CM

## 2023-12-14 DIAGNOSIS — M81.0 OSTEOPOROSIS WITHOUT CURRENT PATHOLOGICAL FRACTURE, UNSPECIFIED OSTEOPOROSIS TYPE: ICD-10-CM

## 2023-12-14 DIAGNOSIS — I10 ESSENTIAL HYPERTENSION: ICD-10-CM

## 2023-12-14 DIAGNOSIS — F41.1 GENERALIZED ANXIETY DISORDER: ICD-10-CM

## 2023-12-14 DIAGNOSIS — J44.9 CHRONIC OBSTRUCTIVE PULMONARY DISEASE, UNSPECIFIED COPD TYPE (HCC): ICD-10-CM

## 2023-12-14 DIAGNOSIS — E78.2 MIXED HYPERLIPIDEMIA: ICD-10-CM

## 2023-12-14 DIAGNOSIS — K58.0 IRRITABLE BOWEL SYNDROME WITH DIARRHEA: ICD-10-CM

## 2023-12-14 DIAGNOSIS — Z72.0 TOBACCO ABUSE: ICD-10-CM

## 2023-12-14 PROCEDURE — 99214 OFFICE O/P EST MOD 30 MIN: CPT | Performed by: FAMILY MEDICINE

## 2023-12-14 PROCEDURE — G0438 PPPS, INITIAL VISIT: HCPCS | Performed by: FAMILY MEDICINE

## 2023-12-14 RX ORDER — NORTRIPTYLINE HYDROCHLORIDE 25 MG/1
25 CAPSULE ORAL
Qty: 90 CAPSULE | Refills: 0 | Status: SHIPPED | OUTPATIENT
Start: 2023-12-14 | End: 2024-03-13

## 2023-12-14 NOTE — PATIENT INSTRUCTIONS
Medicare Preventive Visit Patient Instructions  Thank you for completing your Welcome to Medicare Visit or Medicare Annual Wellness Visit today. Your next wellness visit will be due in one year (12/14/2024).  The screening/preventive services that you may require over the next 5-10 years are detailed below. Some tests may not apply to you based off risk factors and/or age. Screening tests ordered at today's visit but not completed yet may show as past due. Also, please note that scanned in results may not display below.  Preventive Screenings:  Service Recommendations Previous Testing/Comments   Colorectal Cancer Screening  * Colonoscopy    * Fecal Occult Blood Test (FOBT)/Fecal Immunochemical Test (FIT)  * Fecal DNA/Cologuard Test  * Flexible Sigmoidoscopy Age: 45-75 years old   Colonoscopy: every 10 years (may be performed more frequently if at higher risk)  OR  FOBT/FIT: every 1 year  OR  Cologuard: every 3 years  OR  Sigmoidoscopy: every 5 years  Screening may be recommended earlier than age 45 if at higher risk for colorectal cancer. Also, an individualized decision between you and your healthcare provider will decide whether screening between the ages of 76-85 would be appropriate. Colonoscopy: 01/30/2023  FOBT/FIT: Not on file  Cologuard: Not on file  Sigmoidoscopy: Not on file    Screening Current     Breast Cancer Screening Age: 40+ years old  Frequency: every 1-2 years  Not required if history of left and right mastectomy Mammogram: 09/03/2021        Cervical Cancer Screening Between the ages of 21-29, pap smear recommended once every 3 years.   Between the ages of 30-65, can perform pap smear with HPV co-testing every 5 years.   Recommendations may differ for women with a history of total hysterectomy, cervical cancer, or abnormal pap smears in past. Pap Smear: Not on file    History Cervical Cancer   Hepatitis C Screening Once for adults born between 1945 and 1965  More frequently in patients at high  risk for Hepatitis C Hep C Antibody: Not on file    Screening Current   Diabetes Screening 1-2 times per year if you're at risk for diabetes or have pre-diabetes Fasting glucose: 82 mg/dL (11/21/2023)  A1C: 5.6 % (5/18/2019)  Screening Current   Cholesterol Screening Once every 5 years if you don't have a lipid disorder. May order more often based on risk factors. Lipid panel: 08/11/2022    Screening Not Indicated  History Lipid Disorder     Other Preventive Screenings Covered by Medicare:  Abdominal Aortic Aneurysm (AAA) Screening: covered once if your at risk. You're considered to be at risk if you have a family history of AAA.  Lung Cancer Screening: covers low dose CT scan once per year if you meet all of the following conditions: (1) Age 55-77; (2) No signs or symptoms of lung cancer; (3) Current smoker or have quit smoking within the last 15 years; (4) You have a tobacco smoking history of at least 20 pack years (packs per day multiplied by number of years you smoked); (5) You get a written order from a healthcare provider.  Glaucoma Screening: covered annually if you're considered high risk: (1) You have diabetes OR (2) Family history of glaucoma OR (3)  aged 50 and older OR (4)  American aged 65 and older  Osteoporosis Screening: covered every 2 years if you meet one of the following conditions: (1) You're estrogen deficient and at risk for osteoporosis based off medical history and other findings; (2) Have a vertebral abnormality; (3) On glucocorticoid therapy for more than 3 months; (4) Have primary hyperparathyroidism; (5) On osteoporosis medications and need to assess response to drug therapy.   Last bone density test (DXA Scan): 09/09/2021.  HIV Screening: covered annually if you're between the age of 15-65. Also covered annually if you are younger than 15 and older than 65 with risk factors for HIV infection. For pregnant patients, it is covered up to 3 times per  pregnancy.    Immunizations:  Immunization Recommendations   Influenza Vaccine Annual influenza vaccination during flu season is recommended for all persons aged >= 6 months who do not have contraindications   Pneumococcal Vaccine   * Pneumococcal conjugate vaccine = PCV13 (Prevnar 13), PCV15 (Vaxneuvance), PCV20 (Prevnar 20)  * Pneumococcal polysaccharide vaccine = PPSV23 (Pneumovax) Adults 19-65 yo with certain risk factors or if 65+ yo  If never received any pneumonia vaccine: recommend Prevnar 20 (PCV20)  Give PCV20 if previously received 1 dose of PCV13 or PPSV23   Hepatitis B Vaccine 3 dose series if at intermediate or high risk (ex: diabetes, end stage renal disease, liver disease)   Respiratory syncytial virus (RSV) Vaccine - COVERED BY MEDICARE PART D  * RSVPreF3 (Arexvy) CDC recommends that adults 60 years of age and older may receive a single dose of RSV vaccine using shared clinical decision-making (SCDM)   Tetanus (Td) Vaccine - COST NOT COVERED BY MEDICARE PART B Following completion of primary series, a booster dose should be given every 10 years to maintain immunity against tetanus. Td may also be given as tetanus wound prophylaxis.   Tdap Vaccine - COST NOT COVERED BY MEDICARE PART B Recommended at least once for all adults. For pregnant patients, recommended with each pregnancy.   Shingles Vaccine (Shingrix) - COST NOT COVERED BY MEDICARE PART B  2 shot series recommended in those 19 years and older who have or will have weakened immune systems or those 50 years and older     Health Maintenance Due:      Topic Date Due   • Breast Cancer Screening: Mammogram  09/03/2022   • DXA SCAN  09/03/2023   • Hepatitis C Screening  11/16/2024 (Originally 1945)   • Colorectal Cancer Screening  01/30/2026   • Lung Cancer Screening  Discontinued     Immunizations Due:  There are no preventive care reminders to display for this patient.  Advance Directives   What are advance directives?  Advance directives are  legal documents that state your wishes and plans for medical care. These plans are made ahead of time in case you lose your ability to make decisions for yourself. Advance directives can apply to any medical decision, such as the treatments you want, and if you want to donate organs.   What are the types of advance directives?  There are many types of advance directives, and each state has rules about how to use them. You may choose a combination of any of the following:  Living will:  This is a written record of the treatment you want. You can also choose which treatments you do not want, which to limit, and which to stop at a certain time. This includes surgery, medicine, IV fluid, and tube feedings.   Durable power of  for healthcare (DPAHC):  This is a written record that states who you want to make healthcare choices for you when you are unable to make them for yourself. This person, called a proxy, is usually a family member or a friend. You may choose more than 1 proxy.  Do not resuscitate (DNR) order:  A DNR order is used in case your heart stops beating or you stop breathing. It is a request not to have certain forms of treatment, such as CPR. A DNR order may be included in other types of advance directives.  Medical directive:  This covers the care that you want if you are in a coma, near death, or unable to make decisions for yourself. You can list the treatments you want for each condition. Treatment may include pain medicine, surgery, blood transfusions, dialysis, IV or tube feedings, and a ventilator (breathing machine).  Values history:  This document has questions about your views, beliefs, and how you feel and think about life. This information can help others choose the care that you would choose.  Why are advance directives important?  An advance directive helps you control your care. Although spoken wishes may be used, it is better to have your wishes written down. Spoken wishes can be  misunderstood, or not followed. Treatments may be given even if you do not want them. An advance directive may make it easier for your family to make difficult choices about your care.   Urinary Incontinence   Urinary incontinence (UI)  is when you lose control of your bladder. UI develops because your bladder cannot store or empty urine properly. The 3 most common types of UI are stress incontinence, urge incontinence, or both.  Medicines:   May be given to help strengthen your bladder control. Report any side effects of medication to your healthcare provider.  Do pelvic muscle exercises often:  Your pelvic muscles help you stop urinating. Squeeze these muscles tight for 5 seconds, then relax for 5 seconds. Gradually work up to squeezing for 10 seconds. Do 3 sets of 15 repetitions a day, or as directed. This will help strengthen your pelvic muscles and improve bladder control.  Train your bladder:  Go to the bathroom at set times, such as every 2 hours, even if you do not feel the urge to go. You can also try to hold your urine when you feel the urge to go. For example, hold your urine for 5 minutes when you feel the urge to go. As that becomes easier, hold your urine for 10 minutes.   Self-care:   Keep a UI record.  Write down how often you leak urine and how much you leak. Make a note of what you were doing when you leaked urine.  Drink liquids as directed. You may need to limit the amount of liquid you drink to help control your urine leakage. Do not drink any liquid right before you go to bed. Limit or do not have drinks that contain caffeine or alcohol.   Prevent constipation.  Eat a variety of high-fiber foods. Good examples are high-fiber cereals, beans, vegetables, and whole-grain breads. Walking is the best way to trigger your intestines to have a bowel movement.  Exercise regularly and maintain a healthy weight.  Weight loss and exercise will decrease pressure on your bladder and help you control your  leakage.   Use a catheter as directed  to help empty your bladder. A catheter is a tiny, plastic tube that is put into your bladder to drain your urine.   Go to behavior therapy as directed.  Behavior therapy may be used to help you learn to control your urge to urinate.     © Copyright [employer] 2018 Information is for End User's use only and may not be sold, redistributed or otherwise used for commercial purposes. All illustrations and images included in CareNotes® are the copyrighted property of [employer] or [employer]      Medicare Preventive Visit Patient Instructions  Thank you for completing your Welcome to Medicare Visit or Medicare Annual Wellness Visit today. Your next wellness visit will be due in one year (12/14/2024).  The screening/preventive services that you may require over the next 5-10 years are detailed below. Some tests may not apply to you based off risk factors and/or age. Screening tests ordered at today's visit but not completed yet may show as past due. Also, please note that scanned in results may not display below.  Preventive Screenings:  Service Recommendations Previous Testing/Comments   Colorectal Cancer Screening  * Colonoscopy    * Fecal Occult Blood Test (FOBT)/Fecal Immunochemical Test (FIT)  * Fecal DNA/Cologuard Test  * Flexible Sigmoidoscopy Age: 45-75 years old   Colonoscopy: every 10 years (may be performed more frequently if at higher risk)  OR  FOBT/FIT: every 1 year  OR  Cologuard: every 3 years  OR  Sigmoidoscopy: every 5 years  Screening may be recommended earlier than age 45 if at higher risk for colorectal cancer. Also, an individualized decision between you and your healthcare provider will decide whether screening between the ages of 76-85 would be appropriate. Colonoscopy: 01/30/2023  FOBT/FIT: Not on file  Cologuard: Not on file  Sigmoidoscopy: Not on file    Screening Current     Breast Cancer Screening Age: 40+ years old  Frequency: every 1-2  years  Not required if history of left and right mastectomy Mammogram: 09/03/2021        Cervical Cancer Screening Between the ages of 21-29, pap smear recommended once every 3 years.   Between the ages of 30-65, can perform pap smear with HPV co-testing every 5 years.   Recommendations may differ for women with a history of total hysterectomy, cervical cancer, or abnormal pap smears in past. Pap Smear: Not on file    History Cervical Cancer   Hepatitis C Screening Once for adults born between 1945 and 1965  More frequently in patients at high risk for Hepatitis C Hep C Antibody: Not on file    Screening Current   Diabetes Screening 1-2 times per year if you're at risk for diabetes or have pre-diabetes Fasting glucose: 82 mg/dL (11/21/2023)  A1C: 5.6 % (5/18/2019)  Screening Current   Cholesterol Screening Once every 5 years if you don't have a lipid disorder. May order more often based on risk factors. Lipid panel: 08/11/2022    Screening Not Indicated  History Lipid Disorder     Other Preventive Screenings Covered by Medicare:  Abdominal Aortic Aneurysm (AAA) Screening: covered once if your at risk. You're considered to be at risk if you have a family history of AAA.  Lung Cancer Screening: covers low dose CT scan once per year if you meet all of the following conditions: (1) Age 55-77; (2) No signs or symptoms of lung cancer; (3) Current smoker or have quit smoking within the last 15 years; (4) You have a tobacco smoking history of at least 20 pack years (packs per day multiplied by number of years you smoked); (5) You get a written order from a healthcare provider.  Glaucoma Screening: covered annually if you're considered high risk: (1) You have diabetes OR (2) Family history of glaucoma OR (3)  aged 50 and older OR (4)  American aged 65 and older  Osteoporosis Screening: covered every 2 years if you meet one of the following conditions: (1) You're estrogen deficient and at risk for  osteoporosis based off medical history and other findings; (2) Have a vertebral abnormality; (3) On glucocorticoid therapy for more than 3 months; (4) Have primary hyperparathyroidism; (5) On osteoporosis medications and need to assess response to drug therapy.   Last bone density test (DXA Scan): 09/09/2021.  HIV Screening: covered annually if you're between the age of 15-65. Also covered annually if you are younger than 15 and older than 65 with risk factors for HIV infection. For pregnant patients, it is covered up to 3 times per pregnancy.    Immunizations:  Immunization Recommendations   Influenza Vaccine Annual influenza vaccination during flu season is recommended for all persons aged >= 6 months who do not have contraindications   Pneumococcal Vaccine   * Pneumococcal conjugate vaccine = PCV13 (Prevnar 13), PCV15 (Vaxneuvance), PCV20 (Prevnar 20)  * Pneumococcal polysaccharide vaccine = PPSV23 (Pneumovax) Adults 19-63 yo with certain risk factors or if 65+ yo  If never received any pneumonia vaccine: recommend Prevnar 20 (PCV20)  Give PCV20 if previously received 1 dose of PCV13 or PPSV23   Hepatitis B Vaccine 3 dose series if at intermediate or high risk (ex: diabetes, end stage renal disease, liver disease)   Respiratory syncytial virus (RSV) Vaccine - COVERED BY MEDICARE PART D  * RSVPreF3 (Arexvy) CDC recommends that adults 60 years of age and older may receive a single dose of RSV vaccine using shared clinical decision-making (SCDM)   Tetanus (Td) Vaccine - COST NOT COVERED BY MEDICARE PART B Following completion of primary series, a booster dose should be given every 10 years to maintain immunity against tetanus. Td may also be given as tetanus wound prophylaxis.   Tdap Vaccine - COST NOT COVERED BY MEDICARE PART B Recommended at least once for all adults. For pregnant patients, recommended with each pregnancy.   Shingles Vaccine (Shingrix) - COST NOT COVERED BY MEDICARE PART B  2 shot series  recommended in those 19 years and older who have or will have weakened immune systems or those 50 years and older     Health Maintenance Due:      Topic Date Due   • Breast Cancer Screening: Mammogram  09/03/2022   • DXA SCAN  09/03/2023   • Hepatitis C Screening  11/16/2024 (Originally 1945)   • Colorectal Cancer Screening  01/30/2026   • Lung Cancer Screening  Discontinued     Immunizations Due:  There are no preventive care reminders to display for this patient.  Advance Directives   What are advance directives?  Advance directives are legal documents that state your wishes and plans for medical care. These plans are made ahead of time in case you lose your ability to make decisions for yourself. Advance directives can apply to any medical decision, such as the treatments you want, and if you want to donate organs.   What are the types of advance directives?  There are many types of advance directives, and each state has rules about how to use them. You may choose a combination of any of the following:  Living will:  This is a written record of the treatment you want. You can also choose which treatments you do not want, which to limit, and which to stop at a certain time. This includes surgery, medicine, IV fluid, and tube feedings.   Durable power of  for healthcare (DPAHC):  This is a written record that states who you want to make healthcare choices for you when you are unable to make them for yourself. This person, called a proxy, is usually a family member or a friend. You may choose more than 1 proxy.  Do not resuscitate (DNR) order:  A DNR order is used in case your heart stops beating or you stop breathing. It is a request not to have certain forms of treatment, such as CPR. A DNR order may be included in other types of advance directives.  Medical directive:  This covers the care that you want if you are in a coma, near death, or unable to make decisions for yourself. You can list the  treatments you want for each condition. Treatment may include pain medicine, surgery, blood transfusions, dialysis, IV or tube feedings, and a ventilator (breathing machine).  Values history:  This document has questions about your views, beliefs, and how you feel and think about life. This information can help others choose the care that you would choose.  Why are advance directives important?  An advance directive helps you control your care. Although spoken wishes may be used, it is better to have your wishes written down. Spoken wishes can be misunderstood, or not followed. Treatments may be given even if you do not want them. An advance directive may make it easier for your family to make difficult choices about your care.   Urinary Incontinence   Urinary incontinence (UI)  is when you lose control of your bladder. UI develops because your bladder cannot store or empty urine properly. The 3 most common types of UI are stress incontinence, urge incontinence, or both.  Medicines:   May be given to help strengthen your bladder control. Report any side effects of medication to your healthcare provider.  Do pelvic muscle exercises often:  Your pelvic muscles help you stop urinating. Squeeze these muscles tight for 5 seconds, then relax for 5 seconds. Gradually work up to squeezing for 10 seconds. Do 3 sets of 15 repetitions a day, or as directed. This will help strengthen your pelvic muscles and improve bladder control.  Train your bladder:  Go to the bathroom at set times, such as every 2 hours, even if you do not feel the urge to go. You can also try to hold your urine when you feel the urge to go. For example, hold your urine for 5 minutes when you feel the urge to go. As that becomes easier, hold your urine for 10 minutes.   Self-care:   Keep a UI record.  Write down how often you leak urine and how much you leak. Make a note of what you were doing when you leaked urine.  Drink liquids as directed. You may need  to limit the amount of liquid you drink to help control your urine leakage. Do not drink any liquid right before you go to bed. Limit or do not have drinks that contain caffeine or alcohol.   Prevent constipation.  Eat a variety of high-fiber foods. Good examples are high-fiber cereals, beans, vegetables, and whole-grain breads. Walking is the best way to trigger your intestines to have a bowel movement.  Exercise regularly and maintain a healthy weight.  Weight loss and exercise will decrease pressure on your bladder and help you control your leakage.   Use a catheter as directed  to help empty your bladder. A catheter is a tiny, plastic tube that is put into your bladder to drain your urine.   Go to behavior therapy as directed.  Behavior therapy may be used to help you learn to control your urge to urinate.     © Copyright [employer] 2018 Information is for End User's use only and may not be sold, redistributed or otherwise used for commercial purposes. All illustrations and images included in CareNotes® are the copyrighted property of [employer] or [employer]

## 2023-12-14 NOTE — PROGRESS NOTES
Assessment and Plan:     Problem List Items Addressed This Visit       Essential hypertension    Mixed hyperlipidemia    Tobacco abuse    Chronic abdominal pain    Generalized anxiety disorder    Relevant Medications    nortriptyline (PAMELOR) 25 mg capsule    Irritable bowel syndrome with diarrhea    Relevant Medications    nortriptyline (PAMELOR) 25 mg capsule    Chronic obstructive pulmonary disease (HCC)    Depression, recurrent (HCC)    Relevant Medications    nortriptyline (PAMELOR) 25 mg capsule     Other Visit Diagnoses       Medicare annual wellness visit, subsequent    -  Primary    Osteoporosis without current pathological fracture, unspecified osteoporosis type        Relevant Orders    DXA bone density spine hip and pelvis             Preventive health issues were discussed with patient, and age appropriate screening tests were ordered as noted in patient's After Visit Summary.  Personalized health advice and appropriate referrals for health education or preventive services given if needed, as noted in patient's After Visit Summary.     History of Present Illness:     Patient presents for a Medicare Wellness Visit  78yof here for follow up, due for well exam  Continues to have pain in abdomen- has been seen by Dr Pelayo and had imaging done which did not show anything signigicant  Discussed diet of elimination with pt and following FODMAP diet  Recommended she call GI if worsening, also discussed scar tissue from her treatments and possibly seeing general surgery  Has been seen for elevated blood pressure recently  Currently on HCTZ and BP well controlled today  Using Nortiptyline for sleep  Imaging done for neck- recommended repeat ultrasound in 3 months  Still smoking but has cut down  Reviewed questions and health maintenance    PHQ-2/9 Depression Screening    Little interest or pleasure in doing things: 0 - not at all  Feeling down, depressed, or hopeless: 0 - not at all  Trouble falling or staying  asleep, or sleeping too much: 0 - not at all  Feeling tired or having little energy: 1 - several days  Poor appetite or overeatin - not at all  Feeling bad about yourself - or that you are a failure or have let yourself or your family down: 0 - not at all  Trouble concentrating on things, such as reading the newspaper or watching television: 0 - not at all  Moving or speaking so slowly that other people could have noticed. Or the opposite - being so fidgety or restless that you have been moving around a lot more than usual: 0 - not at all  Thoughts that you would be better off dead, or of hurting yourself in some way: 0 - not at all  PHQ-9 Score: 1   PHQ-9 Interpretation: No or Minimal depression              HPI   Patient Care Team:  Enid Jernigan MD as PCP - General (Family Medicine)  MD Grace Patel DO as Referring Physician  Alvin Powell MD as Endoscopist  Enid Jernigan MD (Family Medicine)     Review of Systems:     Review of Systems   Constitutional:  Positive for fatigue. Negative for chills and fever.   HENT: Negative.  Negative for ear pain and sore throat.    Eyes:  Negative for pain and visual disturbance.   Respiratory:  Positive for cough. Negative for shortness of breath.    Cardiovascular: Negative.  Negative for chest pain and palpitations.   Gastrointestinal:  Positive for abdominal distention and abdominal pain. Negative for vomiting.   Genitourinary: Negative.  Negative for dysuria and hematuria.   Musculoskeletal:  Negative for arthralgias and back pain.   Skin:  Negative for color change and rash.   Neurological:  Positive for dizziness. Negative for seizures and syncope.   Psychiatric/Behavioral:  Positive for sleep disturbance. The patient is nervous/anxious.    All other systems reviewed and are negative.       Problem List:     Patient Active Problem List   Diagnosis    Aortic sclerosis    History of malignant neoplasm of cervix    Essential  hypertension    Mixed hyperlipidemia    Colon polyps    CKD (chronic kidney disease) stage 3, GFR 30-59 ml/min (HCC)    Cervical stenosis of spine    Tobacco abuse    White coat syndrome with high blood pressure but without hypertension    Pain in pelvis    Diarrhea    Chronic abdominal pain    Generalized anxiety disorder    Irritable bowel syndrome with diarrhea    Chronic obstructive pulmonary disease (HCC)    Lumbar spondylosis    Tinnitus of both ears    Depression, recurrent (HCC)    Claudication of both lower extremities (HCC)    SI joint arthritis    Chronic pain syndrome    Sacroiliitis (HCC)    Meralgia paresthetica of left side    Anxiety    Increased frequency of urination    Postmenopausal state    TIA (transient ischemic attack)    SBO (small bowel obstruction) (HCC)      Past Medical and Surgical History:     Past Medical History:   Diagnosis Date    Anxiety     Atypical squamous cells cannot exclude high grade squamous intraepithelial lesion on cytologic smear of cervix (ASC-H)     Last Assessed 5/13/2014    Back pain     Cancer (HCC)     Cervical cancer (HCC)     Depression     Ovarian cyst     Tinnitus of both ears 10/2/2020    Tobacco abuse 11/20/2018     Past Surgical History:   Procedure Laterality Date    ANKLE FRACTURE SURGERY      Last Assessed 07/26/2016    ANKLE SURGERY      CERVICAL BIOPSY  W/ LOOP ELECTRODE EXCISION      COLONOSCOPY  10/2019    DIAGNOSTIC LAPAROSCOPY      DILATION AND CURETTAGE OF UTERUS      ESOPHAGOGASTRODUODENOSCOPY N/A 9/12/2016    Procedure: ESOPHAGOGASTRODUODENOSCOPY (EGD);  Surgeon: Alvin Powell MD;  Location: [facility];  Service:     FRACTURE SURGERY      ORTHOPEDIC SURGERY      MO COLONOSCOPY FLX DX W/COLLJ SPEC WHEN PFRMD N/A 9/12/2016    Procedure: FLEXIBLE COLONOSCOPY;  Surgeon: Alvin Powell MD;  Location: [facility];  Service: Colorectal    SALPINGOOPHORECTOMY Left     TONSILLECTOMY      UPPER GASTROINTESTINAL ENDOSCOPY  2016      Family History:      Family History   Problem Relation Age of Onset    Coronary artery disease Mother     No Known Problems Family     No Known Problems Father     Colon cancer Neg Hx       Social History:     Social History     Socioeconomic History    Marital status:      Spouse name: None    Number of children: None    Years of education: 15    Highest education level: None   Occupational History    None   Tobacco Use    Smoking status: Former     Current packs/day: 0.50     Average packs/day: 0.5 packs/day for 60.0 years (30.0 ttl pk-yrs)     Types: Cigarettes    Smokeless tobacco: Never   Vaping Use    Vaping status: Never Used   Substance and Sexual Activity    Alcohol use: No    Drug use: Never    Sexual activity: Not Currently   Other Topics Concern    None   Social History Narrative    ** Merged History Encounter **           since 2015 after 13 year marriage.  1 daughter from 1st marriage.  1 granddaughter, Natasha Crane, whom she raised.  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     Social Determinants of Health     Financial Resource Strain: Low Risk  (12/14/2023)    Overall Financial Resource Strain (CARDIA)     Difficulty of Paying Living Expenses: Not hard at all   Food Insecurity: Unknown (4/19/2021)    Hunger Vital Sign     Worried About Running Out of Food in the Last Year: Never true     Ran Out of Food in the Last Year: Not on file   Transportation Needs: No Transportation Needs (12/14/2023)    PRAPARE - Transportation     Lack of Transportation (Medical): No     Lack of Transportation (Non-Medical): No   Physical Activity: Not on file   Stress: Not on file   Social Connections: Not on file   Intimate Partner Violence: Not on file   Housing Stability: Not on file      Medications and Allergies:     Current Outpatient Medications   Medication Sig Dispense Refill    ALPRAZolam (XANAX) 0.5 mg tablet Take 1 tablet (0.5 mg total) by mouth daily at bedtime as needed for anxiety 30 tablet  0    dicyclomine (BENTYL) 20 mg tablet Take 1 tablet (20 mg total) by mouth every 6 (six) hours 60 tablet 1    hydrochlorothiazide (MICROZIDE) 12.5 mg capsule TAKE ONE CAPSULE BY MOUTH ONCE DAILY 90 capsule 0    meclizine (ANTIVERT) 25 mg tablet Take 1 tablet (25 mg total) by mouth 3 (three) times a day as needed for dizziness 60 tablet 0    nortriptyline (PAMELOR) 25 mg capsule Take 1 capsule (25 mg total) by mouth daily at bedtime 90 capsule 0     No current facility-administered medications for this visit.     Allergies   Allergen Reactions    Lisinopril Syncope     Other reaction(s): Other (See Comments)  Nose bleeds, passing out      Immunizations:     Immunization History   Administered Date(s) Administered    Tdap 01/20/2017      Health Maintenance:         Topic Date Due    Breast Cancer Screening: Mammogram  09/03/2022    DXA SCAN  09/03/2023    Hepatitis C Screening  11/16/2024 (Originally 1945)    Colorectal Cancer Screening  01/30/2026    Lung Cancer Screening  Discontinued     There are no preventive care reminders to display for this patient.   Medicare Screening Tests and Risk Assessments:     Ángela is here for her Subsequent Wellness visit.     Health Risk Assessment:   Patient rates overall health as very good. Patient feels that their physical health rating is same. Patient is satisfied with their life. Eyesight was rated as same. Hearing was rated as same. Patient feels that their emotional and mental health rating is same. Patients states they are never, rarely angry. Patient states they are always unusually tired/fatigued. Pain experienced in the last 7 days has been some. Patient's pain rating has been 6/10. Patient states that she has experienced no weight loss or gain in last 6 months.     Depression Screening:   PHQ-9 Score: 1      Fall Risk Screening:   In the past year, patient has experienced: no history of falling in past year      Urinary Incontinence Screening:   Patient has  leaked urine accidently in the last six months.     Home Safety:  Patient does not have trouble with stairs inside or outside of their home. Patient has working smoke alarms and has working carbon monoxide detector. Home safety hazards include: none.     Nutrition:   Current diet is Regular.     Medications:   Patient is not currently taking any over-the-counter supplements. Patient is able to manage medications.     Activities of Daily Living (ADLs)/Instrumental Activities of Daily Living (IADLs):   Walk and transfer into and out of bed and chair?: Yes  Dress and groom yourself?: Yes    Bathe or shower yourself?: Yes    Feed yourself? Yes  Do your laundry/housekeeping?: Yes  Manage your money, pay your bills and track your expenses?: Yes  Make your own meals?: Yes    Do your own shopping?: Yes    Previous Hospitalizations:   Any hospitalizations or ED visits within the last 12 months?: No      Advance Care Planning:   Living will: Yes    Durable POA for healthcare: Yes    Advanced directive: Yes      PREVENTIVE SCREENINGS      Cardiovascular Screening:    General: Screening Not Indicated and History Lipid Disorder      Diabetes Screening:     General: Screening Current      Colorectal Cancer Screening:     General: Screening Current      Cervical Cancer Screening:    General: History Cervical Cancer      Osteoporosis Screening:    General: Screening Not Indicated and History Osteoporosis      Hepatitis C Screening:    General: Screening Current    Screening, Brief Intervention, and Referral to Treatment (SBIRT)    Screening  Typical number of drinks in a day: 0  Typical number of drinks in a week: 0  Interpretation: Low risk drinking behavior.    Single Item Drug Screening:  How often have you used an illegal drug (including marijuana) or a prescription medication for non-medical reasons in the past year? never    Single Item Drug Screen Score: 0  Interpretation: Negative screen for possible drug use disorder    No  "results found.     Physical Exam:     /82 (BP Location: Left arm, Patient Position: Sitting, Cuff Size: Adult)   Pulse (!) 54   Temp 97.8 °F (36.6 °C) (Temporal)   Ht 5' 6\" (1.676 m)   Wt 63.5 kg (140 lb)   SpO2 99%   BMI 22.60 kg/m²     Physical Exam  Vitals and nursing note reviewed.   Constitutional:       General: She is not in acute distress.     Appearance: She is well-developed.   HENT:      Head: Normocephalic and atraumatic.      Right Ear: Tympanic membrane normal.      Left Ear: Tympanic membrane normal.      Nose: Nose normal.      Mouth/Throat:      Mouth: Mucous membranes are moist.   Eyes:      Conjunctiva/sclera: Conjunctivae normal.   Cardiovascular:      Rate and Rhythm: Normal rate and regular rhythm.      Heart sounds: No murmur heard.  Pulmonary:      Effort: Pulmonary effort is normal. No respiratory distress.      Breath sounds: Normal breath sounds.   Abdominal:      Palpations: Abdomen is soft.      Tenderness: There is no abdominal tenderness.   Musculoskeletal:         General: No swelling.      Cervical back: Neck supple.   Skin:     General: Skin is warm and dry.      Capillary Refill: Capillary refill takes less than 2 seconds.   Neurological:      Mental Status: She is alert.   Psychiatric:         Mood and Affect: Mood normal.          Enid Jernigan MD  "

## 2024-01-12 ENCOUNTER — VBI (OUTPATIENT)
Dept: ADMINISTRATIVE | Facility: OTHER | Age: 79
End: 2024-01-12

## 2024-01-12 NOTE — TELEPHONE ENCOUNTER
01/12/24 1:18 PM     [employer] SmartForm used to document caregap status.    Madelaine Haywood MA

## 2024-01-15 ENCOUNTER — VBI (OUTPATIENT)
Dept: ADMINISTRATIVE | Facility: OTHER | Age: 79
End: 2024-01-15

## 2024-01-17 ENCOUNTER — VBI (OUTPATIENT)
Dept: ADMINISTRATIVE | Facility: OTHER | Age: 79
End: 2024-01-17

## 2024-01-29 ENCOUNTER — VBI (OUTPATIENT)
Dept: ADMINISTRATIVE | Facility: OTHER | Age: 79
End: 2024-01-29

## 2024-02-21 ENCOUNTER — TELEPHONE (OUTPATIENT)
Age: 79
End: 2024-02-21

## 2024-02-21 NOTE — TELEPHONE ENCOUNTER
Pt called and said she can't take the hydrochlorothiazide (MICROZIDE) 12.5 mg capsul because they are giving her too many side affects. If you can please have her suggest another prescription and send it the the [facility] pharmacy on file. Please advise [phone] thank you.     [id number] - [place], [place] - [address] [[id number]]

## 2024-02-22 DIAGNOSIS — I10 ESSENTIAL HYPERTENSION: Primary | ICD-10-CM

## 2024-02-22 RX ORDER — AMLODIPINE BESYLATE 5 MG/1
5 TABLET ORAL DAILY
Qty: 30 TABLET | Refills: 1 | Status: SHIPPED | OUTPATIENT
Start: 2024-02-22

## 2024-02-22 NOTE — TELEPHONE ENCOUNTER
I placed order for amlodipine 5mg- it will need to be faxed or called in as system is down  Please also let pt know and schedule her for BP check in 2-3 weeks to make sure it is working ok  thanks

## 2024-02-22 NOTE — TELEPHONE ENCOUNTER
Left the patient a message to call back and schedule appointment after receiving amlodipine. Patient needs a 2-3 week follow up

## 2024-02-26 NOTE — TELEPHONE ENCOUNTER
Left message for patient to call office to confirm she has received the medication and to schedule the 2-3 week follow up after starting medication.

## 2024-02-26 NOTE — TELEPHONE ENCOUNTER
Patient aware of medication change on 2/22/24.    Rx: Amlodipine 5 mg to be sent to [facility].      Please review and advice

## 2024-02-26 NOTE — TELEPHONE ENCOUNTER
Pt was returning a call about medication and f/u.  Pt hasn't received medication.  See messages below.  Warm transfer to [facility]

## 2024-02-29 ENCOUNTER — OFFICE VISIT (OUTPATIENT)
Dept: FAMILY MEDICINE CLINIC | Facility: CLINIC | Age: 79
End: 2024-02-29
Payer: MEDICARE

## 2024-02-29 VITALS
OXYGEN SATURATION: 97 % | HEIGHT: 66 IN | DIASTOLIC BLOOD PRESSURE: 92 MMHG | HEART RATE: 79 BPM | TEMPERATURE: 98 F | BODY MASS INDEX: 23.3 KG/M2 | SYSTOLIC BLOOD PRESSURE: 146 MMHG | WEIGHT: 145 LBS

## 2024-02-29 DIAGNOSIS — I73.9 CLAUDICATION OF BOTH LOWER EXTREMITIES (HCC): ICD-10-CM

## 2024-02-29 DIAGNOSIS — E78.2 MIXED HYPERLIPIDEMIA: ICD-10-CM

## 2024-02-29 DIAGNOSIS — Z72.0 TOBACCO ABUSE: ICD-10-CM

## 2024-02-29 DIAGNOSIS — N18.31 STAGE 3A CHRONIC KIDNEY DISEASE (HCC): ICD-10-CM

## 2024-02-29 DIAGNOSIS — F41.1 GENERALIZED ANXIETY DISORDER: ICD-10-CM

## 2024-02-29 DIAGNOSIS — F33.9 DEPRESSION, RECURRENT (HCC): ICD-10-CM

## 2024-02-29 DIAGNOSIS — I10 ESSENTIAL HYPERTENSION: Primary | ICD-10-CM

## 2024-02-29 DIAGNOSIS — J44.9 CHRONIC OBSTRUCTIVE PULMONARY DISEASE, UNSPECIFIED COPD TYPE (HCC): ICD-10-CM

## 2024-02-29 PROBLEM — M46.1 SACROILIITIS (HCC): Status: RESOLVED | Noted: 2021-04-05 | Resolved: 2024-02-29

## 2024-02-29 PROCEDURE — 99214 OFFICE O/P EST MOD 30 MIN: CPT | Performed by: FAMILY MEDICINE

## 2024-02-29 RX ORDER — ESCITALOPRAM OXALATE 5 MG/1
5 TABLET ORAL DAILY
Qty: 30 TABLET | Refills: 1 | Status: SHIPPED | OUTPATIENT
Start: 2024-02-29

## 2024-02-29 NOTE — PROGRESS NOTES
Subjective:    HPI  Ángela is a 78 y.o. female here today for:  Chief Complaint   Patient presents with    Follow-up     Discuss her BP meds-wants to stop taking them-too many side effects.   .      ---Above per clinical staff & reviewed. 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    Past Medical History:   Diagnosis Date    Anxiety     Atypical squamous cells cannot exclude high grade squamous intraepithelial lesion on cytologic smear of cervix (ASC-H)     Last Assessed 5/13/2014    Back pain     Cancer (HCC)     Cervical cancer (HCC)     Depression     Ovarian cyst     Tinnitus of both ears 10/2/2020    Tobacco abuse 11/20/2018       Past Surgical History:   Procedure Laterality Date    ANKLE FRACTURE SURGERY      Last Assessed 07/26/2016    ANKLE SURGERY      CERVICAL BIOPSY  W/ LOOP ELECTRODE EXCISION      COLONOSCOPY  10/2019    DIAGNOSTIC LAPAROSCOPY      DILATION AND CURETTAGE OF UTERUS      ESOPHAGOGASTRODUODENOSCOPY N/A 9/12/2016    Procedure: ESOPHAGOGASTRODUODENOSCOPY (EGD);  Surgeon: Alvin Powell MD;  Location: [facility];  Service:     FRACTURE SURGERY      ORTHOPEDIC SURGERY      NM COLONOSCOPY FLX DX W/COLLJ SPEC WHEN PFRMD N/A 9/12/2016    Procedure: FLEXIBLE COLONOSCOPY;  Surgeon: Alvin Powell MD;  Location: [facility];  Service: Colorectal    SALPINGOOPHORECTOMY Left     TONSILLECTOMY      UPPER GASTROINTESTINAL ENDOSCOPY  2016       Social History      Socioeconomic History    Marital status:      Spouse name: None    Number of children: None    Years of education: 15    Highest education level: None   Occupational History    None   Tobacco Use    Smoking status: Former     Current packs/day: 0.50     Average packs/day: 0.5 packs/day for 60.0 years (30.0 ttl pk-yrs)     Types: Cigarettes    Smokeless tobacco: Never   Vaping Use    Vaping status: Never Used   Substance and Sexual Activity    Alcohol use: No    Drug use: Never    Sexual activity: Not Currently   Other Topics Concern    None   Social History Narrative    ** Merged History Encounter **           since 2015 after 13 year marriage.  1 daughter from 1st marriage.  1 granddaughter, Natasha Crane, whom she raised.  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     Social Determinants of Health     Financial Resource Strain: Low Risk  (12/14/2023)    Overall Financial Resource Strain (CARDIA)     Difficulty of Paying Living Expenses: Not hard at all   Food Insecurity: Unknown (4/19/2021)    Hunger Vital Sign     Worried About Running Out of Food in the Last Year: Never true     Ran Out of Food in the Last Year: Not on file   Transportation Needs: No Transportation Needs (12/14/2023)    PRAPARE - Transportation     Lack of Transportation (Medical): No     Lack of Transportation (Non-Medical): No   Physical Activity: Not on file   Stress: Not on file   Social Connections: Not on file   Intimate Partner Violence: Not on file   Housing Stability: Not on file       Current Outpatient Medications   Medication Sig Dispense Refill    ALPRAZolam (XANAX) 0.5 mg tablet Take 1 tablet (0.5 mg total) by mouth daily at bedtime as needed for anxiety 30 tablet 0    amLODIPine (NORVASC) 5 mg tablet Take 1 tablet (5 mg total) by mouth daily 30 tablet 1    dicyclomine (BENTYL) 20 mg tablet Take 1 tablet (20 mg total) by mouth every 6 (six) hours 60 tablet 1    escitalopram (LEXAPRO) 5 mg tablet  "Take 1 tablet (5 mg total) by mouth daily 30 tablet 1    hydrochlorothiazide (MICROZIDE) 12.5 mg capsule TAKE ONE CAPSULE BY MOUTH ONCE DAILY 90 capsule 0    meclizine (ANTIVERT) 25 mg tablet Take 1 tablet (25 mg total) by mouth 3 (three) times a day as needed for dizziness (Patient not taking: Reported on 2/29/2024) 60 tablet 0    nortriptyline (PAMELOR) 25 mg capsule Take 1 capsule (25 mg total) by mouth daily at bedtime (Patient not taking: Reported on 2/29/2024) 90 capsule 0     No current facility-administered medications for this visit.        Review of Systems   Constitutional: Negative.  Negative for chills and fever.   HENT: Negative.  Negative for ear pain and sore throat.    Eyes:  Negative for pain and visual disturbance.   Respiratory: Negative.  Negative for cough and shortness of breath.    Cardiovascular: Negative.  Negative for chest pain and palpitations.   Gastrointestinal: Negative.  Negative for abdominal pain and vomiting.   Genitourinary: Negative.  Negative for dysuria and hematuria.   Musculoskeletal:  Negative for arthralgias and back pain.   Skin:  Negative for color change and rash.   Neurological: Negative.  Negative for seizures and syncope.   Psychiatric/Behavioral:  The patient is nervous/anxious.    All other systems reviewed and are negative.       Objective:    /92 (BP Location: Left arm, Patient Position: Sitting, Cuff Size: Standard)   Pulse 79   Temp 98 °F (36.7 °C) (Temporal)   Ht 5' 6\" (1.676 m)   Wt 65.8 kg (145 lb)   SpO2 97%   BMI 23.40 kg/m²   Wt Readings from Last 3 Encounters:   02/29/24 65.8 kg (145 lb)   12/14/23 63.5 kg (140 lb)   11/16/23 63.5 kg (140 lb)     BP Readings from Last 3 Encounters:   02/29/24 146/92   12/14/23 124/82   11/16/23 148/86       Lab Results   Component Value Date    WBC 9.99 03/07/2023    HGB 16.4 (H) 03/07/2023    HCT 49.7 (H) 03/07/2023     03/07/2023    TRIG 101 08/11/2022    HDL 56 08/11/2022    ALT 18 11/21/2023    AST " 18 11/21/2023     12/10/2015    K 3.6 11/21/2023     11/21/2023    CREATININE 0.93 11/21/2023    BUN 18 11/21/2023    CO2 32 11/21/2023    TSH 0.88 05/18/2019    INR 1.1 09/08/2018    GLUF 82 11/21/2023    HGBA1C 5.6 05/18/2019       Physical Exam  Vitals and nursing note reviewed.   Constitutional:       Appearance: Normal appearance. She is well-developed.   HENT:      Head: Normocephalic and atraumatic.   Eyes:      Pupils: Pupils are equal, round, and reactive to light.   Cardiovascular:      Rate and Rhythm: Normal rate and regular rhythm.      Heart sounds: No murmur heard.  Pulmonary:      Effort: Pulmonary effort is normal.      Breath sounds: Normal breath sounds.   Musculoskeletal:      Cervical back: Normal range of motion and neck supple.   Skin:     General: Skin is warm.      Capillary Refill: Capillary refill takes less than 2 seconds.   Neurological:      Mental Status: She is alert and oriented to person, place, and time.      Cranial Nerves: No cranial nerve deficit.   Psychiatric:         Mood and Affect: Mood normal.         Thought Content: Thought content normal.                       Assessment/Plan:   Ángela was seen today for follow-up.    Diagnoses and all orders for this visit:    Essential hypertension  -     Ambulatory Referral to Cardiology; Future    Generalized anxiety disorder  -     escitalopram (LEXAPRO) 5 mg tablet; Take 1 tablet (5 mg total) by mouth daily    Depression, recurrent (HCC)  -     escitalopram (LEXAPRO) 5 mg tablet; Take 1 tablet (5 mg total) by mouth daily    Chronic obstructive pulmonary disease, unspecified COPD type (HCC)    Claudication of both lower extremities (HCC)    Stage 3a chronic kidney disease (HCC)    Mixed hyperlipidemia  -     Ambulatory Referral to Cardiology; Future    Tobacco abuse  -     Ambulatory Referral to Cardiology; Future      Return in about 6 weeks (around 4/11/2024).  Patient Instructions   Start Lexapro as directed.  Keep  check on BP at home, schedule visit with cardiologist  Follow up in 6 weeks.  Stop smoking.

## 2024-02-29 NOTE — PATIENT INSTRUCTIONS
Start Lexapro as directed.  Keep check on BP at home, schedule visit with cardiologist  Follow up in 6 weeks.  Stop smoking.

## 2024-03-02 ENCOUNTER — HOSPITAL ENCOUNTER (EMERGENCY)
Facility: HOSPITAL | Age: 79
Discharge: HOME | End: 2024-03-02
Attending: EMERGENCY MEDICINE
Payer: MEDICARE

## 2024-03-02 ENCOUNTER — APPOINTMENT (EMERGENCY)
Dept: RADIOLOGY | Facility: HOSPITAL | Age: 79
End: 2024-03-02
Payer: MEDICARE

## 2024-03-02 VITALS
RESPIRATION RATE: 18 BRPM | SYSTOLIC BLOOD PRESSURE: 163 MMHG | WEIGHT: 174.38 LBS | TEMPERATURE: 98.9 F | BODY MASS INDEX: 28.15 KG/M2 | OXYGEN SATURATION: 94 % | HEART RATE: 72 BPM | DIASTOLIC BLOOD PRESSURE: 93 MMHG

## 2024-03-02 DIAGNOSIS — R55 SYNCOPE: Primary | ICD-10-CM

## 2024-03-02 LAB
2HR DELTA HS TROPONIN: 1 NG/L
ANION GAP SERPL CALCULATED.3IONS-SCNC: 8 MMOL/L
ATRIAL RATE: 65 BPM
ATRIAL RATE: 69 BPM
BACTERIA UR QL AUTO: ABNORMAL /HPF
BASOPHILS # BLD AUTO: 0.02 THOUSANDS/ÂΜL (ref 0–0.1)
BASOPHILS NFR BLD AUTO: 0 % (ref 0–1)
BILIRUB UR QL STRIP: NEGATIVE
BUN SERPL-MCNC: 16 MG/DL (ref 5–25)
CALCIUM SERPL-MCNC: 8.8 MG/DL (ref 8.4–10.2)
CARDIAC TROPONIN I PNL SERPL HS: 2 NG/L
CARDIAC TROPONIN I PNL SERPL HS: 3 NG/L
CHLORIDE SERPL-SCNC: 107 MMOL/L (ref 96–108)
CLARITY UR: ABNORMAL
CO2 SERPL-SCNC: 23 MMOL/L (ref 21–32)
COLOR UR: YELLOW
CREAT SERPL-MCNC: 0.85 MG/DL (ref 0.6–1.3)
EOSINOPHIL # BLD AUTO: 0.04 THOUSAND/ÂΜL (ref 0–0.61)
EOSINOPHIL NFR BLD AUTO: 0 % (ref 0–6)
ERYTHROCYTE [DISTWIDTH] IN BLOOD BY AUTOMATED COUNT: 13.1 % (ref 11.6–15.1)
GFR SERPL CREATININE-BSD FRML MDRD: 66 ML/MIN/1.73SQ M
GLUCOSE SERPL-MCNC: 104 MG/DL (ref 65–140)
GLUCOSE SERPL-MCNC: 96 MG/DL (ref 65–140)
GLUCOSE UR STRIP-MCNC: NEGATIVE MG/DL
HCT VFR BLD AUTO: 48 % (ref 34.8–46.1)
HGB BLD-MCNC: 16.3 G/DL (ref 11.5–15.4)
HGB UR QL STRIP.AUTO: ABNORMAL
IMM GRANULOCYTES # BLD AUTO: 0.04 THOUSAND/UL (ref 0–0.2)
IMM GRANULOCYTES NFR BLD AUTO: 0 % (ref 0–2)
KETONES UR STRIP-MCNC: NEGATIVE MG/DL
LEUKOCYTE ESTERASE UR QL STRIP: NEGATIVE
LYMPHOCYTES # BLD AUTO: 0.59 THOUSANDS/ÂΜL (ref 0.6–4.47)
LYMPHOCYTES NFR BLD AUTO: 5 % (ref 14–44)
MCH RBC QN AUTO: 30.6 PG (ref 26.8–34.3)
MCHC RBC AUTO-ENTMCNC: 34 G/DL (ref 31.4–37.4)
MCV RBC AUTO: 90 FL (ref 82–98)
MONOCYTES # BLD AUTO: 0.53 THOUSAND/ÂΜL (ref 0.17–1.22)
MONOCYTES NFR BLD AUTO: 5 % (ref 4–12)
NEUTROPHILS # BLD AUTO: 10.56 THOUSANDS/ÂΜL (ref 1.85–7.62)
NEUTS SEG NFR BLD AUTO: 90 % (ref 43–75)
NITRITE UR QL STRIP: NEGATIVE
NON-SQ EPI CELLS URNS QL MICRO: ABNORMAL /HPF
NRBC BLD AUTO-RTO: 0 /100 WBCS
P AXIS: 82 DEGREES
P AXIS: 96 DEGREES
PH UR STRIP.AUTO: 5.5 [PH] (ref 4.5–8)
PLATELET # BLD AUTO: 214 THOUSANDS/UL (ref 149–390)
PMV BLD AUTO: 9.8 FL (ref 8.9–12.7)
POTASSIUM SERPL-SCNC: 3.5 MMOL/L (ref 3.5–5.3)
PR INTERVAL: 144 MS
PR INTERVAL: 162 MS
PROT UR STRIP-MCNC: NEGATIVE MG/DL
QRS AXIS: 17 DEGREES
QRS AXIS: 32 DEGREES
QRSD INTERVAL: 84 MS
QRSD INTERVAL: 88 MS
QT INTERVAL: 384 MS
QT INTERVAL: 392 MS
QTC INTERVAL: 407 MS
QTC INTERVAL: 411 MS
RBC # BLD AUTO: 5.32 MILLION/UL (ref 3.81–5.12)
RBC #/AREA URNS AUTO: ABNORMAL /HPF
SODIUM SERPL-SCNC: 138 MMOL/L (ref 135–147)
SP GR UR STRIP.AUTO: 1.01 (ref 1–1.03)
T WAVE AXIS: 73 DEGREES
T WAVE AXIS: 91 DEGREES
UROBILINOGEN UR QL STRIP.AUTO: 0.2 E.U./DL
VENTRICULAR RATE: 65 BPM
VENTRICULAR RATE: 69 BPM
WBC # BLD AUTO: 11.78 THOUSAND/UL (ref 4.31–10.16)
WBC #/AREA URNS AUTO: ABNORMAL /HPF

## 2024-03-02 PROCEDURE — 93010 ELECTROCARDIOGRAM REPORT: CPT | Performed by: INTERNAL MEDICINE

## 2024-03-02 PROCEDURE — 99284 EMERGENCY DEPT VISIT MOD MDM: CPT

## 2024-03-02 PROCEDURE — 84484 ASSAY OF TROPONIN QUANT: CPT | Performed by: EMERGENCY MEDICINE

## 2024-03-02 PROCEDURE — 36415 COLL VENOUS BLD VENIPUNCTURE: CPT | Performed by: EMERGENCY MEDICINE

## 2024-03-02 PROCEDURE — 71045 X-RAY EXAM CHEST 1 VIEW: CPT

## 2024-03-02 PROCEDURE — 93005 ELECTROCARDIOGRAM TRACING: CPT

## 2024-03-02 PROCEDURE — 85025 COMPLETE CBC W/AUTO DIFF WBC: CPT | Performed by: EMERGENCY MEDICINE

## 2024-03-02 PROCEDURE — 81001 URINALYSIS AUTO W/SCOPE: CPT

## 2024-03-02 PROCEDURE — 82948 REAGENT STRIP/BLOOD GLUCOSE: CPT

## 2024-03-02 PROCEDURE — 80048 BASIC METABOLIC PNL TOTAL CA: CPT | Performed by: EMERGENCY MEDICINE

## 2024-03-02 NOTE — ED PROVIDER NOTES
"History  Chief Complaint   Patient presents with    Syncope     Patient brought by EMS from home. States she was having nausea, diaphoresis this morning around 10am. Syncopal episode around same time. Upon arrival patient is awake and alert, complains of continued lightheadedness. Denies head strike, thinners. States possible unconscious for 20 minutes and awoke on the floor.      78y F here for evaluation of syncope.  Pt reports being LH, diaphoretic w/ vision starting to dim and then woke on the floor.  Denies assoc cp/palpitations, no sob/chaves, no n/v, no le pain or swelling.  Felt a little nauseated this am, but that has resolved.  Reports normal appetite.  Reports vague right lower abd/flank discomfort for 2 days - described as a \"thumb pushing\" in to the right side.  Symptoms very mild, relatively vague, nothing makes it better/worse.     Denies f/c/s, no cough/congestion, no other complaints.    Pt reports hx of syncope in the past - at that time seemed to be related to lisinopril that dropped her BP into the 60s.  Only recent medication change is that she was started on lexapro about a week ago - states she's been feeling fine w/ that medication change.  Was started on it 2/2 anxiety and her pcp wanted to get her off of her 0.25 mg prn alprazolam.      History provided by:  Patient   used: No        Prior to Admission Medications   Prescriptions Last Dose Informant Patient Reported? Taking?   ALPRAZolam (XANAX) 0.5 mg tablet Past Week Self No Yes   Sig: Take 1 tablet (0.5 mg total) by mouth daily at bedtime as needed for anxiety   amLODIPine (NORVASC) 5 mg tablet Not Taking Self No No   Sig: Take 1 tablet (5 mg total) by mouth daily   Patient not taking: Reported on 3/2/2024   dicyclomine (BENTYL) 20 mg tablet More than a month Self No No   Sig: Take 1 tablet (20 mg total) by mouth every 6 (six) hours   escitalopram (LEXAPRO) 5 mg tablet   No Yes   Sig: Take 1 tablet (5 mg total) by mouth " daily   hydrochlorothiazide (MICROZIDE) 12.5 mg capsule Not Taking Self No No   Sig: TAKE ONE CAPSULE BY MOUTH ONCE DAILY   Patient not taking: Reported on 3/2/2024   meclizine (ANTIVERT) 25 mg tablet Not Taking Self No No   Sig: Take 1 tablet (25 mg total) by mouth 3 (three) times a day as needed for dizziness   Patient not taking: Reported on 2/29/2024   nortriptyline (PAMELOR) 25 mg capsule Not Taking Self No No   Sig: Take 1 capsule (25 mg total) by mouth daily at bedtime   Patient not taking: Reported on 2/29/2024      Facility-Administered Medications: None       Past Medical History:   Diagnosis Date    Anxiety     Atypical squamous cells cannot exclude high grade squamous intraepithelial lesion on cytologic smear of cervix (ASC-H)     Last Assessed 5/13/2014    Back pain     Cancer (HCC)     Cervical cancer (HCC)     Depression     Ovarian cyst     Tinnitus of both ears 10/2/2020    Tobacco abuse 11/20/2018       Past Surgical History:   Procedure Laterality Date    ANKLE FRACTURE SURGERY      Last Assessed 07/26/2016    ANKLE SURGERY      CERVICAL BIOPSY  W/ LOOP ELECTRODE EXCISION      COLONOSCOPY  10/2019    DIAGNOSTIC LAPAROSCOPY      DILATION AND CURETTAGE OF UTERUS      ESOPHAGOGASTRODUODENOSCOPY N/A 9/12/2016    Procedure: ESOPHAGOGASTRODUODENOSCOPY (EGD);  Surgeon: Alvin Powell MD;  Location: [facility];  Service:     FRACTURE SURGERY      ORTHOPEDIC SURGERY      HI COLONOSCOPY FLX DX W/COLLJ SPEC WHEN PFRMD N/A 9/12/2016    Procedure: FLEXIBLE COLONOSCOPY;  Surgeon: Alvin Powell MD;  Location: [facility];  Service: Colorectal    SALPINGOOPHORECTOMY Left     TONSILLECTOMY      UPPER GASTROINTESTINAL ENDOSCOPY  2016       Family History   Problem Relation Age of Onset    Coronary artery disease Mother     No Known Problems Family     No Known Problems Father     Colon cancer Neg Hx      I have reviewed and agree with the history as documented.    E-Cigarette/Vaping    E-Cigarette Use Never  User      E-Cigarette/Vaping Substances    Nicotine No     THC No     CBD No     Flavoring No      Social History     Tobacco Use    Smoking status: Every Day     Current packs/day: 0.50     Average packs/day: 0.5 packs/day for 60.0 years (30.0 ttl pk-yrs)     Types: Cigarettes    Smokeless tobacco: Never   Vaping Use    Vaping status: Never Used   Substance Use Topics    Alcohol use: No    Drug use: Never       Review of Systems   All other systems reviewed and are negative.      Physical Exam  Physical Exam  Vitals and nursing note reviewed.   Constitutional:       General: She is not in acute distress.     Appearance: Normal appearance. She is not ill-appearing, toxic-appearing or diaphoretic.   HENT:      Nose: Nose normal.      Mouth/Throat:      Mouth: Mucous membranes are moist.   Eyes:      Conjunctiva/sclera: Conjunctivae normal.   Cardiovascular:      Rate and Rhythm: Normal rate and regular rhythm.      Heart sounds: No murmur heard.  Pulmonary:      Effort: Pulmonary effort is normal. No respiratory distress.   Abdominal:      General: Abdomen is flat. There is no distension.      Palpations: Abdomen is soft.      Tenderness: There is no abdominal tenderness. There is no rebound. Negative signs include McBurney's sign.   Musculoskeletal:         General: No swelling.      Cervical back: Normal range of motion.   Skin:     General: Skin is warm.   Neurological:      General: No focal deficit present.      Mental Status: She is alert.   Psychiatric:         Mood and Affect: Mood normal.         Vital Signs  ED Triage Vitals   Temperature Pulse Respirations Blood Pressure SpO2   03/02/24 1459 03/02/24 1317 03/02/24 1317 03/02/24 1317 03/02/24 1317   98.9 °F (37.2 °C) 69 20 156/79 96 %      Temp Source Heart Rate Source Patient Position - Orthostatic VS BP Location FiO2 (%)   03/02/24 1459 03/02/24 1317 03/02/24 1317 03/02/24 1317 --   Oral Monitor Sitting Left arm       Pain Score       03/02/24 1317        No Pain           Vitals:    03/02/24 1524 03/02/24 1530 03/02/24 1600 03/02/24 1630   BP: 153/87 153/87 153/89 163/93   Pulse: 73 70 68 72   Patient Position - Orthostatic VS: Lying Lying Lying Lying         Visual Acuity      ED Medications  Medications - No data to display    Diagnostic Studies  Results Reviewed       Procedure Component Value Units Date/Time    HS Troponin I 2hr [[id number]]  (Normal) Collected: 03/02/24 1524    Lab Status: Final result Specimen: Blood from Arm, Left Updated: 03/02/24 1609     hs TnI 2hr 3 ng/L      Delta 2hr hsTnI 1 ng/L     HS Troponin I 4hr [[id number]]     Lab Status: No result Specimen: Blood     Urine Microscopic [[id number]]  (Abnormal) Collected: 03/02/24 1443    Lab Status: Final result Specimen: Urine, Clean Catch Updated: 03/02/24 1521     RBC, UA 1-2 /hpf      WBC, UA 2-4 /hpf      Epithelial Cells Occasional /hpf      Bacteria, UA None Seen /hpf     Urine Macroscopic, POC [[id number]]  (Abnormal) Collected: 03/02/24 1443    Lab Status: Final result Specimen: Urine Updated: 03/02/24 1445     Color, UA Yellow     Clarity, UA Cloudy     pH, UA 5.5     Leukocytes, UA Negative     Nitrite, UA Negative     Protein, UA Negative mg/dl      Glucose, UA Negative mg/dl      Ketones, UA Negative mg/dl      Urobilinogen, UA 0.2 E.U./dl      Bilirubin, UA Negative     Occult Blood, UA Trace     Specific Gravity, UA 1.015    Narrative:      CLINITEK RESULT    Basic metabolic panel [[id number]] Collected: 03/02/24 1329    Lab Status: Final result Specimen: Blood from Arm, Left Updated: 03/02/24 1402     Sodium 138 mmol/L      Potassium 3.5 mmol/L      Chloride 107 mmol/L      CO2 23 mmol/L      ANION GAP 8 mmol/L      BUN 16 mg/dL      Creatinine 0.85 mg/dL      Glucose 96 mg/dL      Calcium 8.8 mg/dL      eGFR 66 ml/min/1.73sq m     Narrative:      National Kid[employer] Foundation guidelines for Chronic Kidney Disease (CKD):     Stage 1 with normal or high GFR (GFR > 90 mL/min/1.73  square meters)    Stage 2 Mild CKD (GFR = 60-89 mL/min/1.73 square meters)    Stage 3A Moderate CKD (GFR = 45-59 mL/min/1.73 square meters)    Stage 3B Moderate CKD (GFR = 30-44 mL/min/1.73 square meters)    Stage 4 Severe CKD (GFR = 15-29 mL/min/1.73 square meters)    Stage 5 End Stage CKD (GFR <15 mL/min/1.73 square meters)  Note: GFR calculation is accurate only with a steady state creatinine    CBC and differential [[id number]]  (Abnormal) Collected: 03/02/24 1329    Lab Status: Final result Specimen: Blood from Arm, Left Updated: 03/02/24 1357     WBC 11.78 Thousand/uL      RBC 5.32 Million/uL      Hemoglobin 16.3 g/dL      Hematocrit 48.0 %      MCV 90 fL      MCH 30.6 pg      MCHC 34.0 g/dL      RDW 13.1 %      MPV 9.8 fL      Platelets 214 Thousands/uL      nRBC 0 /100 WBCs      Neutrophils Relative 90 %      Immat GRANS % 0 %      Lymphocytes Relative 5 %      Monocytes Relative 5 %      Eosinophils Relative 0 %      Basophils Relative 0 %      Neutrophils Absolute 10.56 Thousands/µL      Immature Grans Absolute 0.04 Thousand/uL      Lymphocytes Absolute 0.59 Thousands/µL      Monocytes Absolute 0.53 Thousand/µL      Eosinophils Absolute 0.04 Thousand/µL      Basophils Absolute 0.02 Thousands/µL     Narrative:      This is an appended report.  These results have been appended to a previously verified report.    HS Troponin 0hr (reflex protocol) [[id number]]  (Normal) Collected: 03/02/24 1329    Lab Status: Final result Specimen: Blood from Arm, Left Updated: 03/02/24 1357     hs TnI 0hr 2 ng/L     Fingerstick Glucose (POCT) [[id number]]  (Normal) Collected: 03/02/24 1311    Lab Status: Final result Updated: 03/02/24 1319     POC Glucose 104 mg/dl                    XR chest 1 view portable   ED Interpretation by Lizzy Newman DO (03/02 1430)   Xray reviewed and independently interpreted by me: no acute findings                   Procedures  ECG 12 Lead Documentation Only    Date/Time: 3/2/2024 1:20  PM    Performed by: Lizzy Newman DO  Authorized by: Lizzy Newman DO    Indications / Diagnosis:  Syncope  ECG reviewed by me, the ED Provider: yes    Patient location:  ED  Previous ECG:     Previous ECG:  Unavailable  Interpretation:     Interpretation: non-specific    Rate:     ECG rate:  65    ECG rate assessment: normal    Rhythm:     Rhythm: sinus rhythm    Ectopy:     Ectopy: none    QRS:     QRS axis:  Normal  ST segments:     ST segments:  Normal  T waves:     T waves: flattening      Flattening:  I, aVL, V2, V1 and V3           ED Course  ED Course as of 03/02/24 1756   Sat Mar 02, 2024   1337 Hemoglobin(!): 16.3  baseline   1337 WBC(!): 11.78  Awaiting differential   1429 hs TnI 0hr: 2  Delta due at 1529   1620 Delta 2hr hsTnI: 1  HEART score 4. Instructed to f/u w/ pcp/cards for possible holter/zio patch for further eval.             HEART Risk Score      Flowsheet Row Most Recent Value   Heart Score Risk Calculator    History 0 Filed at: 03/02/2024 1620   ECG 1 Filed at: 03/02/2024 1620   Age 2 Filed at: 03/02/2024 1620   Risk Factors 1 Filed at: 03/02/2024 1620   Troponin 0 Filed at: 03/02/2024 1620   HEART Score 4 Filed at: 03/02/2024 1620                          SBIRT 22yo+      Flowsheet Row Most Recent Value   Initial Alcohol Screen: US AUDIT-C     1. How often do you have a drink containing alcohol? 0 Filed at: 03/02/2024 1330   2. How many drinks containing alcohol do you have on a typical day you are drinking?  0 Filed at: 03/02/2024 1330   3a. Male UNDER 65: How often do you have five or more drinks on one occasion? 0 Filed at: 03/02/2024 1330   3b. FEMALE Any Age, or MALE 65+: How often do you have 4 or more drinks on one occassion? 0 Filed at: 03/02/2024 1330   Audit-C Score 0 Filed at: 03/02/2024 1330   COLLIN: How many times in the past year have you...    Used an illegal drug or used a prescription medication for non-medical reasons? Never Filed at: 03/02/2024 1492                       Medical Decision Making  Will get EKG to r/o arrhythmia, ischemic changes.  Will get labs to r/o acute life threatening metabolic abnl, cardiac ischemia, significant anemia.  Will get CXR to r/o occult pna, will get UA to r/o occult infection.          Problems Addressed:  Syncope: acute illness or injury that poses a threat to life or bodily functions     Details: No acute arrhythmia or evidence of cardiac ischemia at this time.  Instructed to f/u w/ PCP for further outpt w/u including possible holter/zio patch    Amount and/or Complexity of Data Reviewed  External Data Reviewed: notes.     Details: Recent pcp notes reviewed  Labs: ordered. Decision-making details documented in ED Course.  Radiology: ordered and independent interpretation performed.  ECG/medicine tests: ordered and independent interpretation performed.             Disposition  Final diagnoses:   Syncope     Time reflects when diagnosis was documented in both MDM as applicable and the Disposition within this note       Time User Action Codes Description Comment    3/2/2024  4:20 PM Lizzy Newman Add [R55] Syncope           ED Disposition       ED Disposition   Discharge    Condition   Stable    Date/Time   Sat Mar 2, 2024 1620    Comment   Ángela Camara discharge to home/self care.                   Follow-up Information       Follow up With Specialties Details Why Contact Info Additional Information    Enid Jernigan MD Family Medicine Schedule an appointment as soon as possible for a visit in 2 days If symptoms worsen or if no improvement [address] [place] [address] [phone]       [facility] Call in 2 days to schedule and appointment for further evaluation of your syncope [address] [facility] [place] [address] [phone] [facility], [address] [place], [place], [address] [phone]            Discharge  Medication List as of 3/2/2024  4:21 PM        CONTINUE these medications which have NOT CHANGED    Details   ALPRAZolam (XANAX) 0.5 mg tablet Take 1 tablet (0.5 mg total) by mouth daily at bedtime as needed for anxiety, Starting Thu 11/16/2023, Normal      escitalopram (LEXAPRO) 5 mg tablet Take 1 tablet (5 mg total) by mouth daily, Starting Thu 2/29/2024, Normal      amLODIPine (NORVASC) 5 mg tablet Take 1 tablet (5 mg total) by mouth daily, Starting Thu 2/22/2024, Print      dicyclomine (BENTYL) 20 mg tablet Take 1 tablet (20 mg total) by mouth every 6 (six) hours, Starting Thu 11/16/2023, Normal      hydrochlorothiazide (MICROZIDE) 12.5 mg capsule TAKE ONE CAPSULE BY MOUTH ONCE DAILY, Starting Tue 11/7/2023, Normal      meclizine (ANTIVERT) 25 mg tablet Take 1 tablet (25 mg total) by mouth 3 (three) times a day as needed for dizziness, Starting Thu 11/16/2023, Normal      nortriptyline (PAMELOR) 25 mg capsule Take 1 capsule (25 mg total) by mouth daily at bedtime, Starting Thu 12/14/2023, Until Wed 3/13/2024, Normal             No discharge procedures on file.    PDMP Review         Value Time User    PDMP Reviewed  Yes 3/2/2024  1:27 PM Lizzy Newman DO            ED Provider  Electronically Signed by             Lizzy Newman DO  03/02/24 0646

## 2024-03-04 ENCOUNTER — VBI (OUTPATIENT)
Dept: FAMILY MEDICINE CLINIC | Facility: CLINIC | Age: 79
End: 2024-03-04

## 2024-03-04 ENCOUNTER — TELEPHONE (OUTPATIENT)
Age: 79
End: 2024-03-04

## 2024-03-04 DIAGNOSIS — Z71.89 COMPLEX CARE COORDINATION: Primary | ICD-10-CM

## 2024-03-04 NOTE — TELEPHONE ENCOUNTER
03/04/24 10:08 AM    Patient contacted post ED visit, [facility] department spoke with patient/caregiver and outreach was successful.    Thank you.  Kimberly Sy PG [facility]

## 2024-03-05 ENCOUNTER — OFFICE VISIT (OUTPATIENT)
Dept: FAMILY MEDICINE CLINIC | Facility: CLINIC | Age: 79
End: 2024-03-05
Payer: MEDICARE

## 2024-03-05 VITALS
TEMPERATURE: 97.6 F | OXYGEN SATURATION: 99 % | HEIGHT: 66 IN | HEART RATE: 76 BPM | BODY MASS INDEX: 22.93 KG/M2 | WEIGHT: 142.7 LBS | DIASTOLIC BLOOD PRESSURE: 100 MMHG | SYSTOLIC BLOOD PRESSURE: 160 MMHG

## 2024-03-05 DIAGNOSIS — F41.1 GENERALIZED ANXIETY DISORDER: ICD-10-CM

## 2024-03-05 DIAGNOSIS — I10 ESSENTIAL HYPERTENSION: Primary | ICD-10-CM

## 2024-03-05 DIAGNOSIS — R10.9 CHRONIC ABDOMINAL PAIN: ICD-10-CM

## 2024-03-05 DIAGNOSIS — R03.0 WHITE COAT SYNDROME WITH HIGH BLOOD PRESSURE BUT WITHOUT HYPERTENSION: ICD-10-CM

## 2024-03-05 DIAGNOSIS — K58.0 IRRITABLE BOWEL SYNDROME WITH DIARRHEA: ICD-10-CM

## 2024-03-05 DIAGNOSIS — G89.29 CHRONIC ABDOMINAL PAIN: ICD-10-CM

## 2024-03-05 PROCEDURE — 93000 ELECTROCARDIOGRAM COMPLETE: CPT | Performed by: FAMILY MEDICINE

## 2024-03-05 PROCEDURE — 99214 OFFICE O/P EST MOD 30 MIN: CPT | Performed by: FAMILY MEDICINE

## 2024-03-05 RX ORDER — AMLODIPINE BESYLATE 5 MG/1
5 TABLET ORAL DAILY
Qty: 30 TABLET | Refills: 1 | Status: ON HOLD | OUTPATIENT
Start: 2024-03-05 | End: 2024-03-08

## 2024-03-05 NOTE — PATIENT INSTRUCTIONS
Please take medications as discussed  Report some Bps to me in 2 weeks  If worsening BP, headache, weakness, or anything else concerning- go to ER

## 2024-03-05 NOTE — PROGRESS NOTES
Subjective:    HPI  Ángela is a 78 y.o. female here today for:  Chief Complaint   Patient presents with    Follow-up     Patient states her BP has been going up, passed out 3/2, still feeling bad, states BP was 174/104 this morning, took medication and half of xanax which normally helps states it did not, feeling dizzy, R sided neck pain, shaky, cold   .      ---Above per clinical staff & reviewed. ---  HPI:  78yof here for follow up  Had episode of syncope 2 days ago and went to ER  Still having neck/arm pain from fall- bruise on right arm  BP elevated today- has not started her Amlodipine  Pt very anxious- discussed that she should continue to take the Lexapro to treat her anxiety  Pt has been noncompliant with meds due to side effects  Pt having slight headache- feels it is from her syncopal episode  BP came down a bit in the office with resting  Encouraged her to continue the Lexapro, HCTZ and Amlodipine if she can tolerate it  Discussed drinking fluids and getting up to her feet slowly  Still has occasional stomach pain which she has had for many years  Discussed with pt if any CP, bad headache, BP not controlled to go right to ER  Tried calling cardiology- she was told they can't schedule appointment as schedule isn't open- will place referral  Will send in BP in 2 weeks, has follow up scheduled in 1 month    The following portions of the patient's history were reviewed and updated as appropriate: allergies, current medications, past family history, past medical history, past social history, past surgical history and problem list.    Past Medical History:   Diagnosis Date    Anxiety     Atypical squamous cells cannot exclude high grade squamous intraepithelial lesion on cytologic smear of cervix (ASC-H)     Last Assessed 5/13/2014    Back pain     Cancer (HCC)     Cervical cancer (HCC)     Depression     Ovarian cyst     Tinnitus of both ears 10/2/2020    Tobacco abuse 11/20/2018       Past Surgical History:    Procedure Laterality Date    ANKLE FRACTURE SURGERY      Last Assessed 07/26/2016    ANKLE SURGERY      CERVICAL BIOPSY  W/ LOOP ELECTRODE EXCISION      COLONOSCOPY  10/2019    DIAGNOSTIC LAPAROSCOPY      DILATION AND CURETTAGE OF UTERUS      ESOPHAGOGASTRODUODENOSCOPY N/A 9/12/2016    Procedure: ESOPHAGOGASTRODUODENOSCOPY (EGD);  Surgeon: Alvin Powell MD;  Location: [facility];  Service:     FRACTURE SURGERY      ORTHOPEDIC SURGERY      WI COLONOSCOPY FLX DX W/COLLJ SPEC WHEN PFRMD N/A 9/12/2016    Procedure: FLEXIBLE COLONOSCOPY;  Surgeon: Alvin Powell MD;  Location: [facility];  Service: Colorectal    SALPINGOOPHORECTOMY Left     TONSILLECTOMY      UPPER GASTROINTESTINAL ENDOSCOPY  2016       Social History     Socioeconomic History    Marital status:      Spouse name: None    Number of children: None    Years of education: 15    Highest education level: None   Occupational History    None   Tobacco Use    Smoking status: Every Day     Current packs/day: 0.50     Average packs/day: 0.5 packs/day for 60.0 years (30.0 ttl pk-yrs)     Types: Cigarettes    Smokeless tobacco: Never   Vaping Use    Vaping status: Never Used   Substance and Sexual Activity    Alcohol use: No    Drug use: Never    Sexual activity: Not Currently   Other Topics Concern    None   Social History Narrative    ** Merged History Encounter **           since 2015 after 13 year marriage.  1 daughter from 1st marriage.  1 granddaughter, Natasha Crane, whom she raised.  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     Social Determinants of Health     Financial Resource Strain: Low Risk  (12/14/2023)    Overall Financial Resource Strain (CARDIA)     Difficulty of Paying Living Expenses: Not hard at all   Food Insecurity: Unknown (4/19/2021)    Hunger Vital Sign     Worried About Running Out of Food in the Last Year: Never true     Ran Out of Food in the Last Year: Not on file   Transportation Needs: No  Transportation Needs (12/14/2023)    PRAPARE - Transportation     Lack of Transportation (Medical): No     Lack of Transportation (Non-Medical): No   Physical Activity: Not on file   Stress: Not on file   Social Connections: Not on file   Intimate Partner Violence: Not on file   Housing Stability: Not on file       Current Outpatient Medications   Medication Sig Dispense Refill    ALPRAZolam (XANAX) 0.5 mg tablet Take 1 tablet (0.5 mg total) by mouth daily at bedtime as needed for anxiety 30 tablet 0    amLODIPine (NORVASC) 5 mg tablet Take 1 tablet (5 mg total) by mouth daily 30 tablet 1    dicyclomine (BENTYL) 20 mg tablet Take 1 tablet (20 mg total) by mouth every 6 (six) hours 60 tablet 1    escitalopram (LEXAPRO) 5 mg tablet Take 1 tablet (5 mg total) by mouth daily 30 tablet 1    hydrochlorothiazide (MICROZIDE) 12.5 mg capsule TAKE ONE CAPSULE BY MOUTH ONCE DAILY 90 capsule 0    meclizine (ANTIVERT) 25 mg tablet Take 1 tablet (25 mg total) by mouth 3 (three) times a day as needed for dizziness (Patient not taking: Reported on 2/29/2024) 60 tablet 0    nortriptyline (PAMELOR) 25 mg capsule Take 1 capsule (25 mg total) by mouth daily at bedtime (Patient not taking: Reported on 2/29/2024) 90 capsule 0     No current facility-administered medications for this visit.        Review of Systems   Constitutional: Negative.  Negative for chills and fever.   HENT: Negative.  Negative for ear pain and sore throat.    Eyes:  Negative for pain and visual disturbance.   Respiratory: Negative.  Negative for cough and shortness of breath.    Cardiovascular: Negative.  Negative for chest pain and palpitations.   Gastrointestinal:  Positive for abdominal pain and diarrhea. Negative for vomiting.   Genitourinary: Negative.  Negative for dysuria and hematuria.   Musculoskeletal:  Positive for neck pain and neck stiffness. Negative for arthralgias and back pain.   Skin:  Negative for color change and rash.   Neurological:  Positive  "for tremors and headaches. Negative for seizures and syncope.   Psychiatric/Behavioral:  Positive for sleep disturbance. The patient is nervous/anxious.         Objective:    /100 (BP Location: Left arm, Patient Position: Sitting, Cuff Size: Adult)   Pulse 76   Temp 97.6 °F (36.4 °C) (Temporal)   Ht 5' 6\" (1.676 m)   Wt 64.7 kg (142 lb 11.2 oz)   SpO2 99%   BMI 23.03 kg/m²   Wt Readings from Last 3 Encounters:   03/05/24 64.7 kg (142 lb 11.2 oz)   03/02/24 79.1 kg (174 lb 6.1 oz)   02/29/24 65.8 kg (145 lb)     BP Readings from Last 3 Encounters:   03/05/24 160/100   03/02/24 163/93   02/29/24 146/92       Lab Results   Component Value Date    WBC 11.78 (H) 03/02/2024    HGB 16.3 (H) 03/02/2024    HCT 48.0 (H) 03/02/2024     03/02/2024    TRIG 101 08/11/2022    HDL 56 08/11/2022    ALT 18 11/21/2023    AST 18 11/21/2023     12/10/2015    K 3.5 03/02/2024     03/02/2024    CREATININE 0.85 03/02/2024    BUN 16 03/02/2024    CO2 23 03/02/2024    TSH 0.88 05/18/2019    INR 1.1 09/08/2018    GLUF 82 11/21/2023    HGBA1C 5.6 05/18/2019       Physical Exam  Vitals and nursing note reviewed.   Constitutional:       Appearance: Normal appearance. She is well-developed.   HENT:      Head: Normocephalic and atraumatic.   Eyes:      Pupils: Pupils are equal, round, and reactive to light.   Cardiovascular:      Rate and Rhythm: Normal rate and regular rhythm.      Heart sounds: No murmur heard.  Pulmonary:      Effort: Pulmonary effort is normal.      Breath sounds: Normal breath sounds.   Abdominal:      General: There is no distension.      Palpations: Abdomen is soft.      Tenderness: There is no abdominal tenderness.   Musculoskeletal:      Cervical back: Normal range of motion and neck supple.   Skin:     General: Skin is warm.      Capillary Refill: Capillary refill takes less than 2 seconds.   Neurological:      Mental Status: She is alert and oriented to person, place, and time.      " Cranial Nerves: No cranial nerve deficit.   Psychiatric:         Mood and Affect: Mood normal.         Thought Content: Thought content normal.                       Assessment/Plan:   Ángela was seen today for follow-up.    Diagnoses and all orders for this visit:    Essential hypertension  -     POCT ECG  -     amLODIPine (NORVASC) 5 mg tablet; Take 1 tablet (5 mg total) by mouth daily    Irritable bowel syndrome with diarrhea    White coat syndrome with high blood pressure but without hypertension    Generalized anxiety disorder    Chronic abdominal pain      Return for already scheduled, Recheck.  Patient Instructions   Please take medications as discussed  Report some Bps to me in 2 weeks  If worsening BP, headache, weakness, or anything else concerning- go to ER

## 2024-03-06 ENCOUNTER — HOSPITAL ENCOUNTER (INPATIENT)
Facility: HOSPITAL | Age: 79
LOS: 2 days | Discharge: HOME | DRG: 068 | End: 2024-03-08
Attending: EMERGENCY MEDICINE | Admitting: INTERNAL MEDICINE
Payer: MEDICARE

## 2024-03-06 ENCOUNTER — APPOINTMENT (EMERGENCY)
Dept: CT IMAGING | Facility: HOSPITAL | Age: 79
DRG: 068 | End: 2024-03-06
Payer: MEDICARE

## 2024-03-06 ENCOUNTER — TELEPHONE (OUTPATIENT)
Dept: PSYCHOLOGY | Facility: CLINIC | Age: 79
End: 2024-03-06

## 2024-03-06 DIAGNOSIS — I10 ESSENTIAL HYPERTENSION: ICD-10-CM

## 2024-03-06 DIAGNOSIS — R55 SYNCOPE: Primary | ICD-10-CM

## 2024-03-06 DIAGNOSIS — R07.9 CHEST PAIN: ICD-10-CM

## 2024-03-06 DIAGNOSIS — I73.9 PVD (PERIPHERAL VASCULAR DISEASE) (HCC): ICD-10-CM

## 2024-03-06 DIAGNOSIS — E87.6 HYPOKALEMIA: ICD-10-CM

## 2024-03-06 DIAGNOSIS — I25.10 CVD (CARDIOVASCULAR DISEASE): ICD-10-CM

## 2024-03-06 DIAGNOSIS — I16.0 HYPERTENSIVE URGENCY: ICD-10-CM

## 2024-03-06 DIAGNOSIS — I67.9 CVD (CEREBROVASCULAR DISEASE): ICD-10-CM

## 2024-03-06 LAB
2HR DELTA HS TROPONIN: 0 NG/L
4HR DELTA HS TROPONIN: -1 NG/L
ALBUMIN SERPL BCP-MCNC: 4.1 G/DL (ref 3.5–5)
ALP SERPL-CCNC: 78 U/L (ref 34–104)
ALT SERPL W P-5'-P-CCNC: 12 U/L (ref 7–52)
ANION GAP SERPL CALCULATED.3IONS-SCNC: 8 MMOL/L
APTT PPP: 28 SECONDS (ref 23–37)
AST SERPL W P-5'-P-CCNC: 14 U/L (ref 13–39)
ATRIAL RATE: 57 BPM
ATRIAL RATE: 71 BPM
ATRIAL RATE: 77 BPM
BASOPHILS # BLD AUTO: 0.04 THOUSANDS/ÂΜL (ref 0–0.1)
BASOPHILS NFR BLD AUTO: 0 % (ref 0–1)
BILIRUB SERPL-MCNC: 0.61 MG/DL (ref 0.2–1)
BILIRUB UR QL STRIP: NEGATIVE
BNP SERPL-MCNC: 26 PG/ML (ref 0–100)
BUN SERPL-MCNC: 15 MG/DL (ref 5–25)
CALCIUM SERPL-MCNC: 9.3 MG/DL (ref 8.4–10.2)
CARDIAC TROPONIN I PNL SERPL HS: 3 NG/L
CARDIAC TROPONIN I PNL SERPL HS: 4 NG/L
CARDIAC TROPONIN I PNL SERPL HS: 4 NG/L
CHLORIDE SERPL-SCNC: 103 MMOL/L (ref 96–108)
CHOLEST SERPL-MCNC: 229 MG/DL
CLARITY UR: CLEAR
CO2 SERPL-SCNC: 29 MMOL/L (ref 21–32)
COLOR UR: YELLOW
CREAT SERPL-MCNC: 1.03 MG/DL (ref 0.6–1.3)
EOSINOPHIL # BLD AUTO: 0.09 THOUSAND/ÂΜL (ref 0–0.61)
EOSINOPHIL NFR BLD AUTO: 1 % (ref 0–6)
ERYTHROCYTE [DISTWIDTH] IN BLOOD BY AUTOMATED COUNT: 13.2 % (ref 11.6–15.1)
GFR SERPL CREATININE-BSD FRML MDRD: 52 ML/MIN/1.73SQ M
GLUCOSE SERPL-MCNC: 108 MG/DL (ref 65–140)
GLUCOSE UR STRIP-MCNC: NEGATIVE MG/DL
HCT VFR BLD AUTO: 51.1 % (ref 34.8–46.1)
HDLC SERPL-MCNC: 57 MG/DL
HGB BLD-MCNC: 17.2 G/DL (ref 11.5–15.4)
HGB UR QL STRIP.AUTO: NEGATIVE
IMM GRANULOCYTES # BLD AUTO: 0.04 THOUSAND/UL (ref 0–0.2)
IMM GRANULOCYTES NFR BLD AUTO: 0 % (ref 0–2)
INR PPP: 0.94 (ref 0.84–1.19)
KETONES UR STRIP-MCNC: NEGATIVE MG/DL
LACTATE SERPL-SCNC: 0.8 MMOL/L (ref 0.5–2)
LDLC SERPL CALC-MCNC: 151 MG/DL (ref 0–100)
LEUKOCYTE ESTERASE UR QL STRIP: NEGATIVE
LIPASE SERPL-CCNC: 16 U/L (ref 11–82)
LYMPHOCYTES # BLD AUTO: 0.96 THOUSANDS/ÂΜL (ref 0.6–4.47)
LYMPHOCYTES NFR BLD AUTO: 10 % (ref 14–44)
MAGNESIUM SERPL-MCNC: 1.8 MG/DL (ref 1.9–2.7)
MCH RBC QN AUTO: 30.5 PG (ref 26.8–34.3)
MCHC RBC AUTO-ENTMCNC: 33.7 G/DL (ref 31.4–37.4)
MCV RBC AUTO: 91 FL (ref 82–98)
MONOCYTES # BLD AUTO: 0.46 THOUSAND/ÂΜL (ref 0.17–1.22)
MONOCYTES NFR BLD AUTO: 5 % (ref 4–12)
NEUTROPHILS # BLD AUTO: 7.85 THOUSANDS/ÂΜL (ref 1.85–7.62)
NEUTS SEG NFR BLD AUTO: 84 % (ref 43–75)
NITRITE UR QL STRIP: NEGATIVE
NRBC BLD AUTO-RTO: 0 /100 WBCS
P AXIS: 62 DEGREES
P AXIS: 77 DEGREES
P AXIS: 78 DEGREES
PH UR STRIP.AUTO: 5.5 [PH] (ref 4.5–8)
PLATELET # BLD AUTO: 238 THOUSANDS/UL (ref 149–390)
PMV BLD AUTO: 10 FL (ref 8.9–12.7)
POTASSIUM SERPL-SCNC: 3.1 MMOL/L (ref 3.5–5.3)
PR INTERVAL: 154 MS
PR INTERVAL: 156 MS
PR INTERVAL: 164 MS
PROT SERPL-MCNC: 6.6 G/DL (ref 6.4–8.4)
PROT UR STRIP-MCNC: NEGATIVE MG/DL
PROTHROMBIN TIME: 12.8 SECONDS (ref 11.6–14.5)
QRS AXIS: 0 DEGREES
QRS AXIS: 32 DEGREES
QRS AXIS: 38 DEGREES
QRSD INTERVAL: 86 MS
QT INTERVAL: 356 MS
QT INTERVAL: 432 MS
QT INTERVAL: 466 MS
QTC INTERVAL: 402 MS
QTC INTERVAL: 453 MS
QTC INTERVAL: 469 MS
RBC # BLD AUTO: 5.64 MILLION/UL (ref 3.81–5.12)
SODIUM SERPL-SCNC: 140 MMOL/L (ref 135–147)
SP GR UR STRIP.AUTO: 1.01 (ref 1–1.03)
T WAVE AXIS: 69 DEGREES
T WAVE AXIS: 77 DEGREES
T WAVE AXIS: 78 DEGREES
TRIGL SERPL-MCNC: 107 MG/DL
TSH SERPL DL<=0.05 MIU/L-ACNC: 1.04 UIU/ML (ref 0.45–4.5)
UROBILINOGEN UR QL STRIP.AUTO: 0.2 E.U./DL
VENTRICULAR RATE: 57 BPM
VENTRICULAR RATE: 71 BPM
VENTRICULAR RATE: 77 BPM
WBC # BLD AUTO: 9.44 THOUSAND/UL (ref 4.31–10.16)

## 2024-03-06 PROCEDURE — 83735 ASSAY OF MAGNESIUM: CPT | Performed by: EMERGENCY MEDICINE

## 2024-03-06 PROCEDURE — 70498 CT ANGIOGRAPHY NECK: CPT

## 2024-03-06 PROCEDURE — 93005 ELECTROCARDIOGRAM TRACING: CPT

## 2024-03-06 PROCEDURE — 80053 COMPREHEN METABOLIC PANEL: CPT | Performed by: EMERGENCY MEDICINE

## 2024-03-06 PROCEDURE — 70496 CT ANGIOGRAPHY HEAD: CPT

## 2024-03-06 PROCEDURE — 99223 1ST HOSP IP/OBS HIGH 75: CPT | Performed by: INTERNAL MEDICINE

## 2024-03-06 PROCEDURE — 96367 TX/PROPH/DG ADDL SEQ IV INF: CPT

## 2024-03-06 PROCEDURE — 99223 1ST HOSP IP/OBS HIGH 75: CPT | Performed by: PSYCHIATRY & NEUROLOGY

## 2024-03-06 PROCEDURE — 74174 CTA ABD&PLVS W/CONTRAST: CPT

## 2024-03-06 PROCEDURE — 99223 1ST HOSP IP/OBS HIGH 75: CPT | Performed by: SURGERY

## 2024-03-06 PROCEDURE — 83605 ASSAY OF LACTIC ACID: CPT | Performed by: EMERGENCY MEDICINE

## 2024-03-06 PROCEDURE — 85025 COMPLETE CBC W/AUTO DIFF WBC: CPT | Performed by: EMERGENCY MEDICINE

## 2024-03-06 PROCEDURE — 81003 URINALYSIS AUTO W/O SCOPE: CPT

## 2024-03-06 PROCEDURE — 93010 ELECTROCARDIOGRAM REPORT: CPT | Performed by: INTERNAL MEDICINE

## 2024-03-06 PROCEDURE — 99285 EMERGENCY DEPT VISIT HI MDM: CPT | Performed by: EMERGENCY MEDICINE

## 2024-03-06 PROCEDURE — 36415 COLL VENOUS BLD VENIPUNCTURE: CPT | Performed by: EMERGENCY MEDICINE

## 2024-03-06 PROCEDURE — 80061 LIPID PANEL: CPT | Performed by: INTERNAL MEDICINE

## 2024-03-06 PROCEDURE — 96365 THER/PROPH/DIAG IV INF INIT: CPT

## 2024-03-06 PROCEDURE — 93010 ELECTROCARDIOGRAM REPORT: CPT

## 2024-03-06 PROCEDURE — 85730 THROMBOPLASTIN TIME PARTIAL: CPT | Performed by: EMERGENCY MEDICINE

## 2024-03-06 PROCEDURE — 83690 ASSAY OF LIPASE: CPT | Performed by: EMERGENCY MEDICINE

## 2024-03-06 PROCEDURE — 85610 PROTHROMBIN TIME: CPT | Performed by: EMERGENCY MEDICINE

## 2024-03-06 PROCEDURE — 71275 CT ANGIOGRAPHY CHEST: CPT

## 2024-03-06 PROCEDURE — 84443 ASSAY THYROID STIM HORMONE: CPT | Performed by: EMERGENCY MEDICINE

## 2024-03-06 PROCEDURE — 83880 ASSAY OF NATRIURETIC PEPTIDE: CPT | Performed by: EMERGENCY MEDICINE

## 2024-03-06 PROCEDURE — 99285 EMERGENCY DEPT VISIT HI MDM: CPT

## 2024-03-06 PROCEDURE — 84484 ASSAY OF TROPONIN QUANT: CPT | Performed by: EMERGENCY MEDICINE

## 2024-03-06 RX ORDER — NICOTINE 21 MG/24HR
1 PATCH, TRANSDERMAL 24 HOURS TRANSDERMAL DAILY
Status: DISCONTINUED | OUTPATIENT
Start: 2024-03-06 | End: 2024-03-08 | Stop reason: HOSPADM

## 2024-03-06 RX ORDER — AMLODIPINE BESYLATE 5 MG/1
5 TABLET ORAL ONCE
Status: COMPLETED | OUTPATIENT
Start: 2024-03-06 | End: 2024-03-06

## 2024-03-06 RX ORDER — LORAZEPAM 1 MG/1
1 TABLET ORAL ONCE
Status: COMPLETED | OUTPATIENT
Start: 2024-03-06 | End: 2024-03-06

## 2024-03-06 RX ORDER — ATORVASTATIN CALCIUM 40 MG/1
40 TABLET, FILM COATED ORAL
Status: DISCONTINUED | OUTPATIENT
Start: 2024-03-06 | End: 2024-03-08 | Stop reason: HOSPADM

## 2024-03-06 RX ORDER — DICYCLOMINE HCL 20 MG
20 TABLET ORAL EVERY 6 HOURS
Status: DISCONTINUED | OUTPATIENT
Start: 2024-03-06 | End: 2024-03-08 | Stop reason: HOSPADM

## 2024-03-06 RX ORDER — HEPARIN SODIUM 5000 [USP'U]/ML
5000 INJECTION, SOLUTION INTRAVENOUS; SUBCUTANEOUS EVERY 8 HOURS SCHEDULED
Status: DISCONTINUED | OUTPATIENT
Start: 2024-03-06 | End: 2024-03-06

## 2024-03-06 RX ORDER — ALPRAZOLAM 0.5 MG/1
0.5 TABLET ORAL
Status: DISCONTINUED | OUTPATIENT
Start: 2024-03-06 | End: 2024-03-08 | Stop reason: HOSPADM

## 2024-03-06 RX ORDER — MAGNESIUM SULFATE HEPTAHYDRATE 40 MG/ML
2 INJECTION, SOLUTION INTRAVENOUS ONCE
Status: COMPLETED | OUTPATIENT
Start: 2024-03-06 | End: 2024-03-06

## 2024-03-06 RX ORDER — HYDROCHLOROTHIAZIDE 12.5 MG/1
12.5 TABLET ORAL DAILY
Status: DISCONTINUED | OUTPATIENT
Start: 2024-03-07 | End: 2024-03-08 | Stop reason: HOSPADM

## 2024-03-06 RX ORDER — AMLODIPINE BESYLATE 5 MG/1
5 TABLET ORAL DAILY
Status: DISCONTINUED | OUTPATIENT
Start: 2024-03-07 | End: 2024-03-07

## 2024-03-06 RX ORDER — ESCITALOPRAM OXALATE 5 MG/1
5 TABLET ORAL DAILY
Status: DISCONTINUED | OUTPATIENT
Start: 2024-03-07 | End: 2024-03-08 | Stop reason: HOSPADM

## 2024-03-06 RX ORDER — ASPIRIN 325 MG
325 TABLET ORAL ONCE
Status: COMPLETED | OUTPATIENT
Start: 2024-03-06 | End: 2024-03-06

## 2024-03-06 RX ORDER — POTASSIUM CHLORIDE 20 MEQ/1
40 TABLET, EXTENDED RELEASE ORAL ONCE
Status: COMPLETED | OUTPATIENT
Start: 2024-03-06 | End: 2024-03-06

## 2024-03-06 RX ADMIN — ATORVASTATIN CALCIUM 40 MG: 40 TABLET, FILM COATED ORAL at 16:07

## 2024-03-06 RX ADMIN — LORAZEPAM 1 MG: 1 TABLET ORAL at 08:56

## 2024-03-06 RX ADMIN — HEPARIN SODIUM 5000 UNITS: 5000 INJECTION INTRAVENOUS; SUBCUTANEOUS at 14:17

## 2024-03-06 RX ADMIN — Medication 1 PATCH: at 14:17

## 2024-03-06 RX ADMIN — SODIUM CHLORIDE, SODIUM LACTATE, POTASSIUM CHLORIDE, AND CALCIUM CHLORIDE 500 ML: .6; .31; .03; .02 INJECTION, SOLUTION INTRAVENOUS at 08:56

## 2024-03-06 RX ADMIN — APIXABAN 5 MG: 5 TABLET, FILM COATED ORAL at 20:17

## 2024-03-06 RX ADMIN — ASPIRIN 325 MG ORAL TABLET 325 MG: 325 PILL ORAL at 12:29

## 2024-03-06 RX ADMIN — MAGNESIUM SULFATE HEPTAHYDRATE 2 G: 40 INJECTION, SOLUTION INTRAVENOUS at 10:58

## 2024-03-06 RX ADMIN — DICYCLOMINE HYDROCHLORIDE 20 MG: 20 TABLET ORAL at 23:33

## 2024-03-06 RX ADMIN — IOHEXOL 170 ML: 350 INJECTION, SOLUTION INTRAVENOUS at 09:35

## 2024-03-06 RX ADMIN — AMLODIPINE BESYLATE 5 MG: 5 TABLET ORAL at 09:52

## 2024-03-06 RX ADMIN — POTASSIUM CHLORIDE 40 MEQ: 1500 TABLET, EXTENDED RELEASE ORAL at 10:58

## 2024-03-06 NOTE — ED NOTES
[facility]NOV[facility] PARTIAL PROGRAM REFERRAL     [facility]    Name and Date of Birth:  Ángela Camara 78 y.o. 1945    Date of Referral: March 6, 2024    Presenting Symptoms and Stressors:      Symptoms:  depression and anxiety  Stressors:  family problems and family conflict    Access to Weapons:  No    Smoking Status: smoker    Substance Use: denies use   Suicidal Ideation: None    Homicidal Ideation: None at present    Depressed Mood: Yes, with anxiety    Lela/Hypomania: None    Psychosis: None    Agitation: No    Appetite Changes: normal appetite    Sleep Disturbance: normal sleep    Diagnoses:  Major Depressive Disorder, single, moderate    Current Psychiatrist or Therapist:    Psychiatrist: None  Therapist: None    Do they Require Ambulatory Assistance: No    Communication Assistance: not required     Legal Issues: None        Melinda Mast

## 2024-03-06 NOTE — ASSESSMENT & PLAN NOTE
77 yo female smoker w/ a hx of SBO, CVA/TIA, HTN, HLD, CKD and anxiety presented to [facility] on 3/6/24 w/ HTN. Pt was recently seen in the ER for weakness and syncope after having a bowel movement on 3/2. Pt was started on norvasc at that time by her PCP, but has not had a chance to fill the prescription. CTA head/neck showed new focal severe stenosis in the distal left intracranial vertebral artery near the vertebrobasilar junction and new focal severe stenosis in proximal basilar artery for which a consult was placed to neurology. CTA chest/abd/pelvis showed a chronic occlusion of the L EIA w/ distal reconstitution at the CFA by internal mammary and body wall collaterals.    Vascular surgery consulted for abdominal CTA finding of chronic L EIA occlusion. Per surgeon's review of imaging, L EIA occlusion has been present since at least 2021. Pt denies any history of vascular surgeries or blood clots.      Diagnostics:  -3/6/24 CTA chest/abd/pelvis: No acute aortic or vascular abnormalities. 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 Correlate for chronic left lower extremity claudication, rest pain, or nonhealing ulcers. No new/acute areas of vascular occlusion. Mild, long segment, reticular fold thickening in the distal small bowel could relate to edema/third spacing or low-level enterocolitis.  -3/6/24 CTA head/neck: No acute intracranial abnormality. Stable small chronic right MCA territory infarct as described. No intracranial large vessel occlusion. New focal severe stenosis in the distal left intracranial vertebral artery near the vertebrobasilar junction. New focal severe stenosis in proximal basilar artery. Eccentric filling defect along the wall of the vessel in these stenotic foci may indicate noncalcified mural plaque or thrombus. No hemodynamically significant stenosis in the cervical carotid and vertebral  arteries. Pulmonary emphysema.    Plan:  -Chronic L EIA occlusion noted on imaging  -Pt is asymptomatic, LLE warm, no claudication, no tissue loss, (+) motor/sensation, DP/PT signals  -No need for any vascular surgical intervention at this time  -Continue ASA and lipitor for medical management of PAD  -Will follow up w/ pt in the vascular office for monitoring of PAD  -Will discuss w/ Dr. Annita Barrios

## 2024-03-06 NOTE — CONSULTS
Consultation - Neurology   Ángela Camara 78 y.o. female MRN: [id number]  Unit/Bed#: ED-25 Encounter: [id number]      Assessment/Plan   78 year old female with significant pack year smoking history, cervical cancer treated in 2014 (in remission), chronic pain syndrome.     Presented initially following syncopal episode of 3/2; re-presents on 3/6 after experiencing ongoing issues with hypertension, headache, lightheadedness, abdominal pain, personal stressors at home.    On exam today, patient endorses approximately 6 episodes of syncope over the past year, all occurring in the setting of straining to void/bowel movements; thus fair concern for vasovagal origin to her syncopal episodes.  She notes ongoing occasional lightheadedness following extensive work, but denies any vertiginous symptoms nor any clear posterior circulation stroke symptoms of recently.    Vessel imaging notes severe focal left vertebral stenosis near the vertebrobasilar junction with new focal severe stenosis in the proximal basilar/questional eccentric filling defect (mural plaque, thrombus).    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 Shah: No need for MRI brain as neurologic exam is nonfocal and not suggestive of CVA/infarct  -2D echocardiogram pending  -For the above CTA findings, Dr. Shah recommending Eliquis for anticoagulation given possible VB thrombus, discussed with primary team  -Will continue Eliquis for now and recommend repeat CTA head/neck in approximately 2-3 weeks to re-evaluate above findings (and then determine if anticoagulation still required based on results)  -For other  stenosis/athero on CTA head/neck as well as CTA chest abdomen pelvis, continuing aspirin   -vascular consulted for CTA C/A/P findings  -Check hemoglobin A1c and lipid panel  -Neuro-checks; would obtain STAT CTA head/neck with any acute neuro exam changes  -Telemetry monitoring  -Provide stroke education  -Therapy evaluations  -No clear need for permissive hypertension, but would certainly avoid hypotension in the setting of the posterior circulation flow restrictive disease    Discussed plan of care with attending neurologist.     Recommendations for outpatient neurological follow up have yet to be determined.    History of Present Illness     Reason for Consult / Principal Problem: Syncopal episodes, abnormal CTA head/neck  Hx and PE limited by: Patient poor historian  HPI: Ángela Camara is a 78 y.o. female with history as mentioned above in assessment who neurology is asked to evaluate in regards to the above.    History obtained via discussion with patient this afternoon: She initially presented to the ED on March 2 after experiencing syncopal episode preceded by nausea/diaphoresis, was reportedly having a bowel movement at that time.  She was discharged from the ED, and followed up with the PCP yesterday, several days after the event.  During her PCP evaluation yesterday was still having elevated blood pressures, medication noncompliance, headache, anxiousness (several recent personal/social stressors at home) and ongoing abdominal pain at times.  Her symptoms persisted into today and her blood pressure remained uncontrolled, thus she came to the ER for evaluation.    CTA head/neck was obtained given the constellation of symptoms and syncopal episodes above and given the abnormal findings neurology was consulted for input.  In further discussion with patient this afternoon, she denies any obvious strokelike symptoms; denies focal weakness or numbness of the extremities, extremity coordination  difficulty, no room spinning dizziness with ambulation of recently, no changes to speech/vision/dysphagia.  When further discussing the syncopal episode on Saturday, she describes she has had approximately 6 episodes of syncope over the past year.  She states they have typically occurred in the setting of bowel movements and voiding in the past.  She also states that occasionally she will experience lightheadedness after doing a lot of strenuous work.     Denies any active antiplatelet use at home prior to admission.  She has significant pack-year tobacco history, using half pack per day recently.  Denies any illicit drug or alcohol use. No pertinent stroke/neurologic history.     Inpatient consult to Neurology  Consult performed by: Macario Curry PA-C  Consult ordered by: Dileep Zelaya DO          Review of Systems   Constitutional: Negative.    HENT: Negative.  Negative for tinnitus, trouble swallowing and voice change.    Eyes: Negative.    Respiratory: Negative.     Cardiovascular: Negative.    Gastrointestinal:  Positive for abdominal pain.   Genitourinary: Negative.    Musculoskeletal: Negative.    Skin: Negative.    Neurological:  Positive for light-headedness and headaches.   Psychiatric/Behavioral:  The patient is nervous/anxious.        Historical Information   Past Medical History:   Diagnosis Date    Anxiety     Atypical squamous cells cannot exclude high grade squamous intraepithelial lesion on cytologic smear of cervix (ASC-H)     Last Assessed 5/13/2014    Back pain     Cancer (HCC)     Cervical cancer (HCC)     Depression     Ovarian cyst     Tinnitus of both ears 10/2/2020    Tobacco abuse 11/20/2018     Past Surgical History:   Procedure Laterality Date    ANKLE FRACTURE SURGERY      Last Assessed 07/26/2016    ANKLE SURGERY      CERVICAL BIOPSY  W/ LOOP ELECTRODE EXCISION      COLONOSCOPY  10/2019    DIAGNOSTIC LAPAROSCOPY      DILATION AND CURETTAGE OF UTERUS      ESOPHAGOGASTRODUODENOSCOPY  N/A 9/12/2016    Procedure: ESOPHAGOGASTRODUODENOSCOPY (EGD);  Surgeon: Alvin Powell MD;  Location: [facility];  Service:     FRACTURE SURGERY      ORTHOPEDIC SURGERY      DE COLONOSCOPY FLX DX W/COLLJ SPEC WHEN PFRMD N/A 9/12/2016    Procedure: FLEXIBLE COLONOSCOPY;  Surgeon: Alvin Powell MD;  Location: [facility];  Service: Colorectal    SALPINGOOPHORECTOMY Left     TONSILLECTOMY      UPPER GASTROINTESTINAL ENDOSCOPY  2016     Social History   Social History     Substance and Sexual Activity   Alcohol Use No     Social History     Substance and Sexual Activity   Drug Use Never     E-Cigarette/Vaping    E-Cigarette Use Never User      E-Cigarette/Vaping Substances    Nicotine No     THC No     CBD No     Flavoring No      Social History     Tobacco Use   Smoking Status Every Day    Current packs/day: 0.50    Average packs/day: 0.5 packs/day for 60.0 years (30.0 ttl pk-yrs)    Types: Cigarettes   Smokeless Tobacco Never     Family History:   Family History   Problem Relation Age of Onset    Coronary artery disease Mother     No Known Problems Family     No Known Problems Father     Colon cancer Neg Hx        Review of previous medical records was completed.    Meds/Allergies   current meds:   Current Facility-Administered Medications   Medication Dose Route Frequency    ALPRAZolam (XANAX) tablet 0.5 mg  0.5 mg Oral HS PRN    [START ON 3/7/2024] amLODIPine (NORVASC) tablet 5 mg  5 mg Oral Daily    apixaban (ELIQUIS) tablet 5 mg  5 mg Oral BID    [START ON 3/7/2024] aspirin (ECOTRIN LOW STRENGTH) EC tablet 81 mg  81 mg Oral Daily    atorvastatin (LIPITOR) tablet 40 mg  40 mg Oral Daily With Dinner    dicyclomine (BENTYL) tablet 20 mg  20 mg Oral Q6H    [START ON 3/7/2024] escitalopram (LEXAPRO) tablet 5 mg  5 mg Oral Daily    [START ON 3/7/2024] hydroCHLOROthiazide tablet 12.5 mg  12.5 mg Oral Daily    nicotine (NICODERM CQ) 14 mg/24hr TD 24 hr patch 1 patch  1 patch Transdermal Daily    and PTA meds:    Prior to Admission Medications   Prescriptions Last Dose Informant Patient Reported? Taking?   ALPRAZolam (XANAX) 0.5 mg tablet  Self No Yes   Sig: Take 1 tablet (0.5 mg total) by mouth daily at bedtime as needed for anxiety   amLODIPine (NORVASC) 5 mg tablet   No No   Sig: Take 1 tablet (5 mg total) by mouth daily   dicyclomine (BENTYL) 20 mg tablet  Self No No   Sig: Take 1 tablet (20 mg total) by mouth every 6 (six) hours   escitalopram (LEXAPRO) 5 mg tablet  Self No Yes   Sig: Take 1 tablet (5 mg total) by mouth daily   hydrochlorothiazide (MICROZIDE) 12.5 mg capsule  Self No Yes   Sig: TAKE ONE CAPSULE BY MOUTH ONCE DAILY      Facility-Administered Medications: None       Allergies   Allergen Reactions    Lisinopril Syncope     Other reaction(s): Other (See Comments)  Nose bleeds, passing out       Objective   Vitals:Blood pressure (!) 187/106, pulse 82, temperature 97.8 °F (36.6 °C), temperature source Oral, resp. rate 17, weight 64.7 kg (142 lb 10.2 oz), SpO2 96%, not currently breastfeeding.,Body mass index is 23.02 kg/m².    Intake/Output Summary (Last 24 hours) at 3/6/2024 1343  Last data filed at 3/6/2024 1135  Gross per 24 hour   Intake 550 ml   Output --   Net 550 ml       Invasive Devices:   Invasive Devices       Peripheral Intravenous Line  Duration             Peripheral IV 03/06/24 Right Antecubital <1 day                  Examined alongside Dr. Shah.    Physical Exam  Constitutional:       Appearance: Normal appearance.   HENT:      Head: Normocephalic and atraumatic.   Eyes:      Extraocular Movements: Extraocular movements intact and EOM normal.      Conjunctiva/sclera: Conjunctivae normal.      Pupils: Pupils are equal, round, and reactive to light.   Cardiovascular:      Rate and Rhythm: Normal rate.   Pulmonary:      Effort: Pulmonary effort is normal.   Abdominal:      General: There is no distension.   Musculoskeletal:         General: Normal range of motion.      Cervical back: Normal  range of motion and neck supple.   Skin:     General: Skin is warm and dry.   Neurological:      Mental Status: She is alert and oriented to person, place, and time.      Motor: Motor strength is normal.     Coordination: Finger-Nose-Finger Test and Heel to Shin Test normal.   Psychiatric:         Speech: Speech normal.       Neurologic Exam     Mental Status   Oriented to person, place, and time.   Attention: normal. Concentration: normal.   Speech: speech is normal   Normal comprehension.     Cranial Nerves     CN II   Visual fields full to confrontation.     CN III, IV, VI   Pupils are equal, round, and reactive to light.  Extraocular motions are normal.     CN V   Facial sensation intact.     CN VII   Facial expression full, symmetric.     CN VIII   CN VIII normal.     CN IX, X   CN IX normal.   CN X normal.     CN XI   CN XI normal.     CN XII   CN XII normal.     Motor Exam   Muscle bulk: normal  Overall muscle tone: normal  Right arm pronator drift: absent  Left arm pronator drift: absent    Strength   Strength 5/5 throughout.     Sensory Exam   Light touch normal.     Gait, Coordination, and Reflexes     Coordination   Finger to nose coordination: normal  Heel to shin coordination: normal    Tremor   Resting tremor: absent  Intention tremor: absent  Symmetric DTR's, crossed adductors noted with patellars.        Lab Results: CBC:   Results from last 7 days   Lab Units 03/06/24  0853 03/02/24  1329   WBC Thousand/uL 9.44 11.78*   RBC Million/uL 5.64* 5.32*   HEMOGLOBIN g/dL 17.2* 16.3*   HEMATOCRIT % 51.1* 48.0*   MCV fL 91 90   PLATELETS Thousands/uL 238 214   , BMP/CMP:   Results from last 7 days   Lab Units 03/06/24  0853 03/02/24  1329   SODIUM mmol/L 140 138   POTASSIUM mmol/L 3.1* 3.5   CHLORIDE mmol/L 103 107   CO2 mmol/L 29 23   BUN mg/dL 15 16   CREATININE mg/dL 1.03 0.85   CALCIUM mg/dL 9.3 8.8   AST U/L 14  --    ALT U/L 12  --    ALK PHOS U/L 78  --    EGFR ml/min/1.73sq m 52 66   , Vitamin B12:    ", HgBA1C:   , TSH:   Results from last 7 days   Lab Units 03/06/24  0853   TSH 3RD GENERATON uIU/mL 1.039   , Coagulation:   Results from last 7 days   Lab Units 03/06/24  0853   INR  0.94   , Lipid Profile:   Results from last 7 days   Lab Units 03/06/24  0853   HDL mg/dL 57   LDL CALC mg/dL 151*   TRIGLYCERIDES mg/dL 107   , Ammonia:   , Urinalysis:   Results from last 7 days   Lab Units 03/06/24  0901 03/02/24  1443   COLOR UA  Yellow Yellow   CLARITY UA  Clear Cloudy   SPEC GRAV UA  1.010 1.015   PH UA  5.5 5.5   LEUKOCYTES UA  Negative Negative   NITRITE UA  Negative Negative   GLUCOSE UA mg/dl Negative Negative   KETONES UA mg/dl Negative Negative   BILIRUBIN UA  Negative Negative   BLOOD UA  Negative Trace*   , Drug Screen:   , Medication Drug Levels:       Invalid input(s): \"CARBAMAZEPINE\", \"LACOSAMIDE\", \"OXCARBAZEPINE\"  Imaging Studies: I have personally reviewed pertinent films in PACS  CTA head and neck w wo contrast   Final Result by Eloise De MD (03/06 1030)      1.  No acute intracranial CT abnormality.   2.  Stable small chronic right MCA territory infarct as described.   3.  No intracranial large vessel occlusion. New focal severe stenosis in the distal left intracranial vertebral artery near the vertebrobasilar junction. New focal severe stenosis in proximal basilar artery. Eccentric filling defect along the wall of the    vessel in these stenotic foci may indicate noncalcified mural plaque or thrombus.   4.  No hemodynamically significant stenosis in the cervical carotid and vertebral arteries.   5.  Pulmonary emphysema                  Workstation performed: [id number]         CTA dissection protocol chest abdomen pelvis w wo contrast   Final Result by Drew Granger MD (03/06 8816)      1.  No acute aortic or vascular abnormalities.      2.  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 " Correlate for chronic left lower extremity claudication, rest pain, or nonhealing ulcers. No new/acute    areas of vascular occlusion.      3.  Mild, long segment, reticular fold thickening in the distal small bowel could relate to edema/third spacing or low-level enterocolitis. Long extent, involvement in a nonwatershed area, and lack of significant mesenteric atheromatous disease speak    against ischemic etiology, though drawing of lactate levels could be considered.               Workstation performed: [id number]           EKG, Pathology, and Other Studies: I have personally reviewed pertinent reports.      VTE Prophylaxis: Sequential compression device (Venodyne)  and Heparin    Code Status: Level 1 - Full Code    Total time spent today 50 minutes.  Discussed plan of care with patient and primary team: Per Dr. Lux, no need for MRI brain, however would anticoagulate given the concern for possible mural plaque/thrombus in the vertebrobasilar junction/basilar artery, continue aspirin as well, appreciate vascular input, stroke pathway ongoing, close neuro exam monitoring, supportive care, telemetry, therapies.    Please note dictation software was used in the formulation of this note.  Please keep that in mind in light of any grammatical errors.

## 2024-03-06 NOTE — ED PROVIDER NOTES
History  Chief Complaint   Patient presents with    Weakness - Generalized     Pt seen and d/c yesterday for weakness, dizziness, diarrhea, generalized abdominal pain, & near syncopal episodes for approx 3-4 days. States since being d/c symptoms have gotten worse & reporting HTN states systolic 150's      HPI  Patient coming into the emergency department today because she is not feeling well.  Per my external review she was seen here couple of days ago for syncope and had labs EKG and an x-ray done.  She states at that time she was going to the bathroom having a bowel movement felt lightheaded while on the toilet and when she came out had a syncopal episode falling.  She does not believe she injured her head.  She followed up with her primary care seems she has been having an issue with hypertension and they started her on amlodipine in addition to her hydrochlorothiazide which she has not yet started.  It seems she has been having a lot of issues with anxiety and depression and tried an antidepressant but was getting side effects so has not continued taking it and does not see a psychiatrist.  She has a lot of home stressors.  Her pet  a few weeks ago and she is having some issues with her daughter.  She had chest discomfort this morning when she woke up at about 3 or 4 in the morning which she described as a pressure.  There was no radiation.  She felt clammy.  She has been having intermittent crampy epigastric abdominal discomfort as well and decreased appetite with decreased p.o. intake.  She has a few episodes of loose stool per day.  She feels she is urinating more but has no dysuria.  No fevers.  She has pressure in the head but no specific headache.  There is no focal weakness or numbness.  No speech or vision issues.  She was very tearful in the room when discussing the stressors.  She did take her hydrochlorothiazide but did not start the amlodipine that she was prescribed.  Prior to Admission  Medications   Prescriptions Last Dose Informant Patient Reported? Taking?   ALPRAZolam (XANAX) 0.5 mg tablet  Self No Yes   Sig: Take 1 tablet (0.5 mg total) by mouth daily at bedtime as needed for anxiety   amLODIPine (NORVASC) 5 mg tablet   No No   Sig: Take 1 tablet (5 mg total) by mouth daily   dicyclomine (BENTYL) 20 mg tablet  Self No No   Sig: Take 1 tablet (20 mg total) by mouth every 6 (six) hours   escitalopram (LEXAPRO) 5 mg tablet  Self No Yes   Sig: Take 1 tablet (5 mg total) by mouth daily   hydrochlorothiazide (MICROZIDE) 12.5 mg capsule  Self No Yes   Sig: TAKE ONE CAPSULE BY MOUTH ONCE DAILY      Facility-Administered Medications: None       Past Medical History:   Diagnosis Date    Anxiety     Atypical squamous cells cannot exclude high grade squamous intraepithelial lesion on cytologic smear of cervix ([facility]-H)     Last Assessed 5/13/2014    Back pain     Cancer (HCC)     Cervical cancer (HCC)     Depression     Ovarian cyst     Tinnitus of both ears 10/2/2020    Tobacco abuse 11/20/2018       Past Surgical History:   Procedure Laterality Date    ANKLE FRACTURE SURGERY      Last Assessed 07/26/2016    ANKLE SURGERY      CERVICAL BIOPSY  W/ LOOP ELECTRODE EXCISION      COLONOSCOPY  10/2019    DIAGNOSTIC LAPAROSCOPY      DILATION AND CURETTAGE OF UTERUS      ESOPHAGOGASTRODUODENOSCOPY N/A 9/12/2016    Procedure: ESOPHAGOGASTRODUODENOSCOPY (EGD);  Surgeon: Alvin Powell MD;  Location: [facility];  Service:     FRACTURE SURGERY      ORTHOPEDIC SURGERY      WY COLONOSCOPY FLX DX W/COLLJ SPEC WHEN PFRMD N/A 9/12/2016    Procedure: FLEXIBLE COLONOSCOPY;  Surgeon: Alvin Powell MD;  Location: [facility];  Service: Colorectal    SALPINGOOPHORECTOMY Left     TONSILLECTOMY      UPPER GASTROINTESTINAL ENDOSCOPY  2016       Family History   Problem Relation Age of Onset    Coronary artery disease Mother     No Known Problems Family     No Known Problems Father     Colon cancer Neg Hx      I have reviewed  and agree with the history as documented.    E-Cigarette/Vaping    E-Cigarette Use Never User      E-Cigarette/Vaping Substances    Nicotine No     THC No     CBD No     Flavoring No      Social History     Tobacco Use    Smoking status: Every Day     Current packs/day: 0.50     Average packs/day: 0.5 packs/day for 60.0 years (30.0 ttl pk-yrs)     Types: Cigarettes    Smokeless tobacco: Never   Vaping Use    Vaping status: Never Used   Substance Use Topics    Alcohol use: No    Drug use: Never       Review of Systems    Physical Exam  Physical Exam  Vitals and nursing note reviewed.   Constitutional:       General: She is not in acute distress.     Appearance: Normal appearance. She is well-developed. She is not ill-appearing, toxic-appearing or diaphoretic.   HENT:      Head: Normocephalic and atraumatic.      Right Ear: Hearing normal. No drainage or swelling.      Left Ear: Hearing normal. No drainage or swelling.   Eyes:      General: Lids are normal.         Right eye: No discharge.         Left eye: No discharge.      Conjunctiva/sclera: Conjunctivae normal.   Neck:      Vascular: No carotid bruit or JVD.      Trachea: Trachea normal.   Cardiovascular:      Rate and Rhythm: Normal rate and regular rhythm.      Pulses: Normal pulses.      Heart sounds: Normal heart sounds. No murmur heard.     No friction rub. No gallop.   Pulmonary:      Effort: Pulmonary effort is normal. No respiratory distress.      Breath sounds: Normal breath sounds. No stridor. No wheezing or rales.   Chest:      Chest wall: No tenderness.   Abdominal:      Palpations: Abdomen is soft.      Tenderness: There is no abdominal tenderness. There is no guarding or rebound.      Comments: Her abdominal exam seems rather benign I cannot reproduce the pain or tenderness, there is no mass.   Musculoskeletal:         General: Normal range of motion.      Cervical back: Normal range of motion and neck supple. Tenderness present. No rigidity or bony  tenderness.      Thoracic back: Normal.      Lumbar back: Normal.        Back:       Right lower leg: No edema.      Left lower leg: No edema.      Comments: Has some bruising to the arm with no specific point tenderness.  Her midline neck does not seem to be tender and she is pointing more towards the right side of the neck.   Lymphadenopathy:      Cervical: No cervical adenopathy.   Skin:     General: Skin is warm and dry.      Coloration: Skin is not pale.      Findings: No rash.   Neurological:      General: No focal deficit present.      Mental Status: She is alert.      GCS: GCS eye subscore is 4. GCS verbal subscore is 5. GCS motor subscore is 6.      Sensory: No sensory deficit.      Motor: No abnormal muscle tone.   Psychiatric:         Attention and Perception: Attention normal.         Mood and Affect: Mood is anxious and depressed. Affect is labile.         Speech: Speech normal.         Behavior: Behavior normal. Behavior is cooperative.         Thought Content: Thought content normal.     I did bilateral blood pressures on the left she was 155/94 and on the right she was 178/92 but she was quite anxious and tearful at the time I was doing this.  I did personally review the x-ray from the other day and her mediastinum was not narrow with no evidence of any aortic dissection on that study and per the report it was negative.    Vital Signs  ED Triage Vitals   Temperature Pulse Respirations Blood Pressure SpO2   03/06/24 0846 03/06/24 0812 03/06/24 0812 03/06/24 0812 03/06/24 0812   97.8 °F (36.6 °C) 61 17 155/94 98 %      Temp Source Heart Rate Source Patient Position - Orthostatic VS BP Location FiO2 (%)   03/06/24 0846 03/06/24 0812 03/06/24 0812 03/06/24 0812 --   Oral Monitor Sitting Right arm       Pain Score       03/06/24 1522       No Pain           Vitals:    03/06/24 0952 03/06/24 1000 03/06/24 1100 03/06/24 1521   BP: (!) 193/94 (!) 188/95 (!) 187/106 138/81   Pulse:  82 82 65   Patient  Position - Orthostatic VS:  Sitting Sitting          Visual Acuity      ED Medications  Medications   ALPRAZolam (XANAX) tablet 0.5 mg (has no administration in time range)   amLODIPine (NORVASC) tablet 5 mg (has no administration in time range)   dicyclomine (BENTYL) tablet 20 mg (20 mg Oral Not Given 3/6/24 1731)   escitalopram (LEXAPRO) tablet 5 mg (has no administration in time range)   hydroCHLOROthiazide tablet 12.5 mg (has no administration in time range)   aspirin (ECOTRIN LOW STRENGTH) EC tablet 81 mg (has no administration in time range)   atorvastatin (LIPITOR) tablet 40 mg (40 mg Oral Given 3/6/24 1607)   nicotine (NICODERM CQ) 14 mg/24hr TD 24 hr patch 1 patch (1 patch Transdermal Medication Applied 3/6/24 1417)   apixaban (ELIQUIS) tablet 5 mg (has no administration in time range)   LORazepam (ATIVAN) tablet 1 mg (1 mg Oral Given 3/6/24 0856)   lactated ringers bolus 500 mL (0 mL Intravenous Stopped 3/6/24 1022)   amLODIPine (NORVASC) tablet 5 mg (5 mg Oral Given 3/6/24 0952)   iohexol (OMNIPAQUE) 350 MG/ML injection (SINGLE-DOSE) 170 mL (170 mL Intravenous Given 3/6/24 0935)   magnesium sulfate 2 g/50 mL IVPB (premix) 2 g (0 g Intravenous Stopped 3/6/24 1135)   potassium chloride (Klor-Con M20) CR tablet 40 mEq (40 mEq Oral Given 3/6/24 1058)   aspirin tablet 325 mg (325 mg Oral Given 3/6/24 1229)       Diagnostic Studies  Results Reviewed       Procedure Component Value Units Date/Time    Lipid Panel with Direct LDL reflex [[id number]]  (Abnormal) Collected: 03/06/24 0853    Lab Status: Final result Specimen: Blood from Arm, Right Updated: 03/06/24 1352     Cholesterol 229 mg/dL      Triglycerides 107 mg/dL      HDL, Direct 57 mg/dL      LDL Calculated 151 mg/dL     HS Troponin I 4hr [[id number]]  (Normal) Collected: 03/06/24 1246    Lab Status: Final result Specimen: Blood from Arm, Left Updated: 03/06/24 1316     hs TnI 4hr 3 ng/L      Delta 4hr hsTnI -1 ng/L     HS Troponin I 2hr [[id number]]   (Normal) Collected: 03/06/24 1041    Lab Status: Final result Specimen: Blood from Arm, Right Updated: 03/06/24 1123     hs TnI 2hr 4 ng/L      Delta 2hr hsTnI 0 ng/L     Lactic acid, plasma (w/reflex if result > 2.0) [[id number]]  (Normal) Collected: 03/06/24 1101    Lab Status: Final result Specimen: Blood from Arm, Right Updated: 03/06/24 1121     LACTIC ACID 0.8 mmol/L     Narrative:      Result may be elevated if tourniquet was used during collection.    TSH, 3rd generation with Free T4 reflex [[id number]]  (Normal) Collected: 03/06/24 0853    Lab Status: Final result Specimen: Blood from Arm, Right Updated: 03/06/24 0941     TSH 3RD GENERATON 1.039 uIU/mL     HS Troponin 0hr (reflex protocol) [[id number]]  (Normal) Collected: 03/06/24 0853    Lab Status: Final result Specimen: Blood from Arm, Right Updated: 03/06/24 0926     hs TnI 0hr 4 ng/L     B-Type Natriuretic Peptide(BNP) [[id number]]  (Normal) Collected: 03/06/24 0853    Lab Status: Final result Specimen: Blood from Arm, Right Updated: 03/06/24 0924     BNP 26 pg/mL     Comprehensive metabolic panel [[id number]]  (Abnormal) Collected: 03/06/24 0853    Lab Status: Final result Specimen: Blood from Arm, Right Updated: 03/06/24 0920     Sodium 140 mmol/L      Potassium 3.1 mmol/L      Chloride 103 mmol/L      CO2 29 mmol/L      ANION GAP 8 mmol/L      BUN 15 mg/dL      Creatinine 1.03 mg/dL      Glucose 108 mg/dL      Calcium 9.3 mg/dL      AST 14 U/L      ALT 12 U/L      Alkaline Phosphatase 78 U/L      Total Protein 6.6 g/dL      Albumin 4.1 g/dL      Total Bilirubin 0.61 mg/dL      eGFR 52 ml/min/1.73sq m     Narrative:      [employer] guidelines for Chronic Kidney Disease (CKD):     Stage 1 with normal or high GFR (GFR > 90 mL/min/1.73 square meters)    Stage 2 Mild CKD (GFR = 60-89 mL/min/1.73 square meters)    Stage 3A Moderate CKD (GFR = 45-59 mL/min/1.73 square meters)    Stage 3B Moderate CKD (GFR = 30-44 mL/min/1.73 square  meters)    Stage 4 Severe CKD (GFR = 15-29 mL/min/1.73 square meters)    Stage 5 End Stage CKD (GFR <15 mL/min/1.73 square meters)  Note: GFR calculation is accurate only with a steady state creatinine    Lipase [[id number]]  (Normal) Collected: 03/06/24 0853    Lab Status: Final result Specimen: Blood from Arm, Right Updated: 03/06/24 0920     Lipase 16 u/L     Magnesium [[id number]]  (Abnormal) Collected: 03/06/24 0853    Lab Status: Final result Specimen: Blood from Arm, Right Updated: 03/06/24 0920     Magnesium 1.8 mg/dL     Protime-INR [[id number]]  (Normal) Collected: 03/06/24 0853    Lab Status: Final result Specimen: Blood from Arm, Right Updated: 03/06/24 0916     Protime 12.8 seconds      INR 0.94    APTT [[id number]]  (Normal) Collected: 03/06/24 0853    Lab Status: Final result Specimen: Blood from Arm, Right Updated: 03/06/24 0916     PTT 28 seconds     Urine Macroscopic, POC [[id number]] Collected: 03/06/24 0901    Lab Status: Final result Specimen: Urine Updated: 03/06/24 0903     Color, UA Yellow     Clarity, UA Clear     pH, UA 5.5     Leukocytes, UA Negative     Nitrite, UA Negative     Protein, UA Negative mg/dl      Glucose, UA Negative mg/dl      Ketones, UA Negative mg/dl      Urobilinogen, UA 0.2 E.U./dl      Bilirubin, UA Negative     Occult Blood, UA Negative     Specific Gravity, UA 1.010    Narrative:      CLINITEK RESULT    CBC and differential [[id number]]  (Abnormal) Collected: 03/06/24 0853    Lab Status: Final result Specimen: Blood from Arm, Right Updated: 03/06/24 0900     WBC 9.44 Thousand/uL      RBC 5.64 Million/uL      Hemoglobin 17.2 g/dL      Hematocrit 51.1 %      MCV 91 fL      MCH 30.5 pg      MCHC 33.7 g/dL      RDW 13.2 %      MPV 10.0 fL      Platelets 238 Thousands/uL      nRBC 0 /100 WBCs      Neutrophils Relative 84 %      Immat GRANS % 0 %      Lymphocytes Relative 10 %      Monocytes Relative 5 %      Eosinophils Relative 1 %      Basophils Relative 0 %      Neutrophils  Absolute 7.85 Thousands/µL      Immature Grans Absolute 0.04 Thousand/uL      Lymphocytes Absolute 0.96 Thousands/µL      Monocytes Absolute 0.46 Thousand/µL      Eosinophils Absolute 0.09 Thousand/µL      Basophils Absolute 0.04 Thousands/µL                    CTA head and neck w wo contrast   Final Result by Eloise De MD (03/06 1030)      1.  No acute intracranial CT abnormality.   2.  Stable small chronic right MCA territory infarct as described.   3.  No intracranial large vessel occlusion. New focal severe stenosis in the distal left intracranial vertebral artery near the vertebrobasilar junction. New focal severe stenosis in proximal basilar artery. Eccentric filling defect along the wall of the    vessel in these stenotic foci may indicate noncalcified mural plaque or thrombus.   4.  No hemodynamically significant stenosis in the cervical carotid and vertebral arteries.   5.  Pulmonary emphysema                  Workstation performed: [id number]         CTA dissection protocol chest abdomen pelvis w wo contrast   Final Result by Drew Granger MD (03/06 1046)      1.  No acute aortic or vascular abnormalities.      2.  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 Correlate for chronic left lower extremity claudication, rest pain, or nonhealing ulcers. No new/acute    areas of vascular occlusion.      3.  Mild, long segment, reticular fold thickening in the distal small bowel could relate to edema/third spacing or low-level enterocolitis. Long extent, involvement in a nonwatershed area, and lack of significant mesenteric atheromatous disease speak    against ischemic etiology, though drawing of lactate levels could be considered.               Workstation performed: [id number]                    Procedures  ECG 12 Lead Documentation Only    Date/Time: 3/6/2024 9:05 AM    Performed by: Samuel Busch  MD Flavia  Authorized by: Samuel Alejandro MD    Indications / Diagnosis:  Chest pain, htn, lightheaded  ECG reviewed by me, the ED Provider: yes    Patient location:  ED  Interpretation:     Interpretation: non-specific    Rate:     ECG rate:  57    ECG rate assessment: bradycardic    Rhythm:     Rhythm: sinus bradycardia    Ectopy:     Ectopy: none    QRS:     QRS axis:  Normal    QRS intervals:  Normal  Conduction:     Conduction: normal    ST segments:     ST segments:  Normal  T waves:     T waves: non-specific    ECG 12 Lead Documentation Only    Date/Time: 3/6/2024 11:43 AM    Performed by: Samuel Alejandro MD  Authorized by: Samuel Alejandro MD    Indications / Diagnosis:  Htn, cp  ECG reviewed by me, the ED Provider: yes    Patient location:  ED  Previous ECG:     Comparison to cardiac monitor: Yes    Interpretation:     Interpretation: normal    Rate:     ECG rate:  77    ECG rate assessment: normal    Rhythm:     Rhythm: sinus rhythm    Ectopy:     Ectopy: none    QRS:     QRS axis:  Normal  Conduction:     Conduction: normal    ST segments:     ST segments:  Normal  T waves:     T waves: normal             ED Course  ED Course as of 03/06/24 1811   Wed Mar 06, 2024   1039 TSH 3RD GENERATON: 1.039  Normal.   1041 hs TnI 0hr: 4  Negative for ischemia.   1041 BNP: 26  No evidence of volume overload.   1041 MAGNESIUM(!): 1.8  Only mildly low.   1041 Potassium(!): 3.1  Low potassium.  Will replace the magnesium IV and give her oral potassium.  The rest of her electrolytes and renal function are normal.   1041 Hemoglobin(!): 17.2  Suspect this is elevated due to dehydration.  She is getting fluids.   1041 WBC: 9.44  White count normal.  No fever so do not suspect infection.   1053 Reviewed the reports of the radiology studies.  Patient is not having abdominal pain out of proportion but will add lactic.  No severe claudication symptoms.  Calling neurologist on-call discussed the  findings of the CTA of the head and neck.   1127 LACTIC ACID: 0.8  Negative lactic.   1144 Discussed with  On-call for neurology.  Does not need to be urgently transferred to [place] but given the circumstances would likely need admission for further workup evaluation, need echo, antiplatelet and statin therapy.  May need vascular consult as well.   1144 Delta 2hr hsTnI: 0  Delta Trope negative.                               SBIRT 22yo+      Flowsheet Row Most Recent Value   Initial Alcohol Screen: US AUDIT-C     1. How often do you have a drink containing alcohol? 0 Filed at: 03/06/2024 0811   2. How many drinks containing alcohol do you have on a typical day you are drinking?  0 Filed at: 03/06/2024 0811   3b. FEMALE Any Age, or MALE 65+: How often do you have 4 or more drinks on one occassion? 0 Filed at: 03/06/2024 0811   Audit-C Score 0 Filed at: 03/06/2024 0811   COLLIN: How many times in the past year have you...    Used an illegal drug or used a prescription medication for non-medical reasons? Never Filed at: 03/06/2024 0811                      Medical Decision Making  Patient with ongoing problems with her hypertension.  I do believe part of the problem is depression and anxiety which seems to be rather severe and I was able to get her seen by crisis who suggested a partial outpatient program which the patient was agreeable to however she ended up having some findings based on the CT scans which are going to require further workup and evaluation.  She has cerebrovascular disease however she does not describe her symptoms as vertigo and neurologically she appears normal.  I did discuss the case with neurology and felt that she should be admitted for further workup and evaluation but does not require transfer urgently to [place] for evaluation.  She does have peripheral vascular disease and does not appear to have symptoms of claudication.  I calculated her ABIs at 0.6, her feet have normal color and  they are warm.  She has sensation and not complaining of pain at the moment.  She had a low potassium no evidence of arrhythmia so I replace magnesium and potassium.  Troponin was negative.  No evidence of dissection.  I ordered 5 mg of amlodipine.  Discussed with internal medicine they agreed to admit to the hospital.  May need neurology and vascular consult.    Amount and/or Complexity of Data Reviewed  Labs: ordered. Decision-making details documented in ED Course.  Radiology: ordered.    Risk  OTC drugs.  Prescription drug management.  Decision regarding hospitalization.             Disposition  Final diagnoses:   Syncope   Hypokalemia   PVD (peripheral vascular disease) (HCC)   CVD (cardiovascular disease)   Chest pain   Hypertensive urgency     Time reflects when diagnosis was documented in both MDM as applicable and the Disposition within this note       Time User Action Codes Description Comment    3/6/2024 12:30 PM Samuel Alejandro [R55] Syncope     3/6/2024 12:31 PM Samuel Alejandro Add [E87.6] Hypokalemia     3/6/2024 12:31 PM Samuel Alejandro Add [I73.9] PVD (peripheral vascular disease) (HCC)     3/6/2024 12:31 PM Samuel Alejandro Add [I25.10] CVD (cardiovascular disease)     3/6/2024 12:32 PM Samuel Alejandro Add [R07.9] Chest pain     3/6/2024 12:33 PM Samuel Alejandro Add [I16.0] Hypertensive urgency     3/6/2024  1:30 PM Dileep Zelaya Add [I67.9] CVD (cerebrovascular disease)     3/6/2024  1:30 PM Dileep Zelaya Remove [I67.9] CVD (cerebrovascular disease)           ED Disposition       ED Disposition   Admit    Condition   Stable    Date/Time   Wed Mar 6, 2024 1232    Comment   Case was discussed with MARY   and the patient's admission status was agreed to be Admission Status: inpatient status to the service of Dr. Zelaya   .               MD Documentation      Flowsheet Row Most Recent Value   Sending MD Dr Alejandro          Follow-up Information     None         Current Discharge Medication List        CONTINUE these medications which have NOT CHANGED    Details   ALPRAZolam (XANAX) 0.5 mg tablet Take 1 tablet (0.5 mg total) by mouth daily at bedtime as needed for anxiety  Qty: 30 tablet, Refills: 0    Associated Diagnoses: Generalized anxiety disorder      escitalopram (LEXAPRO) 5 mg tablet Take 1 tablet (5 mg total) by mouth daily  Qty: 30 tablet, Refills: 1    Associated Diagnoses: Generalized anxiety disorder; Depression, recurrent (HCC)      hydrochlorothiazide (MICROZIDE) 12.5 mg capsule TAKE ONE CAPSULE BY MOUTH ONCE DAILY  Qty: 90 capsule, Refills: 0    Associated Diagnoses: White coat syndrome with high blood pressure but without hypertension      amLODIPine (NORVASC) 5 mg tablet Take 1 tablet (5 mg total) by mouth daily  Qty: 30 tablet, Refills: 1    Associated Diagnoses: Essential hypertension      dicyclomine (BENTYL) 20 mg tablet Take 1 tablet (20 mg total) by mouth every 6 (six) hours  Qty: 60 tablet, Refills: 1    Associated Diagnoses: Generalized abdominal pain             No discharge procedures on file.    PDMP Review         Value Time User    PDMP Reviewed  Yes 3/2/2024  1:27 PM Lizzy Newman DO            ED Provider  Electronically Signed by             Samuel Alejandro MD  03/06/24 0057

## 2024-03-06 NOTE — ED NOTES
Patient presents to the emergency department with increased anxiety and depression. She says she is up during the night with racing thoughts and says she doesnt even know where to begin with all her problems. She says she gave her daughter [phone] as an investment 2 years ago and the daughter said she spent it all and its gone. Since then she stopped talking to patient which has been very difficult on her. 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 Patient does not want to give up her home and all the stability she has here in [place]. She says it has been causing a hardship and now the grandaughter hasnt spoken to her in over a month. She is very tearful and says she wants to get help for herself and needs to focus on herself as well. She says she has worked too hard to give it all up and the granddaughter hasnt been paying her bills and expecting help from patient. Patient denies suicidal and homicidal ideations but wants someone to talk to. She was offered a partial hospitalization and is very interested in attending.

## 2024-03-06 NOTE — CONSULTS
Critical access hospital  Consult  Name: Ángela Camara 78 y.o. female I MRN: [id number]  Unit/Bed#: [place] [address] -02 I Date of Admission: 3/6/2024   Date of Service: 3/6/2024 I Hospital Day: 0    Inpatient consult to Vascular Surgery  Consult performed by: SHEA Lopez  Consult ordered by: Dileep Zelaya DO          Assessment/Plan   PAD (peripheral artery disease) ([facility])  Assessment & Plan  77 yo female smoker w/ a hx of SBO, CVA/TIA, HTN, HLD, CKD and anxiety presented to [facility] on 3/6/24 w/ HTN. Pt was recently seen in the ER for weakness and syncope after having a bowel movement on 3/2. Pt was started on norvasc at that time by her PCP, but has not had a chance to fill the prescription. CTA head/neck showed new focal severe stenosis in the distal left intracranial vertebral artery near the vertebrobasilar junction and new focal severe stenosis in proximal basilar artery for which a consult was placed to neurology. CTA chest/abd/pelvis showed a chronic occlusion of the L EIA w/ distal reconstitution at the CFA by internal mammary and body wall collaterals.    Vascular surgery consulted for abdominal CTA finding of chronic L EIA occlusion. Per surgeon's review of imaging, L EIA occlusion has been present since at least 2021. Pt denies any history of vascular surgeries or blood clots.      Diagnostics:  -3/6/24 CTA chest/abd/pelvis: No acute aortic or vascular abnormalities. 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 Correlate for chronic left lower extremity claudication, rest pain, or nonhealing ulcers. No new/acute areas of vascular occlusion. Mild, long segment, reticular fold thickening in the distal small bowel could relate to edema/third spacing or low-level enterocolitis.  -3/6/24 CTA head/neck: No acute intracranial abnormality. Stable small chronic right MCA territory  infarct as described. No intracranial large vessel occlusion. New focal severe stenosis in the distal left intracranial vertebral artery near the vertebrobasilar junction. New focal severe stenosis in proximal basilar artery. Eccentric filling defect along the wall of the vessel in these stenotic foci may indicate noncalcified mural plaque or thrombus. No hemodynamically significant stenosis in the cervical carotid and vertebral arteries. Pulmonary emphysema.    Plan:  -Chronic L EIA occlusion noted on imaging  -Pt is asymptomatic, LLE warm, no claudication, no tissue loss, (+) motor/sensation, DP/PT signals  -No need for any vascular surgical intervention at this time  -Continue ASA and lipitor for medical management of PAD  -Will follow up w/ pt in the vascular office for monitoring of PAD  -Will discuss w/ Dr. Ariza Doctor      Consulting Service: SLIM    Chief Complaint: chronic L EIA occlusion    HPI: Ángela Camara is a 78 y.o. female smoker w/ a hx of SBO, CVA/TIA, HTN, HLD, CKD and anxiety who presented to [facility] on 3/6/24 w/ HTN. Pt was recently seen in the ER for weakness and syncope after having a bowel movement on 3/2. Pt was started on norvasc at that time by her PCP, but has not had a chance to fill the prescription. CTA head/neck showed new focal severe stenosis in the distal left intracranial vertebral artery near the vertebrobasilar junction and new focal severe stenosis in proximal basilar artery for which a consult was placed to neurology. CTA chest/abd/pelvis showed a chronic occlusion of the L EIA w/ distal reconstitution at the CFA by internal mammary and body wall collaterals.     Vascular surgery consulted for abdominal CTA finding of chronic L EIA occlusion. Per surgeon's review of imaging, L EIA occlusion has been present since at least 2021. Pt denies any history of vascular surgeries or blood clots. She reports hx of a L ankle fx w/ hardware and subsequent decreased sensation in L  foot in 2016. Pt denies any claudication symptoms and reports she walks at least 5000 steps per day without difficulty. Feet are warm B/L w/ immediate capillary refill, (+) motor/sensation. Pt denies any further complaints at this time.    Review of Systems:  General: negative  Cardiovascular: no chest pain or dyspnea on exertion  Respiratory: no cough, shortness of breath, or wheezing  Gastrointestinal: no abdominal pain, change in bowel habits, or black or bloody stools  Genitourinary ROS: no dysuria, trouble voiding, or hematuria  Musculoskeletal ROS: negative  Neurological ROS: recent syncope    Past Medical History:  Past Medical History:   Diagnosis Date    Anxiety     Atypical squamous cells cannot exclude high grade squamous intraepithelial lesion on cytologic smear of cervix (ASC-H)     Last Assessed 5/13/2014    Back pain     Cancer (HCC)     Cervical cancer (HCC)     Depression     Ovarian cyst     Tinnitus of both ears 10/2/2020    Tobacco abuse 11/20/2018       Past Surgical History:  Past Surgical History:   Procedure Laterality Date    ANKLE FRACTURE SURGERY      Last Assessed 07/26/2016    ANKLE SURGERY      CERVICAL BIOPSY  W/ LOOP ELECTRODE EXCISION      COLONOSCOPY  10/2019    DIAGNOSTIC LAPAROSCOPY      DILATION AND CURETTAGE OF UTERUS      ESOPHAGOGASTRODUODENOSCOPY N/A 9/12/2016    Procedure: ESOPHAGOGASTRODUODENOSCOPY (EGD);  Surgeon: Alivn Powell MD;  Location: [facility];  Service:     FRACTURE SURGERY      ORTHOPEDIC SURGERY      MA COLONOSCOPY FLX DX W/COLLJ SPEC WHEN PFRMD N/A 9/12/2016    Procedure: FLEXIBLE COLONOSCOPY;  Surgeon: Alvin Powell MD;  Location: [facility];  Service: Colorectal    SALPINGOOPHORECTOMY Left     TONSILLECTOMY      UPPER GASTROINTESTINAL ENDOSCOPY  2016       Social History:  Social History     Substance and Sexual Activity   Alcohol Use No     Social History     Substance and Sexual Activity   Drug Use Never     Social History     Tobacco Use    Smoking Status Every Day    Current packs/day: 0.50    Average packs/day: 0.5 packs/day for 60.0 years (30.0 ttl pk-yrs)    Types: Cigarettes   Smokeless Tobacco Never       Family History:  Family History   Problem Relation Age of Onset    Coronary artery disease Mother     No Known Problems Family     No Known Problems Father     Colon cancer Neg Hx        Allergies:  Allergies   Allergen Reactions    Lisinopril Syncope     Other reaction(s): Other (See Comments)  Nose bleeds, passing out       Medications:  Current Facility-Administered Medications   Medication Dose Route Frequency    ALPRAZolam (XANAX) tablet 0.5 mg  0.5 mg Oral HS PRN    [START ON 3/7/2024] amLODIPine (NORVASC) tablet 5 mg  5 mg Oral Daily    apixaban (ELIQUIS) tablet 5 mg  5 mg Oral BID    [START ON 3/7/2024] aspirin (ECOTRIN LOW STRENGTH) EC tablet 81 mg  81 mg Oral Daily    atorvastatin (LIPITOR) tablet 40 mg  40 mg Oral Daily With Dinner    dicyclomine (BENTYL) tablet 20 mg  20 mg Oral Q6H    [START ON 3/7/2024] escitalopram (LEXAPRO) tablet 5 mg  5 mg Oral Daily    [START ON 3/7/2024] hydroCHLOROthiazide tablet 12.5 mg  12.5 mg Oral Daily    nicotine (NICODERM CQ) 14 mg/24hr TD 24 hr patch 1 patch  1 patch Transdermal Daily       Vitals:  BP      Temp      Pulse     Resp      SpO2        I/Os:  No intake/output data recorded.  I/O this shift:  In: 550 [IV Piggyback:550]  Out: -     Lab Results and Cultures:   CBC with diff:   Lab Results   Component Value Date    WBC 9.44 03/06/2024    HGB 17.2 (H) 03/06/2024    HCT 51.1 (H) 03/06/2024    MCV 91 03/06/2024     03/06/2024    RBC 5.64 (H) 03/06/2024    MCH 30.5 03/06/2024    MCHC 33.7 03/06/2024    RDW 13.2 03/06/2024    MPV 10.0 03/06/2024    NRBC 0 03/06/2024     BMP/CMP:  Lab Results   Component Value Date     12/10/2015    K 3.1 (L) 03/06/2024    K 3.8 12/27/2022     03/06/2024     12/27/2022    CO2 29 03/06/2024    CO2 25 12/27/2022    ANIONGAP 18 12/10/2015     BUN 15 03/06/2024    BUN 13 12/27/2022    CREATININE 1.03 03/06/2024    CREATININE 0.88 12/27/2022    GLUCOSE 91 12/10/2015    CALCIUM 9.3 03/06/2024    CALCIUM 8.2 (L) 12/27/2022    AST 14 03/06/2024    AST 15 12/22/2022    ALT 12 03/06/2024    ALT 16 12/22/2022    ALKPHOS 78 03/06/2024    ALKPHOS 76 12/22/2022    PROT 8.0 12/10/2015    BILITOT 0.65 12/10/2015    EGFR 52 03/06/2024    EGFR 68 12/27/2022    EGFR 50 (L) 05/18/2019     Coags:   Lab Results   Component Value Date    PT 13.3 09/08/2018    PTT 28 03/06/2024    PTT 33 04/25/2014    INR 0.94 03/06/2024    INR 1.1 09/08/2018     Urinalysis:   Lab Results   Component Value Date    COLORU Yellow 03/06/2024    COLORU Yellow 07/09/2014    CLARITYU Clear 03/06/2024    CLARITYU Clear 07/09/2014    SPECGRAV 1.010 03/06/2024    SPECGRAV 1.025 07/09/2014    PHUR 5.5 03/06/2024    PHUR 5.5 07/09/2014    LEUKOCYTESUR Negative 03/06/2024    LEUKOCYTESUR Negative 07/09/2014    NITRITE Negative 03/06/2024    NITRITE Negative 07/09/2014    PROTEINUA Negative 07/09/2014    GLUCOSEU Negative 03/06/2024    GLUCOSEU Negative 07/09/2014    KETONESU Negative 03/06/2024    KETONESU Negative 07/09/2014    BILIRUBINUR Negative 03/06/2024    BILIRUBINUR Negative 07/09/2014    BLOODU Negative 03/06/2024    BLOODU Negative 07/09/2014     Imaging:  See above    Physical Exam:    General appearance: alert and oriented, in no acute distress  Head: Normocephalic, without obvious abnormality, atraumatic  Lungs: clear to auscultation bilaterally  Heart: regular rate and rhythm, S1, S2 normal, no murmur, click, rub or gallop  Abdomen:  soft, non-tender  Extremities: extremities normal, warm and well-perfused; no cyanosis, clubbing, or edema  Skin: Skin color, texture, turgor normal. No rashes or lesions  Neurologic: Grossly normal      Pulse exam:  DP: Right: 1+ Left: doppler signal  PT: Right: 2+ Left: doppler signal    SHEA Gonsales  3/6/2024

## 2024-03-06 NOTE — ASSESSMENT & PLAN NOTE
Overall doing ok. No painful ctx/cramping, no vaginal bleeding, no leaking fluid. +FM.   Missed tdap    /70   Wt 71 kg (156 lb 8.4 oz)   LMP 10/21/2021   BMI 31.61 kg/m²     29 y.o., at 30w2d by Estimated Date of Delivery: 22  Patient Active Problem List   Diagnosis    Sickle cell trait    Chronic vaginitis    Chronic bilateral low back pain without sciatica    HSV (herpes simplex virus) anogenital infection    Gastroesophageal reflux disease without esophagitis    Seasonal allergies    Non-intractable vomiting with nausea     OB History    Para Term  AB Living   3 1 1   1 1   SAB IAB Ectopic Multiple Live Births   1 0   0 1      # Outcome Date GA Lbr Sudhir/2nd Weight Sex Delivery Anes PTL Lv   3 Current            2 SAB            1 Term 16 37w0d  2.466 kg (5 lb 7 oz) M Vag-Vacuum EPI N GUIDO      Obstetric Comments   Gynhx:  reg   S/p nexplanon in 3/2019   H/o chlamydia in    Pap 2017 neg       Dating reviewed    Allergies and problem list reviewed and updated    Medical and surgical history reviewed    Prenatal labs reviewed and updated    Physical Exam:  ABD: soft, gravid SIZE > DATE, nontender, +FHT    Assessment:  OB 30w2d     Plan:   - will check US growth for size slightly > dates in setting of abnormal gtt  - resched tdap    OB ED precautions. F/u 2 weeks.     Dilcia Pacheco MD   Obstetrics & Gynecology   [facility]     Will check FLP given PVD  Start atorvastatin

## 2024-03-06 NOTE — DISCHARGE INSTRUCTIONS
This writer discussed the patients current presentation and recommended discharge plan with Dr. Alejandro    They agree with the patient being discharged at this time with referrals and/or information about: [phone] & [facility] and outpatient providers for therapy and psychiatry.      The patient was Instructed to follow up with: [phone] & [facility] [phone]       This writer and the patient completed a safety plan.  The patient was provided with a copy of their safety plan with encouragement to utilize the plan following discharge.               SAFETY PLAN  Warning Signs (thoughts, images, mood, behavior, situations) increased anxiety and depression        Coping Skills (what can I do to take my mind off the problem, or to keep myself safe): Reach out to a therapist at [facility] or call a supportive friend                   National Suicide Prevention Hotline:  1-946.791.4597     [facility] [phone] - Crisis   [facility] [phone] - LVF Crisis/Mobile Crisis   [phone] - SLPF Crisis   [facility]: [phone]  [facility]: [phone]   [facility] [phone] - Crisis   [facility] [phone] - Crisis      [facility] [phone] - Crisis   [facility] [phone] - Crisis   [phone] - Peer Support Talk Line (1-9pm daily)  [phone] - Teen Support Talk Line (1-9pm daily)  [phone] - [facility]   [facility] [phone]- Crisis    [facility] [phone] - Crisis   [facility] [phone] - Crisis    [facility]) [phone] - [facility] Crisis

## 2024-03-06 NOTE — ASSESSMENT & PLAN NOTE
Blood pressure elevated on HCTZ 12.5 mg.  As an outpatient was started on amlodipine but has not picked up yet.  Will start during this hospitalization.

## 2024-03-06 NOTE — ASSESSMENT & PLAN NOTE
Lab Results   Component Value Date    EGFR 52 03/06/2024    EGFR 66 03/02/2024    EGFR 59 11/21/2023    CREATININE 1.03 03/06/2024    CREATININE 0.85 03/02/2024    CREATININE 0.93 11/21/2023     Renal function at baseline/stable.

## 2024-03-06 NOTE — PLAN OF CARE
Problem: PAIN - ADULT  Goal: Verbalizes/displays adequate comfort level or baseline comfort level  Description: Interventions:  - Encourage patient to monitor pain and request assistance  - Assess pain using appropriate pain scale  - Administer analgesics based on type and severity of pain and evaluate response  - Implement non-pharmacological measures as appropriate and evaluate response  - Consider cultural and social influences on pain and pain management  - Notify physician/advanced practitioner if interventions unsuccessful or patient reports new pain  Outcome: Progressing     Problem: INFECTION - ADULT  Goal: Absence or prevention of progression during hospitalization  Description: INTERVENTIONS:  - Assess and monitor for signs and symptoms of infection  - Monitor lab/diagnostic results  - Monitor all insertion sites, i.e. indwelling lines, tubes, and drains  - Monitor endotracheal if appropriate and nasal secretions for changes in amount and color  - Hodges appropriate cooling/warming therapies per order  - Administer medications as ordered  - Instruct and encourage patient and family to use good hand hygiene technique  - Identify and instruct in appropriate isolation precautions for identified infection/condition  Outcome: Progressing  Goal: Absence of fever/infection during neutropenic period  Description: INTERVENTIONS:  - Monitor WBC    Outcome: Progressing     Problem: SAFETY ADULT  Goal: Patient will remain free of falls  Description: INTERVENTIONS:  - Educate patient/family on patient safety including physical limitations  - Instruct patient to call for assistance with activity   - Consult OT/PT to assist with strengthening/mobility   - Keep Call bell within reach  - Keep bed low and locked with side rails adjusted as appropriate  - Keep care items and personal belongings within reach  - Initiate and maintain comfort rounds  - Make Fall Risk Sign visible to staff  - Offer Toileting every 2 Hours,  in advance of need  - Initiate/Maintain bed alarm  - Obtain necessary fall risk management equipment: N/A  - Apply yellow socks and bracelet for high fall risk patients  - Consider moving patient to room near nurses station  Outcome: Progressing  Goal: Maintain or return to baseline ADL function  Description: INTERVENTIONS:  -  Assess patient's ability to carry out ADLs; assess patient's baseline for ADL function and identify physical deficits which impact ability to perform ADLs (bathing, care of mouth/teeth, toileting, grooming, dressing, etc.)  - Assess/evaluate cause of self-care deficits   - Assess range of motion  - Assess patient's mobility; develop plan if impaired  - Assess patient's need for assistive devices and provide as appropriate  - Encourage maximum independence but intervene and supervise when necessary  - Involve family in performance of ADLs  - Assess for home care needs following discharge   - Consider OT consult to assist with ADL evaluation and planning for discharge  - Provide patient education as appropriate  Outcome: Progressing  Goal: Maintains/Returns to pre admission functional level  Description: INTERVENTIONS:  - Perform AM-PAC 6 Click Basic Mobility/ Daily Activity assessment daily.  - Set and communicate daily mobility goal to care team and patient/family/caregiver.   - Collaborate with rehabilitation services on mobility goals if consulted  - Perform Range of Motion 3 times a day.  - Reposition patient every 2 hours.  - Dangle patient 3 times a day  - Stand patient 3 times a day  - Ambulate patient 3 times a day  - Out of bed to chair 3 times a day   - Out of bed for meals 3 times a day  - Out of bed for toileting  - Record patient progress and toleration of activity level   Outcome: Progressing     Problem: DISCHARGE PLANNING  Goal: Discharge to home or other facility with appropriate resources  Description: INTERVENTIONS:  - Identify barriers to discharge w/patient and  caregiver  - Arrange for needed discharge resources and transportation as appropriate  - Identify discharge learning needs (meds, wound care, etc.)  - Arrange for interpretive services to assist at discharge as needed  - Refer to Case Management Department for coordinating discharge planning if the patient needs post-hospital services based on physician/advanced practitioner order or complex needs related to functional status, cognitive ability, or social support system  Outcome: Progressing     Problem: Knowledge Deficit  Goal: Patient/family/caregiver demonstrates understanding of disease process, treatment plan, medications, and discharge instructions  Description: Complete learning assessment and assess knowledge base.  Interventions:  - Provide teaching at level of understanding  - Provide teaching via preferred learning methods  Outcome: Progressing

## 2024-03-06 NOTE — ASSESSMENT & PLAN NOTE
History of bilateral claudication which patient attributed to plates in left lower extremity/ankle.  CTA now with: Chronic occlusion of the left external iliac artery with distal reconstitution at the common femoral by internal mammary and body wall collaterals. Correlate for chronic left lower extremity claudication  Consult vascular for evaluation.  Smoking cessation, starting aspirin and atorvastatin

## 2024-03-06 NOTE — ASSESSMENT & PLAN NOTE
History of hypertension SBO hyperlipidemia and anxiety who presents with unsteadiness, recent syncope, and elevated blood pressure.  Was seen in ED 3/2 for syncopal episode.  Was noted to have elevated blood pressures.  Was started on amlodipine by PCP but has not been able to  prescription yet.  Blood pressure was high this morning.  Incidentally found to have cerebrovascular disease.  Case discussed by ED with neurology recommending admission echocardiogram and further workup.  Start aspirin and atorvastatin.

## 2024-03-06 NOTE — CASE MANAGEMENT
Case Management Assessment & Discharge Planning Note    Patient name Ángela Camara  Location ED-25/ED-25 MRN [id number]  : 1945 Date 3/6/2024       Current Admission Date: 3/6/2024  Current Admission Diagnosis:Syncope and collapse   Patient Active Problem List    Diagnosis Date Noted    CVD (cerebrovascular disease) 2024    PVD (peripheral vascular disease) ([facility]) 2024    Syncope and collapse 2024    SBO (small bowel obstruction) ([facility]) 2022    Postmenopausal state 2021    Increased frequency of urination 2021    Meralgia paresthetica of left side 2021    Chronic pain syndrome 2021    SI joint arthritis     Claudication of both lower extremities ([facility]) 2021    Lumbar spondylosis 10/02/2020    Tinnitus of both ears 10/02/2020    Anxiety and depression     Chronic obstructive pulmonary disease ([facility]) 2020    Irritable bowel syndrome with diarrhea 2020    Chronic abdominal pain 10/29/2019    Generalized anxiety disorder 10/29/2019    Diarrhea 2019    Pain in pelvis 2018    Tobacco abuse 2018    White coat syndrome with high blood pressure but without hypertension 2018    Cervical stenosis of spine 2018    TIA (transient ischemic attack) 2018    Aortic sclerosis 2017    CKD (chronic kidney disease) stage 3, GFR 30-59 ml/min ([facility]) 2017    Anxiety 05/10/2017    Mixed hyperlipidemia 2017    Colon polyps 2016    History of malignant neoplasm of cervix 2014    Essential hypertension 2014      LOS (days): 0  Geometric Mean LOS (GMLOS) (days): 2.1  Days to GMLOS:2     OBJECTIVE:              Current admission status: Inpatient  Referral Reason: Other    Preferred Pharmacy:   [id number] - [address]  [address] [address]  Phone: [phone] Fax: [phone]    Primary Care Provider: Enid Jernigan  MD    Primary Insurance: MEDICARE  Secondary Insurance: AARP    ASSESSMENT:  Active Health Care Proxies    There are no active Health Care Proxies on file.       Advance Directives  Does patient have a Health Care POA?: Yes  Does patient have Advance Directives?: Yes  Advance Directives: Living will, POLST, Power of  for health care         Readmission Root Cause  30 Day Readmission: No    Patient Information  Admitted from:: Home  Mental Status: Alert  During Assessment patient was accompanied by: Not accompanied during assessment  Assessment information provided by:: Patient  Primary Caregiver: Self  Support Systems: Self, Friends/neighbors  County of Residence: [place] [facility]  What city do you live in?: [place]     DISCHARGE DETAILS:    Discharge planning discussed with:: Patient      Additional Comments: Assessment interupted by specialty provider, unable to complete. CM dept to re-attempt.

## 2024-03-06 NOTE — H&P
[facility]  H&P  Name: Ángela Camara 78 y.o. female I MRN: [id number]  Unit/Bed#: ED-25 I Date of Admission: 3/6/2024   Date of Service: 3/6/2024 I Hospital Day: 0      Assessment/Plan   * Syncope and collapse  Assessment & Plan  History of hypertension SBO hyperlipidemia and anxiety who presents with unsteadiness, recent syncope, and elevated blood pressure.  Was seen in ED 3/2 for syncopal episode.  Was noted to have elevated blood pressures.  Was started on amlodipine by PCP but has not been able to  prescription yet.  Blood pressure was high this morning.  Incidentally found to have cerebrovascular disease.  Case discussed by ED with neurology recommending admission echocardiogram and further workup.  Start aspirin and atorvastatin.    CVD (cerebrovascular disease)  Assessment & Plan  CTA head and neck: New focal severe stenosis in the distal left intracranial vertebral artery near the vertebrobasilar junction. New focal severe stenosis in proximal basilar artery. Eccentric filling defect along the wall of the  vessel in these stenotic foci may indicate noncalcified mural plaque or thrombus.  Care was discussed by ED with neurology recommending admission and further workup.    Starting aspirin and atorvastatin.    PVD (peripheral vascular disease) ([facility]ly [facility])  Assessment & Plan  History of bilateral claudication which patient attributed to plates in left lower extremity/ankle.  CTA now with: Chronic occlusion of the left external iliac artery with distal reconstitution at the common femoral by internal mammary and body wall collaterals. Correlate for chronic left lower extremity claudication  Consult vascular for evaluation.  Smoking cessation, starting aspirin and atorvastatin    Anxiety and depression  Assessment & Plan  Anxiety and depression continue escitalopram and alprazolam as needed  Multiple recent stressors    Tobacco abuse  Assessment & Plan  Nicotine patch  ordered    CKD (chronic kidney disease) stage 3, GFR 30-59 ml/min (Formerly [facility])  Assessment & Plan  Lab Results   Component Value Date    EGFR 52 03/06/2024    EGFR 66 03/02/2024    EGFR 59 11/21/2023    CREATININE 1.03 03/06/2024    CREATININE 0.85 03/02/2024    CREATININE 0.93 11/21/2023     Renal function at baseline/stable.    Mixed hyperlipidemia  Assessment & Plan  Will check FLP given PVD  Start atorvastatin    Essential hypertension  Assessment & Plan  Blood pressure elevated on HCTZ 12.5 mg.  As an outpatient was started on amlodipine but has not picked up yet.  Will start during this hospitalization.    VTE Pharmacologic Prophylaxis: VTE Score: 3 Moderate Risk (Score 3-4) - Pharmacological DVT Prophylaxis Ordered: heparin.  Code Status: Level 1 - Full Code  Discussion with family:     Anticipated Length of Stay: Patient will be admitted on an inpatient basis with an anticipated length of stay of greater than 2 midnights secondary to multiple abnormal findings.    Total Time Spent on Date of Encounter in care of patient: This time was spent on one or more of the following: performing physical exam; counseling and coordination of care; obtaining or reviewing history; documenting in the medical record; reviewing/ordering tests, medications or procedures; communicating with other healthcare professionals and discussing with patient's family/caregivers.    Chief Complaint:     Weakness - Generalized (Pt seen and d/c yesterday for weakness, dizziness, diarrhea, generalized abdominal pain, & near syncopal episodes for approx 3-4 days. States since being d/c symptoms have gotten worse & reporting HTN states systolic 150's )    History of Present Illness:    Ángela Camara is a 78 y.o. female with a past medical history of SBO CVA/TIA hypertension and anxiety who presents with weakness.  Patient was seen in the ED 3/2 for syncope.  She was having a bowel movement and while coming out of the bathroom had a  syncopal episode.  She did see her PCP and was started on amlodipine for elevated blood pressures.  She has had increased stressors at home with heightened anxiety.  She felt unwell this morning and checked her blood pressure which was high so she came back to the ED.  She complains of lightheadedness intermittently, abdominal discomfort, and superior headache.  She denies any chest pain or shortness of breath.    Review of Systems:  Review of Systems   Constitutional:  Positive for chills and fatigue. Negative for fever.   HENT:  Negative for facial swelling and trouble swallowing.    Eyes:  Negative for visual disturbance.   Respiratory:  Negative for shortness of breath.    Cardiovascular:  Negative for chest pain and palpitations.   Gastrointestinal:  Positive for abdominal pain and diarrhea. Negative for abdominal distention, nausea and vomiting.   Genitourinary:  Negative for dysuria and hematuria.   Musculoskeletal:  Negative for back pain and myalgias.   Skin:  Negative for rash.   Neurological:  Positive for light-headedness and headaches. Negative for seizures and speech difficulty.   Psychiatric/Behavioral:  The patient is nervous/anxious.    All other systems reviewed and are negative.        Past Medical and Surgical History:   Past Medical History:   Diagnosis Date    Anxiety     Atypical squamous cells cannot exclude high grade squamous intraepithelial lesion on cytologic smear of cervix (ASC-H)     Last Assessed 5/13/2014    Back pain     Cancer (HCC)     Cervical cancer (HCC)     Depression     Ovarian cyst     Tinnitus of both ears 10/2/2020    Tobacco abuse 11/20/2018     Past Surgical History:   Procedure Laterality Date    ANKLE FRACTURE SURGERY      Last Assessed 07/26/2016    ANKLE SURGERY      CERVICAL BIOPSY  W/ LOOP ELECTRODE EXCISION      COLONOSCOPY  10/2019    DIAGNOSTIC LAPAROSCOPY      DILATION AND CURETTAGE OF UTERUS      ESOPHAGOGASTRODUODENOSCOPY N/A 9/12/2016    Procedure:  ESOPHAGOGASTRODUODENOSCOPY (EGD);  Surgeon: Alvin Powell MD;  Location: [facility];  Service:     FRACTURE SURGERY      ORTHOPEDIC SURGERY      OR COLONOSCOPY FLX DX W/COLLJ SPEC WHEN PFRMD N/A 9/12/2016    Procedure: FLEXIBLE COLONOSCOPY;  Surgeon: Alvin Powell MD;  Location: [facility];  Service: Colorectal    SALPINGOOPHORECTOMY Left     TONSILLECTOMY      UPPER GASTROINTESTINAL ENDOSCOPY  2016     Meds/Allergies:  Allergies:   Allergies   Allergen Reactions    Lisinopril Syncope     Other reaction(s): Other (See Comments)  Nose bleeds, passing out     Prior to Admission Medications   Prescriptions Last Dose Informant Patient Reported? Taking?   ALPRAZolam (XANAX) 0.5 mg tablet  Self No Yes   Sig: Take 1 tablet (0.5 mg total) by mouth daily at bedtime as needed for anxiety   amLODIPine (NORVASC) 5 mg tablet   No No   Sig: Take 1 tablet (5 mg total) by mouth daily   dicyclomine (BENTYL) 20 mg tablet  Self No No   Sig: Take 1 tablet (20 mg total) by mouth every 6 (six) hours   escitalopram (LEXAPRO) 5 mg tablet  Self No Yes   Sig: Take 1 tablet (5 mg total) by mouth daily   hydrochlorothiazide (MICROZIDE) 12.5 mg capsule  Self No Yes   Sig: TAKE ONE CAPSULE BY MOUTH ONCE DAILY      Facility-Administered Medications: None     Social History:     Social History     Socioeconomic History    Marital status:      Spouse name: Not on file    Number of children: Not on file    Years of education: 15    Highest education level: Not on file   Occupational History    Not on file   Tobacco Use    Smoking status: Every Day     Current packs/day: 0.50     Average packs/day: 0.5 packs/day for 60.0 years (30.0 ttl pk-yrs)     Types: Cigarettes    Smokeless tobacco: Never   Vaping Use    Vaping status: Never Used   Substance and Sexual Activity    Alcohol use: No    Drug use: Never    Sexual activity: Not Currently   Other Topics Concern    Not on file   Social History Narrative    ** Merged History Encounter **            since 2015 after 13 year marriage.  1 daughter from 1st marriage.  1 granddaughter, Natasha Crane, whom she raised.  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     Social Determinants of Health     Financial Resource Strain: Low Risk  (12/14/2023)    Overall Financial Resource Strain (CARDIA)     Difficulty of Paying Living Expenses: Not hard at all   Food Insecurity: Unknown (4/19/2021)    Hunger Vital Sign     Worried About Running Out of Food in the Last Year: Never true     Ran Out of Food in the Last Year: Not on file   Transportation Needs: No Transportation Needs (12/14/2023)    PRAPARE - Transportation     Lack of Transportation (Medical): No     Lack of Transportation (Non-Medical): No   Physical Activity: Not on file   Stress: Not on file   Social Connections: Not on file   Intimate Partner Violence: Not on file   Housing Stability: Not on file     Patient Pre-hospital Living Situation: Lives alone  Patient Pre-hospital Level of Mobility:   Patient Pre-hospital Diet Restrictions:     Family History:  Family History   Problem Relation Age of Onset    Coronary artery disease Mother     No Known Problems Family     No Known Problems Father     Colon cancer Neg Hx      Physical Exam:   Vitals:   Blood Pressure: (!) 187/106 (03/06/24 1100)  Pulse: 82 (03/06/24 1100)  Temperature: 97.8 °F (36.6 °C) (03/06/24 0846)  Temp Source: Oral (03/06/24 0846)  Respirations: 17 (03/06/24 1100)  Weight - Scale: 64.7 kg (142 lb 10.2 oz) (03/06/24 0812)  SpO2: 96 % (03/06/24 1100)    Physical Exam  Vitals reviewed.   Constitutional:       General: She is not in acute distress.     Appearance: Normal appearance.   HENT:      Head: Atraumatic.   Eyes:      General: No scleral icterus.     Extraocular Movements: Extraocular movements intact.      Conjunctiva/sclera: Conjunctivae normal.      Pupils: Pupils are equal, round, and reactive to light.   Cardiovascular:      Rate and Rhythm: Normal rate  and regular rhythm.      Heart sounds: Normal heart sounds.   Pulmonary:      Breath sounds: Normal breath sounds. No wheezing.   Abdominal:      General: Bowel sounds are normal. There is no distension.      Palpations: Abdomen is soft.      Tenderness: There is no abdominal tenderness. There is no guarding.   Musculoskeletal:         General: No swelling.      Cervical back: Neck supple.   Skin:     General: Skin is warm.   Neurological:      General: No focal deficit present.      Mental Status: She is alert.      Cranial Nerves: No cranial nerve deficit.      Motor: No weakness.   Psychiatric:         Mood and Affect: Mood normal.       Lab Results: I have personally reviewed pertinent reports.    Results from last 7 days   Lab Units 03/06/24  0853   WBC Thousand/uL 9.44   HEMOGLOBIN g/dL 17.2*   HEMATOCRIT % 51.1*   PLATELETS Thousands/uL 238   NEUTROS PCT % 84*   LYMPHS PCT % 10*   MONOS PCT % 5   EOS PCT % 1     Results from last 7 days   Lab Units 03/06/24  0853 03/02/24  1329   SODIUM mmol/L 140 138   POTASSIUM mmol/L 3.1* 3.5   CHLORIDE mmol/L 103 107   CO2 mmol/L 29 23   ANION GAP mmol/L 8 8   BUN mg/dL 15 16   CREATININE mg/dL 1.03 0.85   CALCIUM mg/dL 9.3 8.8   ALBUMIN g/dL 4.1  --    TOTAL BILIRUBIN mg/dL 0.61  --    ALK PHOS U/L 78  --    ALT U/L 12  --    AST U/L 14  --    EGFR ml/min/1.73sq m 52 66   GLUCOSE RANDOM mg/dL 108 96     Results from last 7 days   Lab Units 03/06/24  0853   INR  0.94         Results from last 7 days   Lab Units 03/06/24  1041 03/06/24  0853   HS TNI 0HR ng/L  --  4   HS TNI 2HR ng/L 4  --      Results from last 7 days   Lab Units 03/06/24  1101   LACTIC ACID mmol/L 0.8              Results from last 7 days   Lab Units 03/06/24  0901   COLOR UA  Yellow   CLARITY UA  Clear   SPEC GRAV UA  1.010   PH UA  5.5   LEUKOCYTES UA  Negative   NITRITE UA  Negative   GLUCOSE UA mg/dl Negative   KETONES UA mg/dl Negative   BILIRUBIN UA  Negative   BLOOD UA  Negative      Results from  last 7 days   Lab Units 03/02/24  1443   RBC UA /hpf 1-2   WBC UA /hpf 2-4*   EPITHELIAL CELLS WET PREP /hpf Occasional   BACTERIA UA /hpf None Seen            Lines/Drains  Invasive Devices       Peripheral Intravenous Line  Duration             Peripheral IV 03/06/24 Right Antecubital <1 day                    Imaging: I have personally reviewed pertinent films in PACS  CTA dissection protocol chest abdomen pelvis w wo contrast    Result Date: 3/6/2024  Impression: 1.  No acute aortic or vascular abnormalities. 2.  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 Correlate for chronic left lower extremity claudication, rest pain, or nonhealing ulcers. No new/acute  areas of vascular occlusion. 3.  Mild, long segment, reticular fold thickening in the distal small bowel could relate to edema/third spacing or low-level enterocolitis. Long extent, involvement in a nonwatershed area, and lack of significant mesenteric atheromatous disease speak against ischemic etiology, though drawing of lactate levels could be considered. Workstation performed: [id number]     CTA head and neck w wo contrast    Result Date: 3/6/2024  Impression: 1.  No acute intracranial CT abnormality. 2.  Stable small chronic right MCA territory infarct as described. 3.  No intracranial large vessel occlusion. New focal severe stenosis in the distal left intracranial vertebral artery near the vertebrobasilar junction. New focal severe stenosis in proximal basilar artery. Eccentric filling defect along the wall of the  vessel in these stenotic foci may indicate noncalcified mural plaque or thrombus. 4.  No hemodynamically significant stenosis in the cervical carotid and vertebral arteries. 5.  Pulmonary emphysema Workstation performed: [id number]       EKG, Pathology, and Other Studies Reviewed on Admission:   EKG  Result Date: 03/06/24  Personally  reviewed strips with impression of: Normal sinus rhythm 77 bpm    ** Please Note: This note has been constructed using a voice recognition system. **

## 2024-03-06 NOTE — ASSESSMENT & PLAN NOTE
CTA head and neck: New focal severe stenosis in the distal left intracranial vertebral artery near the vertebrobasilar junction. New focal severe stenosis in proximal basilar artery. Eccentric filling defect along the wall of the  vessel in these stenotic foci may indicate noncalcified mural plaque or thrombus.  Care was discussed by ED with neurology recommending admission and further workup.    Starting aspirin and atorvastatin.

## 2024-03-07 ENCOUNTER — APPOINTMENT (INPATIENT)
Dept: MRI IMAGING | Facility: HOSPITAL | Age: 79
DRG: 068 | End: 2024-03-07
Payer: MEDICARE

## 2024-03-07 ENCOUNTER — APPOINTMENT (INPATIENT)
Dept: NON INVASIVE DIAGNOSTICS | Facility: HOSPITAL | Age: 79
DRG: 068 | End: 2024-03-07
Payer: MEDICARE

## 2024-03-07 LAB
ALBUMIN SERPL BCP-MCNC: 3.8 G/DL (ref 3.5–5)
ALP SERPL-CCNC: 62 U/L (ref 34–104)
ALT SERPL W P-5'-P-CCNC: 11 U/L (ref 7–52)
ANION GAP SERPL CALCULATED.3IONS-SCNC: 8 MMOL/L
AST SERPL W P-5'-P-CCNC: 13 U/L (ref 13–39)
BILIRUB SERPL-MCNC: 0.54 MG/DL (ref 0.2–1)
BUN SERPL-MCNC: 17 MG/DL (ref 5–25)
CALCIUM SERPL-MCNC: 8.7 MG/DL (ref 8.4–10.2)
CHLORIDE SERPL-SCNC: 105 MMOL/L (ref 96–108)
CO2 SERPL-SCNC: 28 MMOL/L (ref 21–32)
CREAT SERPL-MCNC: 1.06 MG/DL (ref 0.6–1.3)
ERYTHROCYTE [DISTWIDTH] IN BLOOD BY AUTOMATED COUNT: 13.1 % (ref 11.6–15.1)
GFR SERPL CREATININE-BSD FRML MDRD: 51 ML/MIN/1.73SQ M
GLUCOSE SERPL-MCNC: 98 MG/DL (ref 65–140)
HCT VFR BLD AUTO: 47.8 % (ref 34.8–46.1)
HGB BLD-MCNC: 16.1 G/DL (ref 11.5–15.4)
MCH RBC QN AUTO: 30.6 PG (ref 26.8–34.3)
MCHC RBC AUTO-ENTMCNC: 33.7 G/DL (ref 31.4–37.4)
MCV RBC AUTO: 91 FL (ref 82–98)
PLATELET # BLD AUTO: 220 THOUSANDS/UL (ref 149–390)
PMV BLD AUTO: 10.3 FL (ref 8.9–12.7)
POTASSIUM SERPL-SCNC: 3.2 MMOL/L (ref 3.5–5.3)
PROT SERPL-MCNC: 6 G/DL (ref 6.4–8.4)
RBC # BLD AUTO: 5.26 MILLION/UL (ref 3.81–5.12)
SODIUM SERPL-SCNC: 141 MMOL/L (ref 135–147)
WBC # BLD AUTO: 5.47 THOUSAND/UL (ref 4.31–10.16)

## 2024-03-07 PROCEDURE — 85027 COMPLETE CBC AUTOMATED: CPT | Performed by: INTERNAL MEDICINE

## 2024-03-07 PROCEDURE — 97163 PT EVAL HIGH COMPLEX 45 MIN: CPT

## 2024-03-07 PROCEDURE — 70551 MRI BRAIN STEM W/O DYE: CPT

## 2024-03-07 PROCEDURE — 80053 COMPREHEN METABOLIC PANEL: CPT | Performed by: INTERNAL MEDICINE

## 2024-03-07 PROCEDURE — 97166 OT EVAL MOD COMPLEX 45 MIN: CPT

## 2024-03-07 PROCEDURE — 99232 SBSQ HOSP IP/OBS MODERATE 35: CPT | Performed by: HOSPITALIST

## 2024-03-07 RX ORDER — AMLODIPINE BESYLATE 5 MG/1
5 TABLET ORAL DAILY
Status: DISCONTINUED | OUTPATIENT
Start: 2024-03-07 | End: 2024-03-08 | Stop reason: HOSPADM

## 2024-03-07 RX ORDER — ONDANSETRON 2 MG/ML
4 INJECTION INTRAMUSCULAR; INTRAVENOUS EVERY 6 HOURS PRN
Status: DISCONTINUED | OUTPATIENT
Start: 2024-03-07 | End: 2024-03-08 | Stop reason: HOSPADM

## 2024-03-07 RX ORDER — LORAZEPAM 2 MG/ML
1 INJECTION INTRAMUSCULAR ONCE
Status: COMPLETED | OUTPATIENT
Start: 2024-03-07 | End: 2024-03-07

## 2024-03-07 RX ADMIN — APIXABAN 5 MG: 5 TABLET, FILM COATED ORAL at 20:33

## 2024-03-07 RX ADMIN — Medication 1 PATCH: at 08:25

## 2024-03-07 RX ADMIN — ONDANSETRON 4 MG: 2 INJECTION INTRAMUSCULAR; INTRAVENOUS at 05:41

## 2024-03-07 RX ADMIN — ONDANSETRON 4 MG: 2 INJECTION INTRAMUSCULAR; INTRAVENOUS at 20:48

## 2024-03-07 RX ADMIN — AMLODIPINE BESYLATE 5 MG: 5 TABLET ORAL at 05:34

## 2024-03-07 RX ADMIN — ATORVASTATIN CALCIUM 40 MG: 40 TABLET, FILM COATED ORAL at 17:00

## 2024-03-07 RX ADMIN — ASPIRIN 81 MG: 81 TABLET, COATED ORAL at 08:24

## 2024-03-07 RX ADMIN — HYDROCHLOROTHIAZIDE 12.5 MG: 12.5 TABLET ORAL at 08:24

## 2024-03-07 RX ADMIN — ESCITALOPRAM OXALATE 5 MG: 5 TABLET, FILM COATED ORAL at 08:24

## 2024-03-07 RX ADMIN — APIXABAN 5 MG: 5 TABLET, FILM COATED ORAL at 08:24

## 2024-03-07 RX ADMIN — LORAZEPAM 1 MG: 2 INJECTION INTRAMUSCULAR; INTRAVENOUS at 21:47

## 2024-03-07 NOTE — CASE MANAGEMENT
Case Management Assessment & Discharge Planning Note    Patient name Ángela Camara  Location [place] [address] /[place] 2 M* MRN [id number]  : 1945 Date 3/7/2024       Current Admission Date: 3/6/2024  Current Admission Diagnosis:Syncope and collapse   Patient Active Problem List    Diagnosis Date Noted    CVD (cerebrovascular disease) 2024    PAD (peripheral artery disease) ([facility]) 2024    Syncope and collapse 2024    SBO (small bowel obstruction) ([facility]) 2022    Postmenopausal state 2021    Increased frequency of urination 2021    Meralgia paresthetica of left side 2021    Chronic pain syndrome 2021    SI joint arthritis     Claudication of both lower extremities ([facility]) 2021    Lumbar spondylosis 10/02/2020    Tinnitus of both ears 10/02/2020    Anxiety and depression     Chronic obstructive pulmonary disease ([facility]) 2020    Irritable bowel syndrome with diarrhea 2020    Chronic abdominal pain 10/29/2019    Generalized anxiety disorder 10/29/2019    Diarrhea 2019    Pain in pelvis 2018    Tobacco abuse 2018    White coat syndrome with high blood pressure but without hypertension 2018    Cervical stenosis of spine 2018    TIA (transient ischemic attack) 2018    Aortic sclerosis 2017    CKD (chronic kidney disease) stage 3, GFR 30-59 ml/min ([facility]) 2017    Anxiety 05/10/2017    Mixed hyperlipidemia 2017    Colon polyps 2016    History of malignant neoplasm of cervix 2014    Essential hypertension 2014      LOS (days): 1  Geometric Mean LOS (GMLOS) (days): 2.1  Days to GMLOS:0.9     OBJECTIVE:    Risk of Unplanned Readmission Score: 10.72         Current admission status: Inpatient  Referral Reason: Other    Preferred Pharmacy:   [id number] - [place] - [address]  [address] [facility] [address] [facility] [address]  Phone: [phone] Fax:  [phone]    Primary Care Provider: Enid Jernigan MD    Primary Insurance: MEDICARE  Secondary Insurance: AARP    ASSESSMENT:  Active Health Care Proxies    There are no active Health Care Proxies on file.        Readmission Root Cause  30 Day Readmission: No    Patient Information  Mental Status: Alert  During Assessment patient was accompanied by: Not accompanied during assessment  Assessment information provided by:: Patient  Primary Caregiver: Self  Support Systems: Self  [facility] Residence: [facility]  What city do you live in?: [place]       Social Determinants of Health (SDOH)      Flowsheet Row Most Recent Value   Housing Stability    In the last 12 months, was there a time when you were not able to pay the mortgage or rent on time? N   In the last 12 months, how many places have you lived? 1   In the last 12 months, was there a time when you did not have a steady place to sleep or slept in a shelter (including now)? N   Transportation Needs    In the past 12 months, has lack of transportation kept you from medical appointments or from getting medications? no   In the past 12 months, has lack of transportation kept you from meetings, work, or from getting things needed for daily living? No   Food Insecurity    Within the past 12 months, you worried that your food would run out before you got the money to buy more. Never true   Within the past 12 months, the food you bought just didn't last and you didn't have money to get more. Never true   Utilities    In the past 12 months has the electric, gas, oil, or water company threatened to shut off services in your home? No            DISCHARGE DETAILS:    Discharge planning discussed with:: Patient     Were Treatment Team discharge recommendations reviewed with patient/caregiver?: Yes  Did patient/caregiver verbalize understanding of patient care needs?: Yes            Treatment Team Recommendation: Home  Discharge Destination Plan:: Home                                          Additional Comments: Dc plan: home with op fu.

## 2024-03-07 NOTE — PHYSICAL THERAPY NOTE
PT EVALUATION    Pt. Name: Ángela Camara  Pt. Age: 78 y.o.  MRN: [id number]  LENGTH OF STAY: 1      Admitting Diagnoses:   CVD (cardiovascular disease) [I25.10]  Hypokalemia [E87.6]  Syncope [R55]  PVD (peripheral vascular disease) (HCC) [I73.9]  Chest pain [R07.9]  Weakness [R53.1]  Hypertensive urgency [I16.0]    Past Medical History:   Diagnosis Date    Anxiety     Atypical squamous cells cannot exclude high grade squamous intraepithelial lesion on cytologic smear of cervix (ASC-H)     Last Assessed 5/13/2014    Back pain     Cancer (HCC)     Cervical cancer (HCC)     Depression     Ovarian cyst     Tinnitus of both ears 10/2/2020    Tobacco abuse 11/20/2018       Past Surgical History:   Procedure Laterality Date    ANKLE FRACTURE SURGERY      Last Assessed 07/26/2016    ANKLE SURGERY      CERVICAL BIOPSY  W/ LOOP ELECTRODE EXCISION      COLONOSCOPY  10/2019    DIAGNOSTIC LAPAROSCOPY      DILATION AND CURETTAGE OF UTERUS      ESOPHAGOGASTRODUODENOSCOPY N/A 9/12/2016    Procedure: ESOPHAGOGASTRODUODENOSCOPY (EGD);  Surgeon: Alvin Powell MD;  Location: [facility];  Service:     FRACTURE SURGERY      ORTHOPEDIC SURGERY      VA COLONOSCOPY FLX DX W/COLLJ SPEC WHEN PFRMD N/A 9/12/2016    Procedure: FLEXIBLE COLONOSCOPY;  Surgeon: Alvin Powell MD;  Location: [facility];  Service: Colorectal    SALPINGOOPHORECTOMY Left     TONSILLECTOMY      UPPER GASTROINTESTINAL ENDOSCOPY  2016       Imaging Studies:  CTA head and neck w wo contrast   Final Result by Eloies De MD (03/06 1030)      1.  No acute intracranial CT abnormality.   2.  Stable small chronic right MCA territory infarct as described.   3.  No intracranial large vessel occlusion. New focal severe stenosis in the distal left intracranial vertebral artery near the vertebrobasilar junction. New focal severe stenosis in proximal basilar artery. Eccentric filling defect along the wall of the    vessel in these stenotic foci may indicate  noncalcified mural plaque or thrombus.   4.  No hemodynamically significant stenosis in the cervical carotid and vertebral arteries.   5.  Pulmonary emphysema                  Workstation performed: [id number]         CTA dissection protocol chest abdomen pelvis w wo contrast   Final Result by Drew Granger MD (03/06 1046)      1.  No acute aortic or vascular abnormalities.      2.  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 Correlate for chronic left lower extremity claudication, rest pain, or nonhealing ulcers. No new/acute    areas of vascular occlusion.      3.  Mild, long segment, reticular fold thickening in the distal small bowel could relate to edema/third spacing or low-level enterocolitis. Long extent, involvement in a nonwatershed area, and lack of significant mesenteric atheromatous disease speak    against ischemic etiology, though drawing of lactate levels could be considered.               Workstation performed: [id number]         CTA head and neck w wo contrast    (Results Pending)   MRI inpatient order    (Results Pending)         03/07/24 0951   PT Last Visit   PT Visit Date 03/07/24   Note Type   Note type Evaluation   Pain Assessment   Pain Score No Pain   Restrictions/Precautions   Weight Bearing Precautions Per Order No   Other Precautions Fall Risk;Telemetry   Home Living   Type of Home House   Home Layout Two level;1/2 bath on main level;Stairs to enter with rails;Other (Comment)  (4STE w/o HR (front); 2STE w/ HR (garage); 14 steps to 2nd floor bed/bath)   Bathroom Shower/Tub Walk-in shower  (has both walk-in showed & tub/shower)   Bathroom Toilet Raised  (raised toilet on the 1st floor; standard toilet on the 2nd floor)   Bathroom Equipment Other (Comment)  (none)   Home Equipment Walker;Cane;Crutches  (does not use at baseline)   Prior Function   Level of [place] Independent  with functional mobility;Independent with ADLs;Independent with IADLS  (w/o AD)   Lives With Alone   Receives Help From Friend(s)  (pt reports her barn helper is able to assist PRN)   Falls in the last 6 months 1 to 4  (1x)   Vocational Works at home   Comments (+)    General   Family/Caregiver Present No   Cognition   Overall Cognitive Status WFL   Arousal/Participation Alert   Attention Within functional limits   Orientation Level Oriented X4   Following Commands Follows all commands and directions without difficulty   Comments pleasant & cooperative   Subjective   Subjective Pt agreeable to PT/OT evals,   RUE Assessment   RUE Assessment   (refer to OT)   LUE Assessment   LUE Assessment   (refer to OT)   RLE Assessment   RLE Assessment WNL   LLE Assessment   LLE Assessment WNL   Coordination   Movements are Fluid and Coordinated 1   Sensation X  (chronic impaired sensation to B/L legs 2* to remote injuries)   Bed Mobility   Supine to Sit 6  Modified independent   Additional items HOB elevated   Sit to Supine 6  Modified independent   Additional items HOB elevated   Additional Comments (+) lightheadedness upon sitting EOB; orthostatic BPs as follows; supine 132/91; /84; standing 132/92; after amb 159/90   Transfers   Sit to Stand 6  Modified independent   Stand to Sit 6  Modified independent   Ambulation/Elevation   Gait pattern Excessively slow   Gait Assistance 5  Supervision   Assistive Device None   Distance >250'   Stair Management Assistance 5  Supervision   Stair Management Technique One rail R;Alternating pattern;Foreward   Number of Stairs 9   Ambulation/Elevation Additional Comments no gross LOB noted; amb slightly guarded 2* to lightheadedness but otherwise pt notes baseline gait   Balance   Static Sitting Normal   Dynamic Sitting Normal   Static Standing Good   Dynamic Standing Fair +   Ambulatory Fair +   Endurance Deficit   Endurance Deficit No   Activity Tolerance   Activity Tolerance  "Patient tolerated treatment well   Medical Staff Made Aware OTR Malia Rios   Nurse Made Aware yes, [place]   Assessment   Prognosis Excellent   Problem List   (lightheadedness)   Assessment Pt 78 y.o. female presented w/ c/o unsteadiness, recent syncope & elevated BP. Pt admitted for Syncope and collapse w/ incidental finding of CVD (cardiovascular disease); hypertensive urgency, PVD (peripheral vascular disease). Per chart, \"CTA head and neck: New focal severe stenosis in the distal left intracranial vertebral artery near the vertebrobasilar junction. 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 Correlate for chronic left lower extremity claudication.\"  Pt referred to PT for mobility assessment & D/C planning. Please see flowsheet above re: home set-up, PLOF & objective findings during PT assessment. PTA, pt reports being I w/o AD.  On eval, pt demonstrates no significant decline in function to warrant skilled PT at this time. Pt  I/modified I w/ overall functional mobility including amb w/o AD & stair management. Balance, strength & gait WFL. Pt reports slight lightheadedness during session. Orthostatic BPs as follows; supine 132/91; /84; standing 132/92; after amb 159/90. Pt states being at her functional baseline and states no concerns about going back home when medically cleared. The patient's AM-PAC Basic Mobility Inpatient Short Form Raw Score is 23. A Raw score of greater than 16 suggests the patient may benefit from discharge to home. Please also refer to the recommendation of the Physical Therapist for safe discharge planning. From PT standpoint, pt may return home when medically cleared. Will D/C PT. Will maintain on restorative for daily amb to prevent decline in function. Nsg notified. Co-eval was necessary to complete this PT eval " for the pt's best interest given pt's medical acuity & complexity.   Barriers to Discharge None   Goals   Patient Goals to go home   PT Treatment Day 0   Plan   Treatment/Interventions Spoke to nursing;OT  (PT eval only)   PT Frequency Other (Comment)  (D/C PT)   Discharge Recommendation   Rehab Resource Intensity Level, PT No post-acute rehabilitation needs   Additional Comments restorative for daily amb   AM-PAC Basic Mobility Inpatient   Turning in Flat Bed Without Bedrails 4   Lying on Back to Sitting on Edge of Flat Bed Without Bedrails 4   Moving Bed to Chair 4   Standing Up From Chair Using Arms 4   Walk in Room 4   Climb 3-5 Stairs With Railing 3   Basic Mobility Inpatient Raw Score 23   Basic Mobility Standardized Score 50.88   Highest Level Of Mobility   JH-HLM Goal 7: Walk 25 feet or more   JH-HLM Achieved 8: Walk 250 feet ot more   End of Consult   Patient Position at End of Consult Supine;All needs within reach   End of Consult Comments Pt in stable condition. All needs in reach.   Hx/personal factors: co-morbidities, inaccessible home, dec caregiver support, home alone, advanced age, telemetry, h/o of falls, and fall risk  Examination: assessed body system, balance, endurance, amb, D/C disposition & fall risk; slight lightheadedness  Clinical: unpredictable (ongoing medical status and risk for falls)  Complexity: high    Lb Artis

## 2024-03-07 NOTE — PLAN OF CARE
Problem: PAIN - ADULT  Goal: Verbalizes/displays adequate comfort level or baseline comfort level  Description: Interventions:  - Encourage patient to monitor pain and request assistance  - Assess pain using appropriate pain scale  - Administer analgesics based on type and severity of pain and evaluate response  - Implement non-pharmacological measures as appropriate and evaluate response  - Consider cultural and social influences on pain and pain management  - Notify physician/advanced practitioner if interventions unsuccessful or patient reports new pain  Outcome: Progressing     Problem: INFECTION - ADULT  Goal: Absence or prevention of progression during hospitalization  Description: INTERVENTIONS:  - Assess and monitor for signs and symptoms of infection  - Monitor lab/diagnostic results  - Monitor all insertion sites, i.e. indwelling lines, tubes, and drains  - Monitor endotracheal if appropriate and nasal secretions for changes in amount and color  - [place] appropriate cooling/warming therapies per order  - Administer medications as ordered  - Instruct and encourage patient and family to use good hand hygiene technique  - Identify and instruct in appropriate isolation precautions for identified infection/condition  Outcome: Progressing  Goal: Absence of fever/infection during neutropenic period  Description: INTERVENTIONS:  - Monitor WBC    Outcome: Progressing     Problem: SAFETY ADULT  Goal: Patient will remain free of falls  Description: INTERVENTIONS:  - Educate patient/family on patient safety including physical limitations  - Instruct patient to call for assistance with activity   - Consult OT/PT to assist with strengthening/mobility   - Keep Call bell within reach  - Keep bed low and locked with side rails adjusted as appropriate  - Keep care items and personal belongings within reach  - Initiate and maintain comfort rounds  - Make Fall Risk Sign visible to staff  - Offer Toileting every 2 Hours,  in advance of need  - Initiate/Maintain bed alarm  - Obtain necessary fall risk management equipment: N/A  - Apply yellow socks and bracelet for high fall risk patients  - Consider moving patient to room near nurses station  Outcome: Progressing  Goal: Maintain or return to baseline ADL function  Description: INTERVENTIONS:  -  Assess patient's ability to carry out ADLs; assess patient's baseline for ADL function and identify physical deficits which impact ability to perform ADLs (bathing, care of mouth/teeth, toileting, grooming, dressing, etc.)  - Assess/evaluate cause of self-care deficits   - Assess range of motion  - Assess patient's mobility; develop plan if impaired  - Assess patient's need for assistive devices and provide as appropriate  - Encourage maximum independence but intervene and supervise when necessary  - Involve family in performance of ADLs  - Assess for home care needs following discharge   - Consider OT consult to assist with ADL evaluation and planning for discharge  - Provide patient education as appropriate  Outcome: Progressing  Goal: Maintains/Returns to pre admission functional level  Description: INTERVENTIONS:  - Perform AM-PAC 6 Click Basic Mobility/ Daily Activity assessment daily.  - Set and communicate daily mobility goal to care team and patient/family/caregiver.   - Collaborate with rehabilitation services on mobility goals if consulted  - Perform Range of Motion 3 times a day.  - Reposition patient every 2 hours.  - Dangle patient 3 times a day  - Stand patient 3 times a day  - Ambulate patient 3 times a day  - Out of bed to chair 3 times a day   - Out of bed for meals 3 times a day  - Out of bed for toileting  - Record patient progress and toleration of activity level   Outcome: Progressing     Problem: DISCHARGE PLANNING  Goal: Discharge to home or other facility with appropriate resources  Description: INTERVENTIONS:  - Identify barriers to discharge w/patient and  caregiver  - Arrange for needed discharge resources and transportation as appropriate  - Identify discharge learning needs (meds, wound care, etc.)  - Arrange for interpretive services to assist at discharge as needed  - Refer to Case Management Department for coordinating discharge planning if the patient needs post-hospital services based on physician/advanced practitioner order or complex needs related to functional status, cognitive ability, or social support system  Outcome: Progressing     Problem: Knowledge Deficit  Goal: Patient/family/caregiver demonstrates understanding of disease process, treatment plan, medications, and discharge instructions  Description: Complete learning assessment and assess knowledge base.  Interventions:  - Provide teaching at level of understanding  - Provide teaching via preferred learning methods  Outcome: Progressing

## 2024-03-07 NOTE — OCCUPATIONAL THERAPY NOTE
Occupational Therapy Evaluation     Patient Name: Ángela Camara  Today's Date: 3/7/2024  Problem List  Principal Problem:    Syncope and collapse  Active Problems:    Essential hypertension    Mixed hyperlipidemia    CKD (chronic kidney disease) stage 3, GFR 30-59 ml/min (HCC)    Tobacco abuse    Anxiety and depression    CVD (cerebrovascular disease)    PAD (peripheral artery disease) (HCC)    Past Medical History  Past Medical History:   Diagnosis Date    Anxiety     Atypical squamous cells cannot exclude high grade squamous intraepithelial lesion on cytologic smear of cervix (ASC-H)     Last Assessed 5/13/2014    Back pain     Cancer (HCC)     Cervical cancer (HCC)     Depression     Ovarian cyst     Tinnitus of both ears 10/2/2020    Tobacco abuse 11/20/2018     Past Surgical History  Past Surgical History:   Procedure Laterality Date    ANKLE FRACTURE SURGERY      Last Assessed 07/26/2016    ANKLE SURGERY      CERVICAL BIOPSY  W/ LOOP ELECTRODE EXCISION      COLONOSCOPY  10/2019    DIAGNOSTIC LAPAROSCOPY      DILATION AND CURETTAGE OF UTERUS      ESOPHAGOGASTRODUODENOSCOPY N/A 9/12/2016    Procedure: ESOPHAGOGASTRODUODENOSCOPY (EGD);  Surgeon: Alvin Powell MD;  Location: [facility];  Service:     FRACTURE SURGERY      ORTHOPEDIC SURGERY      [place] COLONOSCOPY FLX DX W/COLLJ SPEC WHEN PFRMD N/A 9/12/2016    Procedure: FLEXIBLE COLONOSCOPY;  Surgeon: Alvin Powell MD;  Location: [facility];  Service: Colorectal    SALPINGOOPHORECTOMY Left     TONSILLECTOMY      UPPER GASTROINTESTINAL ENDOSCOPY  2016 03/07/24 0931   OT Last Visit   OT Visit Date 03/07/24   Note Type   Note type Evaluation   Pain Assessment   Pain Assessment Tool 0-10   Pain Score No Pain   Restrictions/Precautions   Weight Bearing Precautions Per Order No   Home Living   Type of Home House   Home Layout Two level;1/2 bath on main level;Bed/bath upstairs;Stairs to enter with rails  (4 MEKA through front w/o railing; 2 MEKA  "through garage with railing; 14 stairs to 2nd level with railings)   Bathroom Shower/Tub Walk-in shower   Bathroom Toilet Raised   Bathroom Equipment   (pt reports none)   Home Equipment Walker;Cane;Crutches   Prior Function   Level of [place] Independent with ADLs;Independent with functional mobility;Independent with IADLS   Lives With Alone   Receives Help From Friend(s)  (Pt reports her barn helper is able to assist her)   IADLs Independent with driving;Independent with meal prep;Independent with medication management   Falls in the last 6 months 1 to 4  (Falls = 1)   Vocational Works at home   Lifestyle   Autonomy Pt states PTA she was independent with ADL/IADLs, transfers, and functional mobility - w/o use of AD. Pt reports (+) , (+) falls = 1, (+) home alone.   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   Intrinsic Gratification taking care of her horses   Subjective   Subjective \"I have 20 horses.\"   ADL   Where Assessed Edge of bed   Eating Assistance 7  Independent   Grooming Assistance 6  Modified Independent   UB Bathing Assistance 6  Modified Independent   LB Bathing Assistance 6  Modified Independent   UB Dressing Assistance 6  Modified independent   LB Dressing Assistance 6  Modified independent   Toileting Assistance  6  Modified independent   Bed Mobility   Supine to Sit 6  Modified independent   Additional items HOB elevated;Bedrails;Increased time required   Sit to Supine 6  Modified independent   Additional items Increased time required   Transfers   Sit to Stand 6  Modified independent   Stand to Sit 6  Modified independent   Additional Comments BPs: 132/91 (supine), 140/84 (EOB), 132/92 (standing), 159/90 (EOB after ambulating) - nsg notified   Functional Mobility   Functional Mobility 5  Supervision   Additional Comments 2* to light headedness   Balance   Static Sitting Normal   Dynamic Sitting Normal   Static Standing Good   Dynamic Standing Poor + "   Activity Tolerance   Activity Tolerance Patient tolerated treatment well   Medical Staff Made Aware nsg, PT Lb, CM   RUE Assessment   RUE Assessment WFL   RUE Strength   RUE Overall Strength Within Functional Limits - strength 5/5   LUE Assessment   LUE Assessment WFL   LUE Strength   LUE Overall Strength Within Functional Limits - strength 5/5   Hand Function   Gross Motor Coordination Functional   Fine Motor Coordination Functional   Sensation   Light Touch No apparent deficits   Proprioception   Proprioception No apparent deficits   Vision-Basic Assessment   Current Vision Other (Comment)   Visual History   (Pt with hx of vision difficulty; has an appointment scheduled with eye doctor in April)   Vision - Complex Assessment   Visual Fields   (Able to scan visual fields)   Acuity   (Unable to read board and name badge)   Psychosocial   Psychosocial (WDL) WDL   Perception   Inattention/Neglect Appears intact   Cognition   Overall Cognitive Status WFL   Arousal/Participation Alert;Responsive;Cooperative   Attention Within functional limits   Orientation Level Oriented X4   Memory Within functional limits   Following Commands Follows all commands and directions without difficulty   Assessment   Limitation Decreased endurance;Decreased high-level ADLs   Prognosis Good   Assessment Pt is a 78 y.o female who presented to [facility] ED on 3/6/24 with symptoms of weakness. Pt admitted for syncope & collapse; with noted high blood pressure; per chart review, CT head/neck (+)  severe stenosis in the distal left intracranial vertebral artery and severe stenosis in proximal basilar artery. Eccentric filling defect along the wall of the, CT chest abdomen/pelvis (-). Pt with recent presentation to ED on 3/01/24 for syncope & collapse. Pt with PMH of HTN, SBO, HLD, anxiety, cervical ca., depression, and L ankle fx and surgery (per pt report). Pt states PTA she was independent with ADL/IADLs, transfers, and functional mobility  - w/o use of AD. Pt reports (+) , (+) falls = 1, (+) home alone.During OT initial evaluation pt demonstrated slight deficits with endurance, functional balance, and functional mobility. Pt demonstrates she is functioning close to her baseline and voices no concerns, functionally, about going directly home when medically clear. Pt does not demonstrate the need for IP OT services at this time 2* to functioning close to her baseline and displaying limited ADL deficits. Pt may benefit from restorative therapy program during her stay to maintain strength and endurance during her IP stay. The patient's raw score on the AM-PAC Daily Activity Inpatient Short Form is 23. A raw score of greater than or equal to 19 suggests the patient may benefit from discharge to home. Please refer to the recommendation of the Occupational Therapist for safe discharge planning.   Goals   Patient Goals To return home   Plan   OT Frequency Eval only   Discharge Recommendation   Rehab Resource Intensity Level, OT No post-acute rehabilitation needs   AM-PAC Daily Activity Inpatient   Lower Body Dressing 3   Bathing 4   Toileting 4   Upper Body Dressing 4   Grooming 4   Eating 4   Daily Activity Raw Score 23   Daily Activity Standardized Score (Calc for Raw Score >=11) 51.12   AM-PAC Applied Cognition Inpatient   Following a Speech/Presentation 4   Understanding Ordinary Conversation 4   Taking Medications 4   Remembering Where Things Are Placed or Put Away 4   Remembering List of 4-5 Errands 4   Taking Care of Complicated Tasks 4   Applied Cognition Raw Score 24   Applied Cognition Standardized Score 62.21   Blanca Matson

## 2024-03-07 NOTE — ASSESSMENT & PLAN NOTE
History of hypertension SBO hyperlipidemia and anxiety who presents with unsteadiness, recent syncope, and elevated blood pressure.  Was seen in ED 3/2 for syncopal episode.  Was noted to have elevated blood pressures.  Was started on amlodipine by PCP but has not been able to  prescription yet.  Blood pressure was high this morning.  Incidentally found to have cerebrovascular disease. Neurology following.   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 Pt initiated on ASA, eliquis per neurologist. Pt having lightheadedness/dizziness which may be attributed to the thrombus, will check MRI Brain to exclude CVA.  Echo pending.

## 2024-03-07 NOTE — PROGRESS NOTES
[facility]  Progress Note  Name: Ángela Camara I  MRN: [id number]  Unit/Bed#: [place] [address] -02 I Date of Admission: 3/6/2024   Date of Service: 3/7/2024 I Hospital Day: 1    Assessment/Plan   PAD (peripheral artery disease) ([facility])  Assessment & Plan  History of bilateral claudication which patient attributed to plates in left lower extremity/ankle.  CTA now with: Chronic occlusion of the left external iliac artery with distal reconstitution at the common femoral by internal mammary and body wall collaterals. Correlate for chronic left lower extremity claudication  Vasc surg evaluated, apprec recs-  felt chronic, no acute surgical intervention  Smoking cessation, starting aspirin and atorvastatin    CVD (cerebrovascular disease)  Assessment & Plan  CTA head and neck: New focal severe stenosis in the distal left intracranial vertebral artery near the vertebrobasilar junction. New focal severe stenosis in proximal basilar artery. Eccentric filling defect along the wall of the  vessel in these stenotic foci may indicate noncalcified mural plaque or thrombus.  Neurology following.   Recommends ASA, eliquis (for VB thrombus) and repeat CTA as outpt to assess progression.     Anxiety and depression  Assessment & Plan  Anxiety and depression continue escitalopram and alprazolam as needed  Multiple recent stressors    Tobacco abuse  Assessment & Plan  Nicotine patch ordered    CKD (chronic kidney disease) stage 3, GFR 30-59 ml/min ([facility])  Assessment & Plan  Lab Results   Component Value Date    EGFR 51 03/07/2024    EGFR 52 03/06/2024    EGFR 66 03/02/2024    CREATININE 1.06 03/07/2024    CREATININE 1.03 03/06/2024    CREATININE 0.85 03/02/2024     Renal function at baseline/stable.    Mixed hyperlipidemia  Assessment & Plan  Will check FLP given PVD  Start atorvastatin    Essential hypertension  Assessment & Plan  Blood pressure elevated on HCTZ 12.5 mg.  Started norvasc. Monitor  closely.     * Syncope and collapse  Assessment & Plan  History of hypertension SBO hyperlipidemia and anxiety who presents with unsteadiness, recent syncope, and elevated blood pressure.  Was seen in ED 3/2 for syncopal episode.  Was noted to have elevated blood pressures.  Was started on amlodipine by PCP but has not been able to  prescription yet.  Blood pressure was high this morning.  Incidentally found to have cerebrovascular disease. Neurology following.   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 Pt initiated on ASA, eliquis per neurologist. Pt having lightheadedness/dizziness which may be attributed to the thrombus, will check MRI Brain to exclude CVA.  Echo pending.            VTE  Prophylaxis:   Pharmacologic: in place  Mechanical VTE Prophylaxis in Place: Yes    Patient Centered Rounds: I have performed bedside rounds with nursing staff today.    Discussions with Specialists or Other Care Team Provider: case management    Education and Discussions with Family / Patient: pt    Mobility:   Basic Mobility Inpatient Raw Score: 24  JH-HLM Goal: 8: Walk 250 feet or more  JH-HLM Achieved: 7: Walk 25 feet or more  HLM Goal NOT achieved. Continue with multidisciplinary rounding and encourage appropriate mobility to improve upon HLM goals.    Total Time Spent on Date of Encounter in care of patient: 35 mins. This time was spent on one or more of the following: performing physical exam; counseling and coordination of care; obtaining or reviewing history; documenting in the medical record; reviewing/ordering tests, medications or procedures; communicating with other healthcare professionals and discussing with patient's family/caregivers.      Current Length of Stay: 1 day(s)    Current Patient Status: Inpatient        Code Status: Level 1 - Full Code    Discharge Plan: Pt will require continued inpatient hospitalization.    Subjective:   Pt  states she is not feeling herself  Feels lightheaded  No chest pain or sob.      Patient is seen and examined at bedside.  All other ROS are negative.    Objective:     Vitals:   Temp (24hrs), Av.2 °F (36.8 °C), Min:98 °F (36.7 °C), Max:98.5 °F (36.9 °C)    Temp:  [98 °F (36.7 °C)-98.5 °F (36.9 °C)] 98.3 °F (36.8 °C)  HR:  [62-82] 71  Resp:  [16-18] 16  BP: (132-187)/() 159/90  SpO2:  [91 %-96 %] 92 %  Body mass index is 23.02 kg/m².     Input and Output Summary (last 24 hours):       Intake/Output Summary (Last 24 hours) at 3/7/2024 1058  Last data filed at 3/6/2024 1135  Gross per 24 hour   Intake 50 ml   Output --   Net 50 ml       Physical Exam:       GEN: No acute distress, comfortable  HEEENT: No JVD, PERRLA, no scleral icterus  RESP: Lungs clear to auscultation bilaterally  CV: RRR, +s1/s2   ABD: SOFT NON TENDER, POSITIVE BOWEL SOUNDS, NO DISTENTION  PSYCH: CALM  NEURO: [place] baseline, NO FOCAL DEFICITS  SKIN: NO RASH  EXTREM: NO EDEMA    Additional Data:     Labs:    Results from last 7 days   Lab Units 24  0428 24  0853   WBC Thousand/uL 5.47 9.44   HEMOGLOBIN g/dL 16.1* 17.2*   HEMATOCRIT % 47.8* 51.1*   PLATELETS Thousands/uL 220 238   NEUTROS PCT %  --  84*   LYMPHS PCT %  --  10*   MONOS PCT %  --  5   EOS PCT %  --  1     Results from last 7 days   Lab Units 24  0428   SODIUM mmol/L 141   POTASSIUM mmol/L 3.2*   CHLORIDE mmol/L 105   CO2 mmol/L 28   BUN mg/dL 17   CREATININE mg/dL 1.06   ANION GAP mmol/L 8   CALCIUM mg/dL 8.7   ALBUMIN g/dL 3.8   TOTAL BILIRUBIN mg/dL 0.54   ALK PHOS U/L 62   ALT U/L 11   AST U/L 13   GLUCOSE RANDOM mg/dL 98     Results from last 7 days   Lab Units 24  0853   INR  0.94     Results from last 7 days   Lab Units 24  1311   POC GLUCOSE mg/dl 104         Results from last 7 days   Lab Units 24  1101   LACTIC ACID mmol/L 0.8       Lines/Drains:  Invasive Devices       Peripheral Intravenous Line  Duration             Peripheral IV 24 Right Antecubital 1 day                    Telemetry:    Telemetry Orders (From admission, onward)               24 Hour Telemetry Monitoring  Continuous x 24 Hours (Telem)        Expiring   Question:  Reason for 24 Hour Telemetry  Answer:  PCI/EP study (including pacer and ICD implementation), Cardiac surgery, MI, abnormal cardiac cath, and chest pain- rule out MI                        * I Have Reviewed All Lab Data Listed Above.           Imaging:     Results for orders placed during the hospital encounter of 03/02/24    XR chest 1 view portable    Narrative  XR CHEST PORTABLE    INDICATION: syncope.    COMPARISON: Chest CT 5/20/2019, CXR 4/25/2014.    FINDINGS:    Clear lungs. No pneumothorax or pleural effusion.    Normal cardiomediastinal silhouette.    Bones are unremarkable for age.    Normal upper abdomen.    Impression  No acute cardiopulmonary disease.        Workstation performed: [id number]    No results found for this or any previous visit.      *I have reviewed all imaging reports listed above      Recent Cultures (last 7 days):           Last 24 Hours Medication List:   Current Facility-Administered Medications   Medication Dose Route Frequency Provider Last Rate    ALPRAZolam  0.5 mg Oral HS PRN Dileep Zelaya, DO      amLODIPine  5 mg Oral Daily Jared Hernandez PA-C      apixaban  5 mg Oral BID Macario Curry PA-C      aspirin  81 mg Oral Daily Dileep Garcia, DO      atorvastatin  40 mg Oral Daily With Dinner Dileep Zelaya, DO      dicyclomine  20 mg Oral Q6H Dileep Garcia, DO      escitalopram  5 mg Oral Daily Dileep Garcia, DO      hydroCHLOROthiazide  12.5 mg Oral Daily Dileep Garcia, DO      nicotine  1 patch Transdermal Daily Dileep Garcia, DO      ondansetron  4 mg Intravenous Q6H PRN Jared Hernandez PA-C          Today, Patient Was Seen By: Gayatri Latif MD    ** Please Note: Dictation voice to text software may have been used in the creation of this document. **

## 2024-03-07 NOTE — PLAN OF CARE
Problem: PAIN - ADULT  Goal: Verbalizes/displays adequate comfort level or baseline comfort level  Description: Interventions:  - Encourage patient to monitor pain and request assistance  - Assess pain using appropriate pain scale  - Administer analgesics based on type and severity of pain and evaluate response  - Implement non-pharmacological measures as appropriate and evaluate response  - Consider cultural and social influences on pain and pain management  - Notify physician/advanced practitioner if interventions unsuccessful or patient reports new pain  Outcome: Progressing     Problem: INFECTION - ADULT  Goal: Absence or prevention of progression during hospitalization  Description: INTERVENTIONS:  - Assess and monitor for signs and symptoms of infection  - Monitor lab/diagnostic results  - Monitor all insertion sites, i.e. indwelling lines, tubes, and drains  - Monitor endotracheal if appropriate and nasal secretions for changes in amount and color  - [place] appropriate cooling/warming therapies per order  - Administer medications as ordered  - Instruct and encourage patient and family to use good hand hygiene technique  - Identify and instruct in appropriate isolation precautions for identified infection/condition  Outcome: Progressing  Goal: Absence of fever/infection during neutropenic period  Description: INTERVENTIONS:  - Monitor WBC    Outcome: Progressing     Problem: SAFETY ADULT  Goal: Patient will remain free of falls  Description: INTERVENTIONS:  - Educate patient/family on patient safety including physical limitations  - Instruct patient to call for assistance with activity   - Consult OT/PT to assist with strengthening/mobility   - Keep Call bell within reach  - Keep bed low and locked with side rails adjusted as appropriate  - Keep care items and personal belongings within reach  - Initiate and maintain comfort rounds  - Make Fall Risk Sign visible to staff  - Offer Toileting every 2 Hours,  in advance of need  - Initiate/Maintain bed alarm  - Obtain necessary fall risk management equipment: N/A  - Apply yellow socks and bracelet for high fall risk patients  - Consider moving patient to room near nurses station  Outcome: Progressing  Goal: Maintain or return to baseline ADL function  Description: INTERVENTIONS:  -  Assess patient's ability to carry out ADLs; assess patient's baseline for ADL function and identify physical deficits which impact ability to perform ADLs (bathing, care of mouth/teeth, toileting, grooming, dressing, etc.)  - Assess/evaluate cause of self-care deficits   - Assess range of motion  - Assess patient's mobility; develop plan if impaired  - Assess patient's need for assistive devices and provide as appropriate  - Encourage maximum independence but intervene and supervise when necessary  - Involve family in performance of ADLs  - Assess for home care needs following discharge   - Consider OT consult to assist with ADL evaluation and planning for discharge  - Provide patient education as appropriate  Outcome: Progressing  Goal: Maintains/Returns to pre admission functional level  Description: INTERVENTIONS:  - Perform AM-PAC 6 Click Basic Mobility/ Daily Activity assessment daily.  - Set and communicate daily mobility goal to care team and patient/family/caregiver.   - Collaborate with rehabilitation services on mobility goals if consulted  - Perform Range of Motion 3 times a day.  - Reposition patient every 2 hours.  - Dangle patient 3 times a day  - Stand patient 3 times a day  - Ambulate patient 3 times a day  - Out of bed to chair 3 times a day   - Out of bed for meals 3 times a day  - Out of bed for toileting  - Record patient progress and toleration of activity level   Outcome: Progressing     Problem: DISCHARGE PLANNING  Goal: Discharge to home or other facility with appropriate resources  Description: INTERVENTIONS:  - Identify barriers to discharge w/patient and  caregiver  - Arrange for needed discharge resources and transportation as appropriate  - Identify discharge learning needs (meds, wound care, etc.)  - Arrange for interpretive services to assist at discharge as needed  - Refer to Case Management Department for coordinating discharge planning if the patient needs post-hospital services based on physician/advanced practitioner order or complex needs related to functional status, cognitive ability, or social support system  Outcome: Progressing     Problem: Knowledge Deficit  Goal: Patient/family/caregiver demonstrates understanding of disease process, treatment plan, medications, and discharge instructions  Description: Complete learning assessment and assess knowledge base.  Interventions:  - Provide teaching at level of understanding  - Provide teaching via preferred learning methods  Outcome: Progressing     Problem: CARDIOVASCULAR - ADULT  Goal: Maintains optimal cardiac output and hemodynamic stability  Description: INTERVENTIONS:  - Monitor I/O, vital signs and rhythm  - Monitor for S/S and trends of decreased cardiac output  - Administer and titrate ordered vasoactive medications to optimize hemodynamic stability  - Assess quality of pulses, skin color and temperature  - Assess for signs of decreased coronary artery perfusion  - Instruct patient to report change in severity of symptoms  Outcome: Progressing  Goal: Absence of cardiac dysrhythmias or at baseline rhythm  Description: INTERVENTIONS:  - Continuous cardiac monitoring, vital signs, obtain 12 lead EKG if ordered  - Administer antiarrhythmic and heart rate control medications as ordered  - Monitor electrolytes and administer replacement therapy as ordered  Outcome: Progressing     Problem: METABOLIC, FLUID AND ELECTROLYTES - ADULT  Goal: Electrolytes maintained within normal limits  Description: INTERVENTIONS:  - Monitor labs and assess patient for signs and symptoms of electrolyte imbalances  -  Administer electrolyte replacement as ordered  - Monitor response to electrolyte replacements, including repeat lab results as appropriate  - Instruct patient on fluid and nutrition as appropriate  Outcome: Progressing  Goal: Fluid balance maintained  Description: INTERVENTIONS:  - Monitor labs   - Monitor I/O and WT  - Instruct patient on fluid and nutrition as appropriate  - Assess for signs & symptoms of volume excess or deficit  Outcome: Progressing

## 2024-03-07 NOTE — ASSESSMENT & PLAN NOTE
CTA head and neck: New focal severe stenosis in the distal left intracranial vertebral artery near the vertebrobasilar junction. New focal severe stenosis in proximal basilar artery. Eccentric filling defect along the wall of the  vessel in these stenotic foci may indicate noncalcified mural plaque or thrombus.  Neurology following.   Recommends ASA, eliquis (for VB thrombus) and repeat CTA as outpt to assess progression.

## 2024-03-07 NOTE — ASSESSMENT & PLAN NOTE
History of bilateral claudication which patient attributed to plates in left lower extremity/ankle.  CTA now with: Chronic occlusion of the left external iliac artery with distal reconstitution at the common femoral by internal mammary and body wall collaterals. Correlate for chronic left lower extremity claudication  Vasc surg evaluated, apprec recs-  felt chronic, no acute surgical intervention  Smoking cessation, starting aspirin and atorvastatin

## 2024-03-07 NOTE — ASSESSMENT & PLAN NOTE
Lab Results   Component Value Date    EGFR 51 03/07/2024    EGFR 52 03/06/2024    EGFR 66 03/02/2024    CREATININE 1.06 03/07/2024    CREATININE 1.03 03/06/2024    CREATININE 0.85 03/02/2024     Renal function at baseline/stable.

## 2024-03-08 ENCOUNTER — APPOINTMENT (INPATIENT)
Dept: NON INVASIVE DIAGNOSTICS | Facility: HOSPITAL | Age: 79
DRG: 068 | End: 2024-03-08
Payer: MEDICARE

## 2024-03-08 VITALS
HEART RATE: 68 BPM | OXYGEN SATURATION: 92 % | BODY MASS INDEX: 22.82 KG/M2 | TEMPERATURE: 98.1 F | HEIGHT: 66 IN | WEIGHT: 142 LBS | DIASTOLIC BLOOD PRESSURE: 79 MMHG | RESPIRATION RATE: 18 BRPM | SYSTOLIC BLOOD PRESSURE: 112 MMHG

## 2024-03-08 LAB
ALBUMIN SERPL BCP-MCNC: 3.6 G/DL (ref 3.5–5)
ALP SERPL-CCNC: 72 U/L (ref 34–104)
ALT SERPL W P-5'-P-CCNC: 9 U/L (ref 7–52)
ANION GAP SERPL CALCULATED.3IONS-SCNC: 7 MMOL/L
AORTIC ROOT: 2.9 CM
ASCENDING AORTA: 3 CM
AST SERPL W P-5'-P-CCNC: 13 U/L (ref 13–39)
BASOPHILS # BLD AUTO: 0.04 THOUSANDS/ÂΜL (ref 0–0.1)
BASOPHILS NFR BLD AUTO: 1 % (ref 0–1)
BILIRUB SERPL-MCNC: 0.56 MG/DL (ref 0.2–1)
BSA FOR ECHO PROCEDURE: 1.73 M2
BUN SERPL-MCNC: 18 MG/DL (ref 5–25)
CALCIUM SERPL-MCNC: 8.8 MG/DL (ref 8.4–10.2)
CHLORIDE SERPL-SCNC: 105 MMOL/L (ref 96–108)
CO2 SERPL-SCNC: 27 MMOL/L (ref 21–32)
CREAT SERPL-MCNC: 1.12 MG/DL (ref 0.6–1.3)
E WAVE DECELERATION TIME: 344 MS
E/A RATIO: 0.65
EOSINOPHIL # BLD AUTO: 0.18 THOUSAND/ÂΜL (ref 0–0.61)
EOSINOPHIL NFR BLD AUTO: 3 % (ref 0–6)
ERYTHROCYTE [DISTWIDTH] IN BLOOD BY AUTOMATED COUNT: 13.2 % (ref 11.6–15.1)
FRACTIONAL SHORTENING: 30 (ref 28–44)
GFR SERPL CREATININE-BSD FRML MDRD: 47 ML/MIN/1.73SQ M
GLUCOSE SERPL-MCNC: 91 MG/DL (ref 65–140)
HCT VFR BLD AUTO: 48.2 % (ref 34.8–46.1)
HGB BLD-MCNC: 15.9 G/DL (ref 11.5–15.4)
IMM GRANULOCYTES # BLD AUTO: 0.02 THOUSAND/UL (ref 0–0.2)
IMM GRANULOCYTES NFR BLD AUTO: 0 % (ref 0–2)
INTERVENTRICULAR SEPTUM IN DIASTOLE (PARASTERNAL SHORT AXIS VIEW): 1 CM
INTERVENTRICULAR SEPTUM: 1 CM (ref 0.6–1.1)
IVC: 14 MM
LAAS-AP2: 11.8 CM2
LAAS-AP4: 10 CM2
LEFT ATRIUM SIZE: 3 CM
LEFT ATRIUM VOLUME (MOD BIPLANE): 24 ML
LEFT ATRIUM VOLUME INDEX (MOD BIPLANE): 13.9 ML/M2
LEFT INTERNAL DIMENSION IN SYSTOLE: 2.6 CM (ref 2.1–4)
LEFT VENTRICULAR INTERNAL DIMENSION IN DIASTOLE: 3.7 CM (ref 3.5–6)
LEFT VENTRICULAR POSTERIOR WALL IN END DIASTOLE: 0.9 CM
LEFT VENTRICULAR STROKE VOLUME: 32 ML
LVSV (TEICH): 32 ML
LYMPHOCYTES # BLD AUTO: 1.22 THOUSANDS/ÂΜL (ref 0.6–4.47)
LYMPHOCYTES NFR BLD AUTO: 21 % (ref 14–44)
MCH RBC QN AUTO: 30.9 PG (ref 26.8–34.3)
MCHC RBC AUTO-ENTMCNC: 33 G/DL (ref 31.4–37.4)
MCV RBC AUTO: 94 FL (ref 82–98)
MONOCYTES # BLD AUTO: 0.49 THOUSAND/ÂΜL (ref 0.17–1.22)
MONOCYTES NFR BLD AUTO: 8 % (ref 4–12)
MV E'TISSUE VEL-SEP: 7 CM/S
MV PEAK A VEL: 0.71 M/S
MV PEAK E VEL: 46 CM/S
MV STENOSIS PRESSURE HALF TIME: 100 MS
MV VALVE AREA P 1/2 METHOD: 2.2
NEUTROPHILS # BLD AUTO: 3.86 THOUSANDS/ÂΜL (ref 1.85–7.62)
NEUTS SEG NFR BLD AUTO: 67 % (ref 43–75)
NRBC BLD AUTO-RTO: 0 /100 WBCS
PLATELET # BLD AUTO: 208 THOUSANDS/UL (ref 149–390)
PMV BLD AUTO: 10.4 FL (ref 8.9–12.7)
POTASSIUM SERPL-SCNC: 3.5 MMOL/L (ref 3.5–5.3)
PROT SERPL-MCNC: 5.9 G/DL (ref 6.4–8.4)
RA PRESSURE ESTIMATED: 3 MMHG
RBC # BLD AUTO: 5.14 MILLION/UL (ref 3.81–5.12)
RIGHT ATRIUM AREA SYSTOLE A4C: 9.6 CM2
RIGHT VENTRICLE ID DIMENSION: 3.1 CM
RV PSP: 24 MMHG
SL CV LEFT ATRIUM LENGTH A2C: 4.3 CM
SL CV LV EF: 62
SL CV PED ECHO LEFT VENTRICLE DIASTOLIC VOLUME (MOD BIPLANE) 2D: 57 ML
SL CV PED ECHO LEFT VENTRICLE SYSTOLIC VOLUME (MOD BIPLANE) 2D: 25 ML
SODIUM SERPL-SCNC: 139 MMOL/L (ref 135–147)
TR MAX PG: 21 MMHG
TR PEAK VELOCITY: 2.3 M/S
TRICUSPID ANNULAR PLANE SYSTOLIC EXCURSION: 1.8 CM
TRICUSPID VALVE PEAK REGURGITATION VELOCITY: 2.3 M/S
WBC # BLD AUTO: 5.81 THOUSAND/UL (ref 4.31–10.16)

## 2024-03-08 PROCEDURE — 80053 COMPREHEN METABOLIC PANEL: CPT | Performed by: HOSPITALIST

## 2024-03-08 PROCEDURE — 99239 HOSP IP/OBS DSCHRG MGMT >30: CPT | Performed by: HOSPITALIST

## 2024-03-08 PROCEDURE — 93306 TTE W/DOPPLER COMPLETE: CPT

## 2024-03-08 PROCEDURE — 85025 COMPLETE CBC W/AUTO DIFF WBC: CPT | Performed by: HOSPITALIST

## 2024-03-08 PROCEDURE — 93306 TTE W/DOPPLER COMPLETE: CPT | Performed by: INTERNAL MEDICINE

## 2024-03-08 RX ORDER — ATORVASTATIN CALCIUM 40 MG/1
40 TABLET, FILM COATED ORAL
Qty: 30 TABLET | Refills: 1 | Status: SHIPPED | OUTPATIENT
Start: 2024-03-08

## 2024-03-08 RX ORDER — AMLODIPINE BESYLATE 5 MG/1
5 TABLET ORAL DAILY
Qty: 30 TABLET | Refills: 0 | Status: SHIPPED | OUTPATIENT
Start: 2024-03-08 | End: 2024-03-12

## 2024-03-08 RX ADMIN — ASPIRIN 81 MG: 81 TABLET, COATED ORAL at 08:41

## 2024-03-08 RX ADMIN — HYDROCHLOROTHIAZIDE 12.5 MG: 12.5 TABLET ORAL at 08:41

## 2024-03-08 RX ADMIN — Medication 1 PATCH: at 08:42

## 2024-03-08 RX ADMIN — ESCITALOPRAM OXALATE 5 MG: 5 TABLET, FILM COATED ORAL at 08:41

## 2024-03-08 RX ADMIN — APIXABAN 5 MG: 5 TABLET, FILM COATED ORAL at 08:41

## 2024-03-08 RX ADMIN — AMLODIPINE BESYLATE 5 MG: 5 TABLET ORAL at 08:41

## 2024-03-08 NOTE — ASSESSMENT & PLAN NOTE
History of hypertension SBO hyperlipidemia and anxiety who presents with unsteadiness, recent syncope, and elevated blood pressure.  Was seen in ED 3/2 for syncopal episode.  Was noted to have elevated blood pressures.  Was started on amlodipine by PCP but has not been able to  prescription yet.  Blood pressure was high this morning.  Incidentally found to have cerebrovascular disease. Neurology following.   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 Pt initiated on ASA, eliquis per neurologist. Pt having lightheadedness/dizziness which may be attributed to the thrombus,     No CVA was seen on MRI brain.  There is some intracranial stenosis.  Unclear if that is causing any symptoms.  Neurology recommended aspirin, Eliquis and statin.  I gave her prescriptions for those.      As we gave her prescriptions for the Norvasc which she had not read is received as outpatient

## 2024-03-08 NOTE — NURSING NOTE
Reviewed discharge instructions with patient and her sister. Both verbalized understanding. Received hardcopy prescriptions for (4)  medications. No questions.

## 2024-03-08 NOTE — PLAN OF CARE
Problem: PAIN - ADULT  Goal: Verbalizes/displays adequate comfort level or baseline comfort level  Description: Interventions:  - Encourage patient to monitor pain and request assistance  - Assess pain using appropriate pain scale  - Administer analgesics based on type and severity of pain and evaluate response  - Implement non-pharmacologic  Problem: INFECTION - ADULT  Goal: Absence or prevention of progression during hospitalization  Description: INTERVENTIONS:  - Assess and monitor for signs and symptoms of infection  - Monitor lab/diagnostic results  - Monitor all insertion sites, i.e. indwelling lines, tubes, and drains  - Monitor endotracheal if appropriate and nasal secretions for changes in amount and color  - [place] appropriate cooling/warming therapies per order  - Administer medications as ordered  - Instruct and encourage patient and family to use good hand hygiene technique  - Identify and instruct in appropriate isolation precautions for identified infection/condition  Outcome: Progressing  Goal: Absence of fever/infection during neutropenic period  Description: INTERVENTIONS:  - Monitor WBC    Outcome: Progressing     Problem: SAFETY ADULT  Goal: Patient will remain free of falls  Description: INTERVENTIONS:  - Educate patient/family on patient safety including physical limitations  - Instruct patient to call for assistance with activity   - Consult OT/PT to assist with strengthening/mobility   - Keep Call bell within reach  - Keep bed low and locked with side rails adjusted as appropriate  - Keep care items and personal belongings within reach  - Initiate and maintain comfort rounds  - Make Fall Risk Sign visible to staff  - Offer Toileting every 2 Hours, in advance of need  - Initiate/Maintain bed alarm  - Obtain necessary fall risk management equipment: N/A  - Apply yellow socks and bracelet for high fall risk patients  - Consider moving patient to room near nurses station  Outcome:  Progressing  Goal: Maintain or return to baseline ADL function  Description: INTERVENTIONS:  -  Assess patient's ability to carry out ADLs; assess patient's baseline for ADL function and identify physical deficits which impact ability to perform ADLs (bathing, care of mouth/teeth, toileting, grooming, dressing, etc.)  - Assess/evaluate cause of self-care deficits   - Assess range of motion  - Assess patient's mobility; develop plan if impaired  - Assess patient's need for assistive devices and provide as appropriate  - Encourage maximum independence but intervene and supervise when necessary  - Involve family in performance of ADLs  - Assess for home care needs following discharge   - Consider OT consult to assist with ADL evaluation and planning for discharge  - Provide patient education as appropriate  Outcome: Progressing  Goal: Maintains/Returns to pre admission functional level  Description: INTERVENTIONS:  - Perform AM-PAC 6 Click Basic Mobility/ Daily Activity assessment daily.  - Set and communicate daily mobility goal to care team and patient/family/caregiver.   - Collaborate with rehabilitation services on mobility goals if consulted  - Perform Range of Motion 3 times a day.  - Reposition patient every 2 hours.  - Dangle patient 3 times a day  - Stand patient 3 times a day  - Ambulate patient 3 times a day  - Out of bed to chair 3 times a day   - Out of bed for meals 3 times a day  - Out of bed for toileting  - Record patient progress and toleration of activity level   Outcome: Progressing     Problem: DISCHARGE PLANNING  Goal: Discharge to home or other facility with appropriate resources  Description: INTERVENTIONS:  - Identify barriers to discharge w/patient and caregiver  - Arrange for needed discharge resources and transportation as appropriate  - Identify discharge learning needs (meds, wound care, etc.)  - Arrange for interpretive services to assist at discharge as needed  - Refer to Case Management  Department for coordinating discharge planning if the patient needs post-hospital services based on physician/advanced practitioner order or complex needs related to functional status, cognitive ability, or social support system  Outcome: Progressing     Problem: Knowledge Deficit  Goal: Patient/family/caregiver demonstrates understanding of disease process, treatment plan, medications, and discharge instructions  Description: Complete learning assessment and assess knowledge base.  Interventions:  - Provide teaching at level of understanding  - Provide teaching via preferred learning methods  Outcome: Progressing     Problem: CARDIOVASCULAR - ADULT  Goal: Maintains optimal cardiac output and hemodynamic stability  Description: INTERVENTIONS:  - Monitor I/O, vital signs and rhythm  - Monitor for S/S and trends of decreased cardiac output  - Administer and titrate ordered vasoactive medications to optimize hemodynamic stability  - Assess quality of pulses, skin color and temperature  - Assess for signs of decreased coronary artery perfusion  - Instruct patient to report change in severity of symptoms  Outcome: Progressing  Goal: Absence of cardiac dysrhythmias or at baseline rhythm  Description: INTERVENTIONS:  - Continuous cardiac monitoring, vital signs, obtain 12 lead EKG if ordered  - Administer antiarrhythmic and heart rate control medications as ordered  - Monitor electrolytes and administer replacement therapy as ordered  Outcome: Progressing     Problem: METABOLIC, FLUID AND ELECTROLYTES - ADULT  Goal: Electrolytes maintained within normal limits  Description: INTERVENTIONS:  - Monitor labs and assess patient for signs and symptoms of electrolyte imbalances  - Administer electrolyte replacement as ordered  - Monitor response to electrolyte replacements, including repeat lab results as appropriate  - Instruct patient on fluid and nutrition as appropriate  Outcome: Progressing  Goal: Fluid balance  maintained  Description: INTERVENTIONS:  - Monitor labs   - Monitor I/O and WT  - Instruct patient on fluid and nutrition as appropriate  - Assess for signs & symptoms of volume excess or deficit  Outcome: Progressing   al measures as appropriate and evaluate response  - Consider cultural and social influences on pain and pain management  - Notify physician/advanced practitioner if interventions unsuccessful or patient reports new pain  Outcome: Progressing

## 2024-03-08 NOTE — DISCHARGE SUMMARY
[facility]  Discharge- Ángela Camara 1945, 78 y.o. female MRN: [id number]  Unit/Bed#: [place] [address] -02 Encounter: [id number]  Primary Care Provider: Enid Jernigan MD   Date and time admitted to hospital: 3/6/2024  8:07 AM    * Syncope and collapse  Assessment & Plan  History of hypertension SBO hyperlipidemia and anxiety who presents with unsteadiness, recent syncope, and elevated blood pressure.  Was seen in ED 3/2 for syncopal episode.  Was noted to have elevated blood pressures.  Was started on amlodipine by PCP but has not been able to  prescription yet.  Blood pressure was high this morning.  Incidentally found to have cerebrovascular disease. Neurology following.   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 Pt initiated on ASA, eliquis per neurologist. Pt having lightheadedness/dizziness which may be attributed to the thrombus,     No CVA was seen on MRI brain.  There is some intracranial stenosis.  Unclear if that is causing any symptoms.  Neurology recommended aspirin, Eliquis and statin.  I gave her prescriptions for those.      As we gave her prescriptions for the Norvasc which she had not read is received as outpatient    CVD (cerebrovascular disease)  Assessment & Plan  CTA head and neck: New focal severe stenosis in the distal left intracranial vertebral artery near the vertebrobasilar junction. New focal severe stenosis in proximal basilar artery. Eccentric filling defect along the wall of the  vessel in these stenotic foci may indicate noncalcified mural plaque or thrombus.  Neurology following.   Recommends ASA, eliquis (for VB thrombus) and repeat CTA as outpt to assess progression.         Discharging Physician / Practitioner: Wil Corbin DO  PCP: Enid Jernigan MD  Admission Date:   Admission Orders (From admission, onward)       Ordered        03/06/24 1233  INPATIENT ADMISSION  Once                          Discharge Date: 03/08/24    Medical  "Problems       Resolved Problems  Date Reviewed: 3/7/2024   None           Consultations During Hospital Stay:  Neurology        Procedures Performed:     NICOLÁS brain        Reason for Admission: high blood pressure      Hospital Course:     Ángela Camara is a 78 y.o. female patient who originally presented to the hospital on 3/6/2024 due to high blood pressure.  She states her blood pressure was high, she was supposed to start her Norvasc but had not yet picked up the prescription that she came into the hospital.  She is also been complaining of years of lightheadedness and almost passing out.  Blood pressure has been controlled since restarting the Norvasc that she had not yet gotten as an outpatient but her PCP had prescribed.  But workup was for this lightheadedness.  CTA so show thrombus and intracranial stenosis.  She was seen by neurology.  They recommended aspirin and Eliquis as well as a statin.  Unclear if these are causing her symptoms.  She is okay to be discharged home.  She should follow-up with neurology concerning follow-up of these vascular disease.    Please see above list of diagnoses and related plan for additional information.       Condition at Discharge: stable       Discharge Day Visit / Exam:     Subjective:  feels well  No light headedness.      Vitals: Blood Pressure: 112/79 (03/08/24 1504)  Pulse: 68 (03/08/24 1504)  Temperature: 98.1 °F (36.7 °C) (03/08/24 1504)  Temp Source: Oral (03/07/24 1915)  Respirations: 18 (03/08/24 1504)  Height: 5' 6\" (167.6 cm) (03/08/24 1148)  Weight - Scale: 64.4 kg (142 lb) (03/08/24 1148)  SpO2: 92 % (03/08/24 1504)    Exam:     Physical Exam  Vitals and nursing note reviewed.   HENT:      Head: Normocephalic and atraumatic.   Eyes:      Pupils: Pupils are equal, round, and reactive to light.   Cardiovascular:      Rate and Rhythm: Normal rate and regular rhythm.      Heart sounds: No murmur heard.     No friction rub. No gallop.   Pulmonary:     "  Effort: Pulmonary effort is normal.      Breath sounds: Normal breath sounds. No wheezing or rales.   Abdominal:      General: Bowel sounds are normal.      Palpations: Abdomen is soft.      Tenderness: There is no abdominal tenderness.   Musculoskeletal:      Right lower leg: No edema.      Left lower leg: Edema present.       .         Discharge instructions/Information to patient and family:   See after visit summary for information provided to patient and family.      Provisions for Follow-Up Care:  See after visit summary for information related to follow-up care and any pertinent home health orders.      Disposition:     Home       Discharge Statement:  I spent 38 minutes discharging the patient. This time was spent on the day of discharge. I had direct contact with the patient on the day of discharge. Greater than 50% of the total time was spent examining patient, answering all patient questions, arranging and discussing plan of care with patient as well as directly providing post-discharge instructions.  Additional time then spent on discharge activities.    Discharge Medications:  See after visit summary for reconciled discharge medications provided to patient and family.      ** Please Note: This note has been constructed using a voice recognition system **

## 2024-03-08 NOTE — PLAN OF CARE
Problem: PAIN - ADULT  Goal: Verbalizes/displays adequate comfort level or baseline comfort level  Description: Interventions:  - Encourage patient to monitor pain and request assistance  - Assess pain using appropriate pain scale  - Administer analgesics based on type and severity of pain and evaluate response  - Implement non-pharmacological measures as appropriate and evaluate response  - Consider cultural and social influences on pain and pain management  - Notify physician/advanced practitioner if interventions unsuccessful or patient reports new pain  Outcome: Progressing     Problem: INFECTION - ADULT  Goal: Absence or prevention of progression during hospitalization  Description: INTERVENTIONS:  - Assess and monitor for signs and symptoms of infection  - Monitor lab/diagnostic results  - Monitor all insertion sites, i.e. indwelling lines, tubes, and drains  - Monitor endotracheal if appropriate and nasal secretions for changes in amount and color  - [place] appropriate cooling/warming therapies per order  - Administer medications as ordered  - Instruct and encourage patient and family to use good hand hygiene technique  - Identify and instruct in appropriate isolation precautions for identified infection/condition  Outcome: Progressing  Goal: Absence of fever/infection during neutropenic period  Description: INTERVENTIONS:  - Monitor WBC    Outcome: Progressing     Problem: SAFETY ADULT  Goal: Patient will remain free of falls  Description: INTERVENTIONS:  - Educate patient/family on patient safety including physical limitations  - Instruct patient to call for assistance with activity   - Consult OT/PT to assist with strengthening/mobility   - Keep Call bell within reach  - Keep bed low and locked with side rails adjusted as appropriate  - Keep care items and personal belongings within reach  - Initiate and maintain comfort rounds  - Make Fall Risk Sign visible to staff  - Offer Toileting every 2 Hours,  in advance of need  - Initiate/Maintain bed alarm  - Obtain necessary fall risk management equipment: N/A  - Apply yellow socks and bracelet for high fall risk patients  - Consider moving patient to room near nurses station  Outcome: Progressing  Goal: Maintain or return to baseline ADL function  Description: INTERVENTIONS:  -  Assess patient's ability to carry out ADLs; assess patient's baseline for ADL function and identify physical deficits which impact ability to perform ADLs (bathing, care of mouth/teeth, toileting, grooming, dressing, etc.)  - Assess/evaluate cause of self-care deficits   - Assess range of motion  - Assess patient's mobility; develop plan if impaired  - Assess patient's need for assistive devices and provide as appropriate  - Encourage maximum independence but intervene and supervise when necessary  - Involve family in performance of ADLs  - Assess for home care needs following discharge   - Consider OT consult to assist with ADL evaluation and planning for discharge  - Provide patient education as appropriate  Outcome: Progressing  Goal: Maintains/Returns to pre admission functional level  Description: INTERVENTIONS:  - Perform AM-PAC 6 Click Basic Mobility/ Daily Activity assessment daily.  - Set and communicate daily mobility goal to care team and patient/family/caregiver.   - Collaborate with rehabilitation services on mobility goals if consulted  - Perform Range of Motion 3 times a day.  - Reposition patient every 2 hours.  - Dangle patient 3 times a day  - Stand patient 3 times a day  - Ambulate patient 3 times a day  - Out of bed to chair 3 times a day   - Out of bed for meals 3 times a day  - Out of bed for toileting  - Record patient progress and toleration of activity level   Outcome: Progressing     Problem: DISCHARGE PLANNING  Goal: Discharge to home or other facility with appropriate resources  Description: INTERVENTIONS:  - Identify barriers to discharge w/patient and  caregiver  - Arrange for needed discharge resources and transportation as appropriate  - Identify discharge learning needs (meds, wound care, etc.)  - Arrange for interpretive services to assist at discharge as needed  - Refer to Case Management Department for coordinating discharge planning if the patient needs post-hospital services based on physician/advanced practitioner order or complex needs related to functional status, cognitive ability, or social support system  Outcome: Progressing     Problem: Knowledge Deficit  Goal: Patient/family/caregiver demonstrates understanding of disease process, treatment plan, medications, and discharge instructions  Description: Complete learning assessment and assess knowledge base.  Interventions:  - Provide teaching at level of understanding  - Provide teaching via preferred learning methods  Outcome: Progressing     Problem: CARDIOVASCULAR - ADULT  Goal: Maintains optimal cardiac output and hemodynamic stability  Description: INTERVENTIONS:  - Monitor I/O, vital signs and rhythm  - Monitor for S/S and trends of decreased cardiac output  - Administer and titrate ordered vasoactive medications to optimize hemodynamic stability  - Assess quality of pulses, skin color and temperature  - Assess for signs of decreased coronary artery perfusion  - Instruct patient to report change in severity of symptoms  Outcome: Progressing  Goal: Absence of cardiac dysrhythmias or at baseline rhythm  Description: INTERVENTIONS:  - Continuous cardiac monitoring, vital signs, obtain 12 lead EKG if ordered  - Administer antiarrhythmic and heart rate control medications as ordered  - Monitor electrolytes and administer replacement therapy as ordered  Outcome: Progressing     Problem: METABOLIC, FLUID AND ELECTROLYTES - ADULT  Goal: Electrolytes maintained within normal limits  Description: INTERVENTIONS:  - Monitor labs and assess patient for signs and symptoms of electrolyte imbalances  -  Administer electrolyte replacement as ordered  - Monitor response to electrolyte replacements, including repeat lab results as appropriate  - Instruct patient on fluid and nutrition as appropriate  Outcome: Progressing  Goal: Fluid balance maintained  Description: INTERVENTIONS:  - Monitor labs   - Monitor I/O and WT  - Instruct patient on fluid and nutrition as appropriate  - Assess for signs & symptoms of volume excess or deficit  Outcome: Progressing

## 2024-03-09 ENCOUNTER — HOSPITAL ENCOUNTER (INPATIENT)
Facility: HOSPITAL | Age: 79
LOS: 2 days | Discharge: HOME | DRG: 149 | End: 2024-03-12
Attending: EMERGENCY MEDICINE | Admitting: HOSPITALIST
Payer: MEDICARE

## 2024-03-09 DIAGNOSIS — R55 NEAR SYNCOPE: Primary | ICD-10-CM

## 2024-03-09 DIAGNOSIS — I67.9 CVD (CEREBROVASCULAR DISEASE): ICD-10-CM

## 2024-03-09 DIAGNOSIS — R42 DIZZINESS AND GIDDINESS: ICD-10-CM

## 2024-03-09 PROBLEM — E87.6 HYPOKALEMIA: Status: ACTIVE | Noted: 2024-03-09

## 2024-03-09 LAB
ALBUMIN SERPL BCP-MCNC: 3.8 G/DL (ref 3.5–5)
ALP SERPL-CCNC: 73 U/L (ref 34–104)
ALT SERPL W P-5'-P-CCNC: 11 U/L (ref 7–52)
ANION GAP SERPL CALCULATED.3IONS-SCNC: 6 MMOL/L
AST SERPL W P-5'-P-CCNC: 11 U/L (ref 13–39)
ATRIAL RATE: 54 BPM
BASOPHILS # BLD AUTO: 0.04 THOUSANDS/ÂΜL (ref 0–0.1)
BASOPHILS NFR BLD AUTO: 1 % (ref 0–1)
BILIRUB SERPL-MCNC: 0.71 MG/DL (ref 0.2–1)
BUN SERPL-MCNC: 25 MG/DL (ref 5–25)
CALCIUM SERPL-MCNC: 9.1 MG/DL (ref 8.4–10.2)
CHLORIDE SERPL-SCNC: 103 MMOL/L (ref 96–108)
CO2 SERPL-SCNC: 29 MMOL/L (ref 21–32)
CREAT SERPL-MCNC: 1.27 MG/DL (ref 0.6–1.3)
EOSINOPHIL # BLD AUTO: 0.13 THOUSAND/ÂΜL (ref 0–0.61)
EOSINOPHIL NFR BLD AUTO: 2 % (ref 0–6)
ERYTHROCYTE [DISTWIDTH] IN BLOOD BY AUTOMATED COUNT: 13.2 % (ref 11.6–15.1)
GFR SERPL CREATININE-BSD FRML MDRD: 41 ML/MIN/1.73SQ M
GLUCOSE SERPL-MCNC: 156 MG/DL (ref 65–140)
HCT VFR BLD AUTO: 49.4 % (ref 34.8–46.1)
HGB BLD-MCNC: 16.2 G/DL (ref 11.5–15.4)
IMM GRANULOCYTES # BLD AUTO: 0.04 THOUSAND/UL (ref 0–0.2)
IMM GRANULOCYTES NFR BLD AUTO: 1 % (ref 0–2)
LYMPHOCYTES # BLD AUTO: 0.73 THOUSANDS/ÂΜL (ref 0.6–4.47)
LYMPHOCYTES NFR BLD AUTO: 11 % (ref 14–44)
MCH RBC QN AUTO: 30.3 PG (ref 26.8–34.3)
MCHC RBC AUTO-ENTMCNC: 32.8 G/DL (ref 31.4–37.4)
MCV RBC AUTO: 92 FL (ref 82–98)
MONOCYTES # BLD AUTO: 0.36 THOUSAND/ÂΜL (ref 0.17–1.22)
MONOCYTES NFR BLD AUTO: 5 % (ref 4–12)
NEUTROPHILS # BLD AUTO: 5.62 THOUSANDS/ÂΜL (ref 1.85–7.62)
NEUTS SEG NFR BLD AUTO: 80 % (ref 43–75)
NRBC BLD AUTO-RTO: 0 /100 WBCS
P AXIS: 76 DEGREES
PLATELET # BLD AUTO: 221 THOUSANDS/UL (ref 149–390)
PMV BLD AUTO: 10.4 FL (ref 8.9–12.7)
POTASSIUM SERPL-SCNC: 3.2 MMOL/L (ref 3.5–5.3)
PR INTERVAL: 176 MS
PROT SERPL-MCNC: 6.2 G/DL (ref 6.4–8.4)
QRS AXIS: 33 DEGREES
QRSD INTERVAL: 92 MS
QT INTERVAL: 450 MS
QTC INTERVAL: 426 MS
RBC # BLD AUTO: 5.35 MILLION/UL (ref 3.81–5.12)
SODIUM SERPL-SCNC: 138 MMOL/L (ref 135–147)
T WAVE AXIS: 37 DEGREES
VENTRICULAR RATE: 54 BPM
WBC # BLD AUTO: 6.92 THOUSAND/UL (ref 4.31–10.16)

## 2024-03-09 PROCEDURE — 99223 1ST HOSP IP/OBS HIGH 75: CPT | Performed by: INTERNAL MEDICINE

## 2024-03-09 PROCEDURE — 93005 ELECTROCARDIOGRAM TRACING: CPT

## 2024-03-09 PROCEDURE — 80053 COMPREHEN METABOLIC PANEL: CPT | Performed by: EMERGENCY MEDICINE

## 2024-03-09 PROCEDURE — 93010 ELECTROCARDIOGRAM REPORT: CPT | Performed by: INTERNAL MEDICINE

## 2024-03-09 PROCEDURE — 96365 THER/PROPH/DIAG IV INF INIT: CPT

## 2024-03-09 PROCEDURE — 85025 COMPLETE CBC W/AUTO DIFF WBC: CPT | Performed by: EMERGENCY MEDICINE

## 2024-03-09 PROCEDURE — 36415 COLL VENOUS BLD VENIPUNCTURE: CPT | Performed by: EMERGENCY MEDICINE

## 2024-03-09 PROCEDURE — 99284 EMERGENCY DEPT VISIT MOD MDM: CPT

## 2024-03-09 PROCEDURE — 96375 TX/PRO/DX INJ NEW DRUG ADDON: CPT

## 2024-03-09 PROCEDURE — 99285 EMERGENCY DEPT VISIT HI MDM: CPT | Performed by: EMERGENCY MEDICINE

## 2024-03-09 RX ORDER — HYDROCHLOROTHIAZIDE 12.5 MG/1
12.5 TABLET ORAL DAILY
Status: DISCONTINUED | OUTPATIENT
Start: 2024-03-10 | End: 2024-03-12 | Stop reason: HOSPADM

## 2024-03-09 RX ORDER — AMLODIPINE BESYLATE 5 MG/1
5 TABLET ORAL DAILY
Status: DISCONTINUED | OUTPATIENT
Start: 2024-03-09 | End: 2024-03-10

## 2024-03-09 RX ORDER — SODIUM CHLORIDE, SODIUM GLUCONATE, SODIUM ACETATE, POTASSIUM CHLORIDE, MAGNESIUM CHLORIDE, SODIUM PHOSPHATE, DIBASIC, AND POTASSIUM PHOSPHATE .53; .5; .37; .037; .03; .012; .00082 G/100ML; G/100ML; G/100ML; G/100ML; G/100ML; G/100ML; G/100ML
500 INJECTION, SOLUTION INTRAVENOUS ONCE
Status: COMPLETED | OUTPATIENT
Start: 2024-03-09 | End: 2024-03-09

## 2024-03-09 RX ORDER — ALPRAZOLAM 0.5 MG/1
0.5 TABLET ORAL
Status: DISCONTINUED | OUTPATIENT
Start: 2024-03-09 | End: 2024-03-12 | Stop reason: HOSPADM

## 2024-03-09 RX ORDER — POTASSIUM CHLORIDE 20 MEQ/1
40 TABLET, EXTENDED RELEASE ORAL ONCE
Status: COMPLETED | OUTPATIENT
Start: 2024-03-09 | End: 2024-03-09

## 2024-03-09 RX ORDER — ACETAMINOPHEN 325 MG/1
650 TABLET ORAL EVERY 4 HOURS PRN
Status: DISCONTINUED | OUTPATIENT
Start: 2024-03-09 | End: 2024-03-12 | Stop reason: HOSPADM

## 2024-03-09 RX ORDER — ATORVASTATIN CALCIUM 40 MG/1
40 TABLET, FILM COATED ORAL
Status: DISCONTINUED | OUTPATIENT
Start: 2024-03-09 | End: 2024-03-12 | Stop reason: HOSPADM

## 2024-03-09 RX ORDER — ESCITALOPRAM OXALATE 5 MG/1
5 TABLET ORAL DAILY
Status: DISCONTINUED | OUTPATIENT
Start: 2024-03-09 | End: 2024-03-12 | Stop reason: HOSPADM

## 2024-03-09 RX ORDER — MECLIZINE HYDROCHLORIDE 25 MG/1
25 TABLET ORAL EVERY 8 HOURS SCHEDULED
Status: COMPLETED | OUTPATIENT
Start: 2024-03-09 | End: 2024-03-11

## 2024-03-09 RX ORDER — DICYCLOMINE HCL 20 MG
20 TABLET ORAL
Status: DISCONTINUED | OUTPATIENT
Start: 2024-03-09 | End: 2024-03-10

## 2024-03-09 RX ORDER — ONDANSETRON 2 MG/ML
4 INJECTION INTRAMUSCULAR; INTRAVENOUS EVERY 4 HOURS PRN
Status: DISCONTINUED | OUTPATIENT
Start: 2024-03-09 | End: 2024-03-12 | Stop reason: HOSPADM

## 2024-03-09 RX ORDER — ONDANSETRON 2 MG/ML
4 INJECTION INTRAMUSCULAR; INTRAVENOUS ONCE
Status: COMPLETED | OUTPATIENT
Start: 2024-03-09 | End: 2024-03-09

## 2024-03-09 RX ORDER — SODIUM CHLORIDE, SODIUM GLUCONATE, SODIUM ACETATE, POTASSIUM CHLORIDE, MAGNESIUM CHLORIDE, SODIUM PHOSPHATE, DIBASIC, AND POTASSIUM PHOSPHATE .53; .5; .37; .037; .03; .012; .00082 G/100ML; G/100ML; G/100ML; G/100ML; G/100ML; G/100ML; G/100ML
100 INJECTION, SOLUTION INTRAVENOUS CONTINUOUS
Status: DISCONTINUED | OUTPATIENT
Start: 2024-03-09 | End: 2024-03-10

## 2024-03-09 RX ADMIN — SODIUM CHLORIDE, SODIUM GLUCONATE, SODIUM ACETATE, POTASSIUM CHLORIDE, MAGNESIUM CHLORIDE, SODIUM PHOSPHATE, DIBASIC, AND POTASSIUM PHOSPHATE 100 ML/HR: .53; .5; .37; .037; .03; .012; .00082 INJECTION, SOLUTION INTRAVENOUS at 16:13

## 2024-03-09 RX ADMIN — POTASSIUM CHLORIDE 40 MEQ: 1500 TABLET, EXTENDED RELEASE ORAL at 13:12

## 2024-03-09 RX ADMIN — APIXABAN 5 MG: 5 TABLET, FILM COATED ORAL at 21:05

## 2024-03-09 RX ADMIN — ESCITALOPRAM 5 MG: 5 TABLET, FILM COATED ORAL at 16:08

## 2024-03-09 RX ADMIN — DICYCLOMINE HYDROCHLORIDE 20 MG: 20 TABLET ORAL at 16:09

## 2024-03-09 RX ADMIN — MECLIZINE HYDROCHLORIDE 25 MG: 25 TABLET ORAL at 21:05

## 2024-03-09 RX ADMIN — NICOTINE 7 MG/24 HR DAILY TRANSDERMAL PATCH 1 PATCH: at 16:09

## 2024-03-09 RX ADMIN — ASPIRIN 81 MG: 81 TABLET, COATED ORAL at 16:08

## 2024-03-09 RX ADMIN — MECLIZINE HYDROCHLORIDE 25 MG: 25 TABLET ORAL at 16:08

## 2024-03-09 RX ADMIN — ONDANSETRON 4 MG: 2 INJECTION INTRAMUSCULAR; INTRAVENOUS at 13:10

## 2024-03-09 RX ADMIN — ATORVASTATIN CALCIUM 40 MG: 40 TABLET, FILM COATED ORAL at 16:09

## 2024-03-09 RX ADMIN — SODIUM CHLORIDE, SODIUM GLUCONATE, SODIUM ACETATE, POTASSIUM CHLORIDE, MAGNESIUM CHLORIDE, SODIUM PHOSPHATE, DIBASIC, AND POTASSIUM PHOSPHATE 100 ML/HR: .53; .5; .37; .037; .03; .012; .00082 INJECTION, SOLUTION INTRAVENOUS at 18:57

## 2024-03-09 RX ADMIN — SODIUM CHLORIDE, SODIUM GLUCONATE, SODIUM ACETATE, POTASSIUM CHLORIDE, MAGNESIUM CHLORIDE, SODIUM PHOSPHATE, DIBASIC, AND POTASSIUM PHOSPHATE 500 ML: .53; .5; .37; .037; .03; .012; .00082 INJECTION, SOLUTION INTRAVENOUS at 12:38

## 2024-03-09 NOTE — ASSESSMENT & PLAN NOTE
Lab Results   Component Value Date    EGFR 41 03/09/2024    EGFR 47 03/08/2024    EGFR 51 03/07/2024    CREATININE 1.27 03/09/2024    CREATININE 1.12 03/08/2024    CREATININE 1.06 03/07/2024     Renal function at baseline

## 2024-03-09 NOTE — H&P
[facility]  H&P  Name: Ángela Camara 78 y.o. female I MRN: [id number]  Unit/Bed#: ED-26 I Date of Admission: 3/9/2024   Date of Service: 3/9/2024 I Hospital Day: 0      Assessment/Plan   * Dizziness and giddiness  Assessment & Plan  Recent hospitalization discharged yesterday for near syncope presents with dizziness  Near syncope/headache thought secondary to possible basilar thrombus.  Started on eliquis.  Echocardiogram without significant valvular dysfunction  Continue IV fluids.  Orthostatics negative.  Add meclizine.    CVD (cerebrovascular disease)  Assessment & Plan  Hospitalized yesterday for various intracranial stenosis and/or thrombus.  Was started on eliquis.    PAD (peripheral artery disease) ([facility])  Assessment & Plan  Incidental finding of left EIA occlusion.  Has outpatient vascular surgery follow-up  Continue aspirin and statin    Hypokalemia  Assessment & Plan  Replace and re check tomorrow    Results from last 7 days   Lab Units 03/09/24  1230 03/08/24  0451 03/07/24  0428 03/06/24  0853   POTASSIUM mmol/L 3.2* 3.5 3.2* 3.1*       Anxiety and depression  Assessment & Plan  Continue escitalopram and alprazolam as needed    CKD (chronic kidney disease) stage 3, GFR 30-59 ml/min ([facility])  Assessment & Plan  Lab Results   Component Value Date    EGFR 41 03/09/2024    EGFR 47 03/08/2024    EGFR 51 03/07/2024    CREATININE 1.27 03/09/2024    CREATININE 1.12 03/08/2024    CREATININE 1.06 03/07/2024     Renal function at baseline    Mixed hyperlipidemia  Assessment & Plan  Continue atorvastatin    Essential hypertension  Assessment & Plan  Continue amlodipine and hydrochlorothiazide    VTE Pharmacologic Prophylaxis: VTE Score: 3 Moderate Risk (Score 3-4) - Pharmacological DVT Prophylaxis Ordered: apixaban (Eliquis).  Code Status: Level 1 - Full Code  Discussion with family:     Anticipated Length of Stay: Patient will be admitted on an observation basis with an  anticipated length of stay of less than 2 midnights secondary to dizziness.    Total Time Spent on Date of Encounter in care of patient: This time was spent on one or more of the following: performing physical exam; counseling and coordination of care; obtaining or reviewing history; documenting in the medical record; reviewing/ordering tests, medications or procedures; communicating with other healthcare professionals and discussing with patient's family/caregivers.    Chief Complaint:     Dizziness (Patient reports lightheadedness since about 930 this morning. States began about 20 minutes after taking morning medications but is not able to elaborate on what medications she took. Patient was recently admitted to this facility and discharged last night. Denies other complaint. )    History of Present Illness:    Ángela Camara is a 78 y.o. female with a past medical history of CVD PAD anxiety CKD and hypertension who presents with dizziness.  The patient was hospitalized and discharged yesterday for her multiple complaints including incidental finding of PAD and possible basilar thrombus.  She was started on Eliquis.  She was also seen by vascular surgery for chronic EIA occlusion.  When she went home she felt okay.  This morning she woke up with some dizziness.  She drank coffee and ate half a bagel with cream cheese.  She felt very dizzy walking to the bathroom and at this time she had profound lightheadedness and diaphoresis.  She came back to the ED and has been requested admission.  She denies any chest pain shortness of breath nausea.  She did have diarrhea.    Review of Systems:  Review of Systems   Constitutional:  Negative for chills, diaphoresis and fever.   HENT:  Negative for facial swelling and trouble swallowing.    Eyes:  Negative for visual disturbance.   Respiratory:  Negative for shortness of breath.    Cardiovascular:  Negative for chest pain and palpitations.   Gastrointestinal:   Positive for diarrhea. Negative for abdominal distention, abdominal pain, nausea and vomiting.   Genitourinary:  Negative for hematuria.   Musculoskeletal:  Negative for back pain.   Skin:  Negative for rash.   Neurological:  Positive for dizziness, light-headedness and headaches (Posterior). Negative for numbness.   Psychiatric/Behavioral:  The patient is not nervous/anxious.    All other systems reviewed and are negative.        Past Medical and Surgical History:   Past Medical History:   Diagnosis Date    Anxiety     Atypical squamous cells cannot exclude high grade squamous intraepithelial lesion on cytologic smear of cervix ([facility])     Last Assessed 5/13/2014    Back pain     Cancer (HCC)     Cervical cancer (HCC)     Depression     Ovarian cyst     Tinnitus of both ears 10/2/2020    Tobacco abuse 11/20/2018     Past Surgical History:   Procedure Laterality Date    ANKLE FRACTURE SURGERY      Last Assessed 07/26/2016    ANKLE SURGERY      CERVICAL BIOPSY  W/ LOOP ELECTRODE EXCISION      COLONOSCOPY  10/2019    DIAGNOSTIC LAPAROSCOPY      DILATION AND CURETTAGE OF UTERUS      ESOPHAGOGASTRODUODENOSCOPY N/A 9/12/2016    Procedure: ESOPHAGOGASTRODUODENOSCOPY (EGD);  Surgeon: Alvin Powell MD;  Location: [facility];  Service:     FRACTURE SURGERY      ORTHOPEDIC SURGERY      DC COLONOSCOPY FLX DX W/COLLJ SPEC WHEN PFRMD N/A 9/12/2016    Procedure: FLEXIBLE COLONOSCOPY;  Surgeon: Alvin Powell MD;  Location: [facility];  Service: Colorectal    SALPINGOOPHORECTOMY Left     TONSILLECTOMY      UPPER GASTROINTESTINAL ENDOSCOPY  2016     Meds/Allergies:  Allergies:   Allergies   Allergen Reactions    Lisinopril Syncope     Other reaction(s): Other (See Comments)  Nose bleeds, passing out     Prior to Admission Medications   Prescriptions Last Dose Informant Patient Reported? Taking?   ALPRAZolam (XANAX) 0.5 mg tablet  Self No No   Sig: Take 1 tablet (0.5 mg total) by mouth daily at bedtime as needed for anxiety    amLODIPine (NORVASC) 5 mg tablet   No No   Sig: Take 1 tablet (5 mg total) by mouth daily   apixaban (ELIQUIS) 5 mg   No Yes   Sig: Take 1 tablet (5 mg total) by mouth 2 (two) times a day   aspirin (ECOTRIN LOW STRENGTH) 81 mg EC tablet   No Yes   Sig: Take 1 tablet (81 mg total) by mouth daily   atorvastatin (LIPITOR) 40 mg tablet 3/8/2024  No Yes   Sig: Take 1 tablet (40 mg total) by mouth daily with dinner   dicyclomine (BENTYL) 20 mg tablet  Self No No   Sig: Take 1 tablet (20 mg total) by mouth every 6 (six) hours   escitalopram (LEXAPRO) 5 mg tablet  Self No Yes   Sig: Take 1 tablet (5 mg total) by mouth daily   hydrochlorothiazide (MICROZIDE) 12.5 mg capsule  Self No Yes   Sig: TAKE ONE CAPSULE BY MOUTH ONCE DAILY      Facility-Administered Medications: None     Social History:     Social History     Socioeconomic History    Marital status:      Spouse name: Not on file    Number of children: Not on file    Years of education: 15    Highest education level: Not on file   Occupational History    Not on file   Tobacco Use    Smoking status: Every Day     Current packs/day: 0.50     Average packs/day: 0.5 packs/day for 60.0 years (30.0 ttl pk-yrs)     Types: Cigarettes    Smokeless tobacco: Never   Vaping Use    Vaping status: Never Used   Substance and Sexual Activity    Alcohol use: Not Currently    Drug use: Never    Sexual activity: Not Currently   Other Topics Concern    Not on file   Social History Narrative    ** Merged History Encounter **           since 2015 after 13 year marriage.  1 daughter from 1st marriage.  1 granddaughter, Rosa Maria,Natasha, whom she raised.  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     Social Determinants of Health     Financial Resource Strain: Low Risk  (12/14/2023)    Overall Financial Resource Strain (CARDIA)     Difficulty of Paying Living Expenses: Not hard at all   Food Insecurity: No Food Insecurity (3/7/2024)    Hunger Vital Sign      Worried About Running Out of Food in the Last Year: Never true     Ran Out of Food in the Last Year: Never true   Transportation Needs: No Transportation Needs (3/7/2024)    PRAPARE - Transportation     Lack of Transportation (Medical): No     Lack of Transportation (Non-Medical): No   Physical Activity: Not on file   Stress: Not on file   Social Connections: Not on file   Intimate Partner Violence: Not on file   Housing Stability: Low Risk  (3/7/2024)    Housing Stability Vital Sign     Unable to Pay for Housing in the Last Year: No     Number of Places Lived in the Last Year: 1     Unstable Housing in the Last Year: No     Patient Pre-hospital Living Situation:   Patient Pre-hospital Level of Mobility:   Patient Pre-hospital Diet Restrictions:     Family History:  Family History   Problem Relation Age of Onset    Coronary artery disease Mother     No Known Problems Family     No Known Problems Father     Colon cancer Neg Hx      Physical Exam:   Vitals:   Blood Pressure: 123/80 (03/09/24 1321)  Pulse: 77 (03/09/24 1321)  Temperature: 98.2 °F (36.8 °C) (03/09/24 1454)  Temp Source: Oral (03/09/24 1454)  Respirations: 18 (03/09/24 1321)  SpO2: 96 % (03/09/24 1321)    Physical Exam  Vitals reviewed.   Constitutional:       General: She is not in acute distress.     Appearance: Normal appearance.   HENT:      Head: Atraumatic.   Eyes:      General: No scleral icterus.     Extraocular Movements: Extraocular movements intact.   Cardiovascular:      Rate and Rhythm: Regular rhythm.      Heart sounds: Normal heart sounds.   Pulmonary:      Breath sounds: Decreased breath sounds present. No wheezing.   Abdominal:      General: Bowel sounds are normal.      Palpations: Abdomen is soft.      Tenderness: There is no guarding or rebound.   Musculoskeletal:         General: Tenderness (Right posterior neck) present. No swelling.   Skin:     General: Skin is warm.      Coloration: Skin is not jaundiced.   Neurological:       General: No focal deficit present.      Mental Status: She is alert.      Motor: No weakness.   Psychiatric:         Mood and Affect: Mood normal.       Lab Results: I have personally reviewed pertinent reports.    Results from last 7 days   Lab Units 03/09/24  1230   WBC Thousand/uL 6.92   HEMOGLOBIN g/dL 16.2*   HEMATOCRIT % 49.4*   PLATELETS Thousands/uL 221   NEUTROS PCT % 80*   LYMPHS PCT % 11*   MONOS PCT % 5   EOS PCT % 2     Results from last 7 days   Lab Units 03/09/24  1230 03/08/24  0451 03/07/24  0428 03/06/24  0853   SODIUM mmol/L 138 139 141 140   POTASSIUM mmol/L 3.2* 3.5 3.2* 3.1*   CHLORIDE mmol/L 103 105 105 103   CO2 mmol/L 29 27 28 29   ANION GAP mmol/L 6 7 8 8   BUN mg/dL 25 18 17 15   CREATININE mg/dL 1.27 1.12 1.06 1.03   CALCIUM mg/dL 9.1 8.8 8.7 9.3   ALBUMIN g/dL 3.8 3.6 3.8 4.1   TOTAL BILIRUBIN mg/dL 0.71 0.56 0.54 0.61   ALK PHOS U/L 73 72 62 78   ALT U/L 11 9 11 12   AST U/L 11* 13 13 14   EGFR ml/min/1.73sq m 41 47 51 52   GLUCOSE RANDOM mg/dL 156* 91 98 108     Results from last 7 days   Lab Units 03/06/24  0853   INR  0.94         Results from last 7 days   Lab Units 03/06/24  1246 03/06/24  1041 03/06/24  0853   HS TNI 0HR ng/L  --   --  4   HS TNI 2HR ng/L  --  4  --    HS TNI 4HR ng/L 3  --   --      Results from last 7 days   Lab Units 03/06/24  1101   LACTIC ACID mmol/L 0.8              Results from last 7 days   Lab Units 03/06/24  0901   COLOR UA  Yellow   CLARITY UA  Clear   SPEC GRAV UA  1.010   PH UA  5.5   LEUKOCYTES UA  Negative   NITRITE UA  Negative   GLUCOSE UA mg/dl Negative   KETONES UA mg/dl Negative   BILIRUBIN UA  Negative   BLOOD UA  Negative                 Lines/Drains  Invasive Devices       Peripheral Intravenous Line  Duration             Peripheral IV 03/09/24 Right Forearm <1 day                    Imaging:   No results found.    EKG, Pathology, and Other Studies Reviewed on Admission:   EKG  Result Date: 03/09/24  Personally reviewed strips with  impression of: Sinus bradycardia 54 bpm    ** Please Note: This note has been constructed using a voice recognition system. **

## 2024-03-09 NOTE — ASSESSMENT & PLAN NOTE
Hospitalized yesterday for various intracranial stenosis and/or thrombus.  Was started on eliquis.

## 2024-03-09 NOTE — ED PROVIDER NOTES
History  Chief Complaint   Patient presents with    Dizziness     Patient reports lightheadedness since about 930 this morning. States began about 20 minutes after taking morning medications but is not able to elaborate on what medications she took. Patient was recently admitted to this facility and discharged last night. Denies other complaint.      78-year-old female presents for evaluation after a near syncopal episode.  Patient was just recently admitted to the hospital after several recent episodes of syncope.  Patient was found to be hypertensive, and was started on amlodipine.  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  Patient was discharged home last night.  She states that when she woke up this morning, she noted that her systolic blood pressure was in the low 100s.  Patient states that just prior to arrival to the emergency department, she went to the restroom and while sitting on the toilet she suddenly became lightheaded, diaphoretic, and felt as though she was going to pass out.  Patient states that during this time, she had difficulty speaking, and difficulty moving.  Patient believes that this episode lasted approximately 20 minutes before she felt like she was back to her baseline.  She states that she did not take her amlodipine this morning, but she was given it in the hospital yesterday.  She did take her other medications.  She currently endorses mild headache, but states that this is not unusual for her.  She denies any visual changes, chest pain, shortness of breath, abdominal pain, nausea, vomiting, focal numbness/weakness or other concerning symptoms.        Prior to Admission Medications   Prescriptions Last Dose Informant Patient Reported? Taking?   ALPRAZolam (XANAX) 0.5 mg tablet Not Taking Self No No   Sig: Take 1 tablet (0.5 mg total) by mouth daily at bedtime as needed for anxiety   Patient not taking: Reported  on 3/9/2024   amLODIPine (NORVASC) 5 mg tablet Not Taking  No No   Sig: Take 1 tablet (5 mg total) by mouth daily   Patient not taking: Reported on 3/9/2024   apixaban (ELIQUIS) 5 mg   No Yes   Sig: Take 1 tablet (5 mg total) by mouth 2 (two) times a day   aspirin (ECOTRIN LOW STRENGTH) 81 mg EC tablet   No Yes   Sig: Take 1 tablet (81 mg total) by mouth daily   atorvastatin (LIPITOR) 40 mg tablet 3/8/2024  No Yes   Sig: Take 1 tablet (40 mg total) by mouth daily with dinner   dicyclomine (BENTYL) 20 mg tablet Not Taking Self No No   Sig: Take 1 tablet (20 mg total) by mouth every 6 (six) hours   Patient not taking: Reported on 3/9/2024   escitalopram (LEXAPRO) 5 mg tablet  Self No Yes   Sig: Take 1 tablet (5 mg total) by mouth daily   hydrochlorothiazide (MICROZIDE) 12.5 mg capsule  Self No Yes   Sig: TAKE ONE CAPSULE BY MOUTH ONCE DAILY      Facility-Administered Medications: None       Past Medical History:   Diagnosis Date    Anxiety     Atypical squamous cells cannot exclude high grade squamous intraepithelial lesion on cytologic smear of cervix (ASC-H)     Last Assessed 5/13/2014    Back pain     Cancer (HCC)     Cervical cancer (HCC)     Depression     Ovarian cyst     Tinnitus of both ears 10/2/2020    Tobacco abuse 11/20/2018       Past Surgical History:   Procedure Laterality Date    ANKLE FRACTURE SURGERY      Last Assessed 07/26/2016    ANKLE SURGERY      CERVICAL BIOPSY  W/ LOOP ELECTRODE EXCISION      COLONOSCOPY  10/2019    DIAGNOSTIC LAPAROSCOPY      DILATION AND CURETTAGE OF UTERUS      ESOPHAGOGASTRODUODENOSCOPY N/A 9/12/2016    Procedure: ESOPHAGOGASTRODUODENOSCOPY (EGD);  Surgeon: Alvin Powell MD;  Location: [facility];  Service:     FRACTURE SURGERY      ORTHOPEDIC SURGERY      LA COLONOSCOPY FLX DX W/COLLJ SPEC WHEN PFRMD N/A 9/12/2016    Procedure: FLEXIBLE COLONOSCOPY;  Surgeon: Alvin Powell MD;  Location: [facility];  Service: Colorectal    SALPINGOOPHORECTOMY Left      TONSILLECTOMY      UPPER GASTROINTESTINAL ENDOSCOPY  2016       Family History   Problem Relation Age of Onset    Coronary artery disease Mother     No Known Problems Family     No Known Problems Father     Colon cancer Neg Hx      I have reviewed and agree with the history as documented.    E-Cigarette/Vaping    E-Cigarette Use Never User      E-Cigarette/Vaping Substances    Nicotine No     THC No     CBD No     Flavoring No      Social History     Tobacco Use    Smoking status: Every Day     Current packs/day: 0.50     Average packs/day: 0.5 packs/day for 60.0 years (30.0 ttl pk-yrs)     Types: Cigarettes    Smokeless tobacco: Never   Vaping Use    Vaping status: Never Used   Substance Use Topics    Alcohol use: Not Currently    Drug use: Never       Review of Systems   Constitutional:  Negative for chills and fever.   Eyes:  Negative for visual disturbance.   Respiratory:  Negative for shortness of breath.    Cardiovascular:  Negative for chest pain.   Gastrointestinal:  Negative for abdominal pain, nausea and vomiting.   Neurological:  Positive for light-headedness and headaches. Negative for dizziness.        Near-syncope   All other systems reviewed and are negative.      Physical Exam  Physical Exam  Vitals and nursing note reviewed.   Constitutional:       General: She is awake. She is not in acute distress.     Appearance: She is not toxic-appearing.   HENT:      Head: Normocephalic and atraumatic.   Eyes:      General: Vision grossly intact. Gaze aligned appropriately.      Extraocular Movements: Extraocular movements intact.      Conjunctiva/sclera: Conjunctivae normal.      Pupils: Pupils are equal, round, and reactive to light.   Cardiovascular:      Rate and Rhythm: Regular rhythm. Bradycardia present.      Heart sounds: Normal heart sounds.   Pulmonary:      Effort: Pulmonary effort is normal. No respiratory distress.      Breath sounds: Normal breath sounds.   Abdominal:      Palpations: Abdomen is  soft.      Tenderness: There is no abdominal tenderness.   Musculoskeletal:      Cervical back: Full passive range of motion without pain and neck supple.   Skin:     General: Skin is warm and dry.   Neurological:      General: No focal deficit present.      Mental Status: She is alert and oriented to person, place, and time.         Vital Signs  ED Triage Vitals [03/09/24 1144]   Temp Pulse Respirations Blood Pressure SpO2   -- 56 16 115/66 96 %      Temp src Heart Rate Source Patient Position - Orthostatic VS BP Location FiO2 (%)   -- Monitor Sitting Right arm --      Pain Score       No Pain           Vitals:    03/09/24 1316 03/09/24 1317 03/09/24 1319 03/09/24 1321   BP: 129/64 144/71 136/74 123/80   Pulse: 66 66 68 77   Patient Position - Orthostatic VS: Lying - Orthostatic VS Sitting - Orthostatic VS Standing - Orthostatic VS Standing for 3 minutes - Orthostatic VS         Visual Acuity      ED Medications  Medications   multi-electrolyte (ISOLYTE-S PH 7.4) bolus 500 mL (0 mL Intravenous Stopped 3/9/24 1307)   potassium chloride (Klor-Con M20) CR tablet 40 mEq (40 mEq Oral Given 3/9/24 1312)   ondansetron (ZOFRAN) injection 4 mg (4 mg Intravenous Given 3/9/24 1310)       Diagnostic Studies  Results Reviewed       Procedure Component Value Units Date/Time    Comprehensive metabolic panel [[id number]]  (Abnormal) Collected: 03/09/24 1230    Lab Status: Final result Specimen: Blood from Arm, Left Updated: 03/09/24 1253     Sodium 138 mmol/L      Potassium 3.2 mmol/L      Chloride 103 mmol/L      CO2 29 mmol/L      ANION GAP 6 mmol/L      BUN 25 mg/dL      Creatinine 1.27 mg/dL      Glucose 156 mg/dL      Calcium 9.1 mg/dL      AST 11 U/L      ALT 11 U/L      Alkaline Phosphatase 73 U/L      Total Protein 6.2 g/dL      Albumin 3.8 g/dL      Total Bilirubin 0.71 mg/dL      eGFR 41 ml/min/1.73sq m     Narrative:      National Kid[employer] guidelines for Chronic Kidney Disease (CKD):     Stage 1 with  normal or high GFR (GFR > 90 mL/min/1.73 square meters)    Stage 2 Mild CKD (GFR = 60-89 mL/min/1.73 square meters)    Stage 3A Moderate CKD (GFR = 45-59 mL/min/1.73 square meters)    Stage 3B Moderate CKD (GFR = 30-44 mL/min/1.73 square meters)    Stage 4 Severe CKD (GFR = 15-29 mL/min/1.73 square meters)    Stage 5 End Stage CKD (GFR <15 mL/min/1.73 square meters)  Note: GFR calculation is accurate only with a steady state creatinine    CBC and differential [[id number]]  (Abnormal) Collected: 03/09/24 1230    Lab Status: Final result Specimen: Blood from Arm, Left Updated: 03/09/24 1236     WBC 6.92 Thousand/uL      RBC 5.35 Million/uL      Hemoglobin 16.2 g/dL      Hematocrit 49.4 %      MCV 92 fL      MCH 30.3 pg      MCHC 32.8 g/dL      RDW 13.2 %      MPV 10.4 fL      Platelets 221 Thousands/uL      nRBC 0 /100 WBCs      Neutrophils Relative 80 %      Immat GRANS % 1 %      Lymphocytes Relative 11 %      Monocytes Relative 5 %      Eosinophils Relative 2 %      Basophils Relative 1 %      Neutrophils Absolute 5.62 Thousands/µL      Immature Grans Absolute 0.04 Thousand/uL      Lymphocytes Absolute 0.73 Thousands/µL      Monocytes Absolute 0.36 Thousand/µL      Eosinophils Absolute 0.13 Thousand/µL      Basophils Absolute 0.04 Thousands/µL                    No orders to display              Procedures  Procedures         ED Course  ED Course as of 03/09/24 1404   Sat Mar 09, 2024   1154 Procedure Note: EKG  Date/Time: 03/09/24 11:54 AM   Interpreted by: Whitney Spencer D.O.  Indications / Diagnosis: dizziness  ECG reviewed by me, the ED Provider: yes   The EKG demonstrates:  Rhythm: sinus bradycardia  Intervals: normal intervals  Axis: normal axis  QRS/Blocks: normal QRS  ST Changes: No acute ST Changes, no STD/MEKA. Non-specific T wave changes in inferior/lateral leads.   1236 Hemoglobin(!): 16.2  Similar to previous values   1259 Potassium(!): 3.2  40 mEq oral K ordered for replacement                                SBIRT 20yo+      Flowsheet Row Most Recent Value   Initial Alcohol Screen: US AUDIT-C     1. How often do you have a drink containing alcohol? 0 Filed at: 03/09/2024 1144   2. How many drinks containing alcohol do you have on a typical day you are drinking?  0 Filed at: 03/09/2024 1144   3a. Male UNDER 65: How often do you have five or more drinks on one occasion? 0 Filed at: 03/09/2024 1144   3b. FEMALE Any Age, or MALE 65+: How often do you have 4 or more drinks on one occassion? 0 Filed at: 03/09/2024 1144   Audit-C Score 0 Filed at: 03/09/2024 1144   COLLIN: How many times in the past year have you...    Used an illegal drug or used a prescription medication for non-medical reasons? Never Filed at: 03/09/2024 1144                      Medical Decision Making  78 year old female presents after a near-syncopal episode earlier today. Patient has had multiple episodes over the past few days and was recently discharged after being admitted for this and new onset hypertension. On exam today, patient noted to be bradycardic, otherwise with normal vitals, in no acute distress. No focal neurologic deficits, exam otherwise unremarkable. Concern for possible symptomatic bradycardia, orthostatic hypotension, medication side effect, cardiac arrhythmia, electrolyte abnormalities, anemia, or other acute pathology as cause of her symptoms. Patient was also recently found to have vertebral artery narrowing, which may also be contributing to her symptoms. Patient evaluated with an EKG, CBC, and CMP. IV fluids given for symptomatic treatment.     Patient's EKG showed a sinus bradycardia, otherwise negative for acute ischemic changes or other signs of arrhythmia. CBC stable when compared to previous, and CMP notable for only mild hypokalemia for which she was given oral potassium replacement.     Orthostatic vitals were obtained and were relatively normal, but patient became very symptomatic upon  standing. Given her persistent symptoms with unknown cause, decision was made to admit the patient for further evaluation and treatment. I spoke with the on-call hospitalist who agreed to admit the patient to their service. Patient admitted in stable condition.     Amount and/or Complexity of Data Reviewed  Labs: ordered. Decision-making details documented in ED Course.    Risk  Prescription drug management.  Decision regarding hospitalization.             Disposition  Final diagnoses:   Near syncope   CVD (cerebrovascular disease)     Time reflects when diagnosis was documented in both MDM as applicable and the Disposition within this note       Time User Action Codes Description Comment    3/9/2024  2:03 PM Whitney Spencer [[id number]] Near syncope     3/9/2024  2:03 PM Whitney Spencer [I67.9] CVD (cerebrovascular disease)           ED Disposition       ED Disposition   Admit    Condition   Stable    Date/Time   Sat Mar 9, 2024 1403    Comment   Case was discussed with MARY and the patient's admission status was agreed to be Admission Status: observation status to the service of Dr. Zelaya.               Follow-up Information    None         Patient's Medications   Discharge Prescriptions    No medications on file       No discharge procedures on file.    PDMP Review         Value Time User    PDMP Reviewed  Yes 3/2/2024  1:27 PM Lizzy Newman DO            ED Provider  Electronically Signed by             Whitney Spencer DO  03/14/24 1223

## 2024-03-09 NOTE — ASSESSMENT & PLAN NOTE
Replace and re check tomorrow    Results from last 7 days   Lab Units 03/09/24  1230 03/08/24  0451 03/07/24  0428 03/06/24  0853   POTASSIUM mmol/L 3.2* 3.5 3.2* 3.1*

## 2024-03-09 NOTE — ASSESSMENT & PLAN NOTE
Incidental finding of left EIA occlusion.  Has outpatient vascular surgery follow-up  Continue aspirin and statin

## 2024-03-09 NOTE — ASSESSMENT & PLAN NOTE
Recent hospitalization discharged yesterday for near syncope presents with dizziness  Near syncope/headache thought secondary to possible basilar thrombus.  Started on eliquis.  Echocardiogram without significant valvular dysfunction  Continue IV fluids.  Orthostatics negative.  Add meclizine.

## 2024-03-09 NOTE — PLAN OF CARE
Problem: Potential for Falls  Goal: Patient will remain free of falls  Description: INTERVENTIONS:  - Educate patient/family on patient safety including physical limitations  - Instruct patient to call for assistance with activity   - Consult OT/PT to assist with strengthening/mobility   - Keep Call bell within reach  - Keep bed low and locked with side rails adjusted as appropriate  - Keep care items and personal belongings within reach  - Initiate and maintain comfort rounds  - Make Fall Risk Sign visible to staff  - Offer Toileting every  Hours, in advance of need  - Initiate/Maintain alarm  - Obtain necessary fall risk management equipment:   - Apply yellow socks and bracelet for high fall risk patients  - Consider moving patient to room near nurses station  3/9/2024 1647 by Yousif Hernandez RN  Outcome: Progressing  3/9/2024 1647 by Yousif Hernandez RN  Outcome: Progressing     Problem: INFECTION - ADULT  Goal: Absence of fever/infection during neutropenic period  Description: INTERVENTIONS:  - Monitor WBC    3/9/2024 1647 by Yousif Hernandez RN  Outcome: Progressing  3/9/2024 1647 by Yousif Hernandez RN  Outcome: Progressing  Goal: Absence or prevention of progression during hospitalization  Description: INTERVENTIONS:  - Assess and monitor for signs and symptoms of infection  - Monitor lab/diagnostic results  - Monitor all insertion sites, i.e. indwelling lines, tubes, and drains  - Monitor endotracheal if appropriate and nasal secretions for changes in amount and color  - [place] appropriate cooling/warming therapies per order  - Administer medications as ordered  - Instruct and encourage patient and family to use good hand hygiene technique  - Identify and instruct in appropriate isolation precautions for identified infection/condition  Outcome: Progressing     Problem: PAIN - ADULT  Goal: Verbalizes/displays adequate comfort level or baseline comfort level  Description: Interventions:  - Encourage  patient to monitor pain and request assistance  - Assess pain using appropriate pain scale  - Administer analgesics based on type and severity of pain and evaluate response  - Implement non-pharmacological measures as appropriate and evaluate response  - Consider cultural and social influences on pain and pain management  - Notify physician/advanced practitioner if interventions unsuccessful or patient reports new pain  Outcome: Progressing     Problem: SAFETY ADULT  Goal: Patient will remain free of falls  Description: INTERVENTIONS:  - Educate patient/family on patient safety including physical limitations  - Instruct patient to call for assistance with activity   - Consult OT/PT to assist with strengthening/mobility   - Keep Call bell within reach  - Keep bed low and locked with side rails adjusted as appropriate  - Keep care items and personal belongings within reach  - Initiate and maintain comfort rounds  - Make Fall Risk Sign visible to staff  - Offer Toileting every  Hours, in advance of need  - Initiate/Maintain alarm  - Obtain necessary fall risk management equipment:   - Apply yellow socks and bracelet for high fall risk patients  - Consider moving patient to room near nurses station  3/9/2024 1647 by Yousif Hernandez RN  Outcome: Progressing  3/9/2024 1647 by Yousif Hernandez RN  Outcome: Progressing  Goal: Maintain or return to baseline ADL function  Description: INTERVENTIONS:  -  Assess patient's ability to carry out ADLs; assess patient's baseline for ADL function and identify physical deficits which impact ability to perform ADLs (bathing, care of mouth/teeth, toileting, grooming, dressing, etc.)  - Assess/evaluate cause of self-care deficits   - Assess range of motion  - Assess patient's mobility; develop plan if impaired  - Assess patient's need for assistive devices and provide as appropriate  - Encourage maximum independence but intervene and supervise when necessary  - Involve family in  performance of ADLs  - Assess for home care needs following discharge   - Consider OT consult to assist with ADL evaluation and planning for discharge  - Provide patient education as appropriate  Outcome: Progressing  Goal: Maintains/Returns to pre admission functional level  Description: INTERVENTIONS:  - Perform AM-PAC 6 Click Basic Mobility/ Daily Activity assessment daily.  - Set and communicate daily mobility goal to care team and patient/family/caregiver.   - Collaborate with rehabilitation services on mobility goals if consulted  - Perform Range of Motion  times a day.  - Reposition patient every  hours.  - Dangle patient  times a day  - Stand patient  times a day  - Ambulate patient  times a day  - Out of bed to chair  times a day   - Out of bed for meal times a day  - Out of bed for toileting  - Record patient progress and toleration of activity level   Outcome: Progressing     Problem: DISCHARGE PLANNING  Goal: Discharge to home or other facility with appropriate resources  Description: INTERVENTIONS:  - Identify barriers to discharge w/patient and caregiver  - Arrange for needed discharge resources and transportation as appropriate  - Identify discharge learning needs (meds, wound care, etc.)  - Arrange for interpretive services to assist at discharge as needed  - Refer to Case Management Department for coordinating discharge planning if the patient needs post-hospital services based on physician/advanced practitioner order or complex needs related to functional status, cognitive ability, or social support system  Outcome: Progressing     Problem: Knowledge Deficit  Goal: Patient/family/caregiver demonstrates understanding of disease process, treatment plan, medications, and discharge instructions  Description: Complete learning assessment and assess knowledge base.  Interventions:  - Provide teaching at level of understanding  - Provide teaching via preferred learning methods  Outcome: Progressing

## 2024-03-10 PROBLEM — E87.6 HYPOKALEMIA: Status: RESOLVED | Noted: 2024-03-09 | Resolved: 2024-03-10

## 2024-03-10 LAB
ALBUMIN SERPL BCP-MCNC: 3.4 G/DL (ref 3.5–5)
ALP SERPL-CCNC: 61 U/L (ref 34–104)
ALT SERPL W P-5'-P-CCNC: 10 U/L (ref 7–52)
ANION GAP SERPL CALCULATED.3IONS-SCNC: 4 MMOL/L
AST SERPL W P-5'-P-CCNC: 13 U/L (ref 13–39)
BILIRUB SERPL-MCNC: 0.52 MG/DL (ref 0.2–1)
BUN SERPL-MCNC: 22 MG/DL (ref 5–25)
CALCIUM ALBUM COR SERPL-MCNC: 8.9 MG/DL (ref 8.3–10.1)
CALCIUM SERPL-MCNC: 8.4 MG/DL (ref 8.4–10.2)
CHLORIDE SERPL-SCNC: 106 MMOL/L (ref 96–108)
CO2 SERPL-SCNC: 29 MMOL/L (ref 21–32)
CREAT SERPL-MCNC: 1.09 MG/DL (ref 0.6–1.3)
ERYTHROCYTE [DISTWIDTH] IN BLOOD BY AUTOMATED COUNT: 13.2 % (ref 11.6–15.1)
GFR SERPL CREATININE-BSD FRML MDRD: 49 ML/MIN/1.73SQ M
GLUCOSE SERPL-MCNC: 93 MG/DL (ref 65–140)
HCT VFR BLD AUTO: 47.5 % (ref 34.8–46.1)
HGB BLD-MCNC: 15.4 G/DL (ref 11.5–15.4)
MCH RBC QN AUTO: 30.1 PG (ref 26.8–34.3)
MCHC RBC AUTO-ENTMCNC: 32.4 G/DL (ref 31.4–37.4)
MCV RBC AUTO: 93 FL (ref 82–98)
PLATELET # BLD AUTO: 197 THOUSANDS/UL (ref 149–390)
PMV BLD AUTO: 10 FL (ref 8.9–12.7)
POTASSIUM SERPL-SCNC: 3.5 MMOL/L (ref 3.5–5.3)
PROT SERPL-MCNC: 5.5 G/DL (ref 6.4–8.4)
RBC # BLD AUTO: 5.12 MILLION/UL (ref 3.81–5.12)
SODIUM SERPL-SCNC: 139 MMOL/L (ref 135–147)
WBC # BLD AUTO: 5.93 THOUSAND/UL (ref 4.31–10.16)

## 2024-03-10 PROCEDURE — 85027 COMPLETE CBC AUTOMATED: CPT | Performed by: INTERNAL MEDICINE

## 2024-03-10 PROCEDURE — 99232 SBSQ HOSP IP/OBS MODERATE 35: CPT | Performed by: PHYSICIAN ASSISTANT

## 2024-03-10 PROCEDURE — 80053 COMPREHEN METABOLIC PANEL: CPT | Performed by: INTERNAL MEDICINE

## 2024-03-10 RX ORDER — DICYCLOMINE HCL 20 MG
20 TABLET ORAL 3 TIMES DAILY PRN
Status: DISCONTINUED | OUTPATIENT
Start: 2024-03-10 | End: 2024-03-12 | Stop reason: HOSPADM

## 2024-03-10 RX ORDER — SACCHAROMYCES BOULARDII 250 MG
250 CAPSULE ORAL 2 TIMES DAILY
Status: DISCONTINUED | OUTPATIENT
Start: 2024-03-10 | End: 2024-03-12 | Stop reason: HOSPADM

## 2024-03-10 RX ADMIN — HYDROCHLOROTHIAZIDE 12.5 MG: 12.5 TABLET ORAL at 08:30

## 2024-03-10 RX ADMIN — ATORVASTATIN CALCIUM 40 MG: 40 TABLET, FILM COATED ORAL at 17:30

## 2024-03-10 RX ADMIN — MECLIZINE HYDROCHLORIDE 25 MG: 25 TABLET ORAL at 05:33

## 2024-03-10 RX ADMIN — ASPIRIN 81 MG: 81 TABLET, COATED ORAL at 08:30

## 2024-03-10 RX ADMIN — MECLIZINE HYDROCHLORIDE 25 MG: 25 TABLET ORAL at 21:28

## 2024-03-10 RX ADMIN — APIXABAN 5 MG: 5 TABLET, FILM COATED ORAL at 21:28

## 2024-03-10 RX ADMIN — ESCITALOPRAM 5 MG: 5 TABLET, FILM COATED ORAL at 08:30

## 2024-03-10 RX ADMIN — Medication 250 MG: at 15:17

## 2024-03-10 RX ADMIN — SODIUM CHLORIDE, SODIUM GLUCONATE, SODIUM ACETATE, POTASSIUM CHLORIDE, MAGNESIUM CHLORIDE, SODIUM PHOSPHATE, DIBASIC, AND POTASSIUM PHOSPHATE 100 ML/HR: .53; .5; .37; .037; .03; .012; .00082 INJECTION, SOLUTION INTRAVENOUS at 05:35

## 2024-03-10 RX ADMIN — APIXABAN 5 MG: 5 TABLET, FILM COATED ORAL at 08:30

## 2024-03-10 RX ADMIN — DICYCLOMINE HYDROCHLORIDE 20 MG: 20 TABLET ORAL at 05:33

## 2024-03-10 RX ADMIN — MECLIZINE HYDROCHLORIDE 25 MG: 25 TABLET ORAL at 15:17

## 2024-03-10 RX ADMIN — NICOTINE 7 MG/24 HR DAILY TRANSDERMAL PATCH 1 PATCH: at 08:30

## 2024-03-10 NOTE — CONSULTS
Consultation - Neurology   Ángela Camara 78 y.o. female MRN: [id number]  Unit/Bed#: [place] [address] -01 Encounter: [id number]      Assessment/Plan   * Dizziness and giddiness, syncope  Assessment & Plan  78-year-old female with cervical cancer, treated in 2014 in remission, chronic pain syndrome, significant for smoking history chronic right MCA stroke, known basilar artery stenosis who presented to ED on 3/9 for evaluation of recurrent dizziness.  She noted to have systolic blood pressures in the low 100s she reports when she got up to walk to the bathroom she felt very dizzy described as lightheadedness as well as diaphoresis and while on the commode she had an episode of diarrhea with syncope and was brought to the ED, in the ED her blood pressures were on the lower side, otherwise vital signs were stable lab work was notable for mild hypokalemia.  Orthostatics were negative, the patients Norvasc was discontinued and was given IV fluids.   No neurological imaging completed this admission.   The patient continues to have a non focal neurological examination.     Per record review - The patient has had recurrent presentations to the hospital.  Initially, she presented with a syncopal episode on 3/2.  She represented to the hospital on 3/6 after experiencing ongoing issues with hypertension, headache, lightheadedness, abdominal pain, and personal stressors at home.  Neurology evaluated the patient.  She reported to neurology that she had experienced 6 episodes of syncope over the past year, all occurring in the setting of straining, which was suspicious for vasovagal origin.  She noted ongoing occasional lightheadedness, but denied any vertiginous symptoms or strokelike symptoms.  She underwent vessel imaging during her recent hospitalization which indicated severe focal left vertebral artery stenosis near the vertebrobasilar junction with new focal severe stenosis in the proximal basilar artery.  There was  an eccentric filling deficit, indicative of possible noncalcified mural plaque versus thrombus.During that admission, MRI was deferred as her neurologic exam was nonfocal.  She was recommended to initiate Eliquis and Aspirin and have a repeat CTA head and neck in approximately 2-3 weeks..  The patient did have an MRI completed during the last admission by the medicine team, which noted no acute intracranial abnormality, there was mild chronic microangiopathic changes with sequela of remote right BG infarct.       Suspect the presenting symptoms this admission are secondary to vasovagal origin of her syncopal event, as well the patient was noted to blood pressures on the lower side which may be contributing.   Nonfocal description of the event with nonfocal examination suspect this is less likely an ischemic event, although with prior imaging evidence on CTA of head and neck (completed 3/6) of  new focal severe stenosis in the distal left intracranial vertebral artery near the vertebrobasilar junction. New focal severe stenosis in proximal basilar artery. Eccentric filling defect along the wall of the  vessel in these stenotic foci may indicate noncalcified mural plaque or thrombus, cannot entirely exclude vertebrobasilar insufficiency in the setting of vasovagal/relative hypotension events.     -Will review with attending and need for further imaging,  at this time the patient has a plan to repeat CTA of the head and neck in 2 to 3 weeks, patient's examination continues to be  non-focal  -Continue Eliquis and baby aspirin  -Avoid hypotension for cerebral perfusion  -Frequent neurological checks notify neurology with any changes in neurological exam, status post IV fluids, continue to monitor for orthostatic changes  -Adjustment of blood pressure medications made by medicine team, continue to monitor blood pressure closely  -Plan for repeat CTA of the head and neck in 2 to 3 weeks as well as close follow-up with  neurology as an outpatient  -Please see attestation for further details.       Ángela Camara will need follow up in in 2 weeks with neurovascular attending. She will not require outpatient neurological testing. (CTA of head and neck pending)    History of Present Illness     Reason for Consult / Principal Problem: Dizziness    HPI: Ángela Camara is a 78 y.o.  female with cervical cancer, treated in 2014, in remission, chronic pain syndrome, significant smoking history, chronic right MCA stroke, known basilar artery stenosis, who presented to the ED on 3/9 for evaluation of recurrent dizziness.  Patient reports that when she woke up in the morning, she noted that her systolic blood pressure was in the low 100s.  Patient also reported that she was sitting down eating breakfast.  When she got up to walk to the bathroom, she felt very dizzy, which is further described as lightheadedness, as well as diaphoresis.  Therefore, she presented back to the ED. on arrival, patient's blood pressure was 115/66.  Other vitals were stable.  Lab work notable for mild hypokalemia.    Per medicine team documentation, the patient has a hx of PAD with incidental finding of left EIA occlusion, the patient was seen by surgery last admission on 3/6/24, out patient follow up recommended.  The patient has noted below, has been seen by neurology for possible vasovagal events and synopsized in the past with bowel movements.  Imaging and recommendations completed, as below, the patient presented to the ED on 3/9 while sitting on the toilet had an episode of diarrhea with syncope and was brought back to the ED, it was reported orthostatic blood pressures were negative, the patient was given IV fluids, as well as meclizine.  Norvasc was discontinued.   No neurological imaging was completed this admission, neurology was asked to see and evaluate the patient.     The patient currently endorses events that occurred above, she  "does report she was feeling fine, however, on the commode she had diarrhea, she felt, clammy, light headed, diaphoretic and lost consciousness for a brief period of time.  No focality by description or on examination.  She reported a slight posterior head pressure \"sensation\".  She reports this has now resolved and is close to her baseline, she reports she was given xanax and this helped.     Prior hx and neurological encounters-  The patient has had recurrent presentations to the hospital.  Initially, she presented with a syncopal episode on 3/2.  She represented to the hospital on 3/6 after experiencing ongoing issues with hypertension, headache, lightheadedness, abdominal pain, and personal stressors at home.  Neurology evaluated the patient.  She reported to neurology that she had experienced 6 episodes of syncope over the past year, all occurring in the setting of straining, which was suspicious for vasovagal origin.  She noted ongoing occasional lightheadedness, but denied any vertiginous symptoms or strokelike symptoms.  She underwent vessel imaging during her recent hospitalization which indicated severe focal left vertebral artery stenosis near the vertebrobasilar junction with new focal severe stenosis in the proximal basilar artery.  There was an eccentric filling deficit, indicative of possible noncalcified mural plaque versus thrombus.  During that admission, MRI was deferred as her neurologic exam was nonfocal.  She was recommended to initiate Eliquis and Aspirin and have a repeat CTA head and neck in approximately 2-3 weeks..  The patient did have an MRI completed during the last admission by the medicine team, which noted no acute intracranial abnormality, there was mild chronic microangiopathic changes with sequela of remote right BG infarct.     Inpatient consult to Neurology  Consult performed by: Willard Shah MD  Consult ordered by: Dileep Zelaya DO        Review of Systems  12 point ROS as " per HPI, otherwise negative.     Historical Information   Past Medical History:   Diagnosis Date    Anxiety     Atypical squamous cells cannot exclude high grade squamous intraepithelial lesion on cytologic smear of cervix (ASC-H)     Last Assessed 5/13/2014    Back pain     Cancer (HCC)     Cervical cancer (HCC)     Depression     Ovarian cyst     Tinnitus of both ears 10/2/2020    Tobacco abuse 11/20/2018     Past Surgical History:   Procedure Laterality Date    ANKLE FRACTURE SURGERY      Last Assessed 07/26/2016    ANKLE SURGERY      CERVICAL BIOPSY  W/ LOOP ELECTRODE EXCISION      COLONOSCOPY  10/2019    DIAGNOSTIC LAPAROSCOPY      DILATION AND CURETTAGE OF UTERUS      ESOPHAGOGASTRODUODENOSCOPY N/A 9/12/2016    Procedure: ESOPHAGOGASTRODUODENOSCOPY (EGD);  Surgeon: Alvin Powell MD;  Location: [facility];  Service:     FRACTURE SURGERY      ORTHOPEDIC SURGERY      IA COLONOSCOPY FLX DX W/COLLJ SPEC WHEN PFRMD N/A 9/12/2016    Procedure: FLEXIBLE COLONOSCOPY;  Surgeon: Alvin Powell MD;  Location: [facility];  Service: Colorectal    SALPINGOOPHORECTOMY Left     TONSILLECTOMY      UPPER GASTROINTESTINAL ENDOSCOPY  2016     Social History   Social History     Substance and Sexual Activity   Alcohol Use Not Currently     Social History     Substance and Sexual Activity   Drug Use Never     E-Cigarette/Vaping    E-Cigarette Use Never User      E-Cigarette/Vaping Substances    Nicotine No     THC No     CBD No     Flavoring No      Social History     Tobacco Use   Smoking Status Every Day    Current packs/day: 0.50    Average packs/day: 0.5 packs/day for 60.0 years (30.0 ttl pk-yrs)    Types: Cigarettes   Smokeless Tobacco Never     Family History:   Family History   Problem Relation Age of Onset    Coronary artery disease Mother     No Known Problems Family     No Known Problems Father     Colon cancer Neg Hx        Review of previous medical records was completed.     Meds/Allergies   all current active  meds have been reviewed, current meds:   Current Facility-Administered Medications   Medication Dose Route Frequency    acetaminophen (TYLENOL) tablet 650 mg  650 mg Oral Q4H PRN    ALPRAZolam (XANAX) tablet 0.5 mg  0.5 mg Oral HS PRN    apixaban (ELIQUIS) tablet 5 mg  5 mg Oral BID    aspirin (ECOTRIN LOW STRENGTH) EC tablet 81 mg  81 mg Oral Daily    atorvastatin (LIPITOR) tablet 40 mg  40 mg Oral Daily With Dinner    dicyclomine (BENTYL) tablet 20 mg  20 mg Oral TID PRN    escitalopram (LEXAPRO) tablet 5 mg  5 mg Oral Daily    hydroCHLOROthiazide tablet 12.5 mg  12.5 mg Oral Daily    nicotine (NICODERM CQ) 7 mg/24hr TD 24 hr patch 1 patch  1 patch Transdermal Daily    ondansetron (ZOFRAN) injection 4 mg  4 mg Intravenous Q4H PRN    oxyCODONE (ROXICODONE) split tablet 2.5 mg  2.5 mg Oral Q4H PRN    saccharomyces boulardii (FLORASTOR) capsule 250 mg  250 mg Oral BID   , and PTA meds:   Prior to Admission Medications   Prescriptions Last Dose Informant Patient Reported? Taking?   ALPRAZolam (XANAX) 0.5 mg tablet  Self No No   Sig: Take 1 tablet (0.5 mg total) by mouth daily at bedtime as needed for anxiety   amLODIPine (NORVASC) 5 mg tablet   No No   Sig: Take 1 tablet (5 mg total) by mouth daily   apixaban (ELIQUIS) 5 mg   No Yes   Sig: Take 1 tablet (5 mg total) by mouth 2 (two) times a day   aspirin (ECOTRIN LOW STRENGTH) 81 mg EC tablet   No Yes   Sig: Take 1 tablet (81 mg total) by mouth daily   atorvastatin (LIPITOR) 40 mg tablet 3/8/2024  No Yes   Sig: Take 1 tablet (40 mg total) by mouth daily with dinner   dicyclomine (BENTYL) 20 mg tablet  Self No No   Sig: Take 1 tablet (20 mg total) by mouth every 6 (six) hours   escitalopram (LEXAPRO) 5 mg tablet  Self No Yes   Sig: Take 1 tablet (5 mg total) by mouth daily   hydrochlorothiazide (MICROZIDE) 12.5 mg capsule  Self No Yes   Sig: TAKE ONE CAPSULE BY MOUTH ONCE DAILY      Facility-Administered Medications: None       Allergies   Allergen Reactions     "Lisinopril Syncope     Other reaction(s): Other (See Comments)  Nose bleeds, passing out       Objective   Vitals:Blood pressure (!) 146/104, pulse 63, temperature 98.3 °F (36.8 °C), resp. rate 16, height 5' 6\" (1.676 m), weight 64.4 kg (142 lb), SpO2 93%, not currently breastfeeding.,Body mass index is 22.92 kg/m².    Intake/Output Summary (Last 24 hours) at 3/11/2024 1014  Last data filed at 3/10/2024 1800  Gross per 24 hour   Intake 2281.67 ml   Output --   Net 2281.67 ml       Invasive Devices:   Invasive Devices       Peripheral Intravenous Line  Duration             Peripheral IV 03/11/24 Right Antecubital <1 day                  Physical Exam  Vitals reviewed.   Constitutional:       General: She is not in acute distress.     Appearance: She is not ill-appearing, toxic-appearing or diaphoretic.   HENT:      Head: Normocephalic and atraumatic.      Nose: No congestion.      Mouth/Throat:      Mouth: Mucous membranes are moist.   Eyes:      Extraocular Movements: Extraocular movements intact and EOM normal.      Pupils: Pupils are equal, round, and reactive to light.   Cardiovascular:      Rate and Rhythm: Normal rate.   Pulmonary:      Effort: Pulmonary effort is normal. No respiratory distress.   Abdominal:      General: There is no distension.   Musculoskeletal:         General: No swelling or tenderness.      Cervical back: Neck supple.   Skin:     General: Skin is warm and dry.   Neurological:      Mental Status: She is alert and oriented to person, place, and time.      Cranial Nerves: Cranial nerves 2-12 are intact.      Motor: Motor strength is normal.     Coordination: Finger-Nose-Finger Test and Heel to Shin Test normal.   Psychiatric:         Speech: Speech normal.       Neurologic Exam     Mental Status   Oriented to person, place, and time.   Follows 2 step commands.   Attention: normal. Concentration: normal.   Speech: speech is normal   Able to name object. Able to read. Able to repeat. "     Cranial Nerves   Cranial nerves II through XII intact.     CN II   Visual fields full to confrontation.     CN III, IV, VI   Pupils are equal, round, and reactive to light.  Extraocular motions are normal.   Nystagmus: none   Ophthalmoparesis: none    CN V   Facial sensation intact.     CN VII   Facial expression full, symmetric.     CN VIII   CN VIII normal.     CN IX, X   CN IX normal.   CN X normal.     CN XI   CN XI normal.     CN XII   CN XII normal.     Motor Exam   Muscle bulk: normal  Overall muscle tone: normal  Right arm pronator drift: absent    Strength   Strength 5/5 throughout.     Sensory Exam   Light touch normal.   No neglect noted.      Gait, Coordination, and Reflexes     Coordination   Finger to nose coordination: normal  Heel to shin coordination: normal    Tremor   Resting tremor: absent  Intention tremor: absent    Reflexes   Right plantar: normal  Left plantar: normalNo seizure activity noted, no neglect noted.        Lab Results: I have personally reviewed pertinent reports.    Imaging Studies: I have personally reviewed pertinent reports.   and I have personally reviewed pertinent films in PACS  EKG, Pathology, and Other Studies: I have personally reviewed pertinent reports.    VTE Prophylaxis: Eliquis    Code Status: Level 1 - Full Code

## 2024-03-10 NOTE — ASSESSMENT & PLAN NOTE
Maintained on HCTZ, last admission was started on Norvasc  We will stop amlodipine given well-controlled blood pressure and she feels like this medication may have contributed to her dizziness  Orthostatic vital signs negative

## 2024-03-10 NOTE — ASSESSMENT & PLAN NOTE
Incidental finding of left EIA occlusion.  Seen by vascular surgery last admission 3/6/2024  outpatient vascular surgery follow-up  Continue aspirin and statin

## 2024-03-10 NOTE — ASSESSMENT & PLAN NOTE
Recent hospitalization discharged day prior to readmission for near syncope presents with dizziness  Patient went home states she was feeling well then was in the bathroom morning prior to admission, sat on the toilet had an episode of diarrhea with near syncope brought back to the emergency department  Was recently here and seen by neurology with history of vasovagal syncopal episodes and syncopized in the past after bowel movements  CTA at that time noted basal artery unclear if it is a noncalcified mural plaque or thrombus, she was started on Eliquis with plan for repeat CTA in 2 to 3 weeks  MRI brain: No acute intracranial abnormality, mild chronic microangiopathic changes with sequela of remote right basal ganglia infarct  Echocardiogram without significant valvular dysfunction  Was given IV fluids, meclizine  Orthostatics are negative  She feels like Norvasc may be contributing will DC for now and monitor vital sign trend  Neurology reconsulted  Will have her reevaluated by PT/OT

## 2024-03-10 NOTE — PROGRESS NOTES
[facility]  Progress Note  Name: Ángela Camara I  MRN: [id number]  Unit/Bed#: [place] [address] -01 I Date of Admission: 3/9/2024   Date of Service: 3/10/2024 I Hospital Day: 0    Assessment/Plan   * Dizziness and giddiness  Assessment & Plan  Recent hospitalization discharged day prior to readmission for near syncope presents with dizziness  Patient went home states she was feeling well then was in the bathroom morning prior to admission, sat on the toilet had an episode of diarrhea with near syncope brought back to the emergency department  Was recently here and seen by neurology with history of vasovagal syncopal episodes and syncopized in the past after bowel movements  CTA at that time noted basal artery unclear if it is a noncalcified mural plaque or thrombus, she was started on Eliquis with plan for repeat CTA in 2 to 3 weeks  MRI brain: No acute intracranial abnormality, mild chronic microangiopathic changes with sequela of remote right basal ganglia infarct  Echocardiogram without significant valvular dysfunction  Was given IV fluids, meclizine  Orthostatics are negative  She feels like Norvasc may be contributing will DC for now and monitor vital sign trend  Neurology reconsulted  Will have her reevaluated by PT/OT    PAD (peripheral artery disease) ([facility])  Assessment & Plan  Incidental finding of left EIA occlusion.  Seen by vascular surgery last admission 3/6/2024  outpatient vascular surgery follow-up  Continue aspirin and statin    CVD (cerebrovascular disease)  Assessment & Plan  Hospitalized and d/c 3/8 for various intracranial stenosis and/or thrombus.  Was started on eliquis.    Anxiety and depression  Assessment & Plan  Continue escitalopram and alprazolam as needed    CKD (chronic kidney disease) stage 3, GFR 30-59 ml/min ([facility])  Assessment & Plan  Lab Results   Component Value Date    EGFR 49 03/10/2024    EGFR 41 03/09/2024    EGFR 47 03/08/2024    CREATININE  1.09 03/10/2024    CREATININE 1.27 03/09/2024    CREATININE 1.12 03/08/2024     Renal function at baseline    Mixed hyperlipidemia  Assessment & Plan  Continue atorvastatin    Essential hypertension  Assessment & Plan  Maintained on HCTZ, last admission was started on Norvasc  We will stop amlodipine given well-controlled blood pressure and she feels like this medication may have contributed to her dizziness  Orthostatic vital signs negative             VTE Pharmacologic Prophylaxis: VTE Score: 3 Moderate Risk (Score 3-4) - Pharmacological DVT Prophylaxis Ordered: apixaban (Eliquis).    Mobility:   Basic Mobility Inpatient Raw Score: 23  JH-HLM Goal: 7: Walk 25 feet or more  JH-HLM Achieved: 6: Walk 10 steps or more  HLM Goal achieved. Continue to encourage appropriate mobility.    Patient Centered Rounds: I performed bedside rounds with nursing staff today.   Discussions with Specialists or Other Care Team Provider:     Education and Discussions with Family / Patient: patient     Total Time Spent on Date of Encounter in care of patient:  mins. This time was spent on one or more of the following: performing physical exam; counseling and coordination of care; obtaining or reviewing history; documenting in the medical record; reviewing/ordering tests, medications or procedures; communicating with other healthcare professionals and discussing with patient's family/caregivers.    Current Length of Stay: 0 day(s)  Current Patient Status: Observation   Certification Statement: The patient will continue to require additional inpatient hospital stay due to dizziness   Discharge Plan: Anticipate discharge tomorrow to discharge location to be determined pending rehab evaluations.    Code Status: Level 1 - Full Code    Subjective:   Patient states she has this internal brain feeling from central head back to her neck for years that is hard to describe. Denies hedahce or vision change.s no numbness/tingling. Dizziness and steve  syncope yesterday occurred while on toilet having BM. No leg swelling. No cp or OSB. No dizziness at res.t     Objective:     Vitals:   Temp (24hrs), Av.1 °F (36.7 °C), Min:97.7 °F (36.5 °C), Max:98.3 °F (36.8 °C)    Temp:  [97.7 °F (36.5 °C)-98.3 °F (36.8 °C)] 98.3 °F (36.8 °C)  HR:  [57-77] 58  Resp:  [16-18] 18  BP: (121-147)/(63-98) 127/71  SpO2:  [91 %-96 %] 94 %  Body mass index is 22.92 kg/m².     Input and Output Summary (last 24 hours):     Intake/Output Summary (Last 24 hours) at 3/10/2024 1153  Last data filed at 3/10/2024 1126  Gross per 24 hour   Intake 2661.67 ml   Output --   Net 2661.67 ml       Physical Exam:   Physical Exam  Vitals and nursing note reviewed.   Eyes:      Pupils: Pupils are equal, round, and reactive to light.   Cardiovascular:      Rate and Rhythm: Normal rate and regular rhythm.   Pulmonary:      Effort: Pulmonary effort is normal. No respiratory distress.      Breath sounds: Normal breath sounds. No wheezing or rales.   Abdominal:      General: Bowel sounds are normal.      Palpations: Abdomen is soft.   Musculoskeletal:      Right lower leg: No edema.      Left lower leg: No edema.   Neurological:      General: No focal deficit present.      Mental Status: She is alert and oriented to person, place, and time. Mental status is at baseline.   Psychiatric:         Mood and Affect: Mood normal.          Additional Data:     Labs:  Results from last 7 days   Lab Units 03/10/24  0540 24  1230   WBC Thousand/uL 5.93 6.92   HEMOGLOBIN g/dL 15.4 16.2*   HEMATOCRIT % 47.5* 49.4*   PLATELETS Thousands/uL 197 221   NEUTROS PCT %  --  80*   LYMPHS PCT %  --  11*   MONOS PCT %  --  5   EOS PCT %  --  2     Results from last 7 days   Lab Units 03/10/24  0540   SODIUM mmol/L 139   POTASSIUM mmol/L 3.5   CHLORIDE mmol/L 106   CO2 mmol/L 29   BUN mg/dL 22   CREATININE mg/dL 1.09   ANION GAP mmol/L 4   CALCIUM mg/dL 8.4   ALBUMIN g/dL 3.4*   TOTAL BILIRUBIN mg/dL 0.52   ALK PHOS U/L 61    ALT U/L 10   AST U/L 13   GLUCOSE RANDOM mg/dL 93     Results from last 7 days   Lab Units 03/06/24  0853   INR  0.94             Results from last 7 days   Lab Units 03/06/24  1101   LACTIC ACID mmol/L 0.8       Lines/Drains:  Invasive Devices       Peripheral Intravenous Line  Duration             Peripheral IV 03/09/24 Right Forearm <1 day                      Telemetry:  Telemetry Orders (From admission, onward)               24 Hour Telemetry Monitoring  (ED Bridging Orders Panel)  Continuous x 24 Hours (Telem)        Question:  Reason for 24 Hour Telemetry  Answer:  Syncope suspected to be cardiac in origin                     Telemetry Reviewed: Normal Sinus Rhythm  Indication for Continued Telemetry Use: No indication for continued use. Will discontinue.              Imaging: Reviewed radiology reports from this admission including: MRI brain    Recent Cultures (last 7 days):         Last 24 Hours Medication List:   Current Facility-Administered Medications   Medication Dose Route Frequency Provider Last Rate    acetaminophen  650 mg Oral Q4H PRN Dileep Zelaya, DO      ALPRAZolam  0.5 mg Oral HS PRN Dileep Zelaya, DO      apixaban  5 mg Oral BID Dileep Zelaya, DO      aspirin  81 mg Oral Daily Dileep Zelaya, DO      atorvastatin  40 mg Oral Daily With Dinner Dileep Zelaya, DO      dicyclomine  20 mg Oral TID AC Dileep Zelaya, DO      escitalopram  5 mg Oral Daily Dileep Zelaya, DO      hydroCHLOROthiazide  12.5 mg Oral Daily Dileep Zelaya, DO      meclizine  25 mg Oral Q8H JOSE Dileep Zelaya, DO      nicotine  1 patch Transdermal Daily Dileep Zelaya, DO      ondansetron  4 mg Intravenous Q4H PRN Dileep Zelaya, DO      oxyCODONE  2.5 mg Oral Q4H PRN Dileep Zelaya, DO          Today, Patient Was Seen By: Krsitin Acosta PA-C    **Please Note: This note may have been constructed using a voice recognition system.**

## 2024-03-10 NOTE — ASSESSMENT & PLAN NOTE
Lab Results   Component Value Date    EGFR 49 03/10/2024    EGFR 41 03/09/2024    EGFR 47 03/08/2024    CREATININE 1.09 03/10/2024    CREATININE 1.27 03/09/2024    CREATININE 1.12 03/08/2024     Renal function at baseline

## 2024-03-10 NOTE — PLAN OF CARE

## 2024-03-10 NOTE — CASE MANAGEMENT
Case Management Assessment & Discharge Planning Note    Patient name Ángela Camara  Location [place] [address] /[address] 2 M* MRN [id number]  : 1945 Date 3/10/2024       Current Admission Date: 3/9/2024  Current Admission Diagnosis:Dizziness and giddiness   Patient Active Problem List    Diagnosis Date Noted    CVD (cerebrovascular disease) 2024    PAD (peripheral artery disease) ([facility]) 2024    Dizziness and giddiness 2024    SBO (small bowel obstruction) ([facility]) 2022    Postmenopausal state 2021    Increased frequency of urination 2021    Meralgia paresthetica of left side 2021    Chronic pain syndrome 2021    SI joint arthritis     Claudication of both lower extremities ([facility]) 2021    Lumbar spondylosis 10/02/2020    Tinnitus of both ears 10/02/2020    Anxiety and depression     Chronic obstructive pulmonary disease ([facility]) 2020    Irritable bowel syndrome with diarrhea 2020    Chronic abdominal pain 10/29/2019    Generalized anxiety disorder 10/29/2019    Diarrhea 2019    Pain in pelvis 2018    Tobacco abuse 2018    White coat syndrome with high blood pressure but without hypertension 2018    Cervical stenosis of spine 2018    TIA (transient ischemic attack) 2018    Aortic sclerosis 2017    CKD (chronic kidney disease) stage 3, GFR 30-59 ml/min ([facility]) 2017    Anxiety 05/10/2017    Mixed hyperlipidemia 2017    Colon polyps 2016    History of malignant neoplasm of cervix 2014    Essential hypertension 2014      LOS (days): 0  Geometric Mean LOS (GMLOS) (days):   Days to GMLOS:     OBJECTIVE:  PATIENT READMITTED TO HOSPITAL          Current admission status: Inpatient       Preferred Pharmacy:   [id number] - [place] - [address] [address]  Phone: [phone] Fax: [phone]    Primary Care Provider:  Enid Jernigan MD    Primary Insurance: MEDICARE  Secondary Insurance: AARP    ASSESSMENT:  Active Health Care Proxies    There are no active Health Care Proxies on file.       Advance Directives  Does patient have a Health Care POA?: Yes  Does patient have Advance Directives?: Yes  Advance Directives: Living will, Power of  for health care  Primary Contact: Rosa Maria Palmer (Daughter)  [phone] (Mobile)      Readmission Root Cause  30 Day Readmission: Yes  Who directed you to return to the hospital?: Self  Did you understand whom to contact if you had questions or problems?: Yes  Did you get your prescriptions before you left the hospital?: Yes  Were you able to get your prescriptions filled when you left the hospital?: Yes  Did you take your medications as prescribed?: Yes  Were you able to get to your follow-up appointments?: Yes  During previous admission, was a post-acute recommendation made?: No  Patient was readmitted due to: Dizziness and giddiness CVD (  Action Plan: Pending PT/OT eval    Patient Information  Admitted from:: Home  Mental Status: Alert  During Assessment patient was accompanied by: Not accompanied during assessment  Assessment information provided by:: Patient  Primary Caregiver: Self  Support Systems: Self  County of Residence: [facility]  What city do you live in?: [place]  Home entry access options. Select all that apply.: Stairs  Number of steps to enter home.: 4  Do the steps have railings?: Yes  Type of Current Residence: [address] [place] home  Upon entering residence, is there a bedroom on the main floor (no further steps)?: No  A bedroom is located on the following floor levels of residence (select all that apply):: 2nd Floor  Upon entering residence, is there a bathroom on the main floor (no further steps)?: Yes  Number of steps to 2nd floor from main floor: One Flight  Living Arrangements: Lives Alone  Is patient a ?: No    Activities of Daily Living Prior to  Admission  Functional Status: Independent  Completes ADLs independently?: Yes  Ambulates independently?: Yes  Does patient use assisted devices?: No  Does patient currently own DME?: Yes  What DME does the patient currently own?: Walker  Does patient have a history of Outpatient Therapy (PT/OT)?: Yes  Does the patient have a history of Short-Term Rehab?: No  Does patient have a history of HHC?: No  Does patient currently have HHC?: No      Patient Information Continued  Income Source: Pension/residential  Does patient have prescription coverage?: Yes  Does patient receive dialysis treatments?: No  Does patient have a history of Mental Health Diagnosis?: Yes (Anxiety and depression)  Is patient receiving treatment for mental health?: Yes  Has patient received inpatient treatment related to mental health in the last 2 years?: No      Means of Transportation  Means of Transport to Appts:: Drives Self      Social Determinants of Health (SDOH)      Flowsheet Row Most Recent Value   Housing Stability    In the last 12 months, was there a time when you were not able to pay the mortgage or rent on time? N   In the last 12 months, how many places have you lived? 1   In the last 12 months, was there a time when you did not have a steady place to sleep or slept in a shelter (including now)? N   Transportation Needs    In the past 12 months, has lack of transportation kept you from medical appointments or from getting medications? no   In the past 12 months, has lack of transportation kept you from meetings, work, or from getting things needed for daily living? No   Food Insecurity    Within the past 12 months, you worried that your food would run out before you got the money to buy more. Never true   Within the past 12 months, the food you bought just didn't last and you didn't have money to get more. Never true   Utilities    In the past 12 months has the electric, gas, oil, or water company threatened to shut off services in  your home? No            DISCHARGE DETAILS:    Discharge planning discussed with:: Patient  Freedom of Choice: Yes     CM contacted family/caregiver?: No- see comments (AAO x3)       Additional Comments: Met with patient at bedside. CM department following thru discharge.  PT/OT consulted for discharge recs.

## 2024-03-10 NOTE — PLAN OF CARE
Problem: Potential for Falls  Goal: Patient will remain free of falls  Description: INTERVENTIONS:  - Educate patient/family on patient safety including physical limitations  - Instruct patient to call for assistance with activity   - Consult OT/PT to assist with strengthening/mobility   - Keep Call bell within reach  - Keep bed low and locked with side rails adjusted as appropriate  - Keep care items and personal belongings within reach  - Initiate and maintain comfort rounds  - Make Fall Risk Sign visible to staff  - Apply yellow socks and bracelet for high fall risk patients  - Consider moving patient to room near nurses station  Outcome: Progressing     Problem: INFECTION - ADULT  Goal: Absence of fever/infection during neutropenic period  Description: INTERVENTIONS:  - Monitor WBC    Outcome: Progressing  Goal: Absence or prevention of progression during hospitalization  Description: INTERVENTIONS:  - Assess and monitor for signs and symptoms of infection  - Monitor lab/diagnostic results  - Monitor all insertion sites, i.e. indwelling lines, tubes, and drains  - Monitor endotracheal if appropriate and nasal secretions for changes in amount and color  - Ronks appropriate cooling/warming therapies per order  - Administer medications as ordered  - Instruct and encourage patient and family to use good hand hygiene technique  - Identify and instruct in appropriate isolation precautions for identified infection/condition  Outcome: Progressing     Problem: PAIN - ADULT  Goal: Verbalizes/displays adequate comfort level or baseline comfort level  Description: Interventions:  - Encourage patient to monitor pain and request assistance  - Assess pain using appropriate pain scale  - Administer analgesics based on type and severity of pain and evaluate response  - Implement non-pharmacological measures as appropriate and evaluate response  - Consider cultural and social influences on pain and pain management  - Notify  physician/advanced practitioner if interventions unsuccessful or patient reports new pain  Outcome: Progressing     Problem: SAFETY ADULT  Goal: Patient will remain free of falls  Description: INTERVENTIONS:  - Educate patient/family on patient safety including physical limitations  - Instruct patient to call for assistance with activity   - Consult OT/PT to assist with strengthening/mobility   - Keep Call bell within reach  - Keep bed low and locked with side rails adjusted as appropriate  - Keep care items and personal belongings within reach  - Initiate and maintain comfort rounds  - Make Fall Risk Sign visible to staff  - Apply yellow socks and bracelet for high fall risk patients  - Consider moving patient to room near nurses station  Outcome: Progressing  Goal: Maintain or return to baseline ADL function  Description: INTERVENTIONS:  - Educate patient/family on patient safety including physical limitations  - Instruct patient to call for assistance with activity   - Consult OT/PT to assist with strengthening/mobility   - Keep Call bell within reach  - Keep bed low and locked with side rails adjusted as appropriate  - Keep care items and personal belongings within reach  - Initiate and maintain comfort rounds  - Apply yellow socks and bracelet for high fall risk patients  - Consider moving patient to room near nurses station  Outcome: Progressing  Goal: Maintains/Returns to pre admission functional level  Description: INTERVENTIONS:  - Perform AM-PAC 6 Click Basic Mobility/ Daily Activity assessment daily.  - Set and communicate daily mobility goal to care team and patient/family/caregiver.   - Collaborate with rehabilitation services on mobility goals if consulted    - Out of bed for toileting  - Record patient progress and toleration of activity level   Outcome: Progressing     Problem: DISCHARGE PLANNING  Goal: Discharge to home or other facility with appropriate resources  Description: INTERVENTIONS:  -  Identify barriers to discharge w/patient and caregiver  - Arrange for needed discharge resources and transportation as appropriate  - Identify discharge learning needs (meds, wound care, etc.)  - Arrange for interpretive services to assist at discharge as needed  - Refer to Case Management Department for coordinating discharge planning if the patient needs post-hospital services based on physician/advanced practitioner order or complex needs related to functional status, cognitive ability, or social support system  Outcome: Progressing     Problem: Knowledge Deficit  Goal: Patient/family/caregiver demonstrates understanding of disease process, treatment plan, medications, and discharge instructions  Description: Complete learning assessment and assess knowledge base.  Interventions:  - Provide teaching at level of understanding  - Provide teaching via preferred learning methods  Outcome: Progressing   2

## 2024-03-10 NOTE — ASSESSMENT & PLAN NOTE
Hospitalized and d/c 3/8 for various intracranial stenosis and/or thrombus.  Was started on eliquis.

## 2024-03-11 ENCOUNTER — TRANSITIONAL CARE MANAGEMENT (OUTPATIENT)
Dept: FAMILY MEDICINE CLINIC | Facility: CLINIC | Age: 79
End: 2024-03-11

## 2024-03-11 PROCEDURE — 97166 OT EVAL MOD COMPLEX 45 MIN: CPT

## 2024-03-11 PROCEDURE — 97162 PT EVAL MOD COMPLEX 30 MIN: CPT

## 2024-03-11 PROCEDURE — 99232 SBSQ HOSP IP/OBS MODERATE 35: CPT | Performed by: PHYSICIAN ASSISTANT

## 2024-03-11 RX ADMIN — ESCITALOPRAM 5 MG: 5 TABLET, FILM COATED ORAL at 08:45

## 2024-03-11 RX ADMIN — HYDROCHLOROTHIAZIDE 12.5 MG: 12.5 TABLET ORAL at 08:45

## 2024-03-11 RX ADMIN — APIXABAN 5 MG: 5 TABLET, FILM COATED ORAL at 20:23

## 2024-03-11 RX ADMIN — MECLIZINE HYDROCHLORIDE 25 MG: 25 TABLET ORAL at 05:24

## 2024-03-11 RX ADMIN — NICOTINE 7 MG/24 HR DAILY TRANSDERMAL PATCH 1 PATCH: at 08:45

## 2024-03-11 RX ADMIN — ALPRAZOLAM 0.5 MG: 0.5 TABLET ORAL at 08:59

## 2024-03-11 RX ADMIN — Medication 250 MG: at 15:55

## 2024-03-11 RX ADMIN — Medication 250 MG: at 08:45

## 2024-03-11 RX ADMIN — ATORVASTATIN CALCIUM 40 MG: 40 TABLET, FILM COATED ORAL at 15:55

## 2024-03-11 RX ADMIN — APIXABAN 5 MG: 5 TABLET, FILM COATED ORAL at 08:45

## 2024-03-11 RX ADMIN — ASPIRIN 81 MG: 81 TABLET, COATED ORAL at 08:45

## 2024-03-11 NOTE — ASSESSMENT & PLAN NOTE
Recent hospitalization discharged day prior to readmission for near syncope presents with dizziness  Patient went home states she was feeling well then was in the bathroom morning prior to admission, sat on the toilet had an episode of loose stool with near syncope brought back to the emergency department  Was recently here and seen by neurology with history of vasovagal syncopal episodes and syncopized in the past after bowel movements  CTA at that time noted basal artery unclear if it is a noncalcified mural plaque or thrombus, she was started on Eliquis with plan for repeat CTA in 2 to 3 weeks  MRI brain: No acute intracranial abnormality, mild chronic microangiopathic changes with sequela of remote right basal ganglia infarct  Echocardiogram without significant valvular dysfunction  Orthostatics negative  Evaluated by neurology-Low suspicion for acute neurovascular insult.  Constellation of symptoms raises possibility of carcinoid syndrome.  Recommendation for outpatient 24 hour urinary 5-HIAA, Holter monitor/zio patch   Outpatient vestibular therapy referral  Cervical neuralgia may be contributing-recommendation for conservative measures stretching, ice/heat.  Would avoid muscle relaxers at this time but can consider in the future.

## 2024-03-11 NOTE — ASSESSMENT & PLAN NOTE
78-year-old female with cervical cancer, treated in 2014 in remission, chronic pain syndrome, significant for smoking history chronic right MCA stroke, known basilar artery stenosis who presented to ED on 3/9 for evaluation of recurrent dizziness.  She noted to have systolic blood pressures in the low 100s she reports when she got up to walk to the bathroom she felt very dizzy described as lightheadedness as well as diaphoresis and while on the commode she had an episode of diarrhea with syncope and was brought to the ED, in the ED her blood pressures were on the lower side, otherwise vital signs were stable lab work was notable for mild hypokalemia.  Orthostatics were negative, the patients Norvasc was discontinued and was given IV fluids.   No neurological imaging completed this admission.   The patient continues to have a non focal neurological examination.     Per record review - The patient has had recurrent presentations to the hospital.  Initially, she presented with a syncopal episode on 3/2.  She represented to the hospital on 3/6 after experiencing ongoing issues with hypertension, headache, lightheadedness, abdominal pain, and personal stressors at home.  Neurology evaluated the patient.  She reported to neurology that she had experienced 6 episodes of syncope over the past year, all occurring in the setting of straining, which was suspicious for vasovagal origin.  She noted ongoing occasional lightheadedness, but denied any vertiginous symptoms or strokelike symptoms.  She underwent vessel imaging during her recent hospitalization which indicated severe focal left vertebral artery stenosis near the vertebrobasilar junction with new focal severe stenosis in the proximal basilar artery.  There was an eccentric filling deficit, indicative of possible noncalcified mural plaque versus thrombus.During that admission, MRI was deferred as her neurologic exam was nonfocal.  She was recommended to initiate Eliquis  and Aspirin and have a repeat CTA head and neck in approximately 2-3 weeks..  The patient did have an MRI completed during the last admission by the medicine team, which noted no acute intracranial abnormality, there was mild chronic microangiopathic changes with sequela of remote right BG infarct.       Suspect the presenting symptoms this admission are secondary to vasovagal origin of her syncopal event, as well the patient was noted to blood pressures on the lower side which may be contributing.   Nonfocal description of the event with nonfocal examination suspect this is less likely an ischemic event, although with prior imaging evidence on CTA of head and neck (completed 3/6) of  new focal severe stenosis in the distal left intracranial vertebral artery near the vertebrobasilar junction. New focal severe stenosis in proximal basilar artery. Eccentric filling defect along the wall of the  vessel in these stenotic foci may indicate noncalcified mural plaque or thrombus, cannot entirely exclude vertebrobasilar insufficiency in the setting of vasovagal/relative hypotension events.     -Will review with attending and need for further imaging,  at this time the patient has a plan to repeat CTA of the head and neck in 2 to 3 weeks, patient's examination continues to be  non-focal  -Continue Eliquis and baby aspirin  -Avoid hypotension for cerebral perfusion  -Frequent neurological checks notify neurology with any changes in neurological exam, status post IV fluids, continue to monitor for orthostatic changes  -Adjustment of blood pressure medications made by medicine team, continue to monitor blood pressure closely  -Plan for repeat CTA of the head and neck in 2 to 3 weeks as well as close follow-up with neurology as an outpatient  -Please see attestation for further details.

## 2024-03-11 NOTE — ASSESSMENT & PLAN NOTE
Hospitalized and d/c 3/8 for various intracranial stenosis and/or thrombus.  Was started on eliquis. Continue statin

## 2024-03-11 NOTE — PHYSICAL THERAPY NOTE
PHYSICAL THERAPY EVALUATION          Patient Name: Ángela Camara  Today's Date: 3/11/2024  PT EVALUATION    78 y.o.    [id number]    Dizziness and giddiness [R42]  Dizziness [R42]  CVD (cerebrovascular disease) [I67.9]  Near syncope [R55]    Past Medical History:   Diagnosis Date    Anxiety     Atypical squamous cells cannot exclude high grade squamous intraepithelial lesion on cytologic smear of cervix (ASC-H)     Last Assessed 5/13/2014    Back pain     Cancer (HCC)     Cervical cancer (HCC)     Depression     Ovarian cyst     Tinnitus of both ears 10/2/2020    Tobacco abuse 11/20/2018     Past Surgical History:   Procedure Laterality Date    ANKLE FRACTURE SURGERY      Last Assessed 07/26/2016    ANKLE SURGERY      CERVICAL BIOPSY  W/ LOOP ELECTRODE EXCISION      COLONOSCOPY  10/2019    DIAGNOSTIC LAPAROSCOPY      DILATION AND CURETTAGE OF UTERUS      ESOPHAGOGASTRODUODENOSCOPY N/A 9/12/2016    Procedure: ESOPHAGOGASTRODUODENOSCOPY (EGD);  Surgeon: Alvin Powell MD;  Location: [facility];  Service:     FRACTURE SURGERY      ORTHOPEDIC SURGERY      MI COLONOSCOPY FLX DX W/COLLJ SPEC WHEN PFRMD N/A 9/12/2016    Procedure: FLEXIBLE COLONOSCOPY;  Surgeon: Alvin Powell MD;  Location: [facility];  Service: Colorectal    SALPINGOOPHORECTOMY Left     TONSILLECTOMY      UPPER GASTROINTESTINAL ENDOSCOPY  2016 03/11/24 0856   PT Last Visit   PT Visit Date 03/11/24   Note Type   Note type Evaluation   Pain Assessment   Pain Assessment Tool 0-10   Pain Score No Pain   Restrictions/Precautions   Other Precautions Fall Risk   Home Living   Type of Home House   Home Layout Two level;1/2 bath on main level;Bed/bath upstairs;Stairs to enter with rails;Stairs to enter without rails   Bathroom Shower/Tub Walk-in shower   Bathroom Toilet   (has both)   Bathroom Equipment Built-in shower seat   Home Equipment Walker;Crutches   Additional Comments 2 MEKA w HR vs 4 MEKA no HR   Prior  "Function   Level of [place] Independent with ADLs;Independent with functional mobility;Independent with IADLS   Lives With Alone   IADLs Independent with driving;Independent with meal prep;Independent with medication management   Falls in the last 6 months 1 to 4   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   General   Additional Pertinent History pt admitted 3/9/24 for dizziness and giddiness, syncope. pt w complaints of tinnitus, visual changes (\"looking through a fish bowl\"), syncope and sx in posterior aspect of head. ambulate patient orders. PMHx significant for HTN managed w medication, CVD, anxiety, depression, CKD, PAD, chronic pain syndrome, tinnitus, TIA   Cognition   Overall Cognitive Status WFL   Arousal/Participation Cooperative   Orientation Level Oriented X4   Memory Within functional limits   Following Commands Follows all commands and directions without difficulty   Subjective   Subjective pt w labile moods. expresses frustration regarding not knowing what is causing her medical sx.   RLE Assessment   RLE Assessment WFL  (4/5)   LLE Assessment   LLE Assessment WFL  (4/5)   Coordination   Sensation WFL   Bed Mobility   Supine to Sit 5  Supervision   Additional items Bedrails;Increased time required   Transfers   Sit to Stand 5  Supervision   Stand to Sit 5  Supervision   Ambulation/Elevation   Gait pattern WNL;Excessively slow   Gait Assistance 5  Supervision   Additional items Assist x 1   Assistive Device None   Distance 70'   Stair Management Assistance 5  Supervision   Additional items Assist x 1   Stair Management Technique One rail R;Alternating pattern;Foreward   Number of Stairs 9   Balance   Static Standing Fair +   Dynamic Standing Fair   Ambulatory Fair   Endurance Deficit   Endurance Deficit No  (BP post amb 146/104)   Activity Tolerance   Activity Tolerance Patient tolerated treatment well;Other (Comment)  (effort)   Medical Staff Made Aware OT   Nurse Made " Gisel Dodd RN   Assessment   Prognosis Good   Assessment Ángela Camara is a 78 y.o. female admitted to [facility] on 3/9/2024 for Dizziness and giddiness. PT was consulted and pt was seen on 3/11/2024 for mobility assessment and d/c planning. Pt presents w readmission, medium fall risk, masimo monitoring. At baseline is indep. Pt is currently functioning at a S level for bed mobility, transfers, ambulation and stair negotiation. Pt demonstrated ability to ambulate unlimited household distances and negotiate steps to simulate home environment. Self limited ambulation dt feeling generally unwell and seemingly frustration at medical situation. Emotional support provided. Does not demonstrate acute deficits impacting current LOF. Would benefit from continued mobilization via nsg/restorative.   Barriers to Discharge None   Plan   PT Frequency   (d/c PT: maintain on restorative)   Discharge Recommendation   Rehab Resource Intensity Level, PT   (can consider OPPT for vestibular assessment if pt interested)   AM-PAC Basic Mobility Inpatient   Turning in Flat Bed Without Bedrails 4   Lying on Back to Sitting on Edge of Flat Bed Without Bedrails 4   Moving Bed to Chair 3   Standing Up From Chair Using Arms 3   Walk in Room 3   Climb 3-5 Stairs With Railing 3   Basic Mobility Inpatient Raw Score 20   Basic Mobility Standardized Score 43.99   Highest Level Of Mobility   JH-HLM Goal 6: Walk 10 steps or more   JH-HLM Achieved 7: Walk 25 feet or more   End of Consult   Patient Position at End of Consult Seated edge of bed;Other (comment)  (OT present)   History: co - morbidities including age, coping styles, past experience, current experience including fall risk  Exam: impairments in systems including multiple body structures involved; musculoskeletal (strength), neuromuscular (balance, gait, transfers, sensation), cardiopulmonary (vitals), am-pac  Clinical: stable/unpredictable  Complexity: moderate      Fior Last  PT

## 2024-03-11 NOTE — ASSESSMENT & PLAN NOTE
Maintained on HCTZ, last admission was started on Norvasc  Amlodipine was stopped given well-controlled blood pressure and she feels like this medication may have contributed to her dizziness  Orthostatic vital signs negative  Recommend blood pressure monitoring outpatient with PCP and keep a blood pressure log to see if Norvasc should be restarted outpatient  Avoid hypotension

## 2024-03-11 NOTE — PLAN OF CARE
Problem: Potential for Falls  Goal: Patient will remain free of falls  Description: INTERVENTIONS:  - Educate patient/family on patient safety including physical limitations  - Instruct patient to call for assistance with activity   - Consult OT/PT to assist with strengthening/mobility   - Keep Call bell within reach  - Keep bed low and locked with side rails adjusted as appropriate  - Keep care items and personal belongings within reach  - Initiate and maintain comfort rounds  - Make Fall Risk Sign visible to staff  - Apply yellow socks and bracelet for high fall risk patients  - Consider moving patient to room near nurses station  Outcome: Progressing     Problem: INFECTION - ADULT  Goal: Absence of fever/infection during neutropenic period  Description: INTERVENTIONS:  - Monitor WBC    Outcome: Progressing  Goal: Absence or prevention of progression during hospitalization  Description: INTERVENTIONS:  - Assess and monitor for signs and symptoms of infection  - Monitor lab/diagnostic results  - Monitor all insertion sites, i.e. indwelling lines, tubes, and drains  - Monitor endotracheal if appropriate and nasal secretions for changes in amount and color  - [place] appropriate cooling/warming therapies per order  - Administer medications as ordered  - Instruct and encourage patient and family to use good hand hygiene technique  - Identify and instruct in appropriate isolation precautions for identified infection/condition  Outcome: Progressing     Problem: PAIN - ADULT  Goal: Verbalizes/displays adequate comfort level or baseline comfort level  Description: Interventions:  - Encourage patient to monitor pain and request assistance  - Assess pain using appropriate pain scale  - Administer analgesics based on type and severity of pain and evaluate response  - Implement non-pharmacological measures as appropriate and evaluate response  - Consider cultural and social influences on pain and pain management  - Notify  physician/advanced practitioner if interventions unsuccessful or patient reports new pain  Outcome: Progressing     Problem: SAFETY ADULT  Goal: Patient will remain free of falls  Description: INTERVENTIONS:  - Educate patient/family on patient safety including physical limitations  - Instruct patient to call for assistance with activity   - Consult OT/PT to assist with strengthening/mobility   - Keep Call bell within reach  - Keep bed low and locked with side rails adjusted as appropriate  - Keep care items and personal belongings within reach  - Initiate and maintain comfort rounds  - Make Fall Risk Sign visible to staff  - Apply yellow socks and bracelet for high fall risk patients  - Consider moving patient to room near nurses station  Outcome: Progressing  Goal: Maintain or return to baseline ADL function  Description: INTERVENTIONS:  - Educate patient/family on patient safety including physical limitations  - Instruct patient to call for assistance with activity   - Consult OT/PT to assist with strengthening/mobility   - Keep Call bell within reach  - Keep bed low and locked with side rails adjusted as appropriate  - Keep care items and personal belongings within reach  - Initiate and maintain comfort rounds  - Apply yellow socks and bracelet for high fall risk patients  - Consider moving patient to room near nurses station  Outcome: Progressing  Goal: Maintains/Returns to pre admission functional level  Description: INTERVENTIONS:  - Perform AM-PAC 6 Click Basic Mobility/ Daily Activity assessment daily.  - Set and communicate daily mobility goal to care team and patient/family/caregiver.   - Collaborate with rehabilitation services on mobility goals if consulted  - Perform Range of Motion  times a day.  - Reposition patient every  hours.  - Dangle patient  times a day  - Stand patient  times a day  - Ambulate patient  times a day  - Out of bed to chair  times a day   - Out of bed for meals  times a day  -  Out of bed for toileting  - Record patient progress and toleration of activity level   Outcome: Progressing     Problem: DISCHARGE PLANNING  Goal: Discharge to home or other facility with appropriate resources  Description: INTERVENTIONS:  - Identify barriers to discharge w/patient and caregiver  - Arrange for needed discharge resources and transportation as appropriate  - Identify discharge learning needs (meds, wound care, etc.)  - Arrange for interpretive services to assist at discharge as needed  - Refer to Case Management Department for coordinating discharge planning if the patient needs post-hospital services based on physician/advanced practitioner order or complex needs related to functional status, cognitive ability, or social support system  Outcome: Progressing     Problem: Knowledge Deficit  Goal: Patient/family/caregiver demonstrates understanding of disease process, treatment plan, medications, and discharge instructions  Description: Complete learning assessment and assess knowledge base.  Interventions:  - Provide teaching at level of understanding  - Provide teaching via preferred learning methods  Outcome: Progressing     Problem: NEUROSENSORY - ADULT  Goal: Achieves stable or improved neurological status  Description: INTERVENTIONS  - Monitor and report changes in neurological status  - Monitor vital signs such as temperature, blood pressure, glucose, and any other labs ordered   - Initiate measures to prevent increased intracranial pressure  - Monitor for seizure activity and implement precautions if appropriate      Outcome: Progressing     Problem: GASTROINTESTINAL - ADULT  Goal: Maintains or returns to baseline bowel function  Description: INTERVENTIONS:  - Assess bowel function  - Encourage oral fluids to ensure adequate hydration  - Administer IV fluids if ordered to ensure adequate hydration  - Administer ordered medications as needed  - Encourage mobilization and activity  - Consider  nutritional services referral to assist patient with adequate nutrition and appropriate food choices  Outcome: Progressing     Problem: METABOLIC, FLUID AND ELECTROLYTES - ADULT  Goal: Electrolytes maintained within normal limits  Description: INTERVENTIONS:  - Monitor labs and assess patient for signs and symptoms of electrolyte imbalances  - Administer electrolyte replacement as ordered  - Monitor response to electrolyte replacements, including repeat lab results as appropriate  - Instruct patient on fluid and nutrition as appropriate  Outcome: Progressing

## 2024-03-11 NOTE — PLAN OF CARE
Problem: Potential for Falls  Goal: Patient will remain free of falls  Description: INTERVENTIONS:  - Educate patient/family on patient safety including physical limitations  - Instruct patient to call for assistance with activity   - Consult OT/PT to assist with strengthening/mobility   - Keep Call bell within reach  - Keep bed low and locked with side rails adjusted as appropriate  - Keep care items and personal belongings within reach  - Initiate and maintain comfort rounds  - Make Fall Risk Sign visible to staff  - Apply yellow socks and bracelet for high fall risk patients  - Consider moving patient to room near nurses station  Outcome: Progressing     Problem: INFECTION - ADULT  Goal: Absence of fever/infection during neutropenic period  Description: INTERVENTIONS:  - Monitor WBC    Outcome: Progressing  Goal: Absence or prevention of progression during hospitalization  Description: INTERVENTIONS:  - Assess and monitor for signs and symptoms of infection  - Monitor lab/diagnostic results  - Monitor all insertion sites, i.e. indwelling lines, tubes, and drains  - Monitor endotracheal if appropriate and nasal secretions for changes in amount and color  - [place] appropriate cooling/warming therapies per order  - Administer medications as ordered  - Instruct and encourage patient and family to use good hand hygiene technique  - Identify and instruct in appropriate isolation precautions for identified infection/condition  Outcome: Progressing     Problem: PAIN - ADULT  Goal: Verbalizes/displays adequate comfort level or baseline comfort level  Description: Interventions:  - Encourage patient to monitor pain and request assistance  - Assess pain using appropriate pain scale  - Administer analgesics based on type and severity of pain and evaluate response  - Implement non-pharmacological measures as appropriate and evaluate response  - Consider cultural and social influences on pain and pain management  - Notify  physician/advanced practitioner if interventions unsuccessful or patient reports new pain  Outcome: Progressing     Problem: SAFETY ADULT  Goal: Patient will remain free of falls  Description: INTERVENTIONS:  - Educate patient/family on patient safety including physical limitations  - Instruct patient to call for assistance with activity   - Consult OT/PT to assist with strengthening/mobility   - Keep Call bell within reach  - Keep bed low and locked with side rails adjusted as appropriate  - Keep care items and personal belongings within reach  - Initiate and maintain comfort rounds  - Make Fall Risk Sign visible to staff  - Apply yellow socks and bracelet for high fall risk patients  - Consider moving patient to room near nurses station  Outcome: Progressing  Goal: Maintain or return to baseline ADL function  Description: INTERVENTIONS:  - Educate patient/family on patient safety including physical limitations  - Instruct patient to call for assistance with activity   - Consult OT/PT to assist with strengthening/mobility   - Keep Call bell within reach  - Keep bed low and locked with side rails adjusted as appropriate  - Keep care items and personal belongings within reach  - Initiate and maintain comfort rounds  - Apply yellow socks and bracelet for high fall risk patients  - Consider moving patient to room near nurses station  Outcome: Progressing  Goal: Maintains/Returns to pre admission functional level  Description: INTERVENTIONS:  - Perform AM-PAC 6 Click Basic Mobility/ Daily Activity assessment daily.  - Set and communicate daily mobility goal to care team and patient/family/caregiver.   - Collaborate with rehabilitation services on mobility goals if consulted  - Perform Range of Motion  times a day.  - Reposition patient every  hours.  - Dangle patient  times a day  - Stand patient  times a day  - Ambulate patient  times a day  - Out of bed to chair  times a day   - Out of bed for meal times a day  - Out  of bed for toileting  - Record patient progress and toleration of activity level   Outcome: Progressing     Problem: DISCHARGE PLANNING  Goal: Discharge to home or other facility with appropriate resources  Description: INTERVENTIONS:  - Identify barriers to discharge w/patient and caregiver  - Arrange for needed discharge resources and transportation as appropriate  - Identify discharge learning needs (meds, wound care, etc.)  - Arrange for interpretive services to assist at discharge as needed  - Refer to Case Management Department for coordinating discharge planning if the patient needs post-hospital services based on physician/advanced practitioner order or complex needs related to functional status, cognitive ability, or social support system  Outcome: Progressing     Problem: Knowledge Deficit  Goal: Patient/family/caregiver demonstrates understanding of disease process, treatment plan, medications, and discharge instructions  Description: Complete learning assessment and assess knowledge base.  Interventions:  - Provide teaching at level of understanding  - Provide teaching via preferred learning methods  Outcome: Progressing     Problem: NEUROSENSORY - ADULT  Goal: Achieves stable or improved neurological status  Description: INTERVENTIONS  - Monitor and report changes in neurological status  - Monitor vital signs such as temperature, blood pressure, glucose, and any other labs ordered   - Initiate measures to prevent increased intracranial pressure  - Monitor for seizure activity and implement precautions if appropriate      Outcome: Progressing     Problem: GASTROINTESTINAL - ADULT  Goal: Maintains or returns to baseline bowel function  Description: INTERVENTIONS:  - Assess bowel function  - Encourage oral fluids to ensure adequate hydration  - Administer IV fluids if ordered to ensure adequate hydration  - Administer ordered medications as needed  - Encourage mobilization and activity  - Consider  nutritional services referral to assist patient with adequate nutrition and appropriate food choices  Outcome: Progressing     Problem: METABOLIC, FLUID AND ELECTROLYTES - ADULT  Goal: Electrolytes maintained within normal limits  Description: INTERVENTIONS:  - Monitor labs and assess patient for signs and symptoms of electrolyte imbalances  - Administer electrolyte replacement as ordered  - Monitor response to electrolyte replacements, including repeat lab results as appropriate  - Instruct patient on fluid and nutrition as appropriate  Outcome: Progressing

## 2024-03-11 NOTE — CASE MANAGEMENT
Case Management Discharge Planning Note    Patient name Ángela Camara  Location [place] [address] /South 2 M* MRN [id number]  : 1945 Date 3/11/2024         OBJECTIVE:  Risk of Unplanned Readmission Score: 11.95         Current admission status: Inpatient       DISCHARGE DETAILS:    Discharge planning discussed with:: pt  Freedom of Choice: Yes  Comments - Freedom of Choice: Agreeable to OPPT for vestibular therapy stating has gone to [facility] in the past and would do so again, understanding the process to begin therapy post discharge  CM contacted family/caregiver?: No- see comments (declined need)  Were Treatment Team discharge recommendations reviewed with patient/caregiver?: Yes  Did patient/caregiver verbalize understanding of patient care needs?: Yes       Contacts  Patient Contacts: Rosa Maria Patton  Relationship to Patient:: Family  Contact Method: Phone  Phone Number: [phone]    Requested Home Health Care         Is the patient interested in HHC at discharge?: No    DME Referral Provided  Referral made for DME?: No         Would you like to participate in our [facility] service program?  : No - Declined    Treatment Team Recommendation: Home (w/ OPPT)  Discharge Destination Plan:: Home (w/ OPPT)  Transport at Discharge : Auto with designated , Family        Transported by (Company and Unit #): Daughter, Rosa Maria to transport on dc

## 2024-03-11 NOTE — OCCUPATIONAL THERAPY NOTE
Occupational Therapy Evaluation     Patient Name: Ángela Camara  Today's Date: 3/11/2024  Problem List  Principal Problem:    Dizziness and giddiness, syncope  Active Problems:    Essential hypertension    Mixed hyperlipidemia    CKD (chronic kidney disease) stage 3, GFR 30-59 ml/min (HCC)    Anxiety and depression    CVD (cerebrovascular disease)    PAD (peripheral artery disease) (HCC)    Past Medical History  Past Medical History:   Diagnosis Date    Anxiety     Atypical squamous cells cannot exclude high grade squamous intraepithelial lesion on cytologic smear of cervix (ASC-H)     Last Assessed 5/13/2014    Back pain     Cancer (HCC)     Cervical cancer (HCC)     Depression     Ovarian cyst     Tinnitus of both ears 10/2/2020    Tobacco abuse 11/20/2018     Past Surgical History  Past Surgical History:   Procedure Laterality Date    ANKLE FRACTURE SURGERY      Last Assessed 07/26/2016    ANKLE SURGERY      CERVICAL BIOPSY  W/ LOOP ELECTRODE EXCISION      COLONOSCOPY  10/2019    DIAGNOSTIC LAPAROSCOPY      DILATION AND CURETTAGE OF UTERUS      ESOPHAGOGASTRODUODENOSCOPY N/A 9/12/2016    Procedure: ESOPHAGOGASTRODUODENOSCOPY (EGD);  Surgeon: Alvin Powell MD;  Location: [facility];  Service:     FRACTURE SURGERY      ORTHOPEDIC SURGERY      NJ COLONOSCOPY FLX DX W/COLLJ SPEC WHEN PFRMD N/A 9/12/2016    Procedure: FLEXIBLE COLONOSCOPY;  Surgeon: Alvin Powell MD;  Location: [facility];  Service: Colorectal    SALPINGOOPHORECTOMY Left     TONSILLECTOMY      UPPER GASTROINTESTINAL ENDOSCOPY  2016 03/11/24 0903   OT Last Visit   OT Visit Date 03/11/24   Note Type   Note type Evaluation   Pain Assessment   Pain Assessment Tool 0-10   Pain Score No Pain   Restrictions/Precautions   Weight Bearing Precautions Per Order No   Other Precautions Fall Risk   Home Living   Type of Home House   Home Layout Two level;1/2 bath on main level;Bed/bath upstairs   Bathroom Shower/Tub Walk-in shower    Bathroom Toilet   (has both)   Bathroom Equipment Built-in shower seat   Home Equipment Walker;Cane;Crutches   Additional Comments Pt resides alone in a 2SH with 4 MEKA from front no rails v. 2 MEKA from back.   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 Pt reports limited to no social support, however, per EMR- Pt with daughter and friend who cares for her horses?   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   Intrinsic Gratification Will continue to assess   ADL   Where Assessed Edge of bed   Eating Assistance 7  Independent   Grooming Assistance 7  Independent   UB Bathing Assistance 7  Independent   LB Bathing Assistance 5  Supervision/Setup   UB Dressing Assistance 7  Independent   LB Dressing Assistance 5  Supervision/Setup   LB Dressing Deficit Don/doff L sock;Don/doff R sock   Toileting Assistance  7  Independent   Functional Assistance 5  Supervision/Setup   Functional Deficit Setup;Increased time to complete;Supervision/safety   Bed Mobility   Supine to Sit 5  Supervision   Additional items Bedrails;Increased time required   Additional Comments Pt greeted supine in bed.   Transfers   Sit to Stand 5  Supervision   Additional items Increased time required   Stand to Sit 5  Supervision   Additional Comments no AD   Functional Mobility   Functional Mobility 5  Supervision   Additional Comments S with functional mobility with no AD- Pt self limiting- household distances   Balance   Static Sitting Good  "  Dynamic Sitting Fair +   Static Standing Fair +   Dynamic Standing Fair   Ambulatory Fair   Activity Tolerance   Activity Tolerance Patient tolerated treatment well;Other (Comment)  (limited effort)   Medical Staff Made Aware Co-eval with FELICIA Childress 2* to Pt's medical complexity, need for dispo planning, and decreased endurance.   Nurse Made Aware RN cleared/updated.- Yousif   RUE Assessment   RUE Assessment WFL   LUE Assessment   LUE Assessment WFL   Hand Function   Gross Motor Coordination Functional   Fine Motor Coordination Functional   Sensation   Light Touch No apparent deficits   Vision-Basic Assessment   Current Vision Does not wear glasses   Patient Visual Report Other (Comment)  (\"Feels like I am looking through a fish bowl... It is coming from the back of my head and it is affecting my eyes.\")   Vision - Complex Assessment   Ocular Range of Motion Intact   Tracking Intact   Saccades Intact   Visual Fields Difficulty detecting stimulus with OS LLQ;Difficulty detecting stimulus with OS LUQ   Acuity Able to read clock/calendar on wall without difficulty;Able to read employee name badge without difficulty   Psychosocial   Psychosocial (WDL) WDL   Cognition   Overall Cognitive Status WFL   Arousal/Participation Alert;Cooperative   Attention Within functional limits   Orientation Level Oriented X4   Memory Within functional limits   Following Commands Follows all commands and directions without difficulty   Comments Pt cooperative during OT session, however, self-limiting.   Assessment   Limitation Decreased endurance;Decreased high-level ADLs   Prognosis Fair   Assessment Pt is a 78 y.o. female who [facility] with dizziness. Pt with diagnosis of dizziness and giddiness, and syncope. Pt  has a past medical history of Anxiety, Atypical squamous cells cannot exclude high grade squamous intraepithelial lesion on cytologic smear of cervix (ASC-H), Back pain, Cancer (HCC), Cervical cancer " (HCC), Depression, Ovarian cyst, Tinnitus of both ears (10/2/2020), and Tobacco abuse (11/20/2018). Pt greeted bedside for OT evaluation on 03/11/24. Pt resides alone in a 2SH with 4 MEKA from front no rails v. 2 MEKA from back. PTA, Pt reports being I with ADLs/IADLs/ +/no AD. Pt reports limited to no social support, however, per EMR- Pt with daughter and friend who cares for her horses? Pt demonstrating the following occupational performance levels:I with UB ADLs, S with LB ADLs, S with bed mobility, S with functional transfers, and S with functional mobility. Pt encouraged to participate in ADLs/functional mobility with nursing/restorative staff during hospitalization. Pt with no further acute OT concerns. Pt would benefit from returning home with increased social support upon DC to maximize safety and independence with ADLs and functional tasks of choice. DC skilled OT services.   Goals   Patient Goals To figure out what is going on with her head   Plan   OT Frequency Eval only   Discharge Recommendation   Rehab Resource Intensity Level, OT No post-acute rehabilitation needs   Additional Comments  The patient's raw score on the AM-PAC Daily Activity Inpatient Short Form is 22. A raw score of greater than or equal to 19 suggests the patient may benefit from discharge to home. Please refer to the recommendation of the Occupational Therapist for safe discharge planning.   AM-PAC Daily Activity Inpatient   Lower Body Dressing 3   Bathing 3   Toileting 4   Upper Body Dressing 4   Grooming 4   Eating 4   Daily Activity Raw Score 22   Daily Activity Standardized Score (Calc for Raw Score >=11) 47.1   AM-PAC Applied Cognition Inpatient   Following a Speech/Presentation 4   Understanding Ordinary Conversation 4   Taking Medications 4   Remembering Where Things Are Placed or Put Away 4   Remembering List of 4-5 Errands 4   Taking Care of Complicated Tasks 4   Applied Cognition Raw Score 24   Applied Cognition  Standardized Score 62.21   End of Consult   Education Provided Yes   Patient Position at End of Consult Seated edge of bed;All needs within reach   Nurse Communication Nurse aware of consult       Dot Bhardwaj MS, OTR/L

## 2024-03-11 NOTE — PROGRESS NOTES
[facility]  Progress Note  Name: Ángela Camara I  MRN: [id number]  Unit/Bed#: [place] [address] -01 I Date of Admission: 3/9/2024   Date of Service: 3/11/2024 I Hospital Day: 1    Assessment/Plan   * Dizziness and giddiness, syncope  Assessment & Plan  Recent hospitalization discharged day prior to readmission for near syncope presents with dizziness  Patient went home states she was feeling well then was in the bathroom morning prior to admission, sat on the toilet had an episode of loose stool with near syncope brought back to the emergency department  Was recently here and seen by neurology with history of vasovagal syncopal episodes and syncopized in the past after bowel movements  CTA at that time noted basal artery unclear if it is a noncalcified mural plaque or thrombus, she was started on Eliquis with plan for repeat CTA in 2 to 3 weeks  MRI brain: No acute intracranial abnormality, mild chronic microangiopathic changes with sequela of remote right basal ganglia infarct  Echocardiogram without significant valvular dysfunction  Was given IV fluids, meclizine  Orthostatics are negative  She feels like Norvasc may be contributing will DC for now and monitor vital sign trend to avoid hypotension   Neurology reconsulted   Will have her reevaluated by PT/OT, last time recommending d/c home   Likely home later today if cleared by neurology     PAD (peripheral artery disease) (HCC)  Assessment & Plan  Incidental finding of left EIA occlusion.  Seen by vascular surgery last admission 3/6/2024  Also with mild carotid stenosis of CTA, surveillance recommended outpt   outpatient vascular surgery follow-up  Continue aspirin and statin    CVD (cerebrovascular disease)  Assessment & Plan  Hospitalized and d/c 3/8 for various intracranial stenosis and/or thrombus.  Was started on eliquis. Continue statin     Anxiety and depression  Assessment & Plan  Continue escitalopram and alprazolam as  needed  PDMP xanax last filled 11/16/2023     CKD (chronic kidney disease) stage 3, GFR 30-59 ml/min ([facility])  Assessment & Plan  Lab Results   Component Value Date    EGFR 49 03/10/2024    EGFR 41 03/09/2024    EGFR 47 03/08/2024    CREATININE 1.09 03/10/2024    CREATININE 1.27 03/09/2024    CREATININE 1.12 03/08/2024     Renal function at baseline    Mixed hyperlipidemia  Assessment & Plan  Continue atorvastatin    Essential hypertension  Assessment & Plan  Maintained on HCTZ, last admission was started on Norvasc  We will stop amlodipine given well-controlled blood pressure and she feels like this medication may have contributed to her dizziness  Orthostatic vital signs negative  Recommend blood pressure monitoring outpatient with PCP and keep a blood pressure log to see if Norvasc should be restarted outpatient  Avoid hypotension             VTE Pharmacologic Prophylaxis: VTE Score: 3 Moderate Risk (Score 3-4) - Pharmacological DVT Prophylaxis Ordered: apixaban (Eliquis).    Mobility:   Basic Mobility Inpatient Raw Score: 23  JH-HLM Goal: 7: Walk 25 feet or more  JH-HLM Achieved: 8: Walk 250 feet ot more  HLM Goal achieved. Continue to encourage appropriate mobility.    Patient Centered Rounds: I performed bedside rounds with nursing staff today.   Discussions with Specialists or Other Care Team Provider: neurology     Education and Discussions with Family / Patient:  patient .     Total Time Spent on Date of Encounter in care of patient:  mins. This time was spent on one or more of the following: performing physical exam; counseling and coordination of care; obtaining or reviewing history; documenting in the medical record; reviewing/ordering tests, medications or procedures; communicating with other healthcare professionals and discussing with patient's family/caregivers.    Current Length of Stay: 1 day(s)  Current Patient Status: Inpatient   Certification Statement: The patient will continue to require  additional inpatient hospital stay due to dizziness   Discharge Plan:  later today vs [place] once cleared by neurology     Code Status: Level 1 - Full Code    Subjective:   Patient states continues to have this sensation of lightheadedness and head pain back into her neck. No dizziness. No abodminal pain, N/V. No further BM or diarrhea     Objective:     Vitals:   Temp (24hrs), Av.1 °F (36.7 °C), Min:97.4 °F (36.3 °C), Max:98.4 °F (36.9 °C)    Temp:  [97.4 °F (36.3 °C)-98.4 °F (36.9 °C)] 98.3 °F (36.8 °C)  HR:  [55-63] 63  Resp:  [16-18] 16  BP: (127-146)/() 146/104  SpO2:  [93 %-94 %] 93 %  Body mass index is 22.92 kg/m².     Input and Output Summary (last 24 hours):     Intake/Output Summary (Last 24 hours) at 3/11/2024 1121  Last data filed at 3/10/2024 1800  Gross per 24 hour   Intake 2281.67 ml   Output --   Net 2281.67 ml       Physical Exam:   Physical Exam  Vitals and nursing note reviewed.   Constitutional:       General: She is not in acute distress.  Cardiovascular:      Rate and Rhythm: Normal rate and regular rhythm.   Pulmonary:      Effort: Pulmonary effort is normal. No respiratory distress.      Breath sounds: Normal breath sounds. No wheezing or rales.   Abdominal:      General: Bowel sounds are normal. There is no distension.      Palpations: Abdomen is soft.      Tenderness: There is no abdominal tenderness. There is no guarding.   Musculoskeletal:      Right lower leg: No edema.      Left lower leg: No edema.   Skin:     General: Skin is warm.   Neurological:      General: No focal deficit present.      Mental Status: She is alert and oriented to person, place, and time. Mental status is at baseline.          Additional Data:     Labs:  Results from last 7 days   Lab Units 03/10/24  0540 24  1230   WBC Thousand/uL 5.93 6.92   HEMOGLOBIN g/dL 15.4 16.2*   HEMATOCRIT % 47.5* 49.4*   PLATELETS Thousands/uL 197 221   NEUTROS PCT %  --  80*   LYMPHS PCT %  --  11*   MONOS PCT %  --  5    EOS PCT %  --  2     Results from last 7 days   Lab Units 03/10/24  0540   SODIUM mmol/L 139   POTASSIUM mmol/L 3.5   CHLORIDE mmol/L 106   CO2 mmol/L 29   BUN mg/dL 22   CREATININE mg/dL 1.09   ANION GAP mmol/L 4   CALCIUM mg/dL 8.4   ALBUMIN g/dL 3.4*   TOTAL BILIRUBIN mg/dL 0.52   ALK PHOS U/L 61   ALT U/L 10   AST U/L 13   GLUCOSE RANDOM mg/dL 93     Results from last 7 days   Lab Units 03/06/24  0853   INR  0.94             Results from last 7 days   Lab Units 03/06/24  1101   LACTIC ACID mmol/L 0.8       Lines/Drains:  Invasive Devices       Peripheral Intravenous Line  Duration             Peripheral IV 03/11/24 Right Antecubital <1 day                          Recent Cultures (last 7 days):         Last 24 Hours Medication List:   Current Facility-Administered Medications   Medication Dose Route Frequency Provider Last Rate    acetaminophen  650 mg Oral Q4H PRN Dileep Garcia, DO      ALPRAZolam  0.5 mg Oral HS PRN Dileep Zelaya, DO      apixaban  5 mg Oral BID Dileep Zelaya, DO      aspirin  81 mg Oral Daily Dileep Zelaya, DO      atorvastatin  40 mg Oral Daily With Dinner Dileep Zelaya, DO      dicyclomine  20 mg Oral TID PRN Kristin Acosta PA-C      escitalopram  5 mg Oral Daily Dileep Garcia, DO      hydroCHLOROthiazide  12.5 mg Oral Daily Dileep Garcia, DO      nicotine  1 patch Transdermal Daily Dileep Zelaya, DO      ondansetron  4 mg Intravenous Q4H PRN Dileep Zelaya, DO      oxyCODONE  2.5 mg Oral Q4H PRN Dileep Zelaya, DO      saccharomyces boulardii  250 mg Oral BID Kristin Acosta PA-C          Today, Patient Was Seen By: Kristin Acosta PA-C    **Please Note: This note may have been constructed using a voice recognition system.**

## 2024-03-11 NOTE — ASSESSMENT & PLAN NOTE
Incidental finding of left EIA occlusion.  Seen by vascular surgery last admission 3/6/2024  Also with mild carotid stenosis of CTA, surveillance recommended outpt   outpatient vascular surgery follow-up  Continue aspirin and statin

## 2024-03-11 NOTE — ASSESSMENT & PLAN NOTE
Maintained on HCTZ, last admission was started on Norvasc  We will stop amlodipine given well-controlled blood pressure and she feels like this medication may have contributed to her dizziness  Orthostatic vital signs negative  Recommend blood pressure monitoring outpatient with PCP and keep a blood pressure log to see if Norvasc should be restarted outpatient  Avoid hypotension

## 2024-03-11 NOTE — ASSESSMENT & PLAN NOTE
Recent hospitalization discharged day prior to readmission for near syncope presents with dizziness  Patient went home states she was feeling well then was in the bathroom morning prior to admission, sat on the toilet had an episode of loose stool with near syncope brought back to the emergency department  Was recently here and seen by neurology with history of vasovagal syncopal episodes and syncopized in the past after bowel movements  CTA at that time noted basal artery unclear if it is a noncalcified mural plaque or thrombus, she was started on Eliquis with plan for repeat CTA in 2 to 3 weeks  MRI brain: No acute intracranial abnormality, mild chronic microangiopathic changes with sequela of remote right basal ganglia infarct  Echocardiogram without significant valvular dysfunction  Was given IV fluids, meclizine  Orthostatics are negative  She feels like Norvasc may be contributing will DC for now and monitor vital sign trend to avoid hypotension   Neurology reconsulted   Will have her reevaluated by PT/OT, last time recommending d/c home   Likely home later today if cleared by neurology

## 2024-03-12 VITALS
OXYGEN SATURATION: 93 % | BODY MASS INDEX: 22.82 KG/M2 | DIASTOLIC BLOOD PRESSURE: 84 MMHG | SYSTOLIC BLOOD PRESSURE: 146 MMHG | RESPIRATION RATE: 18 BRPM | HEART RATE: 59 BPM | WEIGHT: 142 LBS | HEIGHT: 66 IN | TEMPERATURE: 98.4 F

## 2024-03-12 PROCEDURE — 99239 HOSP IP/OBS DSCHRG MGMT >30: CPT | Performed by: INTERNAL MEDICINE

## 2024-03-12 RX ORDER — MECLIZINE HYDROCHLORIDE 25 MG/1
25 TABLET ORAL 3 TIMES DAILY PRN
Qty: 30 TABLET | Refills: 0 | Status: SHIPPED | OUTPATIENT
Start: 2024-03-12

## 2024-03-12 RX ADMIN — ESCITALOPRAM 5 MG: 5 TABLET, FILM COATED ORAL at 09:54

## 2024-03-12 RX ADMIN — APIXABAN 5 MG: 5 TABLET, FILM COATED ORAL at 09:54

## 2024-03-12 RX ADMIN — HYDROCHLOROTHIAZIDE 12.5 MG: 12.5 TABLET ORAL at 09:54

## 2024-03-12 RX ADMIN — ASPIRIN 81 MG: 81 TABLET, COATED ORAL at 09:54

## 2024-03-12 RX ADMIN — Medication 250 MG: at 09:54

## 2024-03-12 NOTE — DISCHARGE SUMMARY
[facility]  Discharge- Ángela Camara 1945, 78 y.o. female MRN: [id number]  Unit/Bed#: [place] [address] -01 Encounter: [id number]  Primary Care Provider: Enid Jernigan MD   Date and time admitted to hospital: 3/9/2024 11:32 AM    * Dizziness and giddiness, syncope  Assessment & Plan  Recent hospitalization discharged day prior to readmission for near syncope presents with dizziness  Patient went home states she was feeling well then was in the bathroom morning prior to admission, sat on the toilet had an episode of loose stool with near syncope brought back to the emergency department  Was recently here and seen by neurology with history of vasovagal syncopal episodes and syncopized in the past after bowel movements  CTA at that time noted basal artery unclear if it is a noncalcified mural plaque or thrombus, she was started on Eliquis with plan for repeat CTA in 2 to 3 weeks  MRI brain: No acute intracranial abnormality, mild chronic microangiopathic changes with sequela of remote right basal ganglia infarct  Echocardiogram without significant valvular dysfunction  Orthostatics negative  Evaluated by neurology-Low suspicion for acute neurovascular insult.  Constellation of symptoms raises possibility of carcinoid syndrome.  Recommendation for outpatient 24 hour urinary 5-HIAA, Holter monitor/zio patch   Outpatient vestibular therapy referral  Cervical neuralgia may be contributing-recommendation for conservative measures stretching, ice/heat.  Would avoid muscle relaxers at this time but can consider in the future.    PAD (peripheral artery disease) (HCC)  Assessment & Plan  Incidental finding of left EIA occlusion.  Seen by vascular surgery last admission 3/6/2024  Also with mild carotid stenosis of CTA, surveillance recommended outpt   outpatient vascular surgery follow-up  Continue aspirin and statin    CVD (cerebrovascular disease)  Assessment & Plan  Hospitalized and  d/c 3/8 for various intracranial stenosis and/or thrombus.  Was started on eliquis. Continue statin     Anxiety and depression  Assessment & Plan  Continue escitalopram and alprazolam as needed  PDMP xanax last filled 11/16/2023     CKD (chronic kidney disease) stage 3, GFR 30-59 ml/min ([facility])  Assessment & Plan  Lab Results   Component Value Date    EGFR 49 03/10/2024    EGFR 41 03/09/2024    EGFR 47 03/08/2024    CREATININE 1.09 03/10/2024    CREATININE 1.27 03/09/2024    CREATININE 1.12 03/08/2024     Renal function at baseline    Mixed hyperlipidemia  Assessment & Plan  Continue atorvastatin    Essential hypertension  Assessment & Plan  Maintained on HCTZ, last admission was started on Norvasc  Amlodipine was stopped given well-controlled blood pressure and she feels like this medication may have contributed to her dizziness  Orthostatic vital signs negative  Recommend blood pressure monitoring outpatient with PCP and keep a blood pressure log to see if Norvasc should be restarted outpatient  Avoid hypotension      Discharging Physician / Practitioner: Harinder Yun DO  PCP: Enid Jernigan MD  Admission Date:   Admission Orders (From admission, onward)       Ordered        03/10/24 1448  Inpatient Admission  Once            03/09/24 1404  Place in Observation  Once                          Discharge Date: 03/12/24    Medical Problems       Resolved Problems  Date Reviewed: 3/10/2024            Resolved    Hypokalemia 3/10/2024     Resolved by  Kristin Acosta PA-C          Consultations During Hospital Stay:   IP CONSULT TO NEUROLOGY    Procedures Performed: None    Significant Findings / Test Results:     No results found.    Test Results Pending at Discharge (will require follow up): None      Outpatient Tests Requested: Holter monitor, Urine 5-HIAA    Reason for Admission: Dizziness and presyncope     Hospital Course:     Ángela Camara is a 78 y.o. female Who presented with recurrent  "syncopal event.  Patient was admitted and underwent neurologic workup.  Findings including telemetry, orthostatics, MRI unremarkable for acute ischemic events.  Patient was seen in consultation by neurology with low suspicion for acute neurovascular insult.  Patient was given prescription for meclizine and instructions to discuss outpatient testing with primary doctor.  Referral to vestibular therapy offered.    Please see above list of diagnoses and related plan for additional information.     Condition at Discharge: stable     Discharge Day Visit / Exam:     Subjective: Patient seen and evaluated at bedside.  Continues to have some low-level lightheadedness and ringing in the ears but can safely ambulate without issue.  Feels that she will be able to manage at home.    Vitals: Blood Pressure: 146/84 (03/12/24 0734)  Pulse: 59 (03/12/24 0734)  Temperature: 98.4 °F (36.9 °C) (03/12/24 0734)  Temp Source: Oral (03/12/24 0734)  Respirations: 18 (03/12/24 0734)  Height: 5' 6\" (167.6 cm) (03/09/24 1649)  Weight - Scale: 64.4 kg (142 lb) (03/09/24 1649)  SpO2: 93 % (03/12/24 0734)  Exam:   Physical Exam  Vitals reviewed.   Constitutional:       General: She is not in acute distress.     Appearance: She is well-developed. She is not ill-appearing, toxic-appearing or diaphoretic.   HENT:      Head: Normocephalic and atraumatic.      Mouth/Throat:      Mouth: Mucous membranes are moist.   Eyes:      General: No scleral icterus.     Extraocular Movements: Extraocular movements intact.   Cardiovascular:      Rate and Rhythm: Normal rate and regular rhythm.      Heart sounds: Normal heart sounds.   Pulmonary:      Effort: Pulmonary effort is normal. No respiratory distress.      Breath sounds: Normal breath sounds. No wheezing or rales.   Abdominal:      General: There is no distension.      Palpations: Abdomen is soft.      Tenderness: There is no abdominal tenderness. There is no guarding or rebound.   Musculoskeletal:       "   General: No swelling, tenderness or deformity.      Comments: Paracervical muscle tenderness   Skin:     General: Skin is warm and dry.   Neurological:      General: No focal deficit present.      Mental Status: She is alert. Mental status is at baseline.   Psychiatric:         Mood and Affect: Mood normal.         Behavior: Behavior normal.         Thought Content: Thought content normal.         Judgment: Judgment normal.           Discharge instructions/Information to patient and family:   See after visit summary for information provided to patient and family.      Provisions for Follow-Up Care:  See after visit summary for information related to follow-up care and any pertinent home health orders.      Disposition:     Home     Discharge Statement:  I spent 60 minutes discharging the patient. This time was spent on the day of discharge. I had direct contact with the patient on the day of discharge. Greater than 50% of the total time was spent examining patient, answering all patient questions, arranging and discussing plan of care with patient as well as directly providing post-discharge instructions.  Additional time then spent on discharge activities.    Discharge Medications:  See after visit summary for reconciled discharge medications provided to patient and family.      ** Please Note: This note has been constructed using a voice recognition system **

## 2024-03-12 NOTE — NURSING NOTE
Pt discharge to home at this time, no c/o pain or discomfort. Discharge instructions given, pt stated that she understand instructions. Pt took all person belongings.

## 2024-03-12 NOTE — DISCHARGE INSTR - AVS FIRST PAGE
Patient Education    [employer] HANDOUT- PARENT  FIRST WEEK VISIT (3 TO 5 DAYS)  Here are some suggestions from [employer] experts that may be of value to your family.     HOW YOUR FAMILY IS DOING  If you are worried about your living or food situation, talk with us. Community agencies and programs such as WIC and SNAP can also provide information and assistance.  Tobacco-free spaces keep children healthy. Don t smoke or use e-cigarettes. Keep your home and car smoke-free.  Take help from family and friends.    FEEDING YOUR BABY    Feed your baby only breast milk or iron-fortified formula until he is about 6 months old.    Feed your baby when he is hungry. Look for him to    Put his hand to his mouth.    Suck or root.    Fuss.    Stop feeding when you see your baby is full. You can tell when he    Turns away    Closes his mouth    Relaxes his arms and hands    Know that your baby is getting enough to eat if he has more than 5 wet diapers and at least 3 soft stools per day and is gaining weight appropriately.    Hold your baby so you can look at each other while you feed him.    Always hold the bottle. Never prop it.  If Breastfeeding    Feed your baby on demand. Expect at least 8 to 12 feedings per day.    A lactation consultant can give you information and support on how to breastfeed your baby and make you more comfortable.    Begin giving your baby vitamin D drops (400 IU a day).    Continue your prenatal vitamin with iron.    Eat a healthy diet; avoid fish high in mercury.  If Formula Feeding    Offer your baby 2 oz of formula every 2 to 3 hours. If he is still hungry, offer him more.    HOW YOU ARE FEELING    Try to sleep or rest when your baby sleeps.    Spend time with your other children.    Keep up routines to help your family adjust to the new baby.    BABY CARE    Sing, talk, and read to your baby; avoid TV and digital media.    Help your baby wake for feeding by patting her, changing her  Dear Ángela Camara,     It was our pleasure to care for you here at [facility].  It is our hope that we were always able to exceed the expected standards for your care during your stay.  You were hospitalized due to dizziness.  Your workup was unremarkable for acute neurologic events.  It is likely that you have recurrent vasovagal syncope.  This is a benign condition.  We discontinued your amlodipine to allow for higher baseline blood pressure.  Other things we must keep in mind on the possibility of high serotonin levels which can cause flushing and diarrhea.  Additionally, abnormal heart rhythms.  None were seen during telemetry monitoring both hospitalizations..  You were cared for on the 2nd floor under the service of Harinder Yun DO with the [facility] who covers for your primary care physician (PCP), Enid Jernigan MD, while you were hospitalized.  If you have any questions or concerns related to this hospitalization, you may contact us at .  For follow up as well as medication refills, we recommend that you follow up with your primary care physician.  A registered nurse will reach out to you by phone within a few days after your discharge to answer any additional questions that you may have after going home.  However, at this time we provide for you here, the most important instructions / recommendations at discharge:     Notable Medication Adjustments -   Stop amlodipine  As needed meclizine  Testing Required after Discharge -   24 hour urinary 5-HIAA  Holter monitor/zio patch   ** Please contact your PCP to request testing orders for any of the testing recommended here **  Important Follow Up Information -   Please see your primary doctor in 1 to 2 weeks  Please review this entire after visit summary as additional general instructions including medication list, appointments, activity, diet, any pertinent wound  care, and other additional recommendations from your care team that may be provided for you.      Sincerely,     Harinder Yun, DO     diaper, and undressing her.    Calm your baby by stroking her head or gently rocking her.    Never hit or shake your baby.    Take your baby s temperature with a rectal thermometer, not by ear or skin; a fever is a rectal temperature of 100.4 F/38.0 C or higher. Call us anytime if you have questions or concerns.    Plan for emergencies: have a first aid kit, take first aid and infant CPR classes, and make a list of phone numbers.    Wash your hands often.    Avoid crowds and keep others from touching your baby without clean hands.    Avoid sun exposure.    SAFETY    Use a rear-facing-only car safety seat in the back seat of all vehicles.    Make sure your baby always stays in his car safety seat during travel. If he becomes fussy or needs to feed, stop the vehicle and take him out of his seat.    Your baby s safety depends on you. Always wear your lap and shoulder seat belt. Never drive after drinking alcohol or using drugs. Never text or use a cell phone while driving.    Never leave your baby in the car alone. Start habits that prevent you from ever forgetting your baby in the car, such as putting your cell phone in the back seat.    Always put your baby to sleep on his back in his own crib, not your bed.    Your baby should sleep in your room until he is at least 6 months old.    Make sure your baby s crib or sleep surface meets the most recent safety guidelines.    If you choose to use a mesh playpen, get one made after February 28, 2013.    Swaddling is not safe for sleeping. It may be used to calm your baby when he is awake.    Prevent scalds or burns. Don t drink hot liquids while holding your baby.    Prevent tap water burns. Set the water heater so the temperature at the faucet is at or below 120 F /49 C.    WHAT TO EXPECT AT YOUR BABY S 1 MONTH VISIT  We will talk about  Taking care of your baby, your family, and yourself  Promoting your health and recovery  Feeding your baby and watching her grow  Caring  for and protecting your baby  Keeping your baby safe at home and in the car      Helpful Resources: Smoking Quit Line: 908.832.5058  Poison Help Line:  536.404.9549  Information About Car Safety Seats: www.safercar.gov/parents  Toll-free Auto Safety Hotline: 469.193.5094  Consistent with Bright Futures: Guidelines for Health Supervision of Infants, Children, and Adolescents, 4th Edition  For more information, go to https://brightfutures.aap.org.

## 2024-03-12 NOTE — PLAN OF CARE
Problem: Potential for Falls  Goal: Patient will remain free of falls  Description: INTERVENTIONS:  - Educate patient/family on patient safety including physical limitations  - Instruct patient to call for assistance with activity   - Consult OT/PT to assist with strengthening/mobility   - Keep Call bell within reach  - Keep bed low and locked with side rails adjusted as appropriate  - Keep care items and personal belongings within reach  - Initiate and maintain comfort rounds  - Make Fall Risk Sign visible to staff  - Apply yellow socks and bracelet for high fall risk patients  - Consider moving patient to room near nurses station  Outcome: Progressing     Problem: PAIN - ADULT  Goal: Verbalizes/displays adequate comfort level or baseline comfort level  Description: Interventions:  - Encourage patient to monitor pain and request assistance  - Assess pain using appropriate pain scale  - Administer analgesics based on type and severity of pain and evaluate response  - Implement non-pharmacological measures as appropriate and evaluate response  - Consider cultural and social influences on pain and pain management  - Notify physician/advanced practitioner if interventions unsuccessful or patient reports new pain  Outcome: Progressing     Problem: GASTROINTESTINAL - ADULT  Goal: Maintains or returns to baseline bowel function  Description: INTERVENTIONS:  - Assess bowel function  - Encourage oral fluids to ensure adequate hydration  - Administer IV fluids if ordered to ensure adequate hydration  - Administer ordered medications as needed  - Encourage mobilization and activity  - Consider nutritional services referral to assist patient with adequate nutrition and appropriate food choices  Outcome: Progressing     Problem: METABOLIC, FLUID AND ELECTROLYTES - ADULT  Goal: Electrolytes maintained within normal limits  Description: INTERVENTIONS:  - Monitor labs and assess patient for signs and symptoms of electrolyte  imbalances  - Administer electrolyte replacement as ordered  - Monitor response to electrolyte replacements, including repeat lab results as appropriate  - Instruct patient on fluid and nutrition as appropriate  Outcome: Progressing

## 2024-03-12 NOTE — PLAN OF CARE
Problem: Potential for Falls  Goal: Patient will remain free of falls  Description: INTERVENTIONS:  - Educate patient/family on patient safety including physical limitations  - Instruct patient to call for assistance with activity   - Consult OT/PT to assist with strengthening/mobility   - Keep Call bell within reach  - Keep bed low and locked with side rails adjusted as appropriate  - Keep care items and personal belongings within reach  - Initiate and maintain comfort rounds  - Make Fall Risk Sign visible to staff  - Apply yellow socks and bracelet for high fall risk patients  - Consider moving patient to room near nurses station  Outcome: Progressing     Problem: INFECTION - ADULT  Goal: Absence of fever/infection during neutropenic period  Description: INTERVENTIONS:  - Monitor WBC    Outcome: Progressing  Goal: Absence or prevention of progression during hospitalization  Description: INTERVENTIONS:  - Assess and monitor for signs and symptoms of infection  - Monitor lab/diagnostic results  - Monitor all insertion sites, i.e. indwelling lines, tubes, and drains  - Monitor endotracheal if appropriate and nasal secretions for changes in amount and color  - [place] appropriate cooling/warming therapies per order  - Administer medications as ordered  - Instruct and encourage patient and family to use good hand hygiene technique  - Identify and instruct in appropriate isolation precautions for identified infection/condition  Outcome: Progressing     Problem: PAIN - ADULT  Goal: Verbalizes/displays adequate comfort level or baseline comfort level  Description: Interventions:  - Encourage patient to monitor pain and request assistance  - Assess pain using appropriate pain scale  - Administer analgesics based on type and severity of pain and evaluate response  - Implement non-pharmacological measures as appropriate and evaluate response  - Consider cultural and social influences on pain and pain management  - Notify  physician/advanced practitioner if interventions unsuccessful or patient reports new pain  Outcome: Progressing     Problem: SAFETY ADULT  Goal: Patient will remain free of falls  Description: INTERVENTIONS:  - Educate patient/family on patient safety including physical limitations  - Instruct patient to call for assistance with activity   - Consult OT/PT to assist with strengthening/mobility   - Keep Call bell within reach  - Keep bed low and locked with side rails adjusted as appropriate  - Keep care items and personal belongings within reach  - Initiate and maintain comfort rounds  - Make Fall Risk Sign visible to staff  - Apply yellow socks and bracelet for high fall risk patients  - Consider moving patient to room near nurses station  Outcome: Progressing  Goal: Maintain or return to baseline ADL function  Description: INTERVENTIONS:  - Educate patient/family on patient safety including physical limitations  - Instruct patient to call for assistance with activity   - Consult OT/PT to assist with strengthening/mobility   - Keep Call bell within reach  - Keep bed low and locked with side rails adjusted as appropriate  - Keep care items and personal belongings within reach  - Initiate and maintain comfort rounds  - Apply yellow socks and bracelet for high fall risk patients  - Consider moving patient to room near nurses station  Outcome: Progressing  Goal: Maintains/Returns to pre admission functional level  Description: INTERVENTIONS:  - Perform AM-PAC 6 Click Basic Mobility/ Daily Activity assessment daily.  - Set and communicate daily mobility goal to care team and patient/family/caregiver.   - Collaborate with rehabilitation services on mobility goals if consulted  - Perform Range of Motion  times a day.  - Reposition patient every  hours.  - Dangle patient  times a day  - Stand patient  times a day  - Ambulate patient  times a day  - Out of bed to chair  times a day   - Out of bed for meals  times a day  -  Out of bed for toileting  - Record patient progress and toleration of activity level   Outcome: Progressing     Problem: DISCHARGE PLANNING  Goal: Discharge to home or other facility with appropriate resources  Description: INTERVENTIONS:  - Identify barriers to discharge w/patient and caregiver  - Arrange for needed discharge resources and transportation as appropriate  - Identify discharge learning needs (meds, wound care, etc.)  - Arrange for interpretive services to assist at discharge as needed  - Refer to Case Management Department for coordinating discharge planning if the patient needs post-hospital services based on physician/advanced practitioner order or complex needs related to functional status, cognitive ability, or social support system  Outcome: Progressing     Problem: Knowledge Deficit  Goal: Patient/family/caregiver demonstrates understanding of disease process, treatment plan, medications, and discharge instructions  Description: Complete learning assessment and assess knowledge base.  Interventions:  - Provide teaching at level of understanding  - Provide teaching via preferred learning methods  Outcome: Progressing     Problem: NEUROSENSORY - ADULT  Goal: Achieves stable or improved neurological status  Description: INTERVENTIONS  - Monitor and report changes in neurological status  - Monitor vital signs such as temperature, blood pressure, glucose, and any other labs ordered   - Initiate measures to prevent increased intracranial pressure  - Monitor for seizure activity and implement precautions if appropriate      Outcome: Progressing     Problem: GASTROINTESTINAL - ADULT  Goal: Maintains or returns to baseline bowel function  Description: INTERVENTIONS:  - Assess bowel function  - Encourage oral fluids to ensure adequate hydration  - Administer IV fluids if ordered to ensure adequate hydration  - Administer ordered medications as needed  - Encourage mobilization and activity  - Consider  nutritional services referral to assist patient with adequate nutrition and appropriate food choices  Outcome: Progressing     Problem: METABOLIC, FLUID AND ELECTROLYTES - ADULT  Goal: Electrolytes maintained within normal limits  Description: INTERVENTIONS:  - Monitor labs and assess patient for signs and symptoms of electrolyte imbalances  - Administer electrolyte replacement as ordered  - Monitor response to electrolyte replacements, including repeat lab results as appropriate  - Instruct patient on fluid and nutrition as appropriate  Outcome: Progressing

## 2024-03-13 ENCOUNTER — TELEPHONE (OUTPATIENT)
Dept: NEUROLOGY | Facility: CLINIC | Age: 79
End: 2024-03-13

## 2024-03-13 ENCOUNTER — PATIENT OUTREACH (OUTPATIENT)
Dept: FAMILY MEDICINE CLINIC | Facility: CLINIC | Age: 79
End: 2024-03-13

## 2024-03-13 NOTE — TELEPHONE ENCOUNTER
Called patient. No answer. Left a voice message requesting for a return call. Patient needs to schedule CTA and patient needs a follow up with a [place] attending. Provided the office's phone number.

## 2024-03-13 NOTE — TELEPHONE ENCOUNTER
----- Message from Macario Curry PA-C sent at 3/7/2024  3:09 PM EST -----  Regarding: HFU and follow up CTA  Benjamin Pugh,   Just wanted to make you aware of this patient, and ensure she has appropriate stroke care/follow up post discharge.     She was found to have significant CTA findings yesterday, 3/7, at [place]; Dr. Willard Shah wanted to place her on Eliquis/aspirin and obtain a repeat CTA head/neck in 2-3 weeks from now  (approximately March 21-22); and depending on the results, either stop or continue the Eliquis.     Just wanted to make sure this CTA is completed and followed up on given the significant findings. She should ideally have vascular neurology team follow up in about 4 weeks (to be seen in the office and review CTA results in person/discuss next steps).    Please let me know if any questions and keep me in the loop.     Thank you as always for all of the help!  Macario Curry

## 2024-03-13 NOTE — PROGRESS NOTES
Received ADT alert that pt discharged from inpt admission [facility] 3/9/24-3/12/24. Call placed to pt requesting return call back

## 2024-03-15 ENCOUNTER — PATIENT OUTREACH (OUTPATIENT)
Dept: FAMILY MEDICINE CLINIC | Facility: CLINIC | Age: 79
End: 2024-03-15

## 2024-03-15 ENCOUNTER — TELEPHONE (OUTPATIENT)
Age: 79
End: 2024-03-15

## 2024-03-15 ENCOUNTER — OFFICE VISIT (OUTPATIENT)
Dept: FAMILY MEDICINE CLINIC | Facility: CLINIC | Age: 79
End: 2024-03-15
Payer: MEDICARE

## 2024-03-15 VITALS
HEIGHT: 66 IN | HEART RATE: 70 BPM | TEMPERATURE: 74 F | BODY MASS INDEX: 23.4 KG/M2 | DIASTOLIC BLOOD PRESSURE: 85 MMHG | OXYGEN SATURATION: 96 % | WEIGHT: 145.6 LBS | SYSTOLIC BLOOD PRESSURE: 135 MMHG

## 2024-03-15 DIAGNOSIS — R42 DIZZINESS AND GIDDINESS: ICD-10-CM

## 2024-03-15 DIAGNOSIS — Z09 HOSPITAL DISCHARGE FOLLOW-UP: Primary | ICD-10-CM

## 2024-03-15 PROCEDURE — 99495 TRANSJ CARE MGMT MOD F2F 14D: CPT | Performed by: FAMILY MEDICINE

## 2024-03-15 NOTE — PATIENT INSTRUCTIONS
- Hold the hydrochlorothiazide for your blood pressure but continue to monitor it at home. If it is really high please call the office   Start wearing compression stockings at home   I am ordering a 48 hour Holter monitor as recommended in the hospital  Please call Neurology to set up an appointment   Please schedule vestibular therapy for the dizziness   Follow up with Dr Jernigan as scheduled

## 2024-03-15 NOTE — PROGRESS NOTES
"Inbasket reminder on second attempt to call pt regarding interest in care management. Pt states that she has appt with PCP today but she currently is very dizzy and lightheaded.reports that BP is only 100 as \"top number\". Pt reports that she has called the ambulance a couple times already this week and wont again. Advised pt to use walker for her safety when she is walking to avoid falls. Pt on Eliquis. Pt then stated that she has to end call because the office is calling her.Was unable to discuss anything further.  "

## 2024-03-15 NOTE — PROGRESS NOTES
Assessment & Plan     1. Hospital discharge follow-up    2. Dizziness and giddiness, syncope  -     Holter monitor; Future; Expected date: 03/15/2024  -     Ambulatory Referral to Physical Therapy; Future    - Patient hospitalized from 3/9/24- 3/12/24 for near syncope, dizziness and giddiness. Prior to this she has been having recurrent syncopal events. Workup in the hospital largely unremarkable. Patient was seen by Neurology during her hospital stay who had low suspicion for acute neurovascular insult. There was a question of whether her blood pressure medication was causing the symptoms and the amlodipine was discontinued with patient being instructed to continue hydrochlorothiazide 12.5 mg only. Patient states that she has been compliant with this however she has been experiencing hypotension. Orthostatics were performed today which were positive when going from lying to sitting position. Recommend holding hydrochlorothiazide temporarily and monitoring blood pressure at home. If elevated consider adding amlodipine 2.5 mg daily. Patient also recommended to start wearing compression stockings given positive orthostatics and keep hydrated.   - Will order Holter monitor given recurrent syncopal episodes to rule out underlying arrhythmia   - Referral to physical therapy placed for vestibular therapy   - Patient advised to call Neurology regarding hospital follow up  - Follow up with PCP Dr Jernigan as scheduled 3/21     Subjective     Transitional Care Management Review:   Ángela Camara is a 78 y.o. female here for TCM follow up.     During the TCM phone call patient stated:  TCM Call       Date and time call was made  3/14/2024 11:58 AM    Hospital care reviewed  Records reviewed    Patient was hospitialized at  [facility]    Date of Admission  03/09/24    Date of discharge  03/12/24    Diagnosis  Dizziness and giddiness, syncope    Disposition  Home    Were the patients medications reviewed and  updated  Yes    Current Symptoms  None    Loose stool severity  Mild          TCM Call       Post hospital issues  None    Should patient be enrolled in anticoag monitoring?  No    Scheduled for follow up?  Yes    Patients specialists  Cardiologist    Did you obtain your prescribed medications  Yes    Why were you unable to obtain your medications  no prescriptions sent to the pharmacy    Do you need help managing your prescriptions or medications  No    Is transportation to your appointment needed  No    I have advised the patient to call PCP with any new or worsening symptoms  Sherita Martin, A[place]    Living Arrangements  Family members    Support System  Family    The type of support provided  Emotional    Do you have social support  Yes, as much as I need    Are you recieving any outpatient services  No    Are you recieving home care services  No    Are you using any community resources  No    Current waiver services  No    Have you fallen in the last 12 months  No    Interperter language line needed  No    Counseling  Patient    Comments  TCM-Dizziness and giddiness, syncope          HPI    Ángela Camara is a 78 year old female who presents today for a transition of care management appointment after being hospitalized from 3/9/24- 3/12/24 for a near syncopal event after a bowel movement as well as dizziness and lightheadedness. Prior to this she was hospitalized 3/6/24- 3/8/24 for a syncopal episode and seen in the ED 3/2 for another syncopal episodes. She underwent Neurological workup which included an MRI and also had orthostatics which were negative. Patient was seen by Neurology during her hospital stay who had low suspicion for acute neurovascular insult. Patient was given prescription for meclizine for the dizziness and also referred to physical therapy for vestibular therapy. During previous office visits there was suspicion that perhaps her blood pressure medications were also contributing the  "dizziness and her amlodipine was discontinued. Patient states that she has only been taking hydrochlorothiazide 12.5mg however it has been making her blood pressure drop too low (lowest recorded blood pressure of 101/65) and she has been getting lightheaded. She states that she did not take her medication today.    Review of Systems   Constitutional: Negative.    HENT: Negative.     Eyes: Negative.    Respiratory: Negative.     Cardiovascular: Negative.    Gastrointestinal: Negative.    Genitourinary: Negative.    Musculoskeletal: Negative.    Skin: Negative.    Neurological:  Positive for dizziness and light-headedness.   Psychiatric/Behavioral: Negative.         Objective     /85 (BP Location: Left arm, Patient Position: Standing, Cuff Size: Adult)   Pulse 70   Temp (!) 74 °F (23.3 °C)   Ht 5' 6\" (1.676 m)   Wt 66 kg (145 lb 9.6 oz)   SpO2 96%   BMI 23.50 kg/m²      Physical Exam  Constitutional:       General: She is not in acute distress.     Appearance: She is not ill-appearing.   HENT:      Head: Normocephalic and atraumatic.      Mouth/Throat:      Mouth: Mucous membranes are moist.      Pharynx: No oropharyngeal exudate.   Eyes:      General:         Right eye: No discharge.         Left eye: No discharge.      Extraocular Movements: Extraocular movements intact.      Pupils: Pupils are equal, round, and reactive to light.   Cardiovascular:      Rate and Rhythm: Normal rate.   Pulmonary:      Effort: Pulmonary effort is normal. No respiratory distress.      Breath sounds: No wheezing.   Abdominal:      General: Bowel sounds are normal. There is no distension.      Palpations: Abdomen is soft.      Tenderness: There is no abdominal tenderness.   Neurological:      General: No focal deficit present.      Mental Status: She is alert.      Motor: No weakness.      Coordination: Coordination normal.      Gait: Gait normal.      Deep Tendon Reflexes: Reflexes normal.      Comments: Ira Patel " negative   Psychiatric:         Mood and Affect: Mood normal.         Behavior: Behavior normal.       Medications have been reviewed by provider in current encounter    Nasreen Yang MD

## 2024-03-15 NOTE — TELEPHONE ENCOUNTER
Patient was discharged from the hospital a few days ago. States they adjusted her BP medication but her BP is running to low now. She is feeling lightheaded and dizzy. Is holding off on her a BP med this morning. OV scheduled for this afternoon. Will have someone drive her to appt.

## 2024-03-18 NOTE — TELEPHONE ENCOUNTER
Discussed with Dr. Akbar Dennis's medical assistant. Per jluis Dennis to split a HFU 60 minute appt slot in half to schedule patient.     Called patient. Offered to schedule NV HFU appointment, patient is agreeable to this. Scheduled appointment for 4/3/24 with Dr. Webber. Provided appointment details and office phone number. Patient denies any questions at this time. Patient is scheduled for the CTA on 3/27/24. She was appreciative.

## 2024-03-21 ENCOUNTER — OFFICE VISIT (OUTPATIENT)
Dept: FAMILY MEDICINE CLINIC | Facility: CLINIC | Age: 79
End: 2024-03-21
Payer: MEDICARE

## 2024-03-21 VITALS
HEIGHT: 66 IN | TEMPERATURE: 97 F | HEART RATE: 63 BPM | WEIGHT: 145 LBS | OXYGEN SATURATION: 97 % | DIASTOLIC BLOOD PRESSURE: 100 MMHG | BODY MASS INDEX: 23.3 KG/M2 | SYSTOLIC BLOOD PRESSURE: 166 MMHG

## 2024-03-21 DIAGNOSIS — F41.1 GENERALIZED ANXIETY DISORDER: ICD-10-CM

## 2024-03-21 DIAGNOSIS — R03.0 WHITE COAT SYNDROME WITH HIGH BLOOD PRESSURE BUT WITHOUT HYPERTENSION: Primary | ICD-10-CM

## 2024-03-21 DIAGNOSIS — I73.9 PAD (PERIPHERAL ARTERY DISEASE) (HCC): ICD-10-CM

## 2024-03-21 DIAGNOSIS — G45.9 TIA (TRANSIENT ISCHEMIC ATTACK): ICD-10-CM

## 2024-03-21 DIAGNOSIS — R10.84 GENERALIZED ABDOMINAL PAIN: ICD-10-CM

## 2024-03-21 DIAGNOSIS — K58.0 IRRITABLE BOWEL SYNDROME WITH DIARRHEA: ICD-10-CM

## 2024-03-21 PROCEDURE — G2211 COMPLEX E/M VISIT ADD ON: HCPCS | Performed by: FAMILY MEDICINE

## 2024-03-21 PROCEDURE — 99214 OFFICE O/P EST MOD 30 MIN: CPT | Performed by: FAMILY MEDICINE

## 2024-03-21 RX ORDER — DICYCLOMINE HCL 20 MG
20 TABLET ORAL EVERY 6 HOURS
Qty: 60 TABLET | Refills: 1 | Status: SHIPPED | OUTPATIENT
Start: 2024-03-21

## 2024-03-21 NOTE — PROGRESS NOTES
Subjective:    Hypertension  Associated symptoms include headaches. Pertinent negatives include no chest pain, palpitations or shortness of breath.     Ángela is a 78 y.o. female here today for:  Chief Complaint   Patient presents with    Hypertension   .      ---Above per clinical staff & reviewed. ---  HPI:  78yof here for follow up of HTN  Pt seen by Dr Yang for TCM  Orthostatics were positive and pt instructed to stop HCTZ  Bps at home have been ok- elevated today in the office  Orthostatics normal off HCTZ  Will add low dose Amlodipine  Will call me with BP readings and will f/u in a month  Has appt with neurology and cardiology scheduled    The following portions of the patient's history were reviewed and updated as appropriate: allergies, current medications, past family history, past medical history, past social history, past surgical history and problem list.    Past Medical History:   Diagnosis Date    Anxiety     Atypical squamous cells cannot exclude high grade squamous intraepithelial lesion on cytologic smear of cervix (ASC-H)     Last Assessed 5/13/2014    Back pain     Cancer (HCC)     Cervical cancer (HCC)     Depression     Ovarian cyst     Tinnitus of both ears 10/2/2020    Tobacco abuse 11/20/2018       Past Surgical History:   Procedure Laterality Date    ANKLE FRACTURE SURGERY      Last Assessed 07/26/2016    ANKLE SURGERY      CERVICAL BIOPSY  W/ LOOP ELECTRODE EXCISION      COLONOSCOPY  10/2019    DIAGNOSTIC LAPAROSCOPY      DILATION AND CURETTAGE OF UTERUS      ESOPHAGOGASTRODUODENOSCOPY N/A 9/12/2016    Procedure: ESOPHAGOGASTRODUODENOSCOPY (EGD);  Surgeon: Alvin Powell MD;  Location: [facility];  Service:     FRACTURE SURGERY      ORTHOPEDIC SURGERY      TX COLONOSCOPY FLX DX W/COLLJ SPEC WHEN PFRMD N/A 9/12/2016    Procedure: FLEXIBLE COLONOSCOPY;  Surgeon: Alvin Powell MD;  Location: [facility];  Service: Colorectal    SALPINGOOPHORECTOMY Left     TONSILLECTOMY      UPPER  GASTROINTESTINAL ENDOSCOPY  2016       Social History     Socioeconomic History    Marital status:      Spouse name: None    Number of children: None    Years of education: 15    Highest education level: None   Occupational History    None   Tobacco Use    Smoking status: Every Day     Current packs/day: 0.50     Average packs/day: 0.5 packs/day for 60.0 years (30.0 ttl pk-yrs)     Types: Cigarettes    Smokeless tobacco: Never   Vaping Use    Vaping status: Never Used   Substance and Sexual Activity    Alcohol use: Not Currently    Drug use: Never    Sexual activity: Not Currently   Other Topics Concern    None   Social History Narrative    ** Merged History Encounter **           since 2015 after 13 year marriage.  1 daughter from 1st marriage.  1 granddaughter, Natasha Crane, whom she raised.  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     Social Determinants of Health     Financial Resource Strain: Low Risk  (12/14/2023)    Overall Financial Resource Strain (CARDIA)     Difficulty of Paying Living Expenses: Not hard at all   Food Insecurity: No Food Insecurity (3/10/2024)    Hunger Vital Sign     Worried About Running Out of Food in the Last Year: Never true     Ran Out of Food in the Last Year: Never true   Transportation Needs: No Transportation Needs (3/10/2024)    PRAPARE - Transportation     Lack of Transportation (Medical): No     Lack of Transportation (Non-Medical): No   Physical Activity: Not on file   Stress: Not on file   Social Connections: Not on file   Intimate Partner Violence: Not on file   Housing Stability: Low Risk  (3/10/2024)    Housing Stability Vital Sign     Unable to Pay for Housing in the Last Year: No     Number of Places Lived in the Last Year: 1     Unstable Housing in the Last Year: No       Current Outpatient Medications   Medication Sig Dispense Refill    ALPRAZolam (XANAX) 0.5 mg tablet Take 1 tablet (0.5 mg total) by mouth daily at bedtime as needed  "for anxiety 30 tablet 0    apixaban (ELIQUIS) 5 mg Take 1 tablet (5 mg total) by mouth 2 (two) times a day 60 tablet 1    aspirin (ECOTRIN LOW STRENGTH) 81 mg EC tablet Take 1 tablet (81 mg total) by mouth daily 30 tablet 1    atorvastatin (LIPITOR) 40 mg tablet Take 1 tablet (40 mg total) by mouth daily with dinner 30 tablet 1    dicyclomine (BENTYL) 20 mg tablet Take 1 tablet (20 mg total) by mouth every 6 (six) hours 60 tablet 1    escitalopram (LEXAPRO) 5 mg tablet Take 1 tablet (5 mg total) by mouth daily 30 tablet 1    hydrochlorothiazide (MICROZIDE) 12.5 mg capsule TAKE ONE CAPSULE BY MOUTH ONCE DAILY 90 capsule 0    meclizine (ANTIVERT) 25 mg tablet Take 1 tablet (25 mg total) by mouth 3 (three) times a day as needed for dizziness 30 tablet 0     No current facility-administered medications for this visit.        Review of Systems   Constitutional:  Positive for fatigue. Negative for chills and fever.   HENT: Negative.  Negative for ear pain and sore throat.    Eyes:  Negative for pain and visual disturbance.   Respiratory: Negative.  Negative for cough and shortness of breath.    Cardiovascular: Negative.  Negative for chest pain and palpitations.   Gastrointestinal:  Positive for abdominal pain. Negative for vomiting.   Genitourinary: Negative.  Negative for dysuria and hematuria.   Musculoskeletal:  Positive for arthralgias. Negative for back pain.   Skin:  Negative for color change and rash.   Neurological:  Positive for dizziness, syncope, light-headedness and headaches. Negative for seizures.   Psychiatric/Behavioral:  Positive for sleep disturbance. The patient is nervous/anxious.    All other systems reviewed and are negative.       Objective:    /100 (BP Location: Left arm, Patient Position: Sitting, Cuff Size: Adult)   Pulse 63   Temp (!) 97 °F (36.1 °C) (Temporal)   Ht 5' 6\" (1.676 m)   Wt 65.8 kg (145 lb)   SpO2 97%   BMI 23.40 kg/m²   Wt Readings from Last 3 Encounters:   03/21/24 " 65.8 kg (145 lb)   03/15/24 66 kg (145 lb 9.6 oz)   03/09/24 64.4 kg (142 lb)     BP Readings from Last 3 Encounters:   03/21/24 166/100   03/15/24 135/85   03/12/24 146/84       Lab Results   Component Value Date    WBC 5.93 03/10/2024    HGB 15.4 03/10/2024    HCT 47.5 (H) 03/10/2024     03/10/2024    TRIG 107 03/06/2024    HDL 57 03/06/2024    ALT 10 03/10/2024    AST 13 03/10/2024     12/10/2015    K 3.5 03/10/2024     03/10/2024    CREATININE 1.09 03/10/2024    BUN 22 03/10/2024    CO2 29 03/10/2024    TSH 0.88 05/18/2019    INR 0.94 03/06/2024    GLUF 82 11/21/2023    HGBA1C 5.6 05/18/2019       Physical Exam  Vitals and nursing note reviewed.   Constitutional:       Appearance: Normal appearance. She is well-developed.   HENT:      Head: Normocephalic and atraumatic.   Eyes:      Pupils: Pupils are equal, round, and reactive to light.   Cardiovascular:      Rate and Rhythm: Normal rate and regular rhythm.      Heart sounds: No murmur heard.  Pulmonary:      Effort: Pulmonary effort is normal.      Breath sounds: Normal breath sounds.   Musculoskeletal:      Cervical back: Normal range of motion and neck supple.   Skin:     General: Skin is warm.      Capillary Refill: Capillary refill takes less than 2 seconds.   Neurological:      Mental Status: She is alert and oriented to person, place, and time.      Cranial Nerves: No cranial nerve deficit.   Psychiatric:         Mood and Affect: Mood normal.         Thought Content: Thought content normal.                 Urinary Incontinence Plan of Care: counseling topics discussed: practice Kegel (pelvic floor strengthening) exercises.            Assessment/Plan:   Ángela was seen today for hypertension.    Diagnoses and all orders for this visit:    White coat syndrome with high blood pressure but without hypertension    Generalized abdominal pain  -     dicyclomine (BENTYL) 20 mg tablet; Take 1 tablet (20 mg total) by mouth every 6 (six)  hours    TIA (transient ischemic attack)    PAD (peripheral artery disease) (HCC)    Irritable bowel syndrome with diarrhea    Generalized anxiety disorder      Return in about 4 weeks (around 4/18/2024) for Recheck.  Patient Instructions   Please take Amlodipine 2.5mg daily.  Keep track of blood pressure  Call in 1 week with readings and how you are feeling on this medication only.

## 2024-03-21 NOTE — PATIENT INSTRUCTIONS
Please take Amlodipine 2.5mg daily.  Keep track of blood pressure  Call in 1 week with readings and how you are feeling on this medication only.

## 2024-03-27 ENCOUNTER — HOSPITAL ENCOUNTER (OUTPATIENT)
Dept: CT IMAGING | Facility: HOSPITAL | Age: 79
Discharge: HOME | End: 2024-03-27
Payer: MEDICARE

## 2024-03-27 DIAGNOSIS — I67.9 CVD (CEREBROVASCULAR DISEASE): ICD-10-CM

## 2024-03-27 PROCEDURE — 70496 CT ANGIOGRAPHY HEAD: CPT

## 2024-03-27 PROCEDURE — 70498 CT ANGIOGRAPHY NECK: CPT

## 2024-03-27 RX ADMIN — IOHEXOL 85 ML: 350 INJECTION, SOLUTION INTRAVENOUS at 12:04

## 2024-03-28 ENCOUNTER — HOSPITAL ENCOUNTER (OUTPATIENT)
Dept: NON INVASIVE DIAGNOSTICS | Facility: HOSPITAL | Age: 79
Discharge: HOME | End: 2024-03-28
Attending: FAMILY MEDICINE
Payer: MEDICARE

## 2024-03-28 DIAGNOSIS — R42 DIZZINESS AND GIDDINESS: ICD-10-CM

## 2024-03-28 DIAGNOSIS — F41.1 GENERALIZED ANXIETY DISORDER: ICD-10-CM

## 2024-03-28 PROCEDURE — 93225 XTRNL ECG REC<48 HRS REC: CPT

## 2024-03-28 PROCEDURE — 93226 XTRNL ECG REC<48 HR SCAN A/R: CPT

## 2024-03-28 NOTE — TELEPHONE ENCOUNTER
Reason for call:   [x] Refill   [] Prior Auth  [] Other:     Office:   [x] PCP/Provider -   [] Specialty/Provider -     Medication: ALPRAZolam (XANAX) 0.5 mg tablet     Dose/Frequency: Take 1 tablet (0.5 mg total) by mouth daily at bedtime as needed for anxiety     Quantity: #30    Pharmacy: [id number] [address] [phone]     Does the patient have enough for 3 days?   [] Yes   [x] No - Send as HP to POD

## 2024-03-29 RX ORDER — ALPRAZOLAM 0.5 MG/1
0.5 TABLET ORAL
Qty: 30 TABLET | Refills: 0 | Status: SHIPPED | OUTPATIENT
Start: 2024-03-29

## 2024-04-01 ENCOUNTER — OFFICE VISIT (OUTPATIENT)
Dept: VASCULAR SURGERY | Facility: CLINIC | Age: 79
End: 2024-04-01
Payer: MEDICARE

## 2024-04-01 VITALS
SYSTOLIC BLOOD PRESSURE: 140 MMHG | DIASTOLIC BLOOD PRESSURE: 98 MMHG | OXYGEN SATURATION: 98 % | BODY MASS INDEX: 23.14 KG/M2 | WEIGHT: 144 LBS | HEART RATE: 70 BPM | HEIGHT: 66 IN

## 2024-04-01 DIAGNOSIS — I65.23 CAROTID STENOSIS, ASYMPTOMATIC, BILATERAL: Primary | ICD-10-CM

## 2024-04-01 DIAGNOSIS — Z72.0 TOBACCO ABUSE: ICD-10-CM

## 2024-04-01 DIAGNOSIS — I74.09 AORTOILIAC OCCLUSIVE DISEASE (HCC): ICD-10-CM

## 2024-04-01 DIAGNOSIS — I73.9 PAD (PERIPHERAL ARTERY DISEASE) (HCC): ICD-10-CM

## 2024-04-01 PROCEDURE — 99204 OFFICE O/P NEW MOD 45 MIN: CPT | Performed by: SURGERY

## 2024-04-01 RX ORDER — AMLODIPINE BESYLATE 5 MG/1
TABLET ORAL
COMMUNITY
Start: 2024-03-16

## 2024-04-01 NOTE — ASSESSMENT & PLAN NOTE
Asymptomatic bilateral carotid artery stenosis. Denies lateralizing symptoms or speech disturbances. She reports bilateral right > left blurry vision and lightheadedness. She has been checking her BPs in the morning while sitting with mild fluctuations -150s. She has not checked her BP while standing but reports her lightheadedness is worst while standing and doing activities.    CTA head/neck reviewed - mild <50% bilateral ICA stenosis, no significant extracranial vertebral disease, severe intracranial left vertebral focal stenosis 2/2 mural plaque vs. Thrombus near vertebrobasilar junction and severe proximal basilar stenosis  MRI brain - remote right basal ganglia infarct  Repeat CTA head/neck by my review with slight improvement in intracranial distal left vertebral/basilar stenosis    -We discussed the pathophysiology of carotid disease, available treatment options and indications for treatment.  -Symptoms are not consistent with TIA/CVA 2/2 extracranial carotid or vertebral disease. Defer management of intracranial left vertebral artery stenosis/basilar stenosis to neurology.  -Continue optimization of medical management per neurology. Currently on ASA, eliquis and statin. Follow-up scheduled later this week with neurology.  -Encouraged complete smoking cessation. Patient is declining pharmacologic treatment options or referral to smoking cessation program at this time but will work on cutting down on her own.  -Will obtain baseline carotid duplex and follow-up in 1 year. Advised to return sooner or go to the ED if she develops any TIA/CVA symptoms.

## 2024-04-01 NOTE — LETTER
April 1, 2024     Enid Jernigan MD  [address] [facility] [address]    Patient: Ángela Camara   YOB: 1945   Date of Visit: 4/1/2024       Dear Dr. Jernigan:    Thank you for referring Ángela Camara to me for evaluation. Below are the relevant portions of my assessment and plan of care.    Diagnoses and all orders for this visit:    Carotid stenosis, asymptomatic, bilateral  Asymptomatic bilateral carotid artery stenosis. Denies lateralizing symptoms or speech disturbances. She reports bilateral right > left blurry vision and lightheadedness. She has been checking her BPs in the morning while sitting with mild fluctuations -150s. She has not checked her BP while standing but reports her lightheadedness is worst while standing and doing activities.     CTA head/neck reviewed - mild <50% bilateral ICA stenosis, no significant extracranial vertebral disease, severe intracranial left vertebral focal stenosis 2/2 mural plaque vs. Thrombus near vertebrobasilar junction and severe proximal basilar stenosis  MRI brain - remote right basal ganglia infarct  Repeat CTA head/neck by my review with slight improvement in intracranial distal left vertebral/basilar stenosis     -We discussed the pathophysiology of carotid disease, available treatment options and indications for treatment.  -Symptoms are not consistent with TIA/CVA 2/2 extracranial carotid or vertebral disease. Defer management of intracranial left vertebral artery stenosis/basilar stenosis to neurology.  -Continue optimization of medical management per neurology. Currently on ASA, eliquis and statin. Follow-up scheduled later this week with neurology.  -Encouraged complete smoking cessation. Patient is declining pharmacologic treatment options or referral to smoking cessation program at this time but will work on cutting down on her own.  -Will obtain baseline carotid duplex and follow-up in 1 year. Advised to  return sooner or go to the ED if she develops any TIA/CVA symptoms.        Aortoiliac occlusive disease (HCC)  AOID with incidental finding of long segment left EIA occlusion on CT scan obtained for abdominal pain. Denies significant claudication, rest pain or tissue loss. She is not able to walk long distances 2/2 lightheadedness currently but was previously able to walk without lifestyle limiting claudication symptoms.     CTAP - e/o enterocolitis, long segment left EIA occlusion, mild diffuse aortoiliac disease, celiac/SMA/KIARRA appear patent  Nonpalpable pedal pulses     -We discussed the pathophysiology of arterial occlusive disease, available treatment options and indications for treatment. Relatively asymptomatic from this standpoint.   -Continue medical optimization. Currently on ASA and statin. Goal hgb A1c<7.0, LDL<70, BP <130/80  -Recommend walking program, ambulating at least 30 minutes for 3-5 times weekly  -Recommend moisturization of the lower extremities, avoidance of injury and close observation of bilateral feet for any new wounds  -Recommend complete smoking cessation in relation to progression of atherosclerotic disease.  -Follow-up in 1 year months to re-evaluate symptoms. Will obtain baseline LEAD as well to evaluate for infrainguinal disease given nonpalpable right foot pedal pulses.        Tobacco abuse  Tobacco use is a significant patient-modifiable risk factor for this patient’s vascular disease with multiple vascular comorbidities, and a significant risk factor for failure of and complications from any endovascular or surgical interventions.     I explained to the patient the effects of smoking including peripheral artery disease, coronary artery disease, cerebrovascular disease as well as cancer and chronic obstructive pulmonary disease. I asked the patient to stop smoking immediately. It is never too late to quit, and many studies show significant health benefits as well as economical  savings after smoking cessation. I offered to the patient nicotine replacement therapy as well as referral to the smoking cessation program and access to the quit line 1-131-JOEPFDX or ambulatory referral to our network smoking cessation program.     Based on our conversation, this patient expresses a passive interest in quitting but does not appear very motivated to quit  And declined my offer of nicotine replacement or tobacco cessation medications     The patient did not set a quit date. I will continue to  follow up on this issue at our next scheduled visit.      I spent approximately 10 minutes on tobacco cessation counseling with this patient.        If you have questions, please do not hesitate to call me. I look forward to following Ángela along with you.         Sincerely,        Judi Brewer MD        CC: No Recipients

## 2024-04-01 NOTE — PROGRESS NOTES
Assessment/Plan:    Carotid stenosis, asymptomatic, bilateral  Asymptomatic bilateral carotid artery stenosis. Denies lateralizing symptoms or speech disturbances. She reports bilateral right > left blurry vision and lightheadedness. She has been checking her BPs in the morning while sitting with mild fluctuations -150s. She has not checked her BP while standing but reports her lightheadedness is worst while standing and doing activities.    CTA head/neck reviewed - mild <50% bilateral ICA stenosis, no significant extracranial vertebral disease, severe intracranial left vertebral focal stenosis 2/2 mural plaque vs. Thrombus near vertebrobasilar junction and severe proximal basilar stenosis  MRI brain - remote right basal ganglia infarct  Repeat CTA head/neck by my review with slight improvement in intracranial distal left vertebral/basilar stenosis    -We discussed the pathophysiology of carotid disease, available treatment options and indications for treatment.  -Symptoms are not consistent with TIA/CVA 2/2 extracranial carotid or vertebral disease. Defer management of intracranial left vertebral artery stenosis/basilar stenosis to neurology.  -Continue optimization of medical management per neurology. Currently on ASA, eliquis and statin. Follow-up scheduled later this week with neurology.  -Encouraged complete smoking cessation. Patient is declining pharmacologic treatment options or referral to smoking cessation program at this time but will work on cutting down on her own.  -Will obtain baseline carotid duplex and follow-up in 1 year. Advised to return sooner or go to the ED if she develops any TIA/CVA symptoms.      Aortoiliac occlusive disease (HCC)  AOID with incidental finding of long segment left EIA occlusion on CT scan obtained for abdominal pain. Denies significant claudication, rest pain or tissue loss. She is not able to walk long distances 2/2 lightheadedness currently but was previously able  to walk without lifestyle limiting claudication symptoms.    CTAP - e/o enterocolitis, long segment left EIA occlusion, mild diffuse aortoiliac disease, celiac/SMA/KIARRA appear patent  Nonpalpable pedal pulses    -We discussed the pathophysiology of arterial occlusive disease, available treatment options and indications for treatment. Relatively asymptomatic from this standpoint.   -Continue medical optimization. Currently on ASA and statin. Goal hgb A1c<7.0, LDL<70, BP <130/80  -Recommend walking program, ambulating at least 30 minutes for 3-5 times weekly  -Recommend moisturization of the lower extremities, avoidance of injury and close observation of bilateral feet for any new wounds  -Recommend complete smoking cessation in relation to progression of atherosclerotic disease.  -Follow-up in 1 year months to re-evaluate symptoms. Will obtain baseline LEAD as well to evaluate for infrainguinal disease given nonpalpable right foot pedal pulses.      Tobacco abuse  Tobacco use is a significant patient-modifiable risk factor for this patient’s vascular disease with multiple vascular comorbidities, and a significant risk factor for failure of and complications from any endovascular or surgical interventions.    I explained to the patient the effects of smoking including peripheral artery disease, coronary artery disease, cerebrovascular disease as well as cancer and chronic obstructive pulmonary disease. I asked the patient to stop smoking immediately. It is never too late to quit, and many studies show significant health benefits as well as economical savings after smoking cessation. I offered to the patient nicotine replacement therapy as well as referral to the smoking cessation program and access to the quit line 8-061-NXAJAIU or ambulatory referral to our network smoking cessation program.    Based on our conversation, this patient expresses a passive interest in quitting but does not appear very motivated to  "quit  And declined my offer of nicotine replacement or tobacco cessation medications    The patient did not set a quit date. I will continue to  follow up on this issue at our next scheduled visit.     I spent approximately 10 minutes on tobacco cessation counseling with this patient.       Diagnoses and all orders for this visit:    Carotid stenosis, asymptomatic, bilateral  -     VAS carotid complete study; Future    PAD (peripheral artery disease) (HCC)  -     Ambulatory Referral to Vascular Surgery  -     VAS ARTERIAL DUPLEX- LOWER LIMB BILATERAL; Future    Aortoiliac occlusive disease (HCC)  -     VAS abdominal aorta/iliac duplex; Future    Tobacco abuse    Other orders  -     amLODIPine (NORVASC) 5 mg tablet        I have spent 45 minutes with Patient  today in which greater than 50% of this time was spent in counseling/coordination of care regarding Intructions for management, Importance of tx compliance and Impressions.    Subjective:      Patient ID: Ángela Camara is a 78 y.o. female.    Patient presents for hospital follow up for incidental findings of L EIA occlusion and carotid stenosis. She had a CTA chest/abd/pelvis done 3/6/24 and CTA head/neck done 3/27/24. She denies pain with walking or wounds of BLE. She denies TIA/CVA symptoms. She reports lightheadedness and dizziness, she states she feels like she wants to \"fall over\". She is taking Eliquis, ASA 81 mg, and Atorvastatin. She is a current smoker, 1/2 pack per day.    HPI  Ms. Camara is a 79yo female smoker with HTN, HLD, chronic right basal ganglia infarct recently admitted for lightheadedness and visual disturbances with intracranial left distal vertebral artery/proximal basilar artery high grade stenosis and incidental findings of left EIA occlusion and mild carotid stenosis. She is referred here for follow-up of incidental findings. She was started on ASA, eliquis and statin for findings on her CT scan related to the left " "vertebral artery/basilar stenosis. She has ongoing lightheadedness particularly with standing and activity as well as blurry vision, right > left, while is improved since hospital discharge but still ongoing. She denies any lateralizing symptoms or speech disturbances. She has no claudication, rest pain or tissue loss but has not been ambulating much 2/2 lightheadedness. She was found to have orthostatic hypotension and was taken off HCTZ and recently started on low dose amlodipine instead.    The following portions of the patient's history were reviewed and updated as appropriate: allergies, current medications, past family history, past medical history, past social history, past surgical history, and problem list.    I have reviewed and made appropriate changes to the review of systems input by the medical assistant.    Vitals:    04/01/24 1128   BP: 140/98   BP Location: Left arm   Patient Position: Sitting   Cuff Size: Standard   Pulse: 70   SpO2: 98%   Weight: 65.3 kg (144 lb)   Height: 5' 6\" (1.676 m)       Patient Active Problem List   Diagnosis    Aortic sclerosis    History of malignant neoplasm of cervix    Essential hypertension    Mixed hyperlipidemia    Colon polyps    CKD (chronic kidney disease) stage 3, GFR 30-59 ml/min ([facility])    Cervical stenosis of spine    Tobacco abuse    White coat syndrome with high blood pressure but without hypertension    Pain in pelvis    Diarrhea    Chronic abdominal pain    Generalized anxiety disorder    Irritable bowel syndrome with diarrhea    Chronic obstructive pulmonary disease ([facility])    Lumbar spondylosis    Tinnitus of both ears    Anxiety and depression    Claudication of both lower extremities ([facility])    SI joint arthritis    Chronic pain syndrome    Meralgia paresthetica of left side    Anxiety    Increased frequency of urination    Postmenopausal state    TIA (transient ischemic attack)    SBO (small bowel obstruction) ([facility])    CVD (cerebrovascular disease)    PAD " (peripheral artery disease) (HCC)    Dizziness and giddiness, syncope    Carotid stenosis, asymptomatic, bilateral    Aortoiliac occlusive disease (HCC)       Past Surgical History:   Procedure Laterality Date    ANKLE FRACTURE SURGERY      Last Assessed 07/26/2016    ANKLE SURGERY      CERVICAL BIOPSY  W/ LOOP ELECTRODE EXCISION      COLONOSCOPY  10/2019    DIAGNOSTIC LAPAROSCOPY      DILATION AND CURETTAGE OF UTERUS      ESOPHAGOGASTRODUODENOSCOPY N/A 9/12/2016    Procedure: ESOPHAGOGASTRODUODENOSCOPY (EGD);  Surgeon: Alvin Powell MD;  Location: [facility];  Service:     FRACTURE SURGERY      ORTHOPEDIC SURGERY      MT COLONOSCOPY FLX DX W/COLLJ SPEC WHEN PFRMD N/A 9/12/2016    Procedure: FLEXIBLE COLONOSCOPY;  Surgeon: Alvin Powell MD;  Location: [facility];  Service: Colorectal    SALPINGOOPHORECTOMY Left     TONSILLECTOMY      UPPER GASTROINTESTINAL ENDOSCOPY  2016       Family History   Problem Relation Age of Onset    Coronary artery disease Mother     No Known Problems Family     No Known Problems Father     Colon cancer Neg Hx        Social History     Socioeconomic History    Marital status:      Spouse name: Not on file    Number of children: Not on file    Years of education: 15    Highest education level: Not on file   Occupational History    Not on file   Tobacco Use    Smoking status: Every Day     Current packs/day: 0.50     Average packs/day: 0.5 packs/day for 60.0 years (30.0 ttl pk-yrs)     Types: Cigarettes    Smokeless tobacco: Never   Vaping Use    Vaping status: Never Used   Substance and Sexual Activity    Alcohol use: Not Currently    Drug use: Never    Sexual activity: Not Currently   Other Topics Concern    Not on file   Social History Narrative    ** Merged History Encounter **           since 2015 after 13 year marriage.  1 daughter from 1st marriage.  1 granddaughter, Natasha Crane, whom she raised.  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      Social Determinants of Health     Financial Resource Strain: Low Risk  (12/14/2023)    Overall Financial Resource Strain (CARDIA)     Difficulty of Paying Living Expenses: Not hard at all   Food Insecurity: No Food Insecurity (3/10/2024)    Hunger Vital Sign     Worried About Running Out of Food in the Last Year: Never true     Ran Out of Food in the Last Year: Never true   Transportation Needs: No Transportation Needs (3/10/2024)    PRAPARE - Transportation     Lack of Transportation (Medical): No     Lack of Transportation (Non-Medical): No   Physical Activity: Not on file   Stress: Not on file   Social Connections: Not on file   Intimate Partner Violence: Not on file   Housing Stability: Low Risk  (3/10/2024)    Housing Stability Vital Sign     Unable to Pay for Housing in the Last Year: No     Number of Places Lived in the Last Year: 1     Unstable Housing in the Last Year: No       Allergies   Allergen Reactions    Lisinopril Syncope     Other reaction(s): Other (See Comments)  Nose bleeds, passing out         Current Outpatient Medications:     ALPRAZolam (XANAX) 0.5 mg tablet, Take 1 tablet (0.5 mg total) by mouth daily at bedtime as needed for anxiety, Disp: 30 tablet, Rfl: 0    amLODIPine (NORVASC) 5 mg tablet, , Disp: , Rfl:     apixaban (ELIQUIS) 5 mg, Take 1 tablet (5 mg total) by mouth 2 (two) times a day, Disp: 60 tablet, Rfl: 1    aspirin (ECOTRIN LOW STRENGTH) 81 mg EC tablet, Take 1 tablet (81 mg total) by mouth daily, Disp: 30 tablet, Rfl: 1    atorvastatin (LIPITOR) 40 mg tablet, Take 1 tablet (40 mg total) by mouth daily with dinner, Disp: 30 tablet, Rfl: 1    dicyclomine (BENTYL) 20 mg tablet, Take 1 tablet (20 mg total) by mouth every 6 (six) hours, Disp: 60 tablet, Rfl: 1    escitalopram (LEXAPRO) 5 mg tablet, Take 1 tablet (5 mg total) by mouth daily (Patient taking differently: Take 5 mg by mouth daily Taking 1/5 tablet, twice a day), Disp: 30 tablet, Rfl: 1    meclizine (ANTIVERT) 25 mg  "tablet, Take 1 tablet (25 mg total) by mouth 3 (three) times a day as needed for dizziness, Disp: 30 tablet, Rfl: 0    hydrochlorothiazide (MICROZIDE) 12.5 mg capsule, TAKE ONE CAPSULE BY MOUTH ONCE DAILY (Patient not taking: Reported on 4/1/2024), Disp: 90 capsule, Rfl: 0    Review of Systems   Constitutional: Negative.    HENT: Negative.     Eyes:  Positive for visual disturbance.   Respiratory: Negative.     Cardiovascular: Negative.    Gastrointestinal: Negative.    Endocrine: Negative.    Genitourinary: Negative.    Musculoskeletal: Negative.    Skin: Negative.    Allergic/Immunologic: Negative.    Neurological:  Positive for dizziness and light-headedness. Negative for facial asymmetry, speech difficulty, weakness and headaches.   Hematological: Negative.    Psychiatric/Behavioral: Negative.         I have personally reviewed the ROS entered by MA and agree as documented.    Objective:      /98 (BP Location: Left arm, Patient Position: Sitting, Cuff Size: Standard)   Pulse 70   Ht 5' 6\" (1.676 m)   Wt 65.3 kg (144 lb)   SpO2 98%   BMI 23.24 kg/m²          Physical Exam  Constitutional:       Appearance: Normal appearance.   HENT:      Head: Normocephalic and atraumatic.   Cardiovascular:      Rate and Rhythm: Normal rate.      Pulses:           Dorsalis pedis pulses are 0 on the right side and 0 on the left side.        Posterior tibial pulses are 0 on the right side and 0 on the left side.   Pulmonary:      Effort: Pulmonary effort is normal.   Abdominal:      General: There is no distension.      Palpations: Abdomen is soft.   Musculoskeletal:         General: Normal range of motion.      Cervical back: Normal range of motion and neck supple.   Skin:     General: Skin is warm and dry.      Capillary Refill: Capillary refill takes less than 2 seconds.   Neurological:      General: No focal deficit present.      Mental Status: She is alert and oriented to person, place, and time.   Psychiatric:    "      Mood and Affect: Mood normal.         Behavior: Behavior normal.         Thought Content: Thought content normal.         Judgment: Judgment normal.

## 2024-04-01 NOTE — ASSESSMENT & PLAN NOTE
AOID with incidental finding of long segment left EIA occlusion on CT scan obtained for abdominal pain. Denies significant claudication, rest pain or tissue loss. She is not able to walk long distances 2/2 lightheadedness currently but was previously able to walk without lifestyle limiting claudication symptoms.    CTAP - e/o enterocolitis, long segment left EIA occlusion, mild diffuse aortoiliac disease, celiac/SMA/KIARRA appear patent  Nonpalpable pedal pulses    -We discussed the pathophysiology of arterial occlusive disease, available treatment options and indications for treatment. Relatively asymptomatic from this standpoint.   -Continue medical optimization. Currently on ASA and statin. Goal hgb A1c<7.0, LDL<70, BP <130/80  -Recommend walking program, ambulating at least 30 minutes for 3-5 times weekly  -Recommend moisturization of the lower extremities, avoidance of injury and close observation of bilateral feet for any new wounds  -Recommend complete smoking cessation in relation to progression of atherosclerotic disease.  -Follow-up in 1 year months to re-evaluate symptoms. Will obtain baseline LEAD as well to evaluate for infrainguinal disease given nonpalpable right foot pedal pulses.

## 2024-04-01 NOTE — ASSESSMENT & PLAN NOTE
Tobacco use is a significant patient-modifiable risk factor for this patient’s vascular disease with multiple vascular comorbidities, and a significant risk factor for failure of and complications from any endovascular or surgical interventions.    I explained to the patient the effects of smoking including peripheral artery disease, coronary artery disease, cerebrovascular disease as well as cancer and chronic obstructive pulmonary disease. I asked the patient to stop smoking immediately. It is never too late to quit, and many studies show significant health benefits as well as economical savings after smoking cessation. I offered to the patient nicotine replacement therapy as well as referral to the smoking cessation program and access to the quit line 7-690-AGFQBFA or ambulatory referral to our network smoking cessation program.    Based on our conversation, this patient expresses a passive interest in quitting but does not appear very motivated to quit  And declined my offer of nicotine replacement or tobacco cessation medications    The patient did not set a quit date. I will continue to  follow up on this issue at our next scheduled visit.     I spent approximately 10 minutes on tobacco cessation counseling with this patient.

## 2024-04-01 NOTE — PATIENT INSTRUCTIONS
Carotid stenosis, asymptomatic, bilateral  Asymptomatic bilateral carotid artery stenosis. Denies lateralizing symptoms or speech disturbances. She reports bilateral right > left blurry vision and lightheadedness. She has been checking her BPs in the morning while sitting with mild fluctuations -150s. She has not checked her BP while standing but reports her lightheadedness is worst while standing and doing activities.     CTA head/neck reviewed - mild <50% bilateral ICA stenosis, no significant extracranial vertebral disease, severe intracranial left vertebral focal stenosis 2/2 mural plaque vs. Thrombus near vertebrobasilar junction and severe proximal basilar stenosis  MRI brain - remote right basal ganglia infarct  Repeat CTA head/neck by my review with slight improvement in intracranial distal left vertebral/basilar stenosis     -We discussed the pathophysiology of carotid disease, available treatment options and indications for treatment.  -Symptoms are not consistent with TIA/CVA 2/2 extracranial carotid or vertebral disease. Defer management of intracranial left vertebral artery stenosis/basilar stenosis to neurology.  -Continue optimization of medical management per neurology. Currently on ASA, eliquis and statin. Follow-up scheduled later this week with neurology.  -Encouraged complete smoking cessation. Patient is declining pharmacologic treatment options or referral to smoking cessation program at this time but will work on cutting down on her own.  -Will obtain baseline carotid duplex and follow-up in 1 year. Advised to return sooner or go to the ED if she develops any TIA/CVA symptoms.        Aortoiliac occlusive disease (HCC)  AOID with incidental finding of long segment left EIA occlusion on CT scan obtained for abdominal pain. Denies significant claudication, rest pain or tissue loss. She is not able to walk long distances 2/2 lightheadedness currently but was previously able to walk without  lifestyle limiting claudication symptoms.     CTAP - e/o enterocolitis, long segment left EIA occlusion, mild diffuse aortoiliac disease, celiac/SMA/KAIRRA appear patent  Nonpalpable pedal pulses     -We discussed the pathophysiology of arterial occlusive disease, available treatment options and indications for treatment. Relatively asymptomatic from this standpoint.   -Continue medical optimization. Currently on ASA and statin. Goal hgb A1c<7.0, LDL<70, BP <130/80  -Recommend walking program, ambulating at least 30 minutes for 3-5 times weekly  -Recommend moisturization of the lower extremities, avoidance of injury and close observation of bilateral feet for any new wounds  -Recommend complete smoking cessation in relation to progression of atherosclerotic disease.  -Follow-up in 1 year months to re-evaluate symptoms. Will obtain baseline LEAD as well to evaluate for infrainguinal disease given nonpalpable right foot pedal pulses.        Tobacco abuse  Tobacco use is a significant patient-modifiable risk factor for this patient’s vascular disease with multiple vascular comorbidities, and a significant risk factor for failure of and complications from any endovascular or surgical interventions.     I explained to the patient the effects of smoking including peripheral artery disease, coronary artery disease, cerebrovascular disease as well as cancer and chronic obstructive pulmonary disease. I asked the patient to stop smoking immediately. It is never too late to quit, and many studies show significant health benefits as well as economical savings after smoking cessation. I offered to the patient nicotine replacement therapy as well as referral to the smoking cessation program and access to the quit line 1-665-WARKKEE or ambulatory referral to our network smoking cessation program.     Based on our conversation, this patient expresses a passive interest in quitting but does not appear very motivated to quit  And  declined my offer of nicotine replacement or tobacco cessation medications     The patient did not set a quit date. I will continue to  follow up on this issue at our next scheduled visit.      I spent approximately 10 minutes on tobacco cessation counseling with this patient.

## 2024-04-02 ENCOUNTER — TELEPHONE (OUTPATIENT)
Dept: NEUROLOGY | Facility: CLINIC | Age: 79
End: 2024-04-02

## 2024-04-03 ENCOUNTER — OFFICE VISIT (OUTPATIENT)
Dept: NEUROLOGY | Facility: CLINIC | Age: 79
End: 2024-04-03
Payer: MEDICARE

## 2024-04-03 VITALS
DIASTOLIC BLOOD PRESSURE: 84 MMHG | BODY MASS INDEX: 23.14 KG/M2 | SYSTOLIC BLOOD PRESSURE: 126 MMHG | HEIGHT: 66 IN | HEART RATE: 72 BPM | WEIGHT: 144 LBS | TEMPERATURE: 98 F

## 2024-04-03 DIAGNOSIS — Z86.73 HISTORY OF CVA (CEREBROVASCULAR ACCIDENT): ICD-10-CM

## 2024-04-03 DIAGNOSIS — M54.2 CHRONIC NECK PAIN: ICD-10-CM

## 2024-04-03 DIAGNOSIS — R55 VASOVAGAL ATTACK: Primary | ICD-10-CM

## 2024-04-03 DIAGNOSIS — G89.29 CHRONIC NECK PAIN: ICD-10-CM

## 2024-04-03 PROCEDURE — 99215 OFFICE O/P EST HI 40 MIN: CPT | Performed by: PSYCHIATRY & NEUROLOGY

## 2024-04-03 NOTE — PROGRESS NOTES
Patient ID: Ángela Camara is a 78 y.o. female.    Assessment/Plan:    This is a 79 y/o F who is here as a hospital follow up of vasovagal syncope episodes, not consistent with vertobasilar insufficiency, could be related to anxiety as well. Her CTA head and neck done 3 days ago, has not been read yet, waiting for official results and if shows no evidence of thrombus, then consider stopping eliquis    PLAN:      Diagnoses and all orders for this visit:    Vasovagal attack    History of CVA (cerebrovascular accident)  -for secondary stroke prevention, recommend continuation of combination of eliquis, aspirin and atorvastatin  -repeat CTA head and nekc in 3 months   -Blood Pressure goal < 130/80, BP is at goal in the office.   -LDL goal <70  -I advised patient to avoid using NSAIDs for headaches or other pain and to stick to tylenol if needed  -Recommend lifestyle modifications such as mediterranean diet & regular exercise regimen atleast 4-5 times a week for 20-30 minutes.   -I educated patient/family regarding medication compliance  -encourage smoking cessation, and control of diabetes and hypertension; defer management to primary   -     CTA head and neck w wo contrast; Future    Chronic neck pain  -recommend getting MRI C-spine  -does not want to do PT at this time   -refer patient to cardiology as well   -     MRI cervical spine wo contrast; Future       Follow up in 4 months     I would be happy to see the patient sooner if any new questions/concerns arise.  Patient/Guardian was advised to the call the office if they have any questions and concerns in the meantime.     Patient/Guardian does understand that if they have any new stroke like symptoms such as facial droop on one side, weakness/paralysis on either side, speech trouble, numbness on one side, balance issues, any vision changes, extreme dizziness or any new headache, to call 9-1-1 immediately or to proceed to the nearest ER immediately.       I have spent a total time of 40 minutes on 04/03/24 in caring for this patient including Risks and benefits of tx options, Instructions for management, Counseling / Coordination of care, Documenting in the medical record, Reviewing / ordering tests, medicine, procedures  , and Obtaining or reviewing history  .     Subjective:    HPI    This is a 78-year-old female who is here as a hospital follow-up.  Patient was admitted 3/6/2024 with strokelike symptoms.  She has significant pack-year smoking history, cervical cancer treated in 2014 in remission, chronic pain syndrome.  She presented with syncopal episodes headache lightheadedness abdominal pain personal stressors at home as some of these could be related to anxiety stress.  She has also had some in the setting of straining to avoid bowel movements occasional lightheadedness.  She states that she is not good with hydration.  Vessel imaging shows focal left vertebral artery stenosis in the vertebrobasilar junction with focal severe stenosis in the proximal basilar with thrombus.  I reviewed the CTA head and neck.  At which point patient was started on Eliquis and there was a plan to recommend recommend repeating CTA 2 to 3 weeks after.  She did have a repeat CTA which has not been read yet.  But since her last discharge she has not had any further episodes she is overall doing well she just wants to know why this is happening.  She has not seen cardiology yet.  She is compliant with her medications she is tolerating her Eliquis well without any issues.  Presented here for colon cancer    The following portions of the patient's history were reviewed and updated as appropriate: She  has a past medical history of Anxiety, Atypical squamous cells cannot exclude high grade squamous intraepithelial lesion on cytologic smear of cervix ([facility]), Back pain, Cancer (HCC), Cervical cancer (HCC), Depression, Ovarian cyst, Tinnitus of both ears (10/2/2020), and Tobacco abuse  (11/20/2018).  She   Patient Active Problem List    Diagnosis Date Noted    History of CVA (cerebrovascular accident) 04/03/2024    Carotid stenosis, asymptomatic, bilateral 04/01/2024    Aortoiliac occlusive disease ([facility]) 04/01/2024    CVD (cerebrovascular disease) 03/06/2024    PAD (peripheral artery disease) ([facility]) 03/06/2024    Dizziness and giddiness, syncope 03/06/2024    SBO (small bowel obstruction) ([facility]) 12/23/2022    Postmenopausal state 06/01/2021    Increased frequency of urination 05/23/2021    Meralgia paresthetica of left side 04/06/2021    Chronic pain syndrome 04/05/2021    SI joint arthritis     Claudication of both lower extremities ([facility]) 01/19/2021    Lumbar spondylosis 10/02/2020    Tinnitus of both ears 10/02/2020    Anxiety and depression     Chronic obstructive pulmonary disease ([facility]) 05/19/2020    Irritable bowel syndrome with diarrhea 01/14/2020    Chronic abdominal pain 10/29/2019    Generalized anxiety disorder 10/29/2019    Diarrhea 09/26/2019    Pain in pelvis 12/11/2018    Tobacco abuse 11/20/2018    White coat syndrome with high blood pressure but without hypertension 11/20/2018    Cervical stenosis of spine 09/12/2018    TIA (transient ischemic attack) 08/25/2018    Aortic sclerosis 06/17/2017    CKD (chronic kidney disease) stage 3, GFR 30-59 ml/min ([facility]) 06/17/2017    Anxiety 05/10/2017    Mixed hyperlipidemia 04/24/2017    Colon polyps 07/26/2016    History of malignant neoplasm of cervix 06/16/2014    Essential hypertension 05/13/2014     She  has a past surgical history that includes Ankle surgery; Tonsillectomy; pr colonoscopy flx dx w/collj spec when pfrmd (N/A, 9/12/2016); Esophagogastroduodenoscopy (N/A, 9/12/2016); Cervical biopsy w/ loop electrode excision; Dilation and curettage of uterus; Diagnostic laparoscopy; Salpingoophorectomy (Left); Ankle fracture surgery; Colonoscopy (10/2019); Upper gastrointestinal endoscopy (2016); Fracture surgery; and orthopedic surgery.  Her  family history includes Coronary artery disease in her mother; No Known Problems in her family and father.  She  reports that she has been smoking cigarettes. She has a 30 pack-year smoking history. She has never used smokeless tobacco. She reports that she does not currently use alcohol. She reports that she does not use drugs.  Current Outpatient Medications   Medication Sig Dispense Refill    ALPRAZolam (XANAX) 0.5 mg tablet Take 1 tablet (0.5 mg total) by mouth daily at bedtime as needed for anxiety 30 tablet 0    amLODIPine (NORVASC) 5 mg tablet       apixaban (ELIQUIS) 5 mg Take 1 tablet (5 mg total) by mouth 2 (two) times a day 60 tablet 1    aspirin (ECOTRIN LOW STRENGTH) 81 mg EC tablet Take 1 tablet (81 mg total) by mouth daily 30 tablet 1    atorvastatin (LIPITOR) 40 mg tablet Take 1 tablet (40 mg total) by mouth daily with dinner 30 tablet 1    dicyclomine (BENTYL) 20 mg tablet Take 1 tablet (20 mg total) by mouth every 6 (six) hours (Patient taking differently: Take 20 mg by mouth in the morning) 60 tablet 1    escitalopram (LEXAPRO) 5 mg tablet Take 1 tablet (5 mg total) by mouth daily (Patient taking differently: Take 5 mg by mouth daily Taking 1/2 tablet, twice a day) 30 tablet 1    meclizine (ANTIVERT) 25 mg tablet Take 1 tablet (25 mg total) by mouth 3 (three) times a day as needed for dizziness 30 tablet 0     No current facility-administered medications for this visit.     Current Outpatient Medications on File Prior to Visit   Medication Sig    ALPRAZolam (XANAX) 0.5 mg tablet Take 1 tablet (0.5 mg total) by mouth daily at bedtime as needed for anxiety    amLODIPine (NORVASC) 5 mg tablet     apixaban (ELIQUIS) 5 mg Take 1 tablet (5 mg total) by mouth 2 (two) times a day    aspirin (ECOTRIN LOW STRENGTH) 81 mg EC tablet Take 1 tablet (81 mg total) by mouth daily    atorvastatin (LIPITOR) 40 mg tablet Take 1 tablet (40 mg total) by mouth daily with dinner    dicyclomine (BENTYL) 20 mg tablet Take  "1 tablet (20 mg total) by mouth every 6 (six) hours (Patient taking differently: Take 20 mg by mouth in the morning)    escitalopram (LEXAPRO) 5 mg tablet Take 1 tablet (5 mg total) by mouth daily (Patient taking differently: Take 5 mg by mouth daily Taking 1/2 tablet, twice a day)    meclizine (ANTIVERT) 25 mg tablet Take 1 tablet (25 mg total) by mouth 3 (three) times a day as needed for dizziness    [DISCONTINUED] hydrochlorothiazide (MICROZIDE) 12.5 mg capsule TAKE ONE CAPSULE BY MOUTH ONCE DAILY (Patient not taking: Reported on 4/1/2024)     No current facility-administered medications on file prior to visit.     She is allergic to lisinopril..         Objective:    Blood pressure 126/84, pulse 72, temperature 98 °F (36.7 °C), height 5' 6\" (1.676 m), weight 65.3 kg (144 lb), not currently breastfeeding.    Physical Exam  General - patient is alert   Speech - no dysarthria noted, no aphasia noted.     Neuro:   Cranial nerves: PERRL, EOMI, facial sensation intact to soft touch in V1, V2 and V3, no facial asymmetry noted, uvula/palate midline, tongue midline.   Motor: 5/5 throughout, normal tone, no pronator drift noted.   Sensory - intact to soft touch throughout  Reflexes - 2+ throughout  Coordination - no ataxia/dysmetria noted  Gait - normal    Neurological Exam      ROS:  Reviewed ROS   Review of Systems   Constitutional:  Positive for fatigue. Negative for appetite change and fever.        Takes a nap every day without wanting to, then can sleep through the night   HENT:  Positive for hearing loss. Negative for tinnitus, trouble swallowing and voice change.         Feels like her ears are very hot internally, not hot to touch. Feels as if they are bleeding   Eyes:  Positive for visual disturbance. Negative for photophobia and pain.        R eye feels like she is looking through a fish bowl. Has appt w/optometrist 4/29   Respiratory: Negative.  Negative for shortness of breath.    Cardiovascular: Negative.  " Negative for palpitations.   Gastrointestinal: Negative.  Negative for nausea and vomiting.   Endocrine: Negative.  Negative for cold intolerance.   Genitourinary: Negative.  Negative for dysuria, frequency and urgency.   Musculoskeletal:  Negative for back pain, gait problem, myalgias, neck pain and neck stiffness.        Has fallen several times, gone to the hospital they keep her for a day, 2 days then send her home and it happens again. Patient feels like it is getting worse.   Skin: Negative.  Negative for rash.   Allergic/Immunologic: Negative.    Neurological:  Positive for dizziness and syncope. Negative for tremors, seizures, facial asymmetry, speech difficulty, weakness, light-headedness, numbness and headaches.        Feels very wobbly   Hematological: Negative.  Does not bruise/bleed easily.   Psychiatric/Behavioral: Negative.  Negative for confusion, hallucinations and sleep disturbance.    All other systems reviewed and are negative.

## 2024-04-09 ENCOUNTER — PATIENT OUTREACH (OUTPATIENT)
Dept: FAMILY MEDICINE CLINIC | Facility: CLINIC | Age: 79
End: 2024-04-09

## 2024-04-09 ENCOUNTER — TELEPHONE (OUTPATIENT)
Dept: NEUROLOGY | Facility: CLINIC | Age: 79
End: 2024-04-09

## 2024-04-09 NOTE — TELEPHONE ENCOUNTER
Result Notes     Stephanie Saucedo RN  4/9/2024 11:03 AM EDT Back to Top      Called pt. Left a message on voice mail requesting a return call.    Bhavana Webber MD  4/9/2024  9:46 AM EDT       At this call patient and let them know to continue eliquis for now, will decide after the next CTA Head and neck, thank you!

## 2024-04-09 NOTE — PROGRESS NOTES
Ingiancarlosket reminder to contact pt for another follow up. Message left requesting return call back for care management.   Pt returned tyler and states that she has future appts scheduled. 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 The manager drives her to her appts. Pt reports that she can transition from sitting to standing but then when she starts to walk, she becomes lightheaded. Reviewed pts upcoming appts. MRI is in 2 days and CT scan is next week.Pt I aware of all of them and hopes that some one can figure out what is happening to her. She is appreciative of the call but also states that she does not need care management at this time.

## 2024-04-09 NOTE — LETTER
Date: 04/09/24    Dear Ángela Camara,   My name is Beckie; I am a registered nurse care manager working with [facility]  I have not been able to reach you by phone and would like to discuss any concerns you have about your health conditions.  My work is to help patients that have complex medical conditions understand and better manage their health.  This includes patients who may have been in the hospital or emergency room.    My contact information is enclosed below.  Please call me if with any questions you may have or if you believe this would be something helpful to you.  I look forward to speaking with you.    Sincerely,  Beckie Buchanan RN  [phone]  Outpatient Care Manager

## 2024-04-11 ENCOUNTER — HOSPITAL ENCOUNTER (OUTPATIENT)
Dept: MRI IMAGING | Facility: HOSPITAL | Age: 79
Discharge: HOME | End: 2024-04-11
Attending: PSYCHIATRY & NEUROLOGY
Payer: MEDICARE

## 2024-04-11 DIAGNOSIS — M54.2 CHRONIC NECK PAIN: ICD-10-CM

## 2024-04-11 DIAGNOSIS — G89.29 CHRONIC NECK PAIN: ICD-10-CM

## 2024-04-11 PROCEDURE — 72141 MRI NECK SPINE W/O DYE: CPT

## 2024-04-11 NOTE — TELEPHONE ENCOUNTER
Called pt. Left a message on her voice mail requesting that she read the message that was sent through [facility].

## 2024-04-15 DIAGNOSIS — R55 SYNCOPE: ICD-10-CM

## 2024-04-15 NOTE — TELEPHONE ENCOUNTER
Reason for call:   [x] Refill   [] Prior Auth  [] Other:     Office:   [x] PCP/Provider - previously prescibed in hospital  [] Specialty/Provider -     Medication: atorvastatin 40 mg, one tab daily, 30 tabs     [facility]    Does the patient have enough for 3 days?   [x] Yes   [] No - Send as HP to POD

## 2024-04-16 RX ORDER — ATORVASTATIN CALCIUM 40 MG/1
40 TABLET, FILM COATED ORAL
Qty: 30 TABLET | Refills: 5 | Status: ON HOLD | OUTPATIENT
Start: 2024-04-16

## 2024-04-17 ENCOUNTER — HOSPITAL ENCOUNTER (OUTPATIENT)
Dept: NON INVASIVE DIAGNOSTICS | Facility: CLINIC | Age: 79
Discharge: HOME | End: 2024-04-17
Payer: MEDICARE

## 2024-04-17 DIAGNOSIS — I73.9 PAD (PERIPHERAL ARTERY DISEASE) (HCC): ICD-10-CM

## 2024-04-17 PROCEDURE — 93925 LOWER EXTREMITY STUDY: CPT

## 2024-04-17 PROCEDURE — 93923 UPR/LXTR ART STDY 3+ LVLS: CPT

## 2024-04-17 PROCEDURE — 93922 UPR/L XTREMITY ART 2 LEVELS: CPT | Performed by: SURGERY

## 2024-04-17 PROCEDURE — 93925 LOWER EXTREMITY STUDY: CPT | Performed by: SURGERY

## 2024-04-23 NOTE — PROGRESS NOTES
Cardiology Office Note  MD Macario Telles MD, [facility]  Roger Moise DO, [facility]  MD Nakul Salazar DO, [facility]  Avelino Quiroga DO, [facility]  ----------------------------------------------------------------  [address] [address]    Ángela Camara 78 y.o. female MRN: [id number]  Unit/Bed#:  Encounter: [id number]      History of Present Illness:  It was a pleasure to see Ángela Camara in the office today for initial CV evaluation. She has a past medical history of hypertension, CVA/TIA, PVD, carotid stenosis, CKD, tobacco use and COPD.  She establish care with us in April 2024.  Patient had a longstanding history of recurrent syncope with an total of 5 episodes occurring in the past as of April 2024.  In March 2024, she had an episode of syncope/near syncope that occurred with strokelike symptoms.  Patient in general had been on the toilet bearing down for bowel movement.  During episodes she would always be on the toilet in the bathroom.  She was bearing down and then become lightheaded with sweats.  She would feel hot and cold all over without nausea.  She would experience blurred vision.  When looked at, there would be some twitching and incoherent speech.  Eventually, she would regain consciousness.  With the episode in March 2024, similar episodes occurred.  There was no associated palpitations or chest discomfort.  The patient admitted to poor hydration overall.  A daily basis, she would have at most 1 glass of water.  She did admit to a combination of up to 3 glasses of coffee and green tea per day.  During hospitalization, she underwent echocardiogram demonstrating normal left ventricular systolic function.  Holter monitor was performed posthospitalization showing sinus rhythm with rare atrial and ventricular ectopy.  She also underwent CTA of the head and neck demonstrating evidence of vertebral artery disease with  possible thrombus.  For that reason, she was placed on Eliquis.    Denies lower extremity swelling orthopnea or paroxysmal nocturnal dyspnea.  Denies chest pain, pressure, tightness or squeezing.    Review of Systems:  Review of Systems   Constitutional: Negative for decreased appetite, fever, weight gain and weight loss.   HENT:  Negative for congestion and sore throat.    Eyes:  Negative for visual disturbance.   Cardiovascular:  Positive for syncope. Negative for chest pain, dyspnea on exertion, leg swelling and palpitations.   Respiratory:  Negative for cough and shortness of breath.    Hematologic/Lymphatic: Negative for bleeding problem.   Skin:  Negative for rash.   Musculoskeletal:  Negative for myalgias and neck pain.   Gastrointestinal:  Negative for abdominal pain and nausea.   Neurological:  Negative for light-headedness and weakness.   Psychiatric/Behavioral:  Negative for depression.        Past Medical History:   Diagnosis Date    Anxiety     Atypical squamous cells cannot exclude high grade squamous intraepithelial lesion on cytologic smear of cervix (ASC-H)     Last Assessed 5/13/2014    Back pain     Cancer (HCC)     Cervical cancer (HCC)     Depression     Ovarian cyst     Tinnitus of both ears 10/2/2020    Tobacco abuse 11/20/2018       Past Surgical History:   Procedure Laterality Date    ANKLE FRACTURE SURGERY      Last Assessed 07/26/2016    ANKLE SURGERY      CERVICAL BIOPSY  W/ LOOP ELECTRODE EXCISION      COLONOSCOPY  10/2019    DIAGNOSTIC LAPAROSCOPY      DILATION AND CURETTAGE OF UTERUS      ESOPHAGOGASTRODUODENOSCOPY N/A 9/12/2016    Procedure: ESOPHAGOGASTRODUODENOSCOPY (EGD);  Surgeon: Alvin Powell MD;  Location: [facility];  Service:     FRACTURE SURGERY      ORTHOPEDIC SURGERY      SC COLONOSCOPY FLX DX W/COLLJ SPEC WHEN PFRMD N/A 9/12/2016    Procedure: FLEXIBLE COLONOSCOPY;  Surgeon: Alvin Powell MD;  Location: [facility];  Service: Colorectal    SALPINGOOPHORECTOMY Left      TONSILLECTOMY      UPPER GASTROINTESTINAL ENDOSCOPY  2016       Social History     Socioeconomic History    Marital status:      Spouse name: Not on file    Number of children: Not on file    Years of education: 15    Highest education level: Not on file   Occupational History    Not on file   Tobacco Use    Smoking status: Every Day     Current packs/day: 0.50     Average packs/day: 0.5 packs/day for 60.0 years (30.0 ttl pk-yrs)     Types: Cigarettes    Smokeless tobacco: Never   Vaping Use    Vaping status: Never Used   Substance and Sexual Activity    Alcohol use: Not Currently    Drug use: Never    Sexual activity: Not Currently   Other Topics Concern    Not on file   Social History Narrative    ** Merged History Encounter **           since 2015 after 13 year marriage.  1 daughter from 1st marriage.  1 granddaughter, Rosa Maria,[place], whom she raised.  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     Social Determinants of Health     Financial Resource Strain: Low Risk  (12/14/2023)    Overall Financial Resource Strain (CARDIA)     Difficulty of Paying Living Expenses: Not hard at all   Food Insecurity: No Food Insecurity (3/10/2024)    Hunger Vital Sign     Worried About Running Out of Food in the Last Year: Never true     Ran Out of Food in the Last Year: Never true   Transportation Needs: No Transportation Needs (3/10/2024)    PRAPARE - Transportation     Lack of Transportation (Medical): No     Lack of Transportation (Non-Medical): No   Physical Activity: Not on file   Stress: Not on file   Social Connections: Not on file   Intimate Partner Violence: Not on file   Housing Stability: Low Risk  (3/10/2024)    Housing Stability Vital Sign     Unable to Pay for Housing in the Last Year: No     Number of Places Lived in the Last Year: 1     Unstable Housing in the Last Year: No       Family History   Problem Relation Age of Onset    Coronary artery disease Mother     No Known Problems  "Family     No Known Problems Father     Colon cancer Neg Hx        Allergies   Allergen Reactions    Lisinopril Syncope     Other reaction(s): Other (See Comments)  Nose bleeds, passing out         Current Outpatient Medications:     ALPRAZolam (XANAX) 0.5 mg tablet, Take 1 tablet (0.5 mg total) by mouth daily at bedtime as needed for anxiety, Disp: 30 tablet, Rfl: 0    amLODIPine (NORVASC) 5 mg tablet, , Disp: , Rfl:     apixaban (ELIQUIS) 5 mg, Take 1 tablet (5 mg total) by mouth 2 (two) times a day, Disp: 60 tablet, Rfl: 1    aspirin (ECOTRIN LOW STRENGTH) 81 mg EC tablet, Take 1 tablet (81 mg total) by mouth daily, Disp: 30 tablet, Rfl: 1    atorvastatin (LIPITOR) 40 mg tablet, Take 1 tablet (40 mg total) by mouth daily with dinner, Disp: 30 tablet, Rfl: 5    dicyclomine (BENTYL) 20 mg tablet, Take 1 tablet (20 mg total) by mouth every 6 (six) hours (Patient taking differently: Take 20 mg by mouth in the morning), Disp: 60 tablet, Rfl: 1    escitalopram (LEXAPRO) 5 mg tablet, Take 1 tablet (5 mg total) by mouth daily (Patient taking differently: Take 5 mg by mouth daily Taking 1/2 tablet, twice a day), Disp: 30 tablet, Rfl: 1    meclizine (ANTIVERT) 25 mg tablet, Take 1 tablet (25 mg total) by mouth 3 (three) times a day as needed for dizziness, Disp: 30 tablet, Rfl: 0    Vitals:    04/24/24 0928   BP: 142/90   BP Location: Left arm   Patient Position: Sitting   Cuff Size: Adult   Pulse: 72   Weight: 65.5 kg (144 lb 6.4 oz)   Height: 5' 6\" (1.676 m)     Body mass index is 23.31 kg/m².    PHYSICAL EXAMINATION:  Gen: Awake, Alert, NAD   Head/eyes: AT/NC, pupils equal and round, Anicteric  ENT: mmm  Neck: Supple, No elevated JVP, trachea midline  Resp: CTA bilaterally no w/r/r  CV: RRR +S1, S2, No m/r/g  Abd: Soft, NT/ND + BS  Ext: no LE edema bilaterally  Neuro: Follows commands, moves all extermities  Psych: Appropriate affect, normal mood, pleasant attitude, non-combative  Skin: warm; no rash, erythema or venous " "stasis changes on exposed skin    --------------------------------------------------------------------------------  TREADMILL STRESS  No results found for this or any previous visit.     --------------------------------------------------------------------------------  NUCLEAR STRESS TEST: No results found for this or any previous visit.    No results found for this or any previous visit.      --------------------------------------------------------------------------------  CATH:  No results found for this or any previous visit.    --------------------------------------------------------------------------------  ECHO:   No results found for this or any previous visit.    No results found for this or any previous visit.    --------------------------------------------------------------------------------  HOLTER  No results found for this or any previous visit.    No results found for this or any previous visit.    --------------------------------------------------------------------------------  CAROTIDS  No results found for this or any previous visit.     --------------------------------------------------------------------------------  ECGs:  No results found for this visit on 04/24/24.     Lab Results   Component Value Date    WBC 5.93 03/10/2024    HGB 15.4 03/10/2024    HCT 47.5 (H) 03/10/2024    MCV 93 03/10/2024     03/10/2024      Lab Results   Component Value Date    SODIUM 139 03/10/2024    K 3.5 03/10/2024     03/10/2024    CO2 29 03/10/2024    BUN 22 03/10/2024    CREATININE 1.09 03/10/2024    GLUC 93 03/10/2024    CALCIUM 8.4 03/10/2024      Lab Results   Component Value Date    HGBA1C 5.6 05/18/2019      No results found for: \"CHOL\"  Lab Results   Component Value Date    HDL 57 03/06/2024    HDL 56 08/11/2022    HDL 55 10/21/2021     Lab Results   Component Value Date    LDLCALC 151 (H) 03/06/2024    LDLCALC 144 (H) 08/11/2022    LDLCALC 181 (H) 10/21/2021     Lab Results   Component Value " "Date    TRIG 107 03/06/2024    TRIG 101 08/11/2022    TRIG 90 10/21/2021     No results found for: \"CHOLHDL\"   Lab Results   Component Value Date    INR 0.94 03/06/2024    INR 1.1 09/08/2018    INR 0.99 04/25/2014    PROTIME 12.8 03/06/2024    PROTIME 12.6 04/25/2014        1. History of CVA (cerebrovascular accident)    2. Syncope and collapse    3. Essential hypertension  -     Ambulatory Referral to Cardiology    4. Dyslipidemia    5. PVD (peripheral vascular disease) (HCC)    6. Mixed hyperlipidemia  -     Ambulatory Referral to Cardiology    7. Tobacco abuse  -     Ambulatory Referral to Cardiology        IMPRESSION:    History of CVA/TIA of right basal ganglia and right MCA  Left vertebral artery stenosis with focal stenosis and proximal basilar thrombus on Eliquis, March 2024  Syncope likely vasovagal  Holter w/ SR avg 69 bpm, with rare PVCs and rare PACs, March 2024  Hypertension  LVEF 62%, normal diastolic function, trace TR with PASP 24 mmHg, March 2024  Dyslipidemia w/  mg/dL, HDL 57 mg/dL,  mg/dL, March 2024  PVD  w/ occluded Left dEIA occlusion with reconsistution at CFA, occlusive R dEIA and >75% fem-pop segments, April 2024  CKD stage III  COPD/tobacco use    PLAN:  It was a pleasure to see Ángela in the office today for initial CV evaluation.  She is here today with her history of stroke and syncope.  Overall, she has been feeling much improved since discharge from the hospital in March 2024.  The patient has no chest pain concerning for angina and no signs or symptoms of heart failure.  Clinically she examines to be euvolemic.  Blood pressure is borderline elevated but heart rate is stable.  She is tolerating her current medications without any reported adverse effects.  Episode of syncope was associated with sweats, feeling hot and cold and blurred vision.  Symptoms most consistent with a vasovagal response.  She can perform greater than 4 METS on daily basis without significant " "exertional symptoms.  Based on her clinical presentation, I have the following recommendations:    1.  Recommend placement of implantable loop recorder due to her episodes of recurrent syncope as well as her TIA/CVA in the past.  Will send notification to electrophysiology to schedule placement of the device.  2.  Continue high intensity statin.  Goal LDL is less than 70 mg/dL.  Repeat lipid panel in 3 to 6 months.  Patient is not to goal, would recommend further up titration of statin medication.  Statin management should be aggressive.  3.  We reviewed her blood pressure readings.  At this time, she is taking amlodipine 2.5 mg daily.  Would recommend uptitrating to 5 mg daily.  Patient would like to discuss with primary care in the coming days.  4.  Continue aspirin therapy  5.  Recommend heart healthy diet low in sodium and carbohydrate.  We have discussed the Mediterranean diet.  6.  Physical activity level as tolerated and as recommended by physical therapy.  7.  She is otherwise up-to-date with CV testing.  8.  Tobacco cessation advised.  9.  We will follow-up with her after placement of implantable loop recorder to reassess her progress.    As always, please do not hesitate to call with any questions.    Portions of the record may have been created with voice recognition software. Occasional wrong word or \"sound a like\" substitutions may have occurred due to the inherent limitations of voice recognition software. Read the chart carefully and recognize, using context, where substitutions have occurred.      Signed: Roger Moise DO, FACC, [place], FACP  "

## 2024-04-24 ENCOUNTER — CONSULT (OUTPATIENT)
Dept: CARDIOLOGY CLINIC | Facility: CLINIC | Age: 79
End: 2024-04-24
Payer: MEDICARE

## 2024-04-24 VITALS
SYSTOLIC BLOOD PRESSURE: 142 MMHG | HEIGHT: 66 IN | WEIGHT: 144.4 LBS | DIASTOLIC BLOOD PRESSURE: 90 MMHG | BODY MASS INDEX: 23.21 KG/M2 | HEART RATE: 72 BPM

## 2024-04-24 DIAGNOSIS — E78.5 DYSLIPIDEMIA: ICD-10-CM

## 2024-04-24 DIAGNOSIS — E78.2 MIXED HYPERLIPIDEMIA: ICD-10-CM

## 2024-04-24 DIAGNOSIS — I10 ESSENTIAL HYPERTENSION: ICD-10-CM

## 2024-04-24 DIAGNOSIS — Z72.0 TOBACCO ABUSE: ICD-10-CM

## 2024-04-24 DIAGNOSIS — Z86.73 HISTORY OF CVA (CEREBROVASCULAR ACCIDENT): Primary | ICD-10-CM

## 2024-04-24 DIAGNOSIS — I73.9 PVD (PERIPHERAL VASCULAR DISEASE) (HCC): ICD-10-CM

## 2024-04-24 DIAGNOSIS — R55 SYNCOPE AND COLLAPSE: ICD-10-CM

## 2024-04-24 PROCEDURE — 99204 OFFICE O/P NEW MOD 45 MIN: CPT | Performed by: INTERNAL MEDICINE

## 2024-04-25 ENCOUNTER — TELEPHONE (OUTPATIENT)
Age: 79
End: 2024-04-25

## 2024-04-25 ENCOUNTER — TELEPHONE (OUTPATIENT)
Dept: CARDIOLOGY CLINIC | Facility: CLINIC | Age: 79
End: 2024-04-25

## 2024-04-25 ENCOUNTER — OFFICE VISIT (OUTPATIENT)
Dept: FAMILY MEDICINE CLINIC | Facility: CLINIC | Age: 79
End: 2024-04-25
Payer: MEDICARE

## 2024-04-25 VITALS
OXYGEN SATURATION: 99 % | WEIGHT: 144.4 LBS | SYSTOLIC BLOOD PRESSURE: 140 MMHG | DIASTOLIC BLOOD PRESSURE: 86 MMHG | TEMPERATURE: 97.7 F | BODY MASS INDEX: 23.21 KG/M2 | HEIGHT: 66 IN | HEART RATE: 69 BPM

## 2024-04-25 DIAGNOSIS — G45.9 TIA (TRANSIENT ISCHEMIC ATTACK): ICD-10-CM

## 2024-04-25 DIAGNOSIS — I10 ESSENTIAL HYPERTENSION: Primary | ICD-10-CM

## 2024-04-25 DIAGNOSIS — I73.9 CLAUDICATION OF BOTH LOWER EXTREMITIES (HCC): ICD-10-CM

## 2024-04-25 DIAGNOSIS — M48.02 CERVICAL STENOSIS OF SPINE: ICD-10-CM

## 2024-04-25 DIAGNOSIS — I67.9 CVD (CEREBROVASCULAR DISEASE): ICD-10-CM

## 2024-04-25 DIAGNOSIS — G89.4 CHRONIC PAIN SYNDROME: ICD-10-CM

## 2024-04-25 PROCEDURE — 99214 OFFICE O/P EST MOD 30 MIN: CPT | Performed by: FAMILY MEDICINE

## 2024-04-25 PROCEDURE — G2211 COMPLEX E/M VISIT ADD ON: HCPCS | Performed by: FAMILY MEDICINE

## 2024-04-25 NOTE — TELEPHONE ENCOUNTER
Pt called stating that the cost of Eliquis is to much for her. Patient would like to see if she eligible based off income for the [employer] assistance. She said that she only has 2 days left of Eliquis and she cannot afford the refill.    Advised that I would send over a message to the office so that she can receive samples and fill out paperwork for [place].     Please call patient to discuss.

## 2024-04-25 NOTE — PROGRESS NOTES
Subjective:    HPI  Ángela is a 78 y.o. 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      ---Above per clinical staff & reviewed. 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    Past Medical History:   Diagnosis Date    Anxiety     Atypical squamous cells cannot exclude high grade squamous intraepithelial lesion on cytologic smear of cervix (ASC-H)     Last Assessed 5/13/2014    Back pain     Cancer (HCC)     Cervical cancer (HCC)     Depression     Ovarian cyst     Tinnitus of both ears 10/2/2020    Tobacco abuse 11/20/2018       Past Surgical History:   Procedure Laterality Date    ANKLE FRACTURE SURGERY      Last Assessed 07/26/2016    ANKLE SURGERY      CERVICAL BIOPSY  W/ LOOP ELECTRODE EXCISION      COLONOSCOPY  10/2019    DIAGNOSTIC LAPAROSCOPY      DILATION AND CURETTAGE OF UTERUS      ESOPHAGOGASTRODUODENOSCOPY N/A 9/12/2016    Procedure: ESOPHAGOGASTRODUODENOSCOPY (EGD);  Surgeon: Alvin Powell MD;  Location: [facility];  Service:     FRACTURE SURGERY      ORTHOPEDIC SURGERY      MI COLONOSCOPY FLX DX W/COLLJ SPEC WHEN PFRMD N/A 9/12/2016    Procedure: FLEXIBLE COLONOSCOPY;  Surgeon: Alvin Powell MD;  Location: [facility];  Service:  Colorectal    SALPINGOOPHORECTOMY Left     TONSILLECTOMY      UPPER GASTROINTESTINAL ENDOSCOPY  2016       Social History     Socioeconomic History    Marital status:      Spouse name: None    Number of children: None    Years of education: 15    Highest education level: None   Occupational History    None   Tobacco Use    Smoking status: Every Day     Current packs/day: 0.50     Average packs/day: 0.5 packs/day for 60.0 years (30.0 ttl pk-yrs)     Types: Cigarettes    Smokeless tobacco: Never   Vaping Use    Vaping status: Never Used   Substance and Sexual Activity    Alcohol use: Not Currently    Drug use: Never    Sexual activity: Not Currently   Other Topics Concern    None   Social History Narrative    ** Merged History Encounter **           since 2015 after 13 year marriage.  1 daughter from 1st marriage.  1 granddaughter, Natasha Crane, whom she raised.  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     Social Determinants of Health     Financial Resource Strain: Low Risk  (12/14/2023)    Overall Financial Resource Strain (CARDIA)     Difficulty of Paying Living Expenses: Not hard at all   Food Insecurity: No Food Insecurity (3/10/2024)    Hunger Vital Sign     Worried About Running Out of Food in the Last Year: Never true     Ran Out of Food in the Last Year: Never true   Transportation Needs: No Transportation Needs (3/10/2024)    PRAPARE - Transportation     Lack of Transportation (Medical): No     Lack of Transportation (Non-Medical): No   Physical Activity: Not on file   Stress: Not on file   Social Connections: Not on file   Intimate Partner Violence: Not on file   Housing Stability: Low Risk  (3/10/2024)    Housing Stability Vital Sign     Unable to Pay for Housing in the Last Year: No     Number of Places Lived in the Last Year: 1     Unstable Housing in the Last Year: No       Current Outpatient Medications   Medication Sig Dispense Refill    ALPRAZolam (XANAX) 0.5 mg  tablet Take 1 tablet (0.5 mg total) by mouth daily at bedtime as needed for anxiety 30 tablet 0    amLODIPine (NORVASC) 5 mg tablet       apixaban (ELIQUIS) 5 mg Take 1 tablet (5 mg total) by mouth 2 (two) times a day 60 tablet 1    aspirin (ECOTRIN LOW STRENGTH) 81 mg EC tablet Take 1 tablet (81 mg total) by mouth daily 30 tablet 1    atorvastatin (LIPITOR) 40 mg tablet Take 1 tablet (40 mg total) by mouth daily with dinner 30 tablet 5    dicyclomine (BENTYL) 20 mg tablet Take 1 tablet (20 mg total) by mouth every 6 (six) hours (Patient taking differently: Take 20 mg by mouth in the morning) 60 tablet 1    escitalopram (LEXAPRO) 5 mg tablet Take 1 tablet (5 mg total) by mouth daily (Patient taking differently: Take 5 mg by mouth daily Taking 1/2 tablet, twice a day) 30 tablet 1    meclizine (ANTIVERT) 25 mg tablet Take 1 tablet (25 mg total) by mouth 3 (three) times a day as needed for dizziness 30 tablet 0     No current facility-administered medications for this visit.        Review of Systems   Constitutional: Negative.  Negative for chills and fever.   HENT: Negative.  Negative for ear pain and sore throat.    Eyes:  Positive for visual disturbance. Negative for pain.   Respiratory: Negative.  Negative for cough and shortness of breath.    Cardiovascular: Negative.  Negative for chest pain and palpitations.   Gastrointestinal: Negative.  Negative for abdominal pain and vomiting.   Genitourinary: Negative.  Negative for dysuria and hematuria.   Musculoskeletal:  Positive for arthralgias, myalgias, neck pain and neck stiffness. Negative for back pain.   Skin:  Negative for color change and rash.   Neurological:  Positive for weakness. Negative for seizures and syncope.   Psychiatric/Behavioral:  Positive for sleep disturbance. The patient is nervous/anxious.    All other systems reviewed and are negative.       Objective:    /86 (BP Location: Left arm, Patient Position: Sitting, Cuff Size: Adult)   Pulse  "69   Temp 97.7 °F (36.5 °C) (Temporal)   Ht 5' 6\" (1.676 m)   Wt 65.5 kg (144 lb 6.4 oz)   SpO2 99%   BMI 23.31 kg/m²   Wt Readings from Last 3 Encounters:   04/25/24 65.5 kg (144 lb 6.4 oz)   04/24/24 65.5 kg (144 lb 6.4 oz)   04/03/24 65.3 kg (144 lb)     BP Readings from Last 3 Encounters:   04/25/24 140/86   04/24/24 142/90   04/03/24 126/84       Lab Results   Component Value Date    WBC 5.93 03/10/2024    HGB 15.4 03/10/2024    HCT 47.5 (H) 03/10/2024     03/10/2024    TRIG 107 03/06/2024    HDL 57 03/06/2024    ALT 10 03/10/2024    AST 13 03/10/2024     12/10/2015    K 3.5 03/10/2024     03/10/2024    CREATININE 1.09 03/10/2024    BUN 22 03/10/2024    CO2 29 03/10/2024    TSH 0.88 05/18/2019    INR 0.94 03/06/2024    GLUF 82 11/21/2023    HGBA1C 5.6 05/18/2019       Physical Exam  Vitals and nursing note reviewed.   Constitutional:       Appearance: Normal appearance. She is well-developed.   HENT:      Head: Normocephalic and atraumatic.   Eyes:      Pupils: Pupils are equal, round, and reactive to light.   Cardiovascular:      Rate and Rhythm: Normal rate and regular rhythm.      Heart sounds: No murmur heard.  Pulmonary:      Effort: Pulmonary effort is normal.      Breath sounds: Normal breath sounds.   Musculoskeletal:      Cervical back: Normal range of motion and neck supple.   Skin:     General: Skin is warm.      Capillary Refill: Capillary refill takes less than 2 seconds.   Neurological:      Mental Status: She is alert and oriented to person, place, and time.      Cranial Nerves: No cranial nerve deficit.   Psychiatric:         Mood and Affect: Mood normal.         Thought Content: Thought content normal.                       Assessment/Plan:   Ángela was seen today for follow-up.    Diagnoses and all orders for this visit:    Essential hypertension    TIA (transient ischemic attack)    CVD (cerebrovascular disease)    Claudication of both lower extremities " (HCC)    Chronic pain syndrome    Cervical stenosis of spine      Return in about 3 months (around 7/25/2024).  There are no Patient Instructions on file for this visit.

## 2024-04-25 NOTE — TELEPHONE ENCOUNTER
"Called to schedule patient's LOOP Recorder Implant but patient wants to have it done at [place].     Informed I will forward the message to Kristal so she can call her to get her scheduled at [facility].    Kristal,  Please call patient and schedule at [place].     Thanks,  Marti \"Jazmin\" Wellingotn     "

## 2024-04-25 NOTE — TELEPHONE ENCOUNTER
----- Message from Roger Moise DO sent at 4/24/2024 10:26 AM EDT -----  Patient is amenable to implantable loop recorder.  She has had multiple episodes of syncope as well as recurrent CVA/TIA.  She has Medicare.  Can we schedule her for placement of implantable loop recorder.  Thank you.

## 2024-04-26 ENCOUNTER — TELEPHONE (OUTPATIENT)
Dept: NEUROLOGY | Facility: CLINIC | Age: 79
End: 2024-04-26

## 2024-04-26 NOTE — TELEPHONE ENCOUNTER
Patient called and would like a call from Dr Webber possibly, to discuss if she is a candidate for surgery due to her DX cervical stenosis?   Patient states she is miserable and this is no way to live.    Please assist

## 2024-04-26 NOTE — TELEPHONE ENCOUNTER
Pt called in this morning to follow up on note from yesterday and advise that she just took her last pill this morning and will not have any left after today.

## 2024-04-26 NOTE — TELEPHONE ENCOUNTER
Placed call to patient. I gave her phone number to [employer] [phone]. I am also giving her samples for Eliquis 5mg while she tries to get the application underway.     LOT: [id number]  EXP: July 2025  Dispensed: 4 boxes    Patient was put on ELIQUIS for CVA. I will discuss with Dr. Moise if he is going to take over eliquis management or if it should go through neurology or PCP. If he decides it would not be reasonable for him to take over eliquis management then the [employer] form would need to go to whomever takes over eliquis management.

## 2024-04-28 ENCOUNTER — APPOINTMENT (EMERGENCY)
Dept: CT IMAGING | Facility: HOSPITAL | Age: 79
DRG: 103 | End: 2024-04-28
Payer: MEDICARE

## 2024-04-28 ENCOUNTER — HOSPITAL ENCOUNTER (INPATIENT)
Facility: HOSPITAL | Age: 79
LOS: 1 days | Discharge: HOME | DRG: 103 | End: 2024-04-30
Attending: EMERGENCY MEDICINE | Admitting: INTERNAL MEDICINE
Payer: MEDICARE

## 2024-04-28 DIAGNOSIS — R93.0 ABNORMAL CT OF THE HEAD: ICD-10-CM

## 2024-04-28 DIAGNOSIS — M48.02 CERVICAL STENOSIS OF SPINE: ICD-10-CM

## 2024-04-28 DIAGNOSIS — R29.90 STROKE-LIKE SYMPTOMS: Primary | ICD-10-CM

## 2024-04-28 DIAGNOSIS — I77.1 ARTERIAL STENOSIS (HCC): ICD-10-CM

## 2024-04-28 DIAGNOSIS — Z86.73 HISTORY OF CVA (CEREBROVASCULAR ACCIDENT): ICD-10-CM

## 2024-04-28 DIAGNOSIS — I10 HYPERTENSION: ICD-10-CM

## 2024-04-28 LAB
ALBUMIN SERPL BCP-MCNC: 4 G/DL (ref 3.5–5)
ALP SERPL-CCNC: 94 U/L (ref 34–104)
ALT SERPL W P-5'-P-CCNC: 9 U/L (ref 7–52)
ANION GAP SERPL CALCULATED.3IONS-SCNC: 7 MMOL/L (ref 4–13)
AST SERPL W P-5'-P-CCNC: 10 U/L (ref 13–39)
ATRIAL RATE: 66 BPM
BASOPHILS # BLD AUTO: 0.04 THOUSANDS/ÂΜL (ref 0–0.1)
BASOPHILS NFR BLD AUTO: 1 % (ref 0–1)
BILIRUB SERPL-MCNC: 0.64 MG/DL (ref 0.2–1)
BNP SERPL-MCNC: 31 PG/ML (ref 0–100)
BUN SERPL-MCNC: 11 MG/DL (ref 5–25)
CALCIUM SERPL-MCNC: 9.9 MG/DL (ref 8.4–10.2)
CARDIAC TROPONIN I PNL SERPL HS: <2 NG/L
CHLORIDE SERPL-SCNC: 105 MMOL/L (ref 96–108)
CO2 SERPL-SCNC: 28 MMOL/L (ref 21–32)
CREAT SERPL-MCNC: 0.91 MG/DL (ref 0.6–1.3)
EOSINOPHIL # BLD AUTO: 0.12 THOUSAND/ÂΜL (ref 0–0.61)
EOSINOPHIL NFR BLD AUTO: 2 % (ref 0–6)
ERYTHROCYTE [DISTWIDTH] IN BLOOD BY AUTOMATED COUNT: 12.9 % (ref 11.6–15.1)
EST. AVERAGE GLUCOSE BLD GHB EST-MCNC: 108 MG/DL
GFR SERPL CREATININE-BSD FRML MDRD: 61 ML/MIN/1.73SQ M
GLUCOSE SERPL-MCNC: 83 MG/DL (ref 65–140)
HBA1C MFR BLD: 5.4 %
HCT VFR BLD AUTO: 49.3 % (ref 34.8–46.1)
HGB BLD-MCNC: 16.3 G/DL (ref 11.5–15.4)
IMM GRANULOCYTES # BLD AUTO: 0.01 THOUSAND/UL (ref 0–0.2)
IMM GRANULOCYTES NFR BLD AUTO: 0 % (ref 0–2)
LYMPHOCYTES # BLD AUTO: 0.84 THOUSANDS/ÂΜL (ref 0.6–4.47)
LYMPHOCYTES NFR BLD AUTO: 14 % (ref 14–44)
MCH RBC QN AUTO: 29.9 PG (ref 26.8–34.3)
MCHC RBC AUTO-ENTMCNC: 33.1 G/DL (ref 31.4–37.4)
MCV RBC AUTO: 91 FL (ref 82–98)
MONOCYTES # BLD AUTO: 0.34 THOUSAND/ÂΜL (ref 0.17–1.22)
MONOCYTES NFR BLD AUTO: 6 % (ref 4–12)
NEUTROPHILS # BLD AUTO: 4.84 THOUSANDS/ÂΜL (ref 1.85–7.62)
NEUTS SEG NFR BLD AUTO: 77 % (ref 43–75)
NRBC BLD AUTO-RTO: 0 /100 WBCS
PLATELET # BLD AUTO: 241 THOUSANDS/UL (ref 149–390)
PMV BLD AUTO: 10.4 FL (ref 8.9–12.7)
POTASSIUM SERPL-SCNC: 3.9 MMOL/L (ref 3.5–5.3)
PROT SERPL-MCNC: 6.7 G/DL (ref 6.4–8.4)
QRS AXIS: 50 DEGREES
QRSD INTERVAL: 88 MS
QT INTERVAL: 450 MS
QTC INTERVAL: 475 MS
RBC # BLD AUTO: 5.45 MILLION/UL (ref 3.81–5.12)
SODIUM SERPL-SCNC: 140 MMOL/L (ref 135–147)
T WAVE AXIS: 85 DEGREES
TSH SERPL DL<=0.05 MIU/L-ACNC: 0.62 UIU/ML (ref 0.45–4.5)
VENTRICULAR RATE: 67 BPM
WBC # BLD AUTO: 6.19 THOUSAND/UL (ref 4.31–10.16)

## 2024-04-28 PROCEDURE — 93010 ELECTROCARDIOGRAM REPORT: CPT | Performed by: INTERNAL MEDICINE

## 2024-04-28 PROCEDURE — 84484 ASSAY OF TROPONIN QUANT: CPT | Performed by: EMERGENCY MEDICINE

## 2024-04-28 PROCEDURE — 99223 1ST HOSP IP/OBS HIGH 75: CPT | Performed by: INTERNAL MEDICINE

## 2024-04-28 PROCEDURE — 85025 COMPLETE CBC W/AUTO DIFF WBC: CPT | Performed by: EMERGENCY MEDICINE

## 2024-04-28 PROCEDURE — 36415 COLL VENOUS BLD VENIPUNCTURE: CPT | Performed by: EMERGENCY MEDICINE

## 2024-04-28 PROCEDURE — 84443 ASSAY THYROID STIM HORMONE: CPT | Performed by: INTERNAL MEDICINE

## 2024-04-28 PROCEDURE — 83036 HEMOGLOBIN GLYCOSYLATED A1C: CPT | Performed by: INTERNAL MEDICINE

## 2024-04-28 PROCEDURE — 80053 COMPREHEN METABOLIC PANEL: CPT | Performed by: EMERGENCY MEDICINE

## 2024-04-28 PROCEDURE — 70498 CT ANGIOGRAPHY NECK: CPT

## 2024-04-28 PROCEDURE — 70496 CT ANGIOGRAPHY HEAD: CPT

## 2024-04-28 PROCEDURE — 99285 EMERGENCY DEPT VISIT HI MDM: CPT

## 2024-04-28 PROCEDURE — 99285 EMERGENCY DEPT VISIT HI MDM: CPT | Performed by: EMERGENCY MEDICINE

## 2024-04-28 PROCEDURE — 83880 ASSAY OF NATRIURETIC PEPTIDE: CPT | Performed by: EMERGENCY MEDICINE

## 2024-04-28 PROCEDURE — 93005 ELECTROCARDIOGRAM TRACING: CPT

## 2024-04-28 RX ORDER — ATORVASTATIN CALCIUM 40 MG/1
40 TABLET, FILM COATED ORAL
Status: CANCELLED | OUTPATIENT
Start: 2024-04-28

## 2024-04-28 RX ORDER — DICYCLOMINE HCL 20 MG
20 TABLET ORAL DAILY
Status: DISCONTINUED | OUTPATIENT
Start: 2024-04-29 | End: 2024-04-30 | Stop reason: HOSPADM

## 2024-04-28 RX ORDER — ESCITALOPRAM OXALATE 5 MG/1
5 TABLET ORAL DAILY
Status: DISCONTINUED | OUTPATIENT
Start: 2024-04-29 | End: 2024-04-30 | Stop reason: HOSPADM

## 2024-04-28 RX ORDER — ASPIRIN 81 MG/1
81 TABLET, CHEWABLE ORAL DAILY
Status: DISCONTINUED | OUTPATIENT
Start: 2024-04-28 | End: 2024-04-28 | Stop reason: SDUPTHER

## 2024-04-28 RX ORDER — ACETAMINOPHEN 325 MG/1
650 TABLET ORAL EVERY 4 HOURS PRN
Status: DISCONTINUED | OUTPATIENT
Start: 2024-04-28 | End: 2024-04-30 | Stop reason: HOSPADM

## 2024-04-28 RX ORDER — DOCUSATE SODIUM 100 MG/1
100 CAPSULE, LIQUID FILLED ORAL 2 TIMES DAILY
Status: DISCONTINUED | OUTPATIENT
Start: 2024-04-28 | End: 2024-04-30 | Stop reason: HOSPADM

## 2024-04-28 RX ORDER — AMLODIPINE BESYLATE 5 MG/1
5 TABLET ORAL DAILY
Status: DISCONTINUED | OUTPATIENT
Start: 2024-04-29 | End: 2024-04-30 | Stop reason: HOSPADM

## 2024-04-28 RX ORDER — POLYETHYLENE GLYCOL 3350 17 G/17G
17 POWDER, FOR SOLUTION ORAL DAILY
Status: DISCONTINUED | OUTPATIENT
Start: 2024-04-28 | End: 2024-04-30 | Stop reason: HOSPADM

## 2024-04-28 RX ORDER — ONDANSETRON 2 MG/ML
4 INJECTION INTRAMUSCULAR; INTRAVENOUS EVERY 6 HOURS PRN
Status: DISCONTINUED | OUTPATIENT
Start: 2024-04-28 | End: 2024-04-30 | Stop reason: HOSPADM

## 2024-04-28 RX ORDER — ATORVASTATIN CALCIUM 40 MG/1
40 TABLET, FILM COATED ORAL EVERY EVENING
Status: DISCONTINUED | OUTPATIENT
Start: 2024-04-28 | End: 2024-04-30 | Stop reason: HOSPADM

## 2024-04-28 RX ORDER — MECLIZINE HCL 12.5 MG/1
25 TABLET ORAL 3 TIMES DAILY PRN
Status: DISCONTINUED | OUTPATIENT
Start: 2024-04-28 | End: 2024-04-30 | Stop reason: HOSPADM

## 2024-04-28 RX ORDER — ASPIRIN 81 MG/1
324 TABLET, CHEWABLE ORAL ONCE
Status: COMPLETED | OUTPATIENT
Start: 2024-04-28 | End: 2024-04-28

## 2024-04-28 RX ORDER — ALPRAZOLAM 0.5 MG/1
0.5 TABLET ORAL
Status: DISCONTINUED | OUTPATIENT
Start: 2024-04-28 | End: 2024-04-30 | Stop reason: HOSPADM

## 2024-04-28 RX ADMIN — APIXABAN 5 MG: 5 TABLET, FILM COATED ORAL at 20:44

## 2024-04-28 RX ADMIN — IOHEXOL 85 ML: 350 INJECTION, SOLUTION INTRAVENOUS at 12:58

## 2024-04-28 RX ADMIN — ATORVASTATIN CALCIUM 40 MG: 40 TABLET, FILM COATED ORAL at 17:15

## 2024-04-28 RX ADMIN — ASPIRIN 81 MG CHEWABLE TABLET 324 MG: 81 TABLET CHEWABLE at 14:48

## 2024-04-28 NOTE — PLAN OF CARE

## 2024-04-28 NOTE — INCIDENTAL FINDINGS
The following findings require follow up:  Radiographic finding   Finding: Small lobulated lesion in right paraglottic fat, unchanged since 3/17/2019, likely benign. Consider follow-up with ENT for further evaluation.    Follow up required: Yes   Follow up should be done within 2-3 week(s)    Please notify the following clinician to assist with the follow up:   PCP, Patient aware of finding and agrees to follow up

## 2024-04-28 NOTE — ASSESSMENT & PLAN NOTE
Patient presenting with strokelike symptoms, no previous history of CVA  Reported having vision blurriness while waking up in the morning  Pending outpatient loop recorder implantation  She is anticoagulated with Eliquis due to history of peripheral arterial disease  Previous admissions in March with no etiology identified  Pending outpatient neurosurgical appointment-evidence of cervical radiculopathy on recent MRI of the spine  Possible recrudescence of previous stroke versus autonomic dysfunction  MRI of the brain pending  NIH stroke score of 0

## 2024-04-28 NOTE — H&P
[facility]  H&P  Name: Ángela Ayoub 78 y.o. female I MRN: [id number]  Unit/Bed#: ED-06 I Date of Admission: 4/28/2024   Date of Service: 4/28/2024 I Hospital Day: 0    Assessment and Plan  * Stroke-like symptoms  Assessment & Plan  Patient presenting with strokelike symptoms, no previous history of CVA  Reported having vision blurriness while waking up in the morning  Pending outpatient loop recorder implantation  She is anticoagulated with Eliquis due to history of peripheral arterial disease  Previous admissions in March with no etiology identified  Pending outpatient neurosurgical appointment-evidence of cervical radiculopathy on recent MRI of the spine  Possible recrudescence of previous stroke versus autonomic dysfunction  MRI of the brain pending  NIH stroke score of 0    History of CVA (cerebrovascular accident)  Assessment & Plan  Previous history of CVA  Has had vasovagal syncopal events.  CTA head and neck is showing severe stenosis in the left intracranial vert and severe stenosis in the proximal basilar artery.  Most recent vascular note with recommendation for aggressive risk factor modification  She has remained on aspirin as well as Eliquis given history of vascular disease      Aortoiliac occlusive disease (HCC)  Assessment & Plan  Significantly diminished ABIs  Continue yearly follow-up with vascular surgery  Evaluated 4/17/2024 by vascular surgery    Anxiety  Assessment & Plan  Continue Xanax    Chronic obstructive pulmonary disease (HCC)  Assessment & Plan  No evidence of exacerbation  Not on inhaler therapy prior    Tobacco abuse  Assessment & Plan  Patient is reducing smoking  Continue nicotine patch while admitted        Code Status: Full code    VTE Prophylaxis: Apixaban (Eliquis)  / sequential compression device     Discussion with family: Patient's sister at bedside    Anticipated Length of Stay:  Patient will be admitted on an Observation basis with  "an anticipated length of stay of less than 2 midnights.   Justification for Hospital Stay: Stroke-like symptoms , blurry vision    Total Time for Visit, including Counseling / Coordination of Care: 76 minutes.  Greater than 50% of this total time spent on direct patient counseling and coordination of care.    Chief Complaint:     Chief Complaint   Patient presents with    Medical Problem     Pt here via EMS with complaints of head pressure. Pt states this has been ongoing for 8 years and she was recently diagnosed with cervical spinal stenosis. States feels like there is a \"knot\" at the base of her skull as well as lightheadedness.        History of Present Illness:    Ángela Ayoub is a 78 y.o. female who presents with head pressure and blurry vision while awaking.  This has been going on for many years.  She was recently diagnosed with cervical spinal stenosis by MRI of the neck.  She is pending outpatient evaluation by neurosurgery.  The patient has had previous admission for for dizziness and syncope.  These were felt to be secondary to vasovagal episodes.  A CTA at that time showed basilar artery stenosis with noncalcified mural plaque she was started on Eliquis with plan for repeat CTA.    There was a workup for possible carcinoid syndrome with 24-hour urinary 5-HIAA, the patient is still scheduled for outpatient Zio patch monitoring.  She was referred to vestibular therapy training.  Patient also has history of peripheral arterial disease and most recently was seen in the vascular surgery office on 4/17/2024 with plan for outpatient follow-up.    Patient's other past medical history includes history of previous cerebrovascular disease, she has noted to have various intracranial stenosis.  She also has a history of anxiety and depression, chronic kidney disease, hyperlipidemia and active nicotine dependence.    The time of evaluation the patient denies any lightheadedness or dizziness, no " difficulty speaking or swallowing.  She cannot really describe her symptoms other than she felt she had blurry vision.  She states it took 8 years for them to discover her cervical spinal stenosis.    Patient denies any other acute complaints    Review of Systems:    A complete and comprehensive 14 point organ system review was performed and all other systems are negative other than stated above in the HPI    Past Medical and Surgical History:     Past Medical History:   Diagnosis Date    Anxiety     Atypical squamous cells cannot exclude high grade squamous intraepithelial lesion on cytologic smear of cervix (ASC-H)     Last Assessed 5/13/2014    Back pain     Cancer (HCC)     Cervical cancer (HCC)     Depression     Ovarian cyst     Tinnitus of both ears 10/2/2020    Tobacco abuse 11/20/2018       Past Surgical History:   Procedure Laterality Date    ANKLE FRACTURE SURGERY      Last Assessed 07/26/2016    ANKLE SURGERY      CERVICAL BIOPSY  W/ LOOP ELECTRODE EXCISION      COLONOSCOPY  10/2019    DIAGNOSTIC LAPAROSCOPY      DILATION AND CURETTAGE OF UTERUS      ESOPHAGOGASTRODUODENOSCOPY N/A 9/12/2016    Procedure: ESOPHAGOGASTRODUODENOSCOPY (EGD);  Surgeon: Alvin Powell MD;  Location: [facility];  Service:     FRACTURE SURGERY      ORTHOPEDIC SURGERY      UT COLONOSCOPY FLX DX W/COLLJ SPEC WHEN PFRMD N/A 9/12/2016    Procedure: FLEXIBLE COLONOSCOPY;  Surgeon: Alvin Powell MD;  Location: [facility];  Service: Colorectal    SALPINGOOPHORECTOMY Left     TONSILLECTOMY      UPPER GASTROINTESTINAL ENDOSCOPY  2016       Meds/Allergies:    Prior to Admission medications    Medication Sig Start Date End Date Taking? Authorizing Provider   ALPRAZolam (XANAX) 0.5 mg tablet Take 1 tablet (0.5 mg total) by mouth daily at bedtime as needed for anxiety 3/29/24   Enid Jernigan MD   amLODIPine (NORVASC) 5 mg tablet  3/16/24   Historical Provider, MD   apixaban (ELIQUIS) 5 mg Take 1 tablet (5 mg total) by mouth 2 (two)  "times a day 3/8/24   Wil Corbin DO   aspirin (ECOTRIN LOW STRENGTH) 81 mg EC tablet Take 1 tablet (81 mg total) by mouth daily 3/9/24   Wil Corbin DO   atorvastatin (LIPITOR) 40 mg tablet Take 1 tablet (40 mg total) by mouth daily with dinner 4/16/24   Enid Jernigan MD   dicyclomine (BENTYL) 20 mg tablet Take 1 tablet (20 mg total) by mouth every 6 (six) hours  Patient taking differently: Take 20 mg by mouth in the morning 3/21/24   Enid Jernigan MD   escitalopram (LEXAPRO) 5 mg tablet Take 1 tablet (5 mg total) by mouth daily  Patient taking differently: Take 5 mg by mouth daily Taking 1/2 tablet, twice a day 2/29/24   Enid Jernigan MD   meclizine (ANTIVERT) 25 mg tablet Take 1 tablet (25 mg total) by mouth 3 (three) times a day as needed for dizziness 3/12/24   Harinder Yun DO     I have reviewed home medications with patient personally.    Allergies:   Allergies   Allergen Reactions    Lisinopril Syncope     Other reaction(s): Other (See Comments)  Nose bleeds, passing out       Social History:     Marital Status:    Occupation: Retired    Substance Use History:   Social History     Substance and Sexual Activity   Alcohol Use Not Currently     Social History     Tobacco Use   Smoking Status Every Day    Current packs/day: 0.50    Average packs/day: 0.5 packs/day for 60.0 years (30.0 ttl pk-yrs)    Types: Cigarettes   Smokeless Tobacco Never     Social History     Substance and Sexual Activity   Drug Use Never       Family History:    Family History   Problem Relation Age of Onset    Coronary artery disease Mother     No Known Problems Family     No Known Problems Father     Colon cancer Neg Hx        Physical Exam:     Vitals:   Blood Pressure: 135/85 (04/28/24 1430)  Pulse: 68 (04/28/24 1430)  Temperature: 97.7 °F (36.5 °C) (04/28/24 1125)  Temp Source: Oral (04/28/24 1125)  Respirations: 20 (04/28/24 1430)  Height: 5' 6\" (167.6 cm) (04/28/24 1125)  Weight - Scale: 72.6 kg " (160 lb 0.9 oz) (04/28/24 1125)  SpO2: 96 % (04/28/24 1430)      General: well appearing, no acute distress  HEENT: atraumatic, PERRLA, moist mucosa, normal pharynx, normal tonsils and adenoids, normal tongue, no fluid in sinuses  Neck: Trachea midline, no carotid bruit, no masses  Respiratory: normal chest wall expansion, CTA B, no r/r/w, no rubs  Cardiovascular: RRR, no m/r/g, Normal S1 and S2  Abdomen: Soft, non-tender, non-distended, normal bowel sounds in all quadrants, no hepatosplenomegaly, no tympany  Rectal: deferred  Musculoskeletal: normal ROM in upper and lower extremities  Integumentary: warm, dry, and pink, with no rash, purpura, or petechia  Heme/Lymph: no lymphadenopathy, no bruises  Neurological: Cranial Nerves II-XII grossly intact  Psychiatric: cooperative with normal mood, affect, and cognition    Additional Data:     Lab Results: Laboratory studies reviewed for today    Results from last 7 days   Lab Units 04/28/24  1225   WBC Thousand/uL 6.19   HEMOGLOBIN g/dL 16.3*   HEMATOCRIT % 49.3*   PLATELETS Thousands/uL 241   SEGS PCT % 77*   LYMPHO PCT % 14   MONO PCT % 6   EOS PCT % 2     Results from last 7 days   Lab Units 04/28/24  1225   SODIUM mmol/L 140   POTASSIUM mmol/L 3.9   CHLORIDE mmol/L 105   CO2 mmol/L 28   BUN mg/dL 11   CREATININE mg/dL 0.91   ANION GAP mmol/L 7   CALCIUM mg/dL 9.9   ALBUMIN g/dL 4.0   TOTAL BILIRUBIN mg/dL 0.64   ALK PHOS U/L 94   ALT U/L 9   AST U/L 10*   GLUCOSE RANDOM mg/dL 83                     Results from last 7 days   Lab Units 04/28/24  1225   HS TNI 0HR ng/L <2       Imaging: I have personally reviewed pertinent reports.      CTA head and neck with and without contrast   Final Result by Owen Parks MD (04/28 1351)      No acute intracranial abnormality.      Unchanged chronic lacunar infarct in right basal ganglia with mild-to-moderate chronic microangiopathy.      Negative CTA head and neck for large vessel occlusion, dissection, or aneurysm.       Moderate-to-severe short-segment stenosis in proximal basilar artery and left vertebral artery distal V4 segment near vertebrobasilar junction due to noncalcified atherosclerotic disease, unchanged. No new site of high-grade stenosis.      Small lobulated lesion in right paraglottic fat, unchanged since 3/17/2019, likely benign. Consider follow-up with ENT for further evaluation.      Additional chronic/incidental findings as detailed above.      The study was marked in EPIC for immediate notification.                        Workstation performed: [id number]             EKG, Pathology, and Other Studies Reviewed on Admission:   Reviewed head CT which is negative for large vessel occlusion dissection or aneurysm  EKG reviewed, sinus rhythm at a rate of 67, no acute ST segment ovation's or depressions.  Allscripts / Epic Records Reviewed: Yes    ** Please Note: This note was completed in part utilizing [employer] Software.  Grammatical errors, random word insertions, spelling mistakes, and incomplete sentences may be an occasional consequence of this system secondary to software limitations, ambient noise, and hardware issues.  If you have any questions or concerns about the content, text, or information contained within the body of this dictation, please contact the provider for clarification.**

## 2024-04-28 NOTE — ED PROVIDER NOTES
"History  Chief Complaint   Patient presents with    Medical Problem     Pt here via EMS with complaints of head pressure. Pt states this has been ongoing for 8 years and she was recently diagnosed with cervical spinal stenosis. States feels like there is a \"knot\" at the base of her skull as well as lightheadedness.      78-year-old female presents for evaluation of stroke symptoms this morning.  Patient states she woke up and was unable to walk straight she felt like she was dizzy and about to fall, she states that her vision was blurry and she was unable to read anything.  She states is present in both eyes.  Symptoms have since improved to the point that she is not able to read things clearly and is able to ambulate.      History provided by:  Patient  Medical Problem  Associated symptoms: no abdominal pain, no chest pain, no congestion, no diarrhea, no ear pain, no fatigue, no fever, no myalgias, no nausea, no rash, no rhinorrhea, no shortness of breath, no sore throat, no vomiting and no wheezing        Prior to Admission Medications   Prescriptions Last Dose Informant Patient Reported? Taking?   ALPRAZolam (XANAX) 0.5 mg tablet  Self No No   Sig: Take 1 tablet (0.5 mg total) by mouth daily at bedtime as needed for anxiety   amLODIPine (NORVASC) 5 mg tablet  Self Yes No   apixaban (ELIQUIS) 5 mg  Self No No   Sig: Take 1 tablet (5 mg total) by mouth 2 (two) times a day   aspirin (ECOTRIN LOW STRENGTH) 81 mg EC tablet  Self No No   Sig: Take 1 tablet (81 mg total) by mouth daily   atorvastatin (LIPITOR) 40 mg tablet  Self No No   Sig: Take 1 tablet (40 mg total) by mouth daily with dinner   dicyclomine (BENTYL) 20 mg tablet  Self No No   Sig: Take 1 tablet (20 mg total) by mouth every 6 (six) hours   Patient taking differently: Take 20 mg by mouth in the morning   escitalopram (LEXAPRO) 5 mg tablet  Self No No   Sig: Take 1 tablet (5 mg total) by mouth daily   Patient taking differently: Take 5 mg by mouth daily " Taking 1/2 tablet, twice a day   meclizine (ANTIVERT) 25 mg tablet  Self No No   Sig: Take 1 tablet (25 mg total) by mouth 3 (three) times a day as needed for dizziness      Facility-Administered Medications: None       Past Medical History:   Diagnosis Date    Anxiety     Atypical squamous cells cannot exclude high grade squamous intraepithelial lesion on cytologic smear of cervix (ASC-H)     Last Assessed 5/13/2014    Back pain     Cancer (HCC)     Cervical cancer (HCC)     Depression     Ovarian cyst     Tinnitus of both ears 10/2/2020    Tobacco abuse 11/20/2018       Past Surgical History:   Procedure Laterality Date    ANKLE FRACTURE SURGERY      Last Assessed 07/26/2016    ANKLE SURGERY      CERVICAL BIOPSY  W/ LOOP ELECTRODE EXCISION      COLONOSCOPY  10/2019    DIAGNOSTIC LAPAROSCOPY      DILATION AND CURETTAGE OF UTERUS      ESOPHAGOGASTRODUODENOSCOPY N/A 9/12/2016    Procedure: ESOPHAGOGASTRODUODENOSCOPY (EGD);  Surgeon: Alvin Powell MD;  Location: [facility];  Service:     FRACTURE SURGERY      ORTHOPEDIC SURGERY      MI COLONOSCOPY FLX DX W/COLLJ SPEC WHEN PFRMD N/A 9/12/2016    Procedure: FLEXIBLE COLONOSCOPY;  Surgeon: Alvin Powell MD;  Location: [facility];  Service: Colorectal    SALPINGOOPHORECTOMY Left     TONSILLECTOMY      UPPER GASTROINTESTINAL ENDOSCOPY  2016       Family History   Problem Relation Age of Onset    Coronary artery disease Mother     No Known Problems Family     No Known Problems Father     Colon cancer Neg Hx      I have reviewed and agree with the history as documented.    E-Cigarette/Vaping    E-Cigarette Use Never User      E-Cigarette/Vaping Substances    Nicotine No     THC No     CBD No     Flavoring No      Social History     Tobacco Use    Smoking status: Every Day     Current packs/day: 0.50     Average packs/day: 0.5 packs/day for 60.0 years (30.0 ttl pk-yrs)     Types: Cigarettes    Smokeless tobacco: Never   Vaping Use    Vaping status: Never Used    Substance Use Topics    Alcohol use: Not Currently    Drug use: Never       Review of Systems   Constitutional:  Negative for activity change, appetite change, fatigue and fever.   HENT:  Negative for congestion, dental problem, ear pain, rhinorrhea and sore throat.    Eyes:  Positive for visual disturbance. Negative for pain and redness.   Respiratory:  Negative for chest tightness, shortness of breath and wheezing.    Cardiovascular:  Negative for chest pain and palpitations.   Gastrointestinal:  Negative for abdominal pain, blood in stool, constipation, diarrhea, nausea and vomiting.   Endocrine: Negative for cold intolerance and heat intolerance.   Genitourinary:  Negative for dysuria, frequency and hematuria.   Musculoskeletal:  Positive for neck pain. Negative for arthralgias, myalgias and neck stiffness.   Skin:  Negative for color change, pallor and rash.   Neurological:  Positive for light-headedness. Negative for weakness and numbness.   Hematological:  Does not bruise/bleed easily.   Psychiatric/Behavioral:  Negative for agitation, hallucinations and suicidal ideas.        Physical Exam  Physical Exam  Constitutional:       Appearance: She is well-developed.   HENT:      Head: Normocephalic and atraumatic.   Eyes:      Pupils: Pupils are equal, round, and reactive to light.   Neck:      Vascular: No JVD.      Trachea: No tracheal deviation.   Cardiovascular:      Rate and Rhythm: Normal rate and regular rhythm.   Pulmonary:      Effort: Pulmonary effort is normal. No tachypnea, accessory muscle usage or respiratory distress.      Breath sounds: Normal breath sounds.   Abdominal:      General: There is no distension.      Palpations: There is no mass.      Tenderness: There is no abdominal tenderness.      Hernia: No hernia is present.   Musculoskeletal:      Cervical back: Normal range of motion and neck supple.      Right lower leg: Normal.      Left lower leg: Normal.   Skin:     General: Skin is  warm.      Capillary Refill: Capillary refill takes less than 2 seconds.   Neurological:      Mental Status: She is alert.      Comments: Please refer to NIH stroke scale   Psychiatric:         Behavior: Behavior normal.         Vital Signs  ED Triage Vitals [04/28/24 1125]   Temperature Pulse Respirations Blood Pressure SpO2   97.7 °F (36.5 °C) 61 18 (!) 172/93 100 %      Temp Source Heart Rate Source Patient Position - Orthostatic VS BP Location FiO2 (%)   Oral Monitor Sitting Right arm --      Pain Score       5           Vitals:    04/28/24 1125 04/28/24 1326 04/28/24 1330   BP: (!) 172/93 166/97 152/94   Pulse: 61 70 67   Patient Position - Orthostatic VS: Sitting Sitting          Visual Acuity      ED Medications  Medications   aspirin chewable tablet 324 mg (has no administration in time range)   iohexol (OMNIPAQUE) 350 MG/ML injection (MULTI-DOSE) 85 mL (85 mL Intravenous Given 4/28/24 1258)       Diagnostic Studies  Results Reviewed       Procedure Component Value Units Date/Time    B-Type Natriuretic Peptide(BNP) [[id number]]  (Normal) Collected: 04/28/24 1225    Lab Status: Final result Specimen: Blood from Arm, Left Updated: 04/28/24 1300     BNP 31 pg/mL     HS Troponin 0hr (reflex protocol) [[id number]]  (Normal) Collected: 04/28/24 1225    Lab Status: Final result Specimen: Blood from Arm, Left Updated: 04/28/24 1300     hs TnI 0hr <2 ng/L     Comprehensive metabolic panel [[id number]]  (Abnormal) Collected: 04/28/24 1225    Lab Status: Final result Specimen: Blood from Arm, Left Updated: 04/28/24 1252     Sodium 140 mmol/L      Potassium 3.9 mmol/L      Chloride 105 mmol/L      CO2 28 mmol/L      ANION GAP 7 mmol/L      BUN 11 mg/dL      Creatinine 0.91 mg/dL      Glucose 83 mg/dL      Calcium 9.9 mg/dL      AST 10 U/L      ALT 9 U/L      Alkaline Phosphatase 94 U/L      Total Protein 6.7 g/dL      Albumin 4.0 g/dL      Total Bilirubin 0.64 mg/dL      eGFR 61 ml/min/1.73sq m     Narrative:      National  Kidney Disease Foundation guidelines for Chronic Kidney Disease (CKD):     Stage 1 with normal or high GFR (GFR > 90 mL/min/1.73 square meters)    Stage 2 Mild CKD (GFR = 60-89 mL/min/1.73 square meters)    Stage 3A Moderate CKD (GFR = 45-59 mL/min/1.73 square meters)    Stage 3B Moderate CKD (GFR = 30-44 mL/min/1.73 square meters)    Stage 4 Severe CKD (GFR = 15-29 mL/min/1.73 square meters)    Stage 5 End Stage CKD (GFR <15 mL/min/1.73 square meters)  Note: GFR calculation is accurate only with a steady state creatinine    CBC and differential [[id number]]  (Abnormal) Collected: 04/28/24 1225    Lab Status: Final result Specimen: Blood from Arm, Left Updated: 04/28/24 1236     WBC 6.19 Thousand/uL      RBC 5.45 Million/uL      Hemoglobin 16.3 g/dL      Hematocrit 49.3 %      MCV 91 fL      MCH 29.9 pg      MCHC 33.1 g/dL      RDW 12.9 %      MPV 10.4 fL      Platelets 241 Thousands/uL      nRBC 0 /100 WBCs      Segmented % 77 %      Immature Grans % 0 %      Lymphocytes % 14 %      Monocytes % 6 %      Eosinophils Relative 2 %      Basophils Relative 1 %      Absolute Neutrophils 4.84 Thousands/µL      Absolute Immature Grans 0.01 Thousand/uL      Absolute Lymphocytes 0.84 Thousands/µL      Absolute Monocytes 0.34 Thousand/µL      Eosinophils Absolute 0.12 Thousand/µL      Basophils Absolute 0.04 Thousands/µL                    CTA head and neck with and without contrast   Final Result by Owen Parks MD (04/28 5218)      No acute intracranial abnormality.      Unchanged chronic lacunar infarct in right basal ganglia with mild-to-moderate chronic microangiopathy.      Negative CTA head and neck for large vessel occlusion, dissection, or aneurysm.      Moderate-to-severe short-segment stenosis in proximal basilar artery and left vertebral artery distal V4 segment near vertebrobasilar junction due to noncalcified atherosclerotic disease, unchanged. No new site of high-grade stenosis.      Small lobulated  lesion in right paraglottic fat, unchanged since 3/17/2019, likely benign. Consider follow-up with ENT for further evaluation.      Additional chronic/incidental findings as detailed above.      The study was marked in EPIC for immediate notification.                        Workstation performed: [id number]                    Procedures  Procedures         ED Course                  Stroke Assessment       Row Name 04/28/24 1150             NIH Stroke Scale    Interval Baseline      Level of Consciousness (1a.) 0      LOC Questions (1b.) 0      LOC Commands (1c.) 0      Best Gaze (2.) 0      Visual (3.) 0      Facial Palsy (4.) 0      Motor Arm, Left (5a.) 0      Motor Arm, Right (5b.) 0      Motor Leg, Left (6a.) 0      Motor Leg, Right (6b.) 0      Limb Ataxia (7.) 0      Sensory (8.) 0      Best Language (9.) 0      Dysarthria (10.) 0      Extinction and Inattention (11.) (Formerly Neglect) 0      Total 0                    Flowsheet Row Most Recent Value   Thrombolytic Decision Options    Thrombolytic Decision Patient not a candidate.   Patient is not a candidate options Symptoms resolved/clearly non disabling., Unclear time of onset outside appropriate time window.                      SBIRT 20yo+      Flowsheet Row Most Recent Value   Initial Alcohol Screen: US AUDIT-C     1. How often do you have a drink containing alcohol? 0 Filed at: 04/28/2024 1127   2. How many drinks containing alcohol do you have on a typical day you are drinking?  0 Filed at: 04/28/2024 1127   3a. Male UNDER 65: How often do you have five or more drinks on one occasion? 0 Filed at: 04/28/2024 1127   3b. FEMALE Any Age, or MALE 65+: How often do you have 4 or more drinks on one occassion? 0 Filed at: 04/28/2024 1127   Audit-C Score 0 Filed at: 04/28/2024 1127   COLLIN: How many times in the past year have you...    Used an illegal drug or used a prescription medication for non-medical reasons? Never Filed at: 04/28/2024 1127                       Medical Decision Making  Blood present in a motor dysfunction consistent with transient ischemia attack-will do stroke workup, admit    Amount and/or Complexity of Data Reviewed  Labs: ordered.  Radiology: ordered.    Risk  OTC drugs.  Prescription drug management.  Decision regarding hospitalization.             Disposition  Final diagnoses:   Stroke-like symptoms   Hypertension   Arterial stenosis (HCC)     Time reflects when diagnosis was documented in both MDM as applicable and the Disposition within this note       Time User Action Codes Description Comment    4/28/2024  2:39 PM Ricky Steven [R29.90] Stroke-like symptoms     4/28/2024  2:39 PM Ricky Steven [I10] Hypertension     4/28/2024  2:39 PM Ricky Steven [I77.1] Arterial stenosis (HCC)           ED Disposition       ED Disposition   Admit    Condition   Stable    Date/Time   Sun Apr 28, 2024 1416    Comment                  Follow-up Information    None         Patient's Medications   Discharge Prescriptions    No medications on file       No discharge procedures on file.    PDMP Review         Value Time User    PDMP Reviewed  Yes 3/29/2024  9:02 AM Enid Jernigan MD            ED Provider  Electronically Signed by             Ricky Steven MD  04/28/24 4979

## 2024-04-28 NOTE — ASSESSMENT & PLAN NOTE
Previous history of CVA  Has had vasovagal syncopal events.  CTA head and neck is showing severe stenosis in the left intracranial vert and severe stenosis in the proximal basilar artery.  Most recent vascular note with recommendation for aggressive risk factor modification  She has remained on aspirin as well as Eliquis given history of vascular disease

## 2024-04-28 NOTE — ASSESSMENT & PLAN NOTE
Significantly diminished ABIs  Continue yearly follow-up with vascular surgery  Evaluated 4/17/2024 by vascular surgery

## 2024-04-29 ENCOUNTER — APPOINTMENT (OUTPATIENT)
Dept: NON INVASIVE DIAGNOSTICS | Facility: HOSPITAL | Age: 79
DRG: 103 | End: 2024-04-29
Payer: MEDICARE

## 2024-04-29 ENCOUNTER — APPOINTMENT (OUTPATIENT)
Dept: MRI IMAGING | Facility: HOSPITAL | Age: 79
DRG: 103 | End: 2024-04-29
Payer: MEDICARE

## 2024-04-29 PROBLEM — R93.0 ABNORMAL CT OF THE HEAD: Status: ACTIVE | Noted: 2024-04-29

## 2024-04-29 LAB
BSA FOR ECHO PROCEDURE: 1.76 M2
CHOLEST SERPL-MCNC: 116 MG/DL
HDLC SERPL-MCNC: 44 MG/DL
LDLC SERPL CALC-MCNC: 58 MG/DL (ref 0–100)
SL CV LV EF: 66
TRIGL SERPL-MCNC: 70 MG/DL

## 2024-04-29 PROCEDURE — 93321 DOPPLER ECHO F-UP/LMTD STD: CPT | Performed by: INTERNAL MEDICINE

## 2024-04-29 PROCEDURE — 99223 1ST HOSP IP/OBS HIGH 75: CPT | Performed by: PSYCHIATRY & NEUROLOGY

## 2024-04-29 PROCEDURE — 80061 LIPID PANEL: CPT | Performed by: INTERNAL MEDICINE

## 2024-04-29 PROCEDURE — 93325 DOPPLER ECHO COLOR FLOW MAPG: CPT

## 2024-04-29 PROCEDURE — 93308 TTE F-UP OR LMTD: CPT

## 2024-04-29 PROCEDURE — 93321 DOPPLER ECHO F-UP/LMTD STD: CPT

## 2024-04-29 PROCEDURE — 93325 DOPPLER ECHO COLOR FLOW MAPG: CPT | Performed by: INTERNAL MEDICINE

## 2024-04-29 PROCEDURE — 99232 SBSQ HOSP IP/OBS MODERATE 35: CPT | Performed by: PHYSICIAN ASSISTANT

## 2024-04-29 PROCEDURE — 93308 TTE F-UP OR LMTD: CPT | Performed by: INTERNAL MEDICINE

## 2024-04-29 PROCEDURE — 97166 OT EVAL MOD COMPLEX 45 MIN: CPT

## 2024-04-29 PROCEDURE — 70551 MRI BRAIN STEM W/O DYE: CPT

## 2024-04-29 RX ORDER — BACLOFEN 10 MG/1
10 TABLET ORAL 3 TIMES DAILY
Status: DISCONTINUED | OUTPATIENT
Start: 2024-04-29 | End: 2024-04-29

## 2024-04-29 RX ORDER — BACLOFEN 10 MG/1
10 TABLET ORAL
Status: DISCONTINUED | OUTPATIENT
Start: 2024-04-29 | End: 2024-04-30 | Stop reason: HOSPADM

## 2024-04-29 RX ORDER — LORAZEPAM 2 MG/ML
0.5 INJECTION INTRAMUSCULAR ONCE AS NEEDED
Status: COMPLETED | OUTPATIENT
Start: 2024-04-29 | End: 2024-04-29

## 2024-04-29 RX ORDER — CYCLOBENZAPRINE HCL 10 MG
5 TABLET ORAL 3 TIMES DAILY PRN
Status: DISCONTINUED | OUTPATIENT
Start: 2024-04-29 | End: 2024-04-30 | Stop reason: HOSPADM

## 2024-04-29 RX ADMIN — BACLOFEN 10 MG: 10 TABLET ORAL at 21:05

## 2024-04-29 RX ADMIN — APIXABAN 5 MG: 5 TABLET, FILM COATED ORAL at 20:44

## 2024-04-29 RX ADMIN — LORAZEPAM 0.5 MG: 2 INJECTION INTRAMUSCULAR; INTRAVENOUS at 14:57

## 2024-04-29 RX ADMIN — ATORVASTATIN CALCIUM 40 MG: 40 TABLET, FILM COATED ORAL at 17:08

## 2024-04-29 RX ADMIN — POLYETHYLENE GLYCOL 3350 17 G: 17 POWDER, FOR SOLUTION ORAL at 08:55

## 2024-04-29 RX ADMIN — ESCITALOPRAM 5 MG: 5 TABLET, FILM COATED ORAL at 08:55

## 2024-04-29 RX ADMIN — DOCUSATE SODIUM 100 MG: 100 CAPSULE, LIQUID FILLED ORAL at 08:55

## 2024-04-29 RX ADMIN — AMLODIPINE BESYLATE 5 MG: 5 TABLET ORAL at 08:55

## 2024-04-29 RX ADMIN — DOCUSATE SODIUM 100 MG: 100 CAPSULE, LIQUID FILLED ORAL at 17:08

## 2024-04-29 RX ADMIN — APIXABAN 5 MG: 5 TABLET, FILM COATED ORAL at 08:55

## 2024-04-29 RX ADMIN — DICYCLOMINE HYDROCHLORIDE 20 MG: 20 TABLET ORAL at 08:55

## 2024-04-29 RX ADMIN — ASPIRIN 81 MG: 81 TABLET, COATED ORAL at 08:55

## 2024-04-29 NOTE — ASSESSMENT & PLAN NOTE
78-year-old female with history of chronic right MCA infarct, cervical cancer, significant smoking history/tobacco abuse, chronic pain syndrome.  Also has significant cerebrovascular disease on prior vessel imaging.    Recently seen on 2 occasions by neurohospitalist team in March 2024 for stereotypical episodes of syncope (preceded by presyncope/flushing/diaphoresis/diarrhea).  MRI brain during that time was negative.      Recent MRI C-spine (for cervicalgia) noted multifocal severe foraminal stenosis.    Currently presents on 4/28 with ongoing  cervicalgia/skull base pressure, and acute dizziness/gait instability with blurry vision yesterday upon awakening.    Neuroimaging:  -CTA head/neck: No acute intracranial pathology.  No LVO.  Continues with moderate to severe short segment stenosis in the proximal basilar artery and left vertebral artery (unchanged compared to prior CTA from several weeks ago)    Plan:  -Acute ischemic stroke pathway initiated  -MRI brain pending  -2D echocardiogram pending  -Continue home stroke preventative regimen of Eliquis, aspirin 81 mg daily and Lipitor 40 mg daily  -Hemoglobin A1c of 5.4, lipid panel with LDL of 58  -Neurochecks  -Telemetry monitoring  -Recent 48-hour Holter without any A-fib/a flutter nor pauses/heart block  -Is pending upcoming loop monitor implantation  -Stroke education to be provided  -Therapy evaluations

## 2024-04-29 NOTE — ASSESSMENT & PLAN NOTE
Patient presenting with strokelike symptoms, hx of CVA and vascular disease maintained on Eliquis, aspirin, statin   Neurology consulted   MRI brain and echo pending   Pending outpatient loop recorder implantation  Tele is unremarkable thus far   Follow up neurology recs   PT/OT

## 2024-04-29 NOTE — CONSULTS
Consultation - Neurology   Ángela Ayoub 78 y.o. female MRN: [id number]  Unit/Bed#: E4 -01 Encounter: [id number]      Assessment/Plan     * Stroke-like symptoms  Assessment & Plan  78-year-old female with history of chronic right MCA infarct, cervical cancer, significant smoking history/tobacco abuse, chronic pain syndrome.  Also has significant cerebrovascular disease on prior vessel imaging.    Recently seen on 2 occasions by neurohospitalist team in March 2024 for stereotypical episodes of syncope (preceded by presyncope/flushing/diaphoresis/diarrhea).  MRI brain during that time was negative.      Recent MRI C-spine (for cervicalgia) noted multifocal severe foraminal stenosis.    Currently presents on 4/28 with ongoing  cervicalgia/skull base pressure, and acute dizziness/gait instability with blurry vision yesterday upon awakening.    Neuroimaging:  -CTA head/neck: No acute intracranial pathology.  No LVO.  Continues with moderate to severe short segment stenosis in the proximal basilar artery and left vertebral artery (unchanged compared to prior CTA from several weeks ago)    Plan:  -Acute ischemic stroke pathway initiated  -MRI brain pending  -2D echocardiogram pending  -Continue home stroke preventative regimen of Eliquis, aspirin 81 mg daily and Lipitor 40 mg daily  -Hemoglobin A1c of 5.4, lipid panel with LDL of 58  -Neurochecks  -Telemetry monitoring  -Recent 48-hour Holter without any A-fib/a flutter nor pauses/heart block  -Is pending upcoming loop monitor implantation  -Stroke education to be provided  -Therapy evaluations  -Offered as need muscle relaxant to trial for longstanding cervicalgia/neck discomfort (low dose Flexaril PRN)    Will further discuss plan of care with attending neurologist.    Outpatient follow up recommendations to be determined.     History of Present Illness     Reason for Consult / Principal Problem: Cervicalgia, dizziness/gait instability, blurry  vision    HPI: Ángela Ayoub is a 78 y.o. female with history as mentioned above in assessment who neurology is asked to evaluate in regards to the above.    Patient states she awoke yesterday and felt dizziness, with significant blurring of her vision and diminished visual acuity.  She was able to ambulate at home yesterday, but required holding onto the railing on the stairs and other furniture to keep her balance.  No falls nor head trauma.  Denies any changes to speech, extremity motor/sensory function, no dysphagia.    Given her dizziness and vision changes, she and family became concerned and thus patient came to the ED for evaluation.    As mentioned further above, patient had 2 recent admissions to [facility] in March 2024 for syncopal/presyncopal episodes as well as nonspecific strokelike symptoms.  Was experiencing syncopal episode preceded by presyncope, flushed/diaphoretic sensation and diarrhea.  MRI brain during the first admission was negative for acute findings.  She again presented several days later for similar symptoms (question of possible carcinoid syndrome was raised given constellation of diarrhea, flushing and diaphoresis in patient with history of cancer).  Patient CTA during both admissions noted significant basilar and vertebral stenosis, thus she was continued on her home regimen of Eliquis, aspirin and statin.  Patient followed up at the vascular neurology office in early April.     Patient continues to be compliant with Eliquis, aspirin, statin at home.    She has had chronic right-sided cervicalgia and skull base pain for many years, progressive in nature.  Because of this she underwent recent MRI cervical spine and was reportedly supposed to see a neurosurgeon in the future to discuss findings.  She denies any obvious radiculopathy symptoms (denies radiating extremity weakness, numbness, paresthesias from the neck)    Inpatient consult to Neurology  Consult performed by:  Macario Curry PA-C  Consult ordered by: Diego Hensley DO          Review of Systems   Constitutional: Negative.    HENT: Negative.     Eyes:  Positive for visual disturbance. Negative for photophobia.   Respiratory: Negative.     Cardiovascular: Negative.    Gastrointestinal: Negative.    Musculoskeletal:  Positive for gait problem and neck pain. Negative for arthralgias, myalgias and neck stiffness.   Skin: Negative.    Neurological:  Positive for dizziness and headaches (R skull base). Negative for tremors, seizures, syncope, facial asymmetry, speech difficulty, weakness, light-headedness and numbness.       Historical Information   Past Medical History:   Diagnosis Date    Anxiety     Atypical squamous cells cannot exclude high grade squamous intraepithelial lesion on cytologic smear of cervix (ASC-H)     Last Assessed 5/13/2014    Back pain     Cancer (HCC)     Cervical cancer (HCC)     Depression     Ovarian cyst     Tinnitus of both ears 10/2/2020    Tobacco abuse 11/20/2018     Past Surgical History:   Procedure Laterality Date    ANKLE FRACTURE SURGERY      Last Assessed 07/26/2016    ANKLE SURGERY      CERVICAL BIOPSY  W/ LOOP ELECTRODE EXCISION      COLONOSCOPY  10/2019    DIAGNOSTIC LAPAROSCOPY      DILATION AND CURETTAGE OF UTERUS      ESOPHAGOGASTRODUODENOSCOPY N/A 9/12/2016    Procedure: ESOPHAGOGASTRODUODENOSCOPY (EGD);  Surgeon: Alvin Powell MD;  Location: [facility];  Service:     FRACTURE SURGERY      ORTHOPEDIC SURGERY      OH COLONOSCOPY FLX DX W/COLLJ SPEC WHEN PFRMD N/A 9/12/2016    Procedure: FLEXIBLE COLONOSCOPY;  Surgeon: Alvin Powell MD;  Location: [facility];  Service: Colorectal    SALPINGOOPHORECTOMY Left     TONSILLECTOMY      UPPER GASTROINTESTINAL ENDOSCOPY  2016     Social History   Social History     Substance and Sexual Activity   Alcohol Use Not Currently     Social History     Substance and Sexual Activity   Drug Use Never     E-Cigarette/Vaping    E-Cigarette  Use Never User      E-Cigarette/Vaping Substances    Nicotine No     THC No     CBD No     Flavoring No      Social History     Tobacco Use   Smoking Status Every Day    Current packs/day: 0.50    Average packs/day: 0.5 packs/day for 60.0 years (30.0 ttl pk-yrs)    Types: Cigarettes   Smokeless Tobacco Never     Family History:   Family History   Problem Relation Age of Onset    Coronary artery disease Mother     No Known Problems Family     No Known Problems Father     Colon cancer Neg Hx        Review of previous medical records was completed.    Meds/Allergies   current meds:   Current Facility-Administered Medications   Medication Dose Route Frequency    acetaminophen (TYLENOL) tablet 650 mg  650 mg Oral Q4H PRN    ALPRAZolam (XANAX) tablet 0.5 mg  0.5 mg Oral HS PRN    amLODIPine (NORVASC) tablet 5 mg  5 mg Oral Daily    apixaban (ELIQUIS) tablet 5 mg  5 mg Oral BID    aspirin (ECOTRIN LOW STRENGTH) EC tablet 81 mg  81 mg Oral Daily    atorvastatin (LIPITOR) tablet 40 mg  40 mg Oral QPM    dicyclomine (BENTYL) tablet 20 mg  20 mg Oral Daily    docusate sodium (COLACE) capsule 100 mg  100 mg Oral BID    escitalopram (LEXAPRO) tablet 5 mg  5 mg Oral Daily    meclizine (ANTIVERT) tablet 25 mg  25 mg Oral TID PRN    nicotine (NICODERM CQ) 7 mg/24hr TD 24 hr patch 1 patch  1 patch Transdermal Daily    ondansetron (ZOFRAN) injection 4 mg  4 mg Intravenous Q6H PRN    polyethylene glycol (MIRALAX) packet 17 g  17 g Oral Daily    and PTA meds:   Prior to Admission Medications   Prescriptions Last Dose Informant Patient Reported? Taking?   ALPRAZolam (XANAX) 0.5 mg tablet Past Week Self No Yes   Sig: Take 1 tablet (0.5 mg total) by mouth daily at bedtime as needed for anxiety   amLODIPine (NORVASC) 5 mg tablet 4/28/2024 Self Yes Yes   apixaban (ELIQUIS) 5 mg 4/28/2024 Self No Yes   Sig: Take 1 tablet (5 mg total) by mouth 2 (two) times a day   aspirin (ECOTRIN LOW STRENGTH) 81 mg EC tablet 4/28/2024 Self No Yes   Sig:  "Take 1 tablet (81 mg total) by mouth daily   atorvastatin (LIPITOR) 40 mg tablet 4/27/2024 Self No Yes   Sig: Take 1 tablet (40 mg total) by mouth daily with dinner   dicyclomine (BENTYL) 20 mg tablet  Self No No   Sig: Take 1 tablet (20 mg total) by mouth every 6 (six) hours   Patient taking differently: Take 20 mg by mouth in the morning   escitalopram (LEXAPRO) 5 mg tablet  Self No No   Sig: Take 1 tablet (5 mg total) by mouth daily   Patient taking differently: Take 5 mg by mouth daily Taking 1/2 tablet, twice a day   meclizine (ANTIVERT) 25 mg tablet  Self No No   Sig: Take 1 tablet (25 mg total) by mouth 3 (three) times a day as needed for dizziness      Facility-Administered Medications: None       Allergies   Allergen Reactions    Lisinopril Syncope     Other reaction(s): Other (See Comments)  Nose bleeds, passing out       Objective   Vitals:Blood pressure 140/89, pulse 70, temperature (!) 96.8 °F (36 °C), temperature source Temporal, resp. rate 18, height 5' 6\" (1.676 m), weight 67.8 kg (149 lb 7.6 oz), SpO2 93%, not currently breastfeeding.,Body mass index is 24.13 kg/m².  No intake or output data in the 24 hours ending 04/29/24 0719    Invasive Devices:   Invasive Devices       Peripheral Intravenous Line  Duration             Peripheral IV 04/28/24 Left;Proximal;Ventral (anterior) Forearm <1 day                    Physical Exam  Constitutional:       Appearance: Normal appearance.   HENT:      Head: Normocephalic and atraumatic.   Eyes:      Extraocular Movements: Extraocular movements intact and EOM normal.      Conjunctiva/sclera: Conjunctivae normal.      Pupils: Pupils are equal, round, and reactive to light.   Neck:      Comments: Right paracervical/skull base tenderness to palpation  Cardiovascular:      Rate and Rhythm: Normal rate.   Pulmonary:      Effort: Pulmonary effort is normal.   Abdominal:      General: There is no distension.   Musculoskeletal:         General: Normal range of motion. "      Cervical back: Normal range of motion and neck supple.   Skin:     General: Skin is warm and dry.   Neurological:      Mental Status: She is alert and oriented to person, place, and time.      Motor: Motor strength is normal.     Coordination: Finger-Nose-Finger Test, Heel to Shin Test and Romberg Test normal.      Gait: Gait is intact.   Psychiatric:         Speech: Speech normal.       Neurologic Exam     Mental Status   Oriented to person, place, and time.   Follows 2 step commands.   Attention: normal. Concentration: normal.   Speech: speech is normal     Cranial Nerves     CN II   Visual fields full to confrontation.     CN III, IV, VI   Pupils are equal, round, and reactive to light.  Extraocular motions are normal.     CN V   Facial sensation intact.     CN VII   Facial expression full, symmetric.     CN VIII   CN VIII normal.     CN IX, X   CN IX normal.   CN X normal.     CN XI   CN XI normal.     CN XII   CN XII normal.     Motor Exam   Muscle bulk: normal  Overall muscle tone: normal  Right arm pronator drift: absent  Left arm pronator drift: absent    Strength   Strength 5/5 throughout.     Sensory Exam   Light touch normal.     Gait, Coordination, and Reflexes     Gait  Gait: normal    Coordination   Romberg: negative  Finger to nose coordination: normal  Heel to shin coordination: normal    Tremor   Resting tremor: absent  Intention tremor: absent  Gait observed in room, no truncal instability nor lateral sway, appropriate stride, pivoting and turning with stability.  Negative Romberg.       Lab Results: CBC:   Results from last 7 days   Lab Units 04/28/24  1225   WBC Thousand/uL 6.19   RBC Million/uL 5.45*   HEMOGLOBIN g/dL 16.3*   HEMATOCRIT % 49.3*   MCV fL 91   PLATELETS Thousands/uL 241   , BMP/CMP:   Results from last 7 days   Lab Units 04/28/24  1225   SODIUM mmol/L 140   POTASSIUM mmol/L 3.9   CHLORIDE mmol/L 105   CO2 mmol/L 28   BUN mg/dL 11   CREATININE mg/dL 0.91   CALCIUM mg/dL 9.9    AST U/L 10*   ALT U/L 9   ALK PHOS U/L 94   EGFR ml/min/1.73sq m 61   HgBA1C:   Results from last 7 days   Lab Units 04/28/24  1225   HEMOGLOBIN A1C % 5.4   , TSH:   Results from last 7 days   Lab Units 04/28/24  1225   TSH 3RD GENERATON uIU/mL 0.618   , Coagulation:   , Lipid Profile:   Results from last 7 days   Lab Units 04/29/24  0434   HDL mg/dL 44*   LDL CALC mg/dL 58   TRIGLYCERIDES mg/dL 70     Imaging Studies: I have personally reviewed pertinent films in PACS  CTA head and neck with and without contrast   Final Result by Owen Parks MD (04/28 9915)      No acute intracranial abnormality.      Unchanged chronic lacunar infarct in right basal ganglia with mild-to-moderate chronic microangiopathy.      Negative CTA head and neck for large vessel occlusion, dissection, or aneurysm.      Moderate-to-severe short-segment stenosis in proximal basilar artery and left vertebral artery distal V4 segment near vertebrobasilar junction due to noncalcified atherosclerotic disease, unchanged. No new site of high-grade stenosis.      Small lobulated lesion in right paraglottic fat, unchanged since 3/17/2019, likely benign. Consider follow-up with ENT for further evaluation.      Additional chronic/incidental findings as detailed above.      The study was marked in EPIC for immediate notification.                        Workstation performed: [id number]         MRI Inpatient Order    (Results Pending)       EKG, Pathology, and Other Studies: I have personally reviewed pertinent reports.      VTE Prophylaxis: Sequential compression device (Venodyne) and Eliquis    Code Status: Level 1 - Full Code    Please see attendings attestation for total time spent/billing.  Discussed plan of care with patient and primary team: Await MRI brain, continue home Eliquis/aspirin/statin, as needed muscle relaxant for chronic cervicalgia and skull base pain.     Please note dictation software was used in the formulation of this  note.  Please keep that in mind in light of any grammatical errors.

## 2024-04-29 NOTE — CASE MANAGEMENT
Case Management Assessment & Discharge Planning Note    Patient name Ángela Ayoub  Location [facility] /E4 -* MRN [id number]  : 1945 Date 2024       Current Admission Date: 2024  Current Admission Diagnosis:Stroke-like symptoms   Patient Active Problem List    Diagnosis Date Noted    Stroke-like symptoms 2024    History of CVA (cerebrovascular accident) 2024    Carotid stenosis, asymptomatic, bilateral 2024    Aortoiliac occlusive disease (HCC) 2024    CVD (cerebrovascular disease) 2024    PAD (peripheral artery disease) ([facility]) 2024    Dizziness and giddiness, syncope 2024    SBO (small bowel obstruction) ([facility]) 2022    Postmenopausal state 2021    Increased frequency of urination 2021    Meralgia paresthetica of left side 2021    Chronic pain syndrome 2021    SI joint arthritis     Claudication of both lower extremities ([facility]) 2021    Lumbar spondylosis 10/02/2020    Tinnitus of both ears 10/02/2020    Anxiety and depression     Chronic obstructive pulmonary disease ([facility]) 2020    Irritable bowel syndrome with diarrhea 2020    Chronic abdominal pain 10/29/2019    Generalized anxiety disorder 10/29/2019    Diarrhea 2019    Pain in pelvis 2018    Tobacco abuse 2018    White coat syndrome with high blood pressure but without hypertension 2018    Cervical stenosis of spine 2018    TIA (transient ischemic attack) 2018    Aortic sclerosis 2017    CKD (chronic kidney disease) stage 3, GFR 30-59 ml/min ([facility]) 2017    Anxiety 05/10/2017    Mixed hyperlipidemia 2017    Colon polyps 2016    History of malignant neoplasm of cervix 2014    Essential hypertension 2014      LOS (days): 0  Geometric Mean LOS (GMLOS) (days):   Days to GMLOS:     OBJECTIVE:              Current admission status: Observation       Preferred Pharmacy:    [id number] - [place], [place] - [address]  [address] [address]  Phone: [phone] Fax: [phone]    Primary Care Provider: Enid Jernigan MD    Primary Insurance: MEDICARE  Secondary Insurance: AARP    ASSESSMENT:  Active Health Care Proxies       Mirna Turcios Health Care Representative - Sister   Primary Phone: [phone] (Mobile)                 Advance Directives  Does patient have a Health Care POA?: Yes  Does patient have Advance Directives?: No  Was patient offered paperwork?: Yes (Pt declined)    Obs Notice Signed: 04/29/24    Readmission Root Cause  30 Day Readmission: No    Patient Information  Admitted from:: Home  Mental Status: Alert  During Assessment patient was accompanied by: Not accompanied during assessment  Assessment information provided by:: Patient  Primary Caregiver: Self  County [facility] Residence: [facility]  What city do you live in?: [place]  Home entry access options. Select all that apply.: Stairs  Number of steps to enter home.: 4  Do the steps have railings?: Yes  Type of Current Residence: 2 story home  Upon entering residence, is there a bedroom on the main floor (no further steps)?: No  A bedroom is located on the following floor levels of residence (select all that apply):: 2nd Floor  Upon entering residence, is there a bathroom on the main floor (no further steps)?: Yes  Number of steps to 2nd floor from main floor: One Flight  Living Arrangements: Lives Alone  Is patient a ?: No    Activities of Daily Living Prior to Admission  Functional Status: Independent  Completes ADLs independently?: Yes  Ambulates independently?: Yes  Does patient use assisted devices?: No  Does patient currently own DME?: Yes  What DME does the patient currently own?: Walker, Straight Cane, Crutches  Does patient have a history of Outpatient Therapy (PT/OT)?: Yes  Does the patient have a history of Short-Term Rehab?: No  Does patient have a  history of HHC?: No  Does patient currently have HHC?: No         Patient Information Continued  Income Source: Pension/long-term  Does patient have prescription coverage?: Yes  Does patient receive dialysis treatments?: No  Does patient have a history of substance abuse?: No  Does patient have a history of Mental Health Diagnosis?: No         Means of Transportation  Means of Transport to Appts:: Drives Self      Social Determinants of Health (SDOH)      Flowsheet Row Most Recent Value   Housing Stability    In the last 12 months, was there a time when you were not able to pay the mortgage or rent on time? N   In the last 12 months, how many places have you lived? 1   In the last 12 months, was there a time when you did not have a steady place to sleep or slept in a shelter (including now)? N   Transportation Needs    In the past 12 months, has lack of transportation kept you from medical appointments or from getting medications? no   In the past 12 months, has lack of transportation kept you from meetings, work, or from getting things needed for daily living? No   Food Insecurity    Within the past 12 months, you worried that your food would run out before you got the money to buy more. Never true   Within the past 12 months, the food you bought just didn't last and you didn't have money to get more. Never true   Utilities    In the past 12 months has the electric, gas, oil, or water company threatened to shut off services in your home? No            DISCHARGE DETAILS:    Discharge planning discussed with:: Patient  Freedom of Choice: Yes     CM contacted family/caregiver?: No- see comments (Pt declined)  Were Treatment Team discharge recommendations reviewed with patient/caregiver?: Yes  Did patient/caregiver verbalize understanding of patient care needs?: Yes  Were patient/caregiver advised of the risks associated with not following Treatment Team discharge recommendations?: Yes         Requested Home Health  Care         Is the patient interested in HHC at discharge?: No    DME Referral Provided  Referral made for DME?: No         Would you like to participate in our [facility] service program?  : No - Declined    CM met with patient at bedside to introduce self and role with discharge planning.  Patient reports being fully independent PTA.  Patient lives alone in a 2 story home, she has one flight of steps to her bedroom.  Patient drives and reports that a friend will be able to transport her home at time of discharge. Patient does not utilize any DME but owns a RW, SPC, and crutches.  Patient does not identify any CM needs at this time.  CM department remains available for any discharge needs.

## 2024-04-29 NOTE — TELEPHONE ENCOUNTER
Placed call to let her know that when she gets forms for Eliquis, she should give them to neurology since they manage her Eliquis. She verbalized understanding. She states she also is cancelling loop implant and will get the 2 week zio monitor.

## 2024-04-29 NOTE — TELEPHONE ENCOUNTER
Caller: Ángela    Doctor: Suma    Reason for call: Pt would like to cancel her appt for her loop recorder implant. She states she would like to pursue other options instead.    Call back#: [phone]

## 2024-04-29 NOTE — ASSESSMENT & PLAN NOTE
Patient is reducing smoking  Continue nicotine patch while admitted  Encourage smoking cessation and efforts to continue to reduce/quit

## 2024-04-29 NOTE — PLAN OF CARE

## 2024-04-29 NOTE — TELEPHONE ENCOUNTER
"Forward to  Kristal Akers @ [place] to process patient's request.     Thanks,  Marti \"Jazmin\" Wellington    "

## 2024-04-29 NOTE — ASSESSMENT & PLAN NOTE
Previous history of CVA  Has had vasovagal syncopal events.  She has remained on aspirin as well as Eliquis given history of vascular disease  Continue statin   Neurology consulted as above

## 2024-04-29 NOTE — ASSESSMENT & PLAN NOTE
Arterial duplex 4/17   Seen by vascular 3/6/24 during that admission with chronic L EIA occlusion   Also noted to have mild carotid stenosis   CTA head/neck: negative for large vessel occlusion, dissection, aneurysm; moderate to severe short segment stenosis in proximal basilar artery and left vertebral artery no new high grade stenosis   Continue aspirin, Eliquis, statin

## 2024-04-29 NOTE — OCCUPATIONAL THERAPY NOTE
Occupational Therapy Evaluation     Patient Name: Ángela Ayoub  Today's Date: 4/29/2024  Problem List  Principal Problem:    Stroke-like symptoms  Active Problems:    Tobacco abuse    Chronic obstructive pulmonary disease (HCC)    Anxiety    Aortoiliac occlusive disease (HCC)    History of CVA (cerebrovascular accident)    Abnormal CT of the head    Past Medical History  Past Medical History:   Diagnosis Date    Anxiety     Atypical squamous cells cannot exclude high grade squamous intraepithelial lesion on cytologic smear of cervix (ASC-H)     Last Assessed 5/13/2014    Back pain     Cancer (HCC)     Cervical cancer (HCC)     Depression     Ovarian cyst     Tinnitus of both ears 10/2/2020    Tobacco abuse 11/20/2018     Past Surgical History  Past Surgical History:   Procedure Laterality Date    ANKLE FRACTURE SURGERY      Last Assessed 07/26/2016    ANKLE SURGERY      CERVICAL BIOPSY  W/ LOOP ELECTRODE EXCISION      COLONOSCOPY  10/2019    DIAGNOSTIC LAPAROSCOPY      DILATION AND CURETTAGE OF UTERUS      ESOPHAGOGASTRODUODENOSCOPY N/A 9/12/2016    Procedure: ESOPHAGOGASTRODUODENOSCOPY (EGD);  Surgeon: Alvin Powell MD;  Location: [facility];  Service:     FRACTURE SURGERY      ORTHOPEDIC SURGERY      VA COLONOSCOPY FLX DX W/COLLJ SPEC WHEN PFRMD N/A 9/12/2016    Procedure: FLEXIBLE COLONOSCOPY;  Surgeon: Alvin Powell MD;  Location: [facility];  Service: Colorectal    SALPINGOOPHORECTOMY Left     TONSILLECTOMY      UPPER GASTROINTESTINAL ENDOSCOPY  2016 04/29/24 1120   Note Type   Note type Evaluation   Pain Assessment   Pain Assessment Tool 0-10   Pain Score No Pain   Restrictions/Precautions   Weight Bearing Precautions Per Order No   Other Precautions Fall Risk   Home Living   Type of Home House   Home Layout Two level   Bathroom Shower/Tub Walk-in shower   Bathroom Toilet Standard   Home Equipment Walker;Cane;Crutches   Prior Function   Level of [place] Independent with  "ADLs;Independent with functional mobility   Lives With Alone   Falls in the last 6 months 1 to 4  (1)   Lifestyle   Autonomy PTA pt states independence with all aspects of her ADLs, transfers, ambulation--w/o device; +falls=1   Reciprocal Relationships limited family support   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   Intrinsic Gratification 3 dogs   Subjective   Subjective \"I use to work like a man, but now my body won't let me.\"   ADL   Where Assessed Edge of bed   Eating Assistance 6  Modified independent   Grooming Assistance 6  Modified Independent   UB Bathing Assistance 6  Modified Independent   LB Bathing Assistance 6  Modified Independent   UB Dressing Assistance 6  Modified independent   LB Dressing Assistance 6  Modified independent   Toileting Assistance  6  Modified independent   Bed Mobility   Rolling R 7  Independent   Rolling L 7  Independent   Supine to Sit 7  Independent   Sit to Supine 7  Independent   Transfers   Sit to Stand 7  Independent   Stand to Sit 7  Independent   Functional Mobility   Functional Mobility 7  Independent   Additional items   (w/o device)   Balance   Static Sitting Normal   Dynamic Sitting Good   Static Standing Good   Dynamic Standing Fair +   Activity Tolerance   Activity Tolerance Patient tolerated treatment well   Medical Staff Made Aware nsg, P.T.   RUE Assessment   RUE Assessment WFL   RUE Strength   RUE Overall Strength Within Functional Limits - able to perform ADL tasks with strength  (4+/5 throughout)   LUE Assessment   LUE Assessment WFL   LUE Strength   LUE Overall Strength Within Functional Limits - able to perform ADL tasks with strength  (4+/5 throughout)   Hand Function   Gross Motor Coordination Functional   Fine Motor Coordination Functional   Sensation   Light Touch No apparent deficits   Proprioception   Proprioception No apparent deficits   Vision-Basic Assessment   Current Vision   (no glasses)   Vision - Complex Assessment   Acuity Able to read " "clock/calendar on wall without difficulty   Psychosocial   Psychosocial (WDL) X   Patient Behaviors/Mood Flat affect;Cooperative   Perception   Inattention/Neglect Appears intact   Cognition   Overall Cognitive Status WFL   Arousal/Participation Alert   Attention Within functional limits   Orientation Level Oriented X4   Memory Within functional limits   Following Commands Follows all commands and directions without difficulty   Assessment   Limitation Decreased endurance;Decreased high-level ADLs   Prognosis Good   Assessment Pt is a 79y/o female admitted to the hospital 2* symptoms of blurred vision; MRI(c-spine)=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 Pt with PMH CVA, anxiety, back pain, cervical Ca, and recent hospitalization(3/6, 3/9) 2* weakness/dizziness. PTA pt states independence with all aspects of her ADLs, transfers, ambulation--w/o device; +falls=1. During initial eval, pt demonstrated slight deficits with her functional balance, activity tolerance. and b/l UE strength. Pt was able to demonstrate good ADL status and states no concerns about going directly home. Pt would benefit from a restorative program on the unit to improve their above stated deficits. Acute OT tx not indicated at this time 2* limited functional deficits. The patient's raw score on the AM-PAC Daily Activity Inpatient Short Form is 24. A raw score of greater than or equal to 19 suggests the patient may benefit from discharge to home. Please refer to the recommendation of the Occupational Therapist for safe discharge planning.   Goals   Patient Goals \"to get better\"   Plan   OT Frequency Eval only   Discharge Recommendation   Rehab Resource Intensity Level, OT No post-acute rehabilitation needs   AM-PAC Daily Activity Inpatient   Lower Body Dressing 4   Bathing 4   Toileting 4   Upper Body Dressing 4   Grooming 4   Eating 4   Daily Activity Raw Score 24 "   Daily Activity Standardized Score (Calc for Raw Score >=11) 57.54   AM-PAC Applied Cognition Inpatient   Following a Speech/Presentation 4   Understanding Ordinary Conversation 4   Taking Medications 4   Remembering Where Things Are Placed or Put Away 4   Remembering List of 4-5 Errands 4   Taking Care of Complicated Tasks 4   Applied Cognition Raw Score 24   Applied Cognition Standardized Score 62.21   Krishna Yoder

## 2024-04-29 NOTE — PROGRESS NOTES
[facility]  Progress Note  Name: Ángela Ayoub I  MRN: [id number]  Unit/Bed#: E4 -01 I Date of Admission: 4/28/2024   Date of Service: 4/29/2024 I Hospital Day: 0    Assessment/Plan   * Stroke-like symptoms  Assessment & Plan  Patient presenting with strokelike symptoms, hx of CVA and vascular disease maintained on Eliquis, aspirin, statin   Neurology consulted   MRI brain and echo pending   Pending outpatient loop recorder implantation  Tele is unremarkable thus far   Follow up neurology recs   PT/OT     Abnormal CT of the head  Assessment & Plan  Small lobulated lesion in right paraglottic fat, unchanged since 3/17/2019, likely benign. Consider follow-up with ENT for further evaluation   Incidental note completed on admission     History of CVA (cerebrovascular accident)  Assessment & Plan  Previous history of CVA  Has had vasovagal syncopal events.  She has remained on aspirin as well as Eliquis given history of vascular disease  Continue statin   Neurology consulted as above      Aortoiliac occlusive disease (HCC)  Assessment & Plan  Arterial duplex 4/17   Seen by vascular 3/6/24 during that admission with chronic L EIA occlusion   Also noted to have mild carotid stenosis   CTA head/neck: negative for large vessel occlusion, dissection, aneurysm; moderate to severe short segment stenosis in proximal basilar artery and left vertebral artery no new high grade stenosis   Continue aspirin, Eliquis, statin     Anxiety  Assessment & Plan  Continue Xanax prn qHS   Ativan ordered x once 30 minutes prior to MRI     Chronic obstructive pulmonary disease (HCC)  Assessment & Plan  No evidence of exacerbation  Not on inhaler therapy at baseline     Tobacco abuse  Assessment & Plan  Patient is reducing smoking  Continue nicotine patch while admitted  Encourage smoking cessation and efforts to continue to reduce/quit              VTE Pharmacologic Prophylaxis: VTE Score: 4 Moderate  Risk (Score 3-4) - Pharmacological DVT Prophylaxis Ordered: apixaban (Eliquis).    Mobility:   JH-HLM Achieved: 1: Laying in bed  JH-HLM Goal achieved. Continue to encourage appropriate mobility.    Patient Centered Rounds: I performed bedside rounds with nursing staff today.   Discussions with Specialists or Other Care Team Provider: will review neuro recs     Education and Discussions with Family / Patient: Patient declined call to .     Total Time Spent on Date of Encounter in care of patient:  mins. This time was spent on one or more of the following: performing physical exam; counseling and coordination of care; obtaining or reviewing history; documenting in the medical record; reviewing/ordering tests, medications or procedures; communicating with other healthcare professionals and discussing with patient's family/caregivers.    Current Length of Stay: 0 day(s)  Current Patient Status: Observation   Certification Statement: The patient will continue to require additional inpatient hospital stay due to stroke like symptoms   Discharge Plan: Anticipate discharge in 24-48 hrs to discharge location to be determined pending rehab evaluations.    Code Status: Level 1 - Full Code    Subjective:   Doing well. States she still has blurry vision but better than yesterday. Se had lightheadedness yesterday none currently. No headache, loss of vision, speech abnormality, chest pain, SOB, numbness/tingling, weakness.     Objective:     Vitals:   Temp (24hrs), Av.8 °F (36.6 °C), Min:96.8 °F (36 °C), Max:98.2 °F (36.8 °C)    Temp:  [96.8 °F (36 °C)-98.2 °F (36.8 °C)] 97.5 °F (36.4 °C)  HR:  [61-74] 64  Resp:  [16-20] 16  BP: (127-177)/() 154/104  SpO2:  [93 %-100 %] 93 %  Body mass index is 24.05 kg/m².     Input and Output Summary (last 24 hours):   No intake or output data in the 24 hours ending 24 1113    Physical Exam:   Physical Exam  Vitals and nursing note reviewed.   Constitutional:        General: She is not in acute distress.     Appearance: She is not toxic-appearing.   Cardiovascular:      Rate and Rhythm: Normal rate and regular rhythm.   Pulmonary:      Effort: Pulmonary effort is normal. No respiratory distress.      Breath sounds: Normal breath sounds.   Abdominal:      General: Bowel sounds are normal.      Palpations: Abdomen is soft.   Musculoskeletal:      Right lower leg: No edema.      Left lower leg: No edema.   Neurological:      Mental Status: She is alert and oriented to person, place, and time. Mental status is at baseline.      Comments: Moving all extremities , speech clear, reports blurry vision in both eyes, no loss of vision, no other focal deficits           Additional Data:     Labs:  Results from last 7 days   Lab Units 04/28/24  1225   WBC Thousand/uL 6.19   HEMOGLOBIN g/dL 16.3*   HEMATOCRIT % 49.3*   PLATELETS Thousands/uL 241   SEGS PCT % 77*   LYMPHO PCT % 14   MONO PCT % 6   EOS PCT % 2     Results from last 7 days   Lab Units 04/28/24  1225   SODIUM mmol/L 140   POTASSIUM mmol/L 3.9   CHLORIDE mmol/L 105   CO2 mmol/L 28   BUN mg/dL 11   CREATININE mg/dL 0.91   ANION GAP mmol/L 7   CALCIUM mg/dL 9.9   ALBUMIN g/dL 4.0   TOTAL BILIRUBIN mg/dL 0.64   ALK PHOS U/L 94   ALT U/L 9   AST U/L 10*   GLUCOSE RANDOM mg/dL 83             Results from last 7 days   Lab Units 04/28/24  1225   HEMOGLOBIN A1C % 5.4           Lines/Drains:  Invasive Devices       Peripheral Intravenous Line  Duration             Peripheral IV 04/28/24 Left;Proximal;Ventral (anterior) Forearm <1 day                      Telemetry:  Telemetry Orders (From admission, onward)               24 Hour Telemetry Monitoring  Continuous x 24 Hours (Telem)        Question:  Reason for 24 Hour Telemetry  Answer:  Arrhythmias requiring acute medical intervention / PPM or ICD malfunction                     Telemetry Reviewed: Normal Sinus Rhythm  Indication for Continued Telemetry Use: Acute CVA              Imaging: Reviewed radiology reports from this admission including: CT head    Recent Cultures (last 7 days):         Last 24 Hours Medication List:   Current Facility-Administered Medications   Medication Dose Route Frequency Provider Last Rate    acetaminophen  650 mg Oral Q4H PRN Diego Hensley, DO      ALPRAZolam  0.5 mg Oral HS PRN Diego Hensley, DO      amLODIPine  5 mg Oral Daily Diego Hensley, DO      apixaban  5 mg Oral BID Diego Hensley, DO      aspirin  81 mg Oral Daily Diego Hensley, DO      atorvastatin  40 mg Oral QPM Diego Hensley, DO      dicyclomine  20 mg Oral Daily Diego Hensley, DO      docusate sodium  100 mg Oral BID Diego Hensley, DO      escitalopram  5 mg Oral Daily Diego Hensley, DO      LORazepam  0.5 mg Intravenous Once PRN Kristin Acosta PA-C      meclizine  25 mg Oral TID PRN Diego Hensley, DO      nicotine  1 patch Transdermal Daily Diego Hensley, DO      ondansetron  4 mg Intravenous Q6H PRN Diego Hensley, DO      polyethylene glycol  17 g Oral Daily Diego Hensley, DO          Today, Patient Was Seen By: Kristin Acosta PA-C    **Please Note: This note may have been constructed using a voice recognition system.**

## 2024-04-29 NOTE — RESTORATIVE TECHNICIAN NOTE
Restorative Technician Note      Patient Name: Ángela KoJohanatorsten     Restorative Tech Visit Date: 04/29/24  Note Type: Mobility  Patient Position Upon Consult: Supine  Activity Performed: Ambulated  Assistive Device: Other (Comment) (none)  Patient Position at End of Consult: Bedside chair; All needs within reach

## 2024-04-29 NOTE — PHYSICAL THERAPY NOTE
Physical Therapy Screen    Patient Name: Ángela Ayoub    Today's Date: 4/29/2024     Problem List  Principal Problem:    Stroke-like symptoms  Active Problems:    Tobacco abuse    Chronic obstructive pulmonary disease (HCC)    Anxiety    Aortoiliac occlusive disease (HCC)    History of CVA (cerebrovascular accident)    Abnormal CT of the head       Past Medical History  Past Medical History:   Diagnosis Date    Anxiety     Atypical squamous cells cannot exclude high grade squamous intraepithelial lesion on cytologic smear of cervix (ASC-H)     Last Assessed 5/13/2014    Back pain     Cancer (HCC)     Cervical cancer (HCC)     Depression     Ovarian cyst     Tinnitus of both ears 10/2/2020    Tobacco abuse 11/20/2018        Past Surgical History  Past Surgical History:   Procedure Laterality Date    ANKLE FRACTURE SURGERY      Last Assessed 07/26/2016    ANKLE SURGERY      CERVICAL BIOPSY  W/ LOOP ELECTRODE EXCISION      COLONOSCOPY  10/2019    DIAGNOSTIC LAPAROSCOPY      DILATION AND CURETTAGE OF UTERUS      ESOPHAGOGASTRODUODENOSCOPY N/A 9/12/2016    Procedure: ESOPHAGOGASTRODUODENOSCOPY (EGD);  Surgeon: Alvin Powell MD;  Location: [facility];  Service:     FRACTURE SURGERY      ORTHOPEDIC SURGERY      VT COLONOSCOPY FLX DX W/COLLJ SPEC WHEN PFRMD N/A 9/12/2016    Procedure: FLEXIBLE COLONOSCOPY;  Surgeon: Alvin Powell MD;  Location: [facility];  Service: Colorectal    SALPINGOOPHORECTOMY Left     TONSILLECTOMY      UPPER GASTROINTESTINAL ENDOSCOPY  2016 04/1947   PT Last Visit   PT Visit Date 04/29/24   Note Type   Note type Screen   Additional Comments PT consult received. Chart reviewed. Pt admitted for stroke-like symptoms. Per RN & OT, pt functioning at independent level. Please see OT eval for details. PT SCREEN completed. No skilled IPPT indicated at this time. Will D/C PT. Will maintain pt on restorative amb to  prevent decline in function. Please advise PT when needs change.     Lb Artis

## 2024-04-29 NOTE — APP STUDENT NOTE
[facility] STUDENT  Inpatient Progress Note for TRAINING ONLY  Not Part of Legal Medical Record       Progress Note - Ángela Ayoub 78 y.o. female MRN: [id number]    Unit/Bed#: E4 -01 Encounter: [id number]      Assessment/Plan:  Stroke-like symptoms   Patient with a Hx of CVA and vascular disease maintained on eliquis, aspirin, statin presents with stroke-like symptoms including blurred vision, shaking, head/neck pain, and dizziness  Neuro consulted- recommend getting MRI, echo, telemetry monitoring, continuing home stroke preventative regimen, and muscle relaxant prn for cervicalgia   CTA head/neck- shows no acute intracranial abnormalities  MRI brain pending   Echo pending   PT and OT consult    Abnormal CT of Head  Small lobulated lesion in right paraglottic fat -- unchanged since 3/17/2019, likely benign  Follow up with ENT outpatient  Incidental finding note completed on admission     Hx of CVA  Hx of CVA and vasovagal syncopal events   Continue aspirin and eliquis given vascular disease   Continue statin   Neuro consulted- recommend continuing home stroke prevention regimen    Aortoiliac occlusive disease   Seen by vascular on 3/6/24 for chronic L EIA occlusion and noted to have mild carotid stenosis  Arterial duplex on 4/17  CTA head/neck- shows no acute intracranial abnormalities, negative for large vessel occlusion, dissection, aneurysm; moderate to severe short segment stenosis in the proximal basilar artery and left vertebral artery; no new high grade stenosis   Continue aspirin, eliquis, statin     Anxiety   Continue xanax prn qHS  Ativan ordered once for MRI     COPD  Stable, No evidence of exacerbation     Tobacco abuse  Encourage smoking cessation  Nicotine patches prn       Subjective:   Patient is laying in bed comfortably. She feels much better today and her vision is much better by only being slightly blurry. She has a headache but its similar to her baseline headache that she gets  "frequently. She is no longer shaking or dizzy and has no complaints. Denies SOB, chest pain, palpitations, leg pain.     Objective:     Vitals: Blood pressure (!) 154/104, pulse 64, temperature 97.5 °F (36.4 °C), temperature source Temporal, resp. rate 16, height 5' 6\" (1.676 m), weight 67.6 kg (149 lb), SpO2 93%, not currently breastfeeding.,Body mass index is 24.05 kg/m².    No intake or output data in the 24 hours ending 04/29/24 1213    Physical Exam  Vitals reviewed.   Constitutional:       General: She is not in acute distress.     Appearance: She is not toxic-appearing.   HENT:      Head: Normocephalic.   Eyes:      Extraocular Movements: Extraocular movements intact.   Cardiovascular:      Rate and Rhythm: Normal rate and regular rhythm.      Heart sounds: Normal heart sounds. No murmur heard.  Pulmonary:      Effort: Pulmonary effort is normal. No respiratory distress.      Breath sounds: Normal breath sounds. No wheezing, rhonchi or rales.   Abdominal:      General: Bowel sounds are normal.      Palpations: Abdomen is soft.      Tenderness: There is no abdominal tenderness.   Musculoskeletal:         General: No tenderness. Normal range of motion.      Cervical back: Normal range of motion.   Skin:     General: Skin is warm.   Neurological:      General: No focal deficit present.      Mental Status: She is alert.      Sensory: Sensation is intact.      Motor: Motor function is intact.      Comments: Sensation equally intact on both sides  Strength and movement equally intact on both sides   Speech normal     Psychiatric:         Mood and Affect: Mood normal.         Behavior: Behavior normal.          Invasive Devices       Peripheral Intravenous Line  Duration             Peripheral IV 04/28/24 Left;Proximal;Ventral (anterior) Forearm 1 day                    Lab, Imaging and other studies: I have personally reviewed pertinent reports.    VTE Pharmacologic Prophylaxis: apixaban (Eliquis)  VTE Mechanical " Prophylaxis: sequential compression device

## 2024-04-29 NOTE — SPEECH THERAPY NOTE
Consult received and chart reviewed. Per chart review, and discussion with RN, pt passed RN dysphagia assessment and is tolerating regular diet with thin liquids with no s/s of aspiration. No dysarthria or language deficits noted or reported. Therefore, formal speech/swallow evaluation not indicated at this time. If new concerns arise, please reconsult.       Sharyn Luu M.S., CCC-SLP  Speech Language Pathologist   Available via "[employer]"  [place] #[id number]  [place] #[id number]

## 2024-04-29 NOTE — ASSESSMENT & PLAN NOTE
Small lobulated lesion in right paraglottic fat, unchanged since 3/17/2019, likely benign. Consider follow-up with ENT for further evaluation   Incidental note completed on admission

## 2024-04-30 ENCOUNTER — TELEPHONE (OUTPATIENT)
Age: 79
End: 2024-04-30

## 2024-04-30 ENCOUNTER — TELEPHONE (OUTPATIENT)
Dept: CARDIOLOGY CLINIC | Facility: CLINIC | Age: 79
End: 2024-04-30

## 2024-04-30 ENCOUNTER — TRANSITIONAL CARE MANAGEMENT (OUTPATIENT)
Dept: FAMILY MEDICINE CLINIC | Facility: CLINIC | Age: 79
End: 2024-04-30

## 2024-04-30 VITALS
DIASTOLIC BLOOD PRESSURE: 78 MMHG | HEIGHT: 66 IN | OXYGEN SATURATION: 94 % | WEIGHT: 143.3 LBS | RESPIRATION RATE: 18 BRPM | HEART RATE: 56 BPM | BODY MASS INDEX: 23.03 KG/M2 | SYSTOLIC BLOOD PRESSURE: 140 MMHG | TEMPERATURE: 97.6 F

## 2024-04-30 PROBLEM — R29.90 STROKE-LIKE SYMPTOMS: Status: RESOLVED | Noted: 2024-04-28 | Resolved: 2024-04-30

## 2024-04-30 PROCEDURE — 99239 HOSP IP/OBS DSCHRG MGMT >30: CPT | Performed by: PHYSICIAN ASSISTANT

## 2024-04-30 RX ORDER — CYCLOBENZAPRINE HCL 5 MG
5 TABLET ORAL
Qty: 20 TABLET | Refills: 0 | Status: SHIPPED | OUTPATIENT
Start: 2024-04-30

## 2024-04-30 RX ADMIN — DICYCLOMINE HYDROCHLORIDE 20 MG: 20 TABLET ORAL at 08:38

## 2024-04-30 RX ADMIN — ESCITALOPRAM 5 MG: 5 TABLET, FILM COATED ORAL at 08:38

## 2024-04-30 RX ADMIN — APIXABAN 5 MG: 5 TABLET, FILM COATED ORAL at 08:38

## 2024-04-30 RX ADMIN — ASPIRIN 81 MG: 81 TABLET, COATED ORAL at 08:38

## 2024-04-30 RX ADMIN — AMLODIPINE BESYLATE 5 MG: 5 TABLET ORAL at 08:38

## 2024-04-30 NOTE — PLAN OF CARE
Problem: Potential for Falls  Goal: Patient will remain free of falls  Description: INTERVENTIONS:  - Educate patient/family on patient safety including physical limitations  - Instruct patient to call for assistance with activity   - Consult OT/PT to assist with strengthening/mobility   - Keep Call bell within reach  - Keep bed low and locked with side rails adjusted as appropriate  - Keep care items and personal belongings within reach  - Initiate and maintain comfort rounds  - Make Fall Risk Sign visible to staff  - Offer Toileting every 3 Hours, in advance of need  - Initiate/Maintain bed alarm  - Obtain necessary fall risk management equipment:   - Apply yellow socks and bracelet for high fall risk patients  - Consider moving patient to room near nurses station  4/30/2024 0948 by Ghazal Staley  Outcome: Adequate for Discharge  4/30/2024 0935 by Ghazal Staley  Outcome: Progressing     Problem: PAIN - ADULT  Goal: Verbalizes/displays adequate comfort level or baseline comfort level  Description: Interventions:  - Encourage patient to monitor pain and request assistance  - Assess pain using appropriate pain scale  - Administer analgesics based on type and severity of pain and evaluate response  - Implement non-pharmacological measures as appropriate and evaluate response  - Consider cultural and social influences on pain and pain management  - Notify physician/advanced practitioner if interventions unsuccessful or patient reports new pain  4/30/2024 0948 by Ghazal Staley  Outcome: Adequate for Discharge  4/30/2024 0935 by Ghazal Staley  Outcome: Progressing     Problem: INFECTION - ADULT  Goal: Absence or prevention of progression during hospitalization  Description: INTERVENTIONS:  - Assess and monitor for signs and symptoms of infection  - Monitor lab/diagnostic results  - Monitor all insertion sites, i.e. indwelling lines, tubes, and drains  - Monitor endotracheal if appropriate and nasal secretions  for changes in amount and color  - [place] appropriate cooling/warming therapies per order  - Administer medications as ordered  - Instruct and encourage patient and family to use good hand hygiene technique  - Identify and instruct in appropriate isolation precautions for identified infection/condition  4/30/2024 0948 by Ghazal Staley  Outcome: Adequate for Discharge  4/30/2024 0935 by Ghazal Staley  Outcome: Progressing  Goal: Absence of fever/infection during neutropenic period  Description: INTERVENTIONS:  - Monitor WBC    4/30/2024 0948 by Ghazal Staley  Outcome: Adequate for Discharge  4/30/2024 0935 by Ghazal Staley  Outcome: Progressing     Problem: SAFETY ADULT  Goal: Patient will remain free of falls  Description: INTERVENTIONS:  - Educate patient/family on patient safety including physical limitations  - Instruct patient to call for assistance with activity   - Consult OT/PT to assist with strengthening/mobility   - Keep Call bell within reach  - Keep bed low and locked with side rails adjusted as appropriate  - Keep care items and personal belongings within reach  - Initiate and maintain comfort rounds  - Make Fall Risk Sign visible to staff  - Offer Toileting every 3 Hours, in advance of need  - Initiate/Maintain bed alarm  - Obtain necessary fall risk management equipment:   - Apply yellow socks and bracelet for high fall risk patients  - Consider moving patient to room near nurses station  4/30/2024 0948 by Ghazal Staley  Outcome: Adequate for Discharge  4/30/2024 0935 by Ghazal Staley  Outcome: Progressing  Goal: Maintain or return to baseline ADL function  Description: INTERVENTIONS:  -  Assess patient's ability to carry out ADLs; assess patient's baseline for ADL function and identify physical deficits which impact ability to perform ADLs (bathing, care of mouth/teeth, toileting, grooming, dressing, etc.)  - Assess/evaluate cause of self-care deficits   - Assess range of motion  -  Assess patient's mobility; develop plan if impaired  - Assess patient's need for assistive devices and provide as appropriate  - Encourage maximum independence but intervene and supervise when necessary  - Involve family in performance of ADLs  - Assess for home care needs following discharge   - Consider OT consult to assist with ADL evaluation and planning for discharge  - Provide patient education as appropriate  4/30/2024 0948 by Ghazal Staley  Outcome: Adequate for Discharge  4/30/2024 0935 by Ghazal Staley  Outcome: Progressing  Goal: Maintains/Returns to pre admission functional level  Description: INTERVENTIONS:  - Perform AM-PAC 6 Click Basic Mobility/ Daily Activity assessment daily.  - Set and communicate daily mobility goal to care team and patient/family/caregiver.   - Collaborate with rehabilitation services on mobility goals if consulted  - Perform Range of Motion 3 times a day.  - Reposition patient every 2 hours.  - Dangle patient 3 times a day  - Stand patient 3 times a day  - Ambulate patient 3 times a day  - Out of bed to chair 3 times a day   - Out of bed for meals 3 times a day  - Out of bed for toileting  - Record patient progress and toleration of activity level   4/30/2024 0948 by Ghazal Staley  Outcome: Adequate for Discharge  4/30/2024 0935 by Ghazal Staley  Outcome: Progressing     Problem: DISCHARGE PLANNING  Goal: Discharge to home or other facility with appropriate resources  Description: INTERVENTIONS:  - Identify barriers to discharge w/patient and caregiver  - Arrange for needed discharge resources and transportation as appropriate  - Identify discharge learning needs (meds, wound care, etc.)  - Arrange for interpretive services to assist at discharge as needed  - Refer to Case Management Department for coordinating discharge planning if the patient needs post-hospital services based on physician/advanced practitioner order or complex needs related to functional status,  cognitive ability, or social support system  4/30/2024 0948 by Ghazal Staley  Outcome: Adequate for Discharge  4/30/2024 0935 by Ghazal Staley  Outcome: Progressing     Problem: Knowledge Deficit  Goal: Patient/family/caregiver demonstrates understanding of disease process, treatment plan, medications, and discharge instructions  Description: Complete learning assessment and assess knowledge base.  Interventions:  - Provide teaching at level of understanding  - Provide teaching via preferred learning methods  4/30/2024 0948 by Ghazal Staley  Outcome: Adequate for Discharge  4/30/2024 0935 by Ghazal Staley  Outcome: Progressing

## 2024-04-30 NOTE — ASSESSMENT & PLAN NOTE
Patient presenting with strokelike symptoms with lightheadedness and blurry vision, hx of CVA and vascular disease maintained on Eliquis, aspirin, statin   Neurology consulted   MRI brain no evidence of acute stroke , stable chronic right gangliocapsular infarct and asymmetrically also along the right corona radiata and chronic left cerebellar lacunar infarct   Symptoms likely more related to cervicalgia and chronic vestibulopathy   Initating on baclofen 10 mg po qHS per neurology recs   Had and MRI cervical spine outpt 4/11 and needs outpt follow up with neurosurgery   Pending outpatient loop recorder implantation  Outpt PT

## 2024-04-30 NOTE — TELEPHONE ENCOUNTER
04/30/24 @ 10:20am, pt called stating she wanted to cancel ilr implant scheduled for 5/13. Pt said she is having 14 day monitor instead.~ch

## 2024-04-30 NOTE — CASE MANAGEMENT
Case Management Discharge Planning Note    Patient name Ángela Ayoub  [facility] -* MRN [id number]  : 1945 Date 2024       Current Admission Date: 2024  Current Admission Diagnosis:Anxiety   Patient Active Problem List    Diagnosis Date Noted    Abnormal CT of the head 2024    History of CVA (cerebrovascular accident) 2024    Carotid stenosis, asymptomatic, bilateral 2024    Aortoiliac occlusive disease (HCC) 2024    CVD (cerebrovascular disease) 2024    PAD (peripheral artery disease) ([facility]) 2024    Dizziness and giddiness, syncope 2024    SBO (small bowel obstruction) ([facility]) 2022    Postmenopausal state 2021    Increased frequency of urination 2021    Meralgia paresthetica of left side 2021    Chronic pain syndrome 2021    SI joint arthritis     Claudication of both lower extremities ([facility]) 2021    Lumbar spondylosis 10/02/2020    Tinnitus of both ears 10/02/2020    Anxiety and depression     Chronic obstructive pulmonary disease ([facility]) 2020    Irritable bowel syndrome with diarrhea 2020    Chronic abdominal pain 10/29/2019    Generalized anxiety disorder 10/29/2019    Diarrhea 2019    Pain in pelvis 2018    Tobacco abuse 2018    White coat syndrome with high blood pressure but without hypertension 2018    Cervical stenosis of spine 2018    TIA (transient ischemic attack) 2018    Aortic sclerosis 2017    CKD (chronic kidney disease) stage 3, GFR 30-59 ml/min ([facility]) 2017    Anxiety 05/10/2017    Mixed hyperlipidemia 2017    Colon polyps 2016    History of malignant neoplasm of cervix 2014    Essential hypertension 2014      LOS (days): 1  Geometric Mean LOS (GMLOS) (days):   Days to GMLOS:     OBJECTIVE:  Risk of Unplanned Readmission Score: 14.5         Current admission status: Inpatient   Preferred  Pharmacy:   [id number] - [place], [place] - [address] [facility]  [address] [place] [id number]  Phone: [phone] Fax: [phone]    Primary Care Provider: Enid Jernigan MD    Primary Insurance: MEDICARE  Secondary Insurance: [facility]    DISCHARGE DETAILS:                        IMM Given (Date):: 04/30/24  IMM Given to:: Patient        IMM reviewed with patient, patient agrees with discharge determination.    CM met with patient at bedside to review IMM as patient is likely being discharged today.  Patient does not identify any CM needs at this time.  CM department remains available for any discharge needs.

## 2024-04-30 NOTE — PLAN OF CARE

## 2024-04-30 NOTE — TELEPHONE ENCOUNTER
Caller: Patient     Doctor: Dr. Moise    Reason for call: PT called to cancel her loop implant procedure    Call back#: [phone]

## 2024-04-30 NOTE — TELEPHONE ENCOUNTER
Pt returning callback to schedule tcm appt. Luana transferred pt to Emilia in the office to schedule.

## 2024-04-30 NOTE — PLAN OF CARE

## 2024-04-30 NOTE — NURSING NOTE
IV removed. AVS and tobacco cessation materials given to and reviewed with patient. Questions addressed. Patient discharged.

## 2024-04-30 NOTE — ASSESSMENT & PLAN NOTE
Previous history of CVA  She has remained on aspirin as well as Eliquis given history of vascular disease  Continue statin

## 2024-05-03 DIAGNOSIS — I10 ESSENTIAL HYPERTENSION: Primary | ICD-10-CM

## 2024-05-04 RX ORDER — AMLODIPINE BESYLATE 5 MG/1
5 TABLET ORAL DAILY
Qty: 30 TABLET | Refills: 5 | Status: SHIPPED | OUTPATIENT
Start: 2024-05-04

## 2024-05-06 DIAGNOSIS — F33.9 DEPRESSION, RECURRENT (HCC): ICD-10-CM

## 2024-05-06 DIAGNOSIS — F41.1 GENERALIZED ANXIETY DISORDER: ICD-10-CM

## 2024-05-07 RX ORDER — ESCITALOPRAM OXALATE 5 MG/1
5 TABLET ORAL DAILY
Qty: 30 TABLET | Refills: 2 | Status: SHIPPED | OUTPATIENT
Start: 2024-05-07

## 2024-05-09 ENCOUNTER — OFFICE VISIT (OUTPATIENT)
Dept: FAMILY MEDICINE CLINIC | Facility: CLINIC | Age: 79
End: 2024-05-09
Payer: MEDICARE

## 2024-05-09 VITALS
OXYGEN SATURATION: 96 % | HEART RATE: 71 BPM | DIASTOLIC BLOOD PRESSURE: 84 MMHG | WEIGHT: 147.4 LBS | SYSTOLIC BLOOD PRESSURE: 148 MMHG | BODY MASS INDEX: 23.69 KG/M2 | HEIGHT: 66 IN | TEMPERATURE: 97.6 F

## 2024-05-09 DIAGNOSIS — M48.02 CERVICAL STENOSIS OF SPINE: ICD-10-CM

## 2024-05-09 DIAGNOSIS — G89.4 CHRONIC PAIN SYNDROME: ICD-10-CM

## 2024-05-09 DIAGNOSIS — K58.0 IRRITABLE BOWEL SYNDROME WITH DIARRHEA: ICD-10-CM

## 2024-05-09 DIAGNOSIS — I10 ESSENTIAL HYPERTENSION: ICD-10-CM

## 2024-05-09 DIAGNOSIS — F41.1 GENERALIZED ANXIETY DISORDER: ICD-10-CM

## 2024-05-09 DIAGNOSIS — G45.9 TIA (TRANSIENT ISCHEMIC ATTACK): Primary | ICD-10-CM

## 2024-05-09 DIAGNOSIS — I65.23 CAROTID STENOSIS, ASYMPTOMATIC, BILATERAL: ICD-10-CM

## 2024-05-09 DIAGNOSIS — R55 SYNCOPE: ICD-10-CM

## 2024-05-09 DIAGNOSIS — J44.9 CHRONIC OBSTRUCTIVE PULMONARY DISEASE, UNSPECIFIED COPD TYPE (HCC): ICD-10-CM

## 2024-05-09 DIAGNOSIS — Z72.0 TOBACCO ABUSE: ICD-10-CM

## 2024-05-09 PROCEDURE — 99495 TRANSJ CARE MGMT MOD F2F 14D: CPT | Performed by: FAMILY MEDICINE

## 2024-05-09 RX ORDER — ALPRAZOLAM 0.5 MG/1
0.5 TABLET ORAL
Qty: 30 TABLET | Refills: 0 | Status: CANCELLED | OUTPATIENT
Start: 2024-05-09

## 2024-05-09 NOTE — PROGRESS NOTES
TCM Call       Date and time call was made  4/30/2024  2:11 PM    Hospital care reviewed  Records reviewed    Patient was hospitialized at  [facility]    Date of Admission  04/28/24    Date of discharge  04/30/24    Diagnosis  Stroke like symptoms    Disposition  Home    Were the patients medications reviewed and updated  Yes    Current Symptoms  None    Loose stool severity  Mild          TCM Call       Post hospital issues  None    Should patient be enrolled in anticoag monitoring?  No    Scheduled for follow up?  Yes    Patients specialists  Cardiologist    Did you obtain your prescribed medications  Yes    Why were you unable to obtain your medications  no prescriptions sent to the pharmacy    Do you need help managing your prescriptions or medications  No    Is transportation to your appointment needed  No    I have advised the patient to call PCP with any new or worsening symptoms  Emilia Greco, ISABELLAA    Living Arrangements  Family members    Support System  Family    The type of support provided  Emotional    Do you have social support  Yes, as much as I need    Are you recieving any outpatient services  No    Are you recieving home care services  No    Are you using any community resources  No    Current waiver services  No    Have you fallen in the last 12 months  Yes    Interperter language line needed  No    Counseling  Patient    Comments  TCM- HTN, stroke like symptoms           Subjective:    HPI  Ángela is a 79 y.o. female here today for:  Chief Complaint   Patient presents with    Transition of Care Management     4/28/24-Stroke like symptoms   .      ---Above per clinical staff & reviewed. ---  HPI:  79yof here for hospital follow up  Doing ok  Has multiple appointments scheduled for follow up including neurosurgery, cardiology  Pt declines the cardiac loop implant while in hospital  Taking her Eliquis but having issues with cost  Needs neurosurgery input due to cervical stenosis  Given  referral for ENT due to incidental finding on scan, likely benign  COPD stable  Still smoking- encouraged to quit due to issues  Pt anxious and has difficulty with doing everything on her own\  Have discussed therapy in past  Medications reviewed- eliquis renewed  Has follow up schedule in July with me already  Will call for any concerns    The following portions of the patient's history were reviewed and updated as appropriate: allergies, current medications, past family history, past medical history, past social history, past surgical history and problem list.    Past Medical History:   Diagnosis Date    Anxiety     Atypical squamous cells cannot exclude high grade squamous intraepithelial lesion on cytologic smear of cervix (ASC-H)     Last Assessed 5/13/2014    Back pain     Cancer (HCC)     Cervical cancer (HCC)     Depression     Ovarian cyst     Tinnitus of both ears 10/2/2020    Tobacco abuse 11/20/2018       Past Surgical History:   Procedure Laterality Date    ANKLE FRACTURE SURGERY      Last Assessed 07/26/2016    ANKLE SURGERY      CERVICAL BIOPSY  W/ LOOP ELECTRODE EXCISION      COLONOSCOPY  10/2019    DIAGNOSTIC LAPAROSCOPY      DILATION AND CURETTAGE OF UTERUS      ESOPHAGOGASTRODUODENOSCOPY N/A 9/12/2016    Procedure: ESOPHAGOGASTRODUODENOSCOPY (EGD);  Surgeon: Alvin Powell MD;  Location: [facility];  Service:     FRACTURE SURGERY      ORTHOPEDIC SURGERY      HI COLONOSCOPY FLX DX W/COLLJ SPEC WHEN PFRMD N/A 9/12/2016    Procedure: FLEXIBLE COLONOSCOPY;  Surgeon: Alvin Powell MD;  Location: [facility];  Service: Colorectal    SALPINGOOPHORECTOMY Left     TONSILLECTOMY      UPPER GASTROINTESTINAL ENDOSCOPY  2016       Social History     Socioeconomic History    Marital status:      Spouse name: None    Number of children: None    Years of education: 15    Highest education level: None   Occupational History    None   Tobacco Use    Smoking status: Every Day     Current packs/day: 0.50      Average packs/day: 0.5 packs/day for 60.0 years (30.0 ttl pk-yrs)     Types: Cigarettes    Smokeless tobacco: Never   Vaping Use    Vaping status: Never Used   Substance and Sexual Activity    Alcohol use: Not Currently    Drug use: Never    Sexual activity: Not Currently   Other Topics Concern    None   Social History Narrative    ** Merged History Encounter **           since 2015 after 13 year marriage.  1 daughter from 1st marriage.  1 granddaughter, Natasha Crane, whom she raised.  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     Social Determinants of Health     Financial Resource Strain: Low Risk  (12/14/2023)    Overall Financial Resource Strain (CARDIA)     Difficulty of Paying Living Expenses: Not hard at all   Food Insecurity: No Food Insecurity (4/29/2024)    Hunger Vital Sign     Worried About Running Out of Food in the Last Year: Never true     Ran Out of Food in the Last Year: Never true   Transportation Needs: No Transportation Needs (4/29/2024)    PRAPARE - Transportation     Lack of Transportation (Medical): No     Lack of Transportation (Non-Medical): No   Physical Activity: Not on file   Stress: Not on file   Social Connections: Not on file   Intimate Partner Violence: Not on file   Housing Stability: Low Risk  (4/29/2024)    Housing Stability Vital Sign     Unable to Pay for Housing in the Last Year: No     Number of Places Lived in the Last Year: 1     Unstable Housing in the Last Year: No       Current Outpatient Medications   Medication Sig Dispense Refill    ALPRAZolam (XANAX) 0.5 mg tablet Take 1 tablet (0.5 mg total) by mouth daily at bedtime as needed for anxiety 30 tablet 0    apixaban (ELIQUIS) 5 mg Take 1 tablet (5 mg total) by mouth 2 (two) times a day 60 tablet 4    aspirin (ECOTRIN LOW STRENGTH) 81 mg EC tablet Take 1 tablet (81 mg total) by mouth daily 30 tablet 1    atorvastatin (LIPITOR) 40 mg tablet Take 1 tablet (40 mg total) by mouth daily with dinner  "30 tablet 5    cyclobenzaprine (FLEXERIL) 5 mg tablet Take 1 tablet (5 mg total) by mouth daily at bedtime 20 tablet 0    dicyclomine (BENTYL) 20 mg tablet Take 1 tablet (20 mg total) by mouth every 6 (six) hours 60 tablet 1    escitalopram (LEXAPRO) 5 mg tablet TAKE ONE TABLET BY MOUTH ONCE DAILY 30 tablet 2    meclizine (ANTIVERT) 25 mg tablet Take 1 tablet (25 mg total) by mouth 3 (three) times a day as needed for dizziness 30 tablet 0    amLODIPine (NORVASC) 5 mg tablet TAKE ONE TABLET BY MOUTH ONCE DAILY (Patient not taking: Reported on 5/9/2024) 30 tablet 5     No current facility-administered medications for this visit.        Review of Systems   Constitutional:  Positive for fatigue. Negative for chills and fever.   HENT: Negative.  Negative for ear pain and sore throat.    Eyes:  Negative for pain and visual disturbance.   Respiratory: Negative.  Negative for cough and shortness of breath.    Cardiovascular: Negative.  Negative for chest pain and palpitations.   Gastrointestinal:  Positive for abdominal pain and diarrhea. Negative for vomiting.   Genitourinary: Negative.  Negative for dysuria and hematuria.   Musculoskeletal:  Positive for arthralgias, back pain, neck pain and neck stiffness.   Skin:  Negative for color change and rash.   Neurological:  Positive for dizziness and light-headedness. Negative for seizures and syncope.   Psychiatric/Behavioral:  Positive for sleep disturbance. The patient is nervous/anxious.    All other systems reviewed and are negative.       Objective:    /84 (BP Location: Left arm, Patient Position: Sitting, Cuff Size: Standard)   Pulse 71   Temp 97.6 °F (36.4 °C) (Temporal)   Ht 5' 6\" (1.676 m)   Wt 66.9 kg (147 lb 6.4 oz)   SpO2 96%   BMI 23.79 kg/m²   Wt Readings from Last 3 Encounters:   05/09/24 66.9 kg (147 lb 6.4 oz)   04/30/24 65 kg (143 lb 4.8 oz)   04/25/24 65.5 kg (144 lb 6.4 oz)     BP Readings from Last 3 Encounters:   05/09/24 148/84   04/30/24 " 140/78   04/25/24 140/86       Lab Results   Component Value Date    WBC 6.19 04/28/2024    HGB 16.3 (H) 04/28/2024    HCT 49.3 (H) 04/28/2024     04/28/2024    TRIG 70 04/29/2024    HDL 44 (L) 04/29/2024    ALT 9 04/28/2024    AST 10 (L) 04/28/2024     12/10/2015    K 3.9 04/28/2024     04/28/2024    CREATININE 0.91 04/28/2024    BUN 11 04/28/2024    CO2 28 04/28/2024    TSH 0.88 05/18/2019    INR 0.94 03/06/2024    GLUF 82 11/21/2023    HGBA1C 5.4 04/28/2024       Physical Exam  Vitals and nursing note reviewed.   Constitutional:       Appearance: Normal appearance. She is well-developed.   HENT:      Head: Normocephalic and atraumatic.   Eyes:      Pupils: Pupils are equal, round, and reactive to light.   Cardiovascular:      Rate and Rhythm: Normal rate and regular rhythm.      Heart sounds: Normal heart sounds. No murmur heard.  Pulmonary:      Effort: Pulmonary effort is normal. No respiratory distress.      Breath sounds: Normal breath sounds.   Musculoskeletal:      Cervical back: Normal range of motion and neck supple.   Skin:     General: Skin is warm.      Capillary Refill: Capillary refill takes less than 2 seconds.   Neurological:      Mental Status: She is alert and oriented to person, place, and time.      Cranial Nerves: No cranial nerve deficit.   Psychiatric:         Mood and Affect: Mood normal.         Thought Content: Thought content normal.                       Assessment/Plan:   Ángela was seen today for transition of care management.    Diagnoses and all orders for this visit:    TIA (transient ischemic attack)    Generalized anxiety disorder    Syncope  -     apixaban (ELIQUIS) 5 mg; Take 1 tablet (5 mg total) by mouth 2 (two) times a day    Essential hypertension    Carotid stenosis, asymptomatic, bilateral    Chronic obstructive pulmonary disease, unspecified COPD type (HCC)    Irritable bowel syndrome with diarrhea    Tobacco abuse    Chronic pain syndrome    Cervical  stenosis of spine      Return if symptoms worsen or fail to improve.  There are no Patient Instructions on file for this visit.

## 2024-05-14 DIAGNOSIS — F41.1 GENERALIZED ANXIETY DISORDER: ICD-10-CM

## 2024-05-15 ENCOUNTER — OFFICE VISIT (OUTPATIENT)
Age: 79
End: 2024-05-15
Payer: MEDICARE

## 2024-05-15 VITALS
TEMPERATURE: 99 F | HEART RATE: 80 BPM | DIASTOLIC BLOOD PRESSURE: 92 MMHG | RESPIRATION RATE: 16 BRPM | OXYGEN SATURATION: 92 % | HEIGHT: 66 IN | WEIGHT: 147 LBS | BODY MASS INDEX: 23.63 KG/M2 | SYSTOLIC BLOOD PRESSURE: 144 MMHG

## 2024-05-15 DIAGNOSIS — I65.09 VERTEBROBASILAR ARTERY STENOSIS: Primary | ICD-10-CM

## 2024-05-15 DIAGNOSIS — I65.1 VERTEBROBASILAR ARTERY STENOSIS: Primary | ICD-10-CM

## 2024-05-15 DIAGNOSIS — M48.02 CERVICAL STENOSIS OF SPINE: ICD-10-CM

## 2024-05-15 PROCEDURE — 99204 OFFICE O/P NEW MOD 45 MIN: CPT | Performed by: PHYSICIAN ASSISTANT

## 2024-05-15 NOTE — ASSESSMENT & PLAN NOTE
Patient presents today as new patient for evaluation of cervical stenosis.   Patient with hyperreflexia and chronic neck pain.   Denies radicular pain, numbness, tingling or weakness. Denies gait disturbance or difficulty with fine motor function of hands.     Imaging personally reviewed:   MRI cervical spine wo 4/11/2024: Multilevel spondylitic changes leading to central canal stenosis without cord compression.  Variable degree of foraminal stenosis most significant at C4-5.     Plan:   Continue to monitor for neurological symptoms.   Reviewed signs and symptoms of cervical radiculopathy as well as myelopathy.   MRI imaging reviewed in detail with patient and friend present with her today.    We discussed these findings are not the etiology of her hearing loss or visual complaints.   At this time, it is reasonable to proceed with physical therapy.   Recommend against any high velocity neck manipulation.   Given no radicular pain/numbness, defer pain management evaluation.   Discussed with patient if she develops radiating symptoms or difficulty with ambulation/fine motor of her hands, she can be reevaluated for surgical intervention.

## 2024-05-15 NOTE — PATIENT INSTRUCTIONS
Monitor for any radiating arm/leg pain, numbness, tingling or weakness which can be caused by pressure on the nerves.     Monitor for any difficulty with walking, dropping objects or fine motor function loss in your hands which can be related to pressure on the spinal cord.     Continue your blood thinners and cholesterol medications to help with blood flow through the vessel which are narrowed.     Call for neurology appointment - Dr. Webber (Plan for Aug 2024)

## 2024-05-15 NOTE — PROGRESS NOTES
Neurosurgery Office Note  Ángela Ayoub 79 y.o. female MRN: [id number]      Assessment & Plan     Cervical stenosis of spine  Patient presents today as new patient for evaluation of cervical stenosis.   Patient with hyperreflexia and chronic neck pain.   Denies radicular pain, numbness, tingling or weakness. Denies gait disturbance or difficulty with fine motor function of hands.     Imaging personally reviewed:   MRI cervical spine wo 4/11/2024: Multilevel spondylitic changes leading to central canal stenosis without cord compression.  Variable degree of foraminal stenosis most significant at C4-5.     Plan:   Continue to monitor for neurological symptoms.   Reviewed signs and symptoms of cervical radiculopathy as well as myelopathy.   MRI imaging reviewed in detail with patient and friend present with her today.    We discussed these findings are not the etiology of her hearing loss or visual complaints.   At this time, it is reasonable to proceed with physical therapy.   Recommend against any high velocity neck manipulation.   Given no radicular pain/numbness, defer pain management evaluation.   Discussed with patient if she develops radiating symptoms or difficulty with ambulation/fine motor of her hands, she can be reevaluated for surgical intervention.       Vertebrobasilar artery stenosis  Continue Eliquis, ASA and statin as per neurology.     Ongoing follow-up with neurology.        Diagnoses and all orders for this visit:    Vertebrobasilar artery stenosis    Cervical stenosis of spine  -     Ambulatory Referral to Neurosurgery          I have spent a total time of 50 minutes on 05/15/24 in caring for this patient including Diagnostic results, Instructions for management, Impressions, Documenting in the medical record, Reviewing / ordering tests, medicine, procedures  , and Obtaining or reviewing history  .      CHIEF COMPLAINT    Chief Complaint   Patient presents with    Consult        HISTORY    History of Present Illness       This is a 79-year-old female past medical history significant for stroke, basilar and vertebral artery stenosis, cervical cancer, anxiety/depression and chronic neck pain who presents today for evaluation of cervical spinal stenosis.  Given patient with chronic neck pain as well as findings of brisk reflexes, MRI cervical spine was completed demonstrating multilevel degenerative changes with variable degree of stenosis without cord compression.  She has had multiple recent admissions including March 2024 for syncopal episode as well as nonspecific strokelike symptoms.  MRI imaging was without acute strokes and CT imaging demonstrated significant basilar vertebral artery stenosis for which it has continued on home regimen of Eliquis, aspirin and Lipitor.  Patient has followed with vascular neurology.  Patient had repeat admission April 2024 secondary week with dizziness, blurry vision and diminished visual acuity.  Repeat MRI brain showed chronic right-sided subcortical infarcts and chronic left cerebellar lacunar infarcts without acute strokes.  Patient was scheduled for ongoing follow-up with cardiology, neurology and ENT.    Today, patient endorses ongoing chronic neck pain which has progressed over years to months.  She denies any radicular pain, numbness, tingling and/or weakness of her extremities.  She states overall she ambulates without difficulties but if she goes to bend forward she feels as if she will fall forward.  Denies any difficulty with fine motor function of your hands or dropping objects.  Does endorse urinary urgency/incontinence for which she completed pelvic floor therapy with limited improvement.  Endorses ongoing bilateral hearing loss and tinnitus.  She reports being recommended for hearing aids but deferred.  Reports one-time episode of bilateral anterior thigh tingling several days ago without recurrence.          REVIEW OF  SYSTEMS    Review of Systems   HENT:  Positive for hearing loss and tinnitus.    Eyes:  Positive for visual disturbance (blurred).   Respiratory: Negative.     Cardiovascular: Negative.    Gastrointestinal: Negative.    Endocrine: Negative.    Genitourinary: Negative.    Musculoskeletal:  Positive for gait problem (at times) and neck pain (base of neck).   Neurological:  Positive for light-headedness (at times). Negative for numbness (reports tinling a couple of day ago in bi/legs).       ROS obtained by MA. Reviewed. See HPI.     Meds/Allergies     Current Outpatient Medications   Medication Sig Dispense Refill    ALPRAZolam (XANAX) 0.5 mg tablet Take 1 tablet (0.5 mg total) by mouth daily at bedtime as needed for anxiety 30 tablet 0    apixaban (ELIQUIS) 5 mg Take 1 tablet (5 mg total) by mouth 2 (two) times a day 60 tablet 4    aspirin (ECOTRIN LOW STRENGTH) 81 mg EC tablet Take 1 tablet (81 mg total) by mouth daily 30 tablet 1    atorvastatin (LIPITOR) 40 mg tablet Take 1 tablet (40 mg total) by mouth daily with dinner 30 tablet 5    dicyclomine (BENTYL) 20 mg tablet Take 1 tablet (20 mg total) by mouth every 6 (six) hours 60 tablet 1    escitalopram (LEXAPRO) 5 mg tablet TAKE ONE TABLET BY MOUTH ONCE DAILY 30 tablet 2    meclizine (ANTIVERT) 25 mg tablet Take 1 tablet (25 mg total) by mouth 3 (three) times a day as needed for dizziness 30 tablet 0     No current facility-administered medications for this visit.       Allergies   Allergen Reactions    Lisinopril Syncope     Other reaction(s): Other (See Comments)  Nose bleeds, passing out       PAST HISTORY    Past Medical History:   Diagnosis Date    Anxiety     Atypical squamous cells cannot exclude high grade squamous intraepithelial lesion on cytologic smear of cervix (ASC-H)     Last Assessed 5/13/2014    Back pain     Cancer (HCC)     Cervical cancer (HCC)     Depression     Ovarian cyst     Tinnitus of both ears 10/2/2020    Tobacco abuse 11/20/2018  "      Past Surgical History:   Procedure Laterality Date    ANKLE FRACTURE SURGERY      Last Assessed 07/26/2016    ANKLE SURGERY      CERVICAL BIOPSY  W/ LOOP ELECTRODE EXCISION      COLONOSCOPY  10/2019    DIAGNOSTIC LAPAROSCOPY      DILATION AND CURETTAGE OF UTERUS      ESOPHAGOGASTRODUODENOSCOPY N/A 9/12/2016    Procedure: ESOPHAGOGASTRODUODENOSCOPY (EGD);  Surgeon: Alvin Powell MD;  Location: [facility];  Service:     FRACTURE SURGERY      ORTHOPEDIC SURGERY      ME COLONOSCOPY FLX DX W/COLLJ SPEC WHEN PFRMD N/A 9/12/2016    Procedure: FLEXIBLE COLONOSCOPY;  Surgeon: Alvin Powell MD;  Location: [facility];  Service: Colorectal    SALPINGOOPHORECTOMY Left     TONSILLECTOMY      UPPER GASTROINTESTINAL ENDOSCOPY  2016       Social History     Tobacco Use    Smoking status: Every Day     Current packs/day: 0.50     Average packs/day: 0.5 packs/day for 60.0 years (30.0 ttl pk-yrs)     Types: Cigarettes    Smokeless tobacco: Never   Vaping Use    Vaping status: Never Used   Substance Use Topics    Alcohol use: Not Currently    Drug use: Never       Family History   Problem Relation Age of Onset    Coronary artery disease Mother     No Known Problems Family     No Known Problems Father     Colon cancer Neg Hx          Above history personally reviewed.       EXAM    Vitals:Blood pressure 144/92, pulse 80, temperature 99 °F (37.2 °C), temperature source Temporal, resp. rate 16, height 5' 6\" (1.676 m), weight 66.7 kg (147 lb), SpO2 92%, not currently breastfeeding.,Body mass index is 23.73 kg/m².     Physical Exam  Constitutional:       General: She is not in acute distress.     Appearance: Normal appearance. She is well-developed. She is not ill-appearing.   HENT:      Head: Normocephalic and atraumatic.      Right Ear: External ear normal.      Left Ear: External ear normal.      Nose: Nose normal.      Mouth/Throat:      Mouth: Mucous membranes are moist.   Eyes:      General: No scleral icterus.        " Right eye: No discharge.         Left eye: No discharge.      Extraocular Movements: EOM normal.      Conjunctiva/sclera: Conjunctivae normal.   Cardiovascular:      Rate and Rhythm: Normal rate.   Pulmonary:      Effort: Pulmonary effort is normal. No respiratory distress.   Skin:     General: Skin is warm and dry.      Findings: No rash.   Neurological:      Mental Status: She is alert.      Motor: Motor strength is normal.     Deep Tendon Reflexes:      Reflex Scores:       Bicep reflexes are 3+ on the right side and 3+ on the left side.       Brachioradialis reflexes are 3+ on the right side and 3+ on the left side.       Patellar reflexes are 3+ on the right side and 3+ on the left side.  Psychiatric:         Mood and Affect: Mood normal.         Speech: Speech normal.         Behavior: Behavior normal.         Thought Content: Thought content normal.         Judgment: Judgment normal.         Neurologic Exam     Mental Status   Follows 2 step commands.   Attention: normal. Concentration: normal.   Speech: speech is normal   Level of consciousness: alert  Knowledge: good.   Normal comprehension.     Cranial Nerves     CN III, IV, VI   Extraocular motions are normal.   Conjugate gaze: present    CN VII   Facial expression full, symmetric.     CN VIII   Hearing: intact    Motor Exam     Strength   Strength 5/5 throughout.     Sensory Exam   Light touch normal.     Gait, Coordination, and Reflexes     Tremor   Resting tremor: absent  Intention tremor: absent  Action tremor: absent    Reflexes   Right brachioradialis: 3+  Left brachioradialis: 3+  Right biceps: 3+  Left biceps: 3+  Right patellar: 3+  Left patellar: 3+  Right Cordon: present  Right ankle clonus: absent  Left ankle clonus: absent      MEDICAL DECISION MAKING    Imaging Studies:     MRI brain wo contrast  Result Date: 4/29/2024  Impression: No significant interval change. No mass effect, acute intracranial hemorrhage or evidence of recent  infarction. Workstation performed: [id number]     CTA head and neck with and without contrast  Result Date: 4/28/2024  Impression: No acute intracranial abnormality. Unchanged chronic lacunar infarct in right basal ganglia with mild-to-moderate chronic microangiopathy. Negative CTA head and neck for large vessel occlusion, dissection, or aneurysm. Moderate-to-severe short-segment stenosis in proximal basilar artery and left vertebral artery distal V4 segment near vertebrobasilar junction due to noncalcified atherosclerotic disease, unchanged. No new site of high-grade stenosis. Small lobulated lesion in right paraglottic fat, unchanged since 3/17/2019, likely benign. Consider follow-up with ENT for further evaluation. Additional chronic/incidental findings as detailed above. The study was marked in EPIC for immediate notification. Workstation performed: [id number]     MRI cervical spine    I have personally reviewed pertinent reports.   and I have personally reviewed pertinent films in PACS

## 2024-05-16 ENCOUNTER — TELEPHONE (OUTPATIENT)
Age: 79
End: 2024-05-16

## 2024-05-16 DIAGNOSIS — F41.1 GENERALIZED ANXIETY DISORDER: ICD-10-CM

## 2024-05-16 DIAGNOSIS — Z86.73 HISTORY OF CVA (CEREBROVASCULAR ACCIDENT): Primary | ICD-10-CM

## 2024-05-16 RX ORDER — ALPRAZOLAM 0.5 MG/1
0.5 TABLET ORAL
Qty: 30 TABLET | Refills: 0 | Status: SHIPPED | OUTPATIENT
Start: 2024-05-16

## 2024-05-16 RX ORDER — ALPRAZOLAM 0.5 MG
0.5 TABLET ORAL
Qty: 30 TABLET | Refills: 0 | OUTPATIENT
Start: 2024-05-16

## 2024-05-16 NOTE — TELEPHONE ENCOUNTER
Caller: Ángela Ayoub    Doctor: Suma    Reason for call: Patient wants to move ahead with the 14 day Holter monitor & needs it ordered.  Please confirm with Pt once it is ordered.    Call back#: [phone]

## 2024-05-16 NOTE — TELEPHONE ENCOUNTER
Placed call to patient. Reviewed instructions. She verbalized understanding. No questions or concerns at this time.

## 2024-05-16 NOTE — TELEPHONE ENCOUNTER
Patient called for appointment due to abnormal CT of the head. [facility]. Patient cannot wait that long. Provided the information for [facility] and advised patient to contact that office for a sooner appointment.

## 2024-05-17 ENCOUNTER — TELEPHONE (OUTPATIENT)
Dept: NEUROLOGY | Facility: CLINIC | Age: 79
End: 2024-05-17

## 2024-05-17 NOTE — TELEPHONE ENCOUNTER
Called patient to obtain more information. She is inquiring what are her physical limitations. 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    She is seeking clearance for the following:  Can she drive the ride-on ?  Can she lift 50 pound bags of grain?  Can she lift 60 pounds of hay?  Can she ride the show horses?  Can she use a week alyssa?  Can she bend over to pull weeds and for how long?    Patient reports she feels okay and starts PT next week.   Advised patient how this RN will send a message to Dr. Webber for her thoughts. She was appreciative.     Dr. Webber- please advise.  Thank you!

## 2024-05-17 NOTE — TELEPHONE ENCOUNTER
Pt called in. She would like to speak to a nurse about what physical activities she is able to safely do. Neuro surgery told her to call Neurology to get that list.     Pt does have a f/u on 8/20 with Dr. Webber but she would like to know what she's allowed to do before that.     Thank you.

## 2024-05-21 ENCOUNTER — EVALUATION (OUTPATIENT)
Dept: PHYSICAL THERAPY | Facility: CLINIC | Age: 79
End: 2024-05-21
Payer: MEDICARE

## 2024-05-21 DIAGNOSIS — M48.02 CERVICAL STENOSIS OF SPINE: ICD-10-CM

## 2024-05-21 DIAGNOSIS — Z86.73 HISTORY OF CVA (CEREBROVASCULAR ACCIDENT): ICD-10-CM

## 2024-05-21 PROCEDURE — 97112 NEUROMUSCULAR REEDUCATION: CPT | Performed by: PHYSICAL THERAPIST

## 2024-05-21 PROCEDURE — 97162 PT EVAL MOD COMPLEX 30 MIN: CPT | Performed by: PHYSICAL THERAPIST

## 2024-05-21 NOTE — TELEPHONE ENCOUNTER
1. No she cannot  2. No she cannot  3. She cannot  4. No she cannot  5. Yes she can use it  6. Yes she can, as long as she can tolerate.     We can decide regarding doing other things once she is finished with PT. When is her next appt with me?

## 2024-05-21 NOTE — PROGRESS NOTES
PT Evaluation     Today's date: 2024  Patient name: Ángela Ayoub  : 1945  MRN: [id number]  Referring provider: Kristin Acosta PA-C  Dx:   Encounter Diagnosis     ICD-10-CM    1. Cervical stenosis of spine  M48.02 Ambulatory referral to Physical Therapy      2. History of CVA (cerebrovascular accident)  Z86.73 Ambulatory referral to Physical Therapy                     Assessment  Impairments: abnormal muscle firing, abnormal muscle tone, abnormal or restricted ROM, abnormal movement, activity intolerance, impaired balance, lacks appropriate home exercise program and pain with function  Symptom irritability: moderate    Assessment details: 79 year old female patient reports to PT with dizziness and neck stiffness. No red flags noted and patient denies numbness/tingling. Patient presents with reproduction of symptoms during saccades and VOR x1, indicating oculomotor and vestibular contributions to her dizziness. Patient also has significant cervical decreased flexibility and mobility. Patient will benefit from skilled PT services to address current impairments and functional limitations to help patient return to her PLOF.       Understanding of Dx/Px/POC: good     Prognosis: good    Goals  STG  1. Patient will be independent with completion of HEP throughout therapy  2. Patient will read without increase in difficulty in 4 weeks.  LTG  1. Patient will bend over without increase in dizziness in 6 weeks.  2. Patient will drive without increase in dizziness in 6 weeks.     Plan  Patient would benefit from: skilled physical therapy    Planned therapy interventions: abdominal trunk stabilization, activity modification, joint mobilization, IASTM, manual therapy, balance, motor coordination training, neuromuscular re-education, patient/caregiver education, strengthening, stretching, therapeutic activities, therapeutic exercise, home exercise program, flexibility and functional ROM  "exercises    Frequency: 1x week  Duration in weeks: 8  Treatment plan discussed with: patient        Subjective Evaluation    History of Present Illness  Mechanism of injury: Patient reports with \"cervical spinal stenosis.\" Patient notes she is always dizzy, \"her hearing is bad and her vision is gone.\"  Patient reports sometimes it is painful but it other times it's not that painful.  Patient reports if she turns her head and holds it there for a little bit as well as looking up it makes her dizzy. Patient denies numbness/tingling. Patient reports also some stiffness with her neck when she moves it. Patient notes this morning she was bent over to wash her hair and got dizzy and is still dizzy now.     Patient reports reading books makes her dizzy.    Patient denies difficulty watching tv but doesn't want to drive to do being cautious of getting dizzy.         Objective     Concurrent Complaints  Positive for dizziness, tinnitus, visual change and hearing loss. Negative for night pain, disturbed sleep, faints, headaches, nausea/motion sickness, memory loss, aural fullness, poor concentration and peripheral neuropathy    Active Range of Motion   Cervical/Thoracic Spine       Cervical    Flexion:  WFL  Extension:  Restriction level: maximal  Left rotation:  Restriction level: maximal  Right rotation:  Restriction level: maximal    General Comments:      Cervical/Thoracic Comments  Horizontal VOR x 1 symptomatic     No reproduction of symptoms with cervicogenic testing  Neuro Exam:     Dizziness  Positive for disequilibrium, vertigo, motion sickness and rocking or swaying.   Negative for oscillopsia, light-headedness, diplopia and floating or swimming.     Exacerbating factors  Positive for bending over, looking up, turning head and optokinetic movement.   Negative for rolling in bed, walking and supine to/from sitting. Walking in busy environment: unsure.    Symptoms   Duration: depends on what patient does  Intensity " at best: 0/10  Intensity at worst: 6/10    Headaches   Patient reports headaches: No.     Oculomotor exam   Oculomotor ROM: WNL  Resting nystagmus: not present   Gaze holding nystagmus: not present left  and not present right  Smooth pursuits: within normal limits  Vertical saccades: normal  Horizontal saccades: normal  Convergence: normal    Positional testing   Roll test   Left horizontal canal: WNL  Right horizontal canal: WNL             Precautions: heart monitor, cervical stenosis in vertebral and basilar artery, had imaging on brain and CTA of neck .       Manuals 5/21            Cervical mx wrk             Cervical mobs             Check julia hallpike             Check MTSCIB or DGI             Neuro Re-Ed             VOR x 1 horizontal  r            Saccades horizonta/vertical r                         UBE                                                    Ther Ex                                                                                                                     Ther Activity                                       Gait Training                                       Modalities

## 2024-05-22 NOTE — TELEPHONE ENCOUNTER
Recd  5/22 11:26 AM  Ana Laura Ayoub. I just got a message from Lexy in regards to the questions that I had for Dr. Webber. If you could call me back, I don't know why my phone didn't ring, but you could call me back at [phone].

## 2024-05-22 NOTE — TELEPHONE ENCOUNTER
Called patient to notify her of the provider's response. Patient did not answer. Left a voice message requesting for a return call to review over the phone. Provided the office's phone number.    Dr. Webber- thank you for your response! Patient is scheduled to see you on 8/20/24 for a four month follow up.

## 2024-05-22 NOTE — TELEPHONE ENCOUNTER
Called patient. Notified her of the provider's response. Patient expressed understanding. Provided emotional support. Expressed these restrictions are in place for her safety. Patient expressed understanding and was appreciative for the return call. She will call the office if she has any questions in the future.

## 2024-05-30 ENCOUNTER — OFFICE VISIT (OUTPATIENT)
Dept: PHYSICAL THERAPY | Facility: CLINIC | Age: 79
End: 2024-05-30
Payer: MEDICARE

## 2024-05-30 DIAGNOSIS — Z86.73 HISTORY OF CVA (CEREBROVASCULAR ACCIDENT): ICD-10-CM

## 2024-05-30 DIAGNOSIS — M48.02 CERVICAL STENOSIS OF SPINE: Primary | ICD-10-CM

## 2024-05-30 PROCEDURE — 97110 THERAPEUTIC EXERCISES: CPT | Performed by: PHYSICAL THERAPIST

## 2024-05-30 PROCEDURE — 97140 MANUAL THERAPY 1/> REGIONS: CPT | Performed by: PHYSICAL THERAPIST

## 2024-05-30 NOTE — PROGRESS NOTES
"Daily Note     Today's date: 2024  Patient name: Ángela Ayoub  : 1945  MRN: [id number]  Referring provider: Kristin Acosta PA-C  Dx:   Encounter Diagnosis     ICD-10-CM    1. Cervical stenosis of spine  M48.02       2. History of CVA (cerebrovascular accident)  Z86.73           Start Time: 1130  Stop Time: 1200  Total time in clinic (min): 30 minutes    Subjective: Patient reports that her symptoms have greatly improved without any dizziness. Patient notes she is allowed to do some yardwork.       Objective: See treatment diary below      Assessment: Tolerated treatment well. Patient demonstrated fatigue post treatment and would benefit from continued PT. Patient did not have dizziness with saccades.       Plan: Continue per plan of care.      Precautions: heart monitor, cervical stenosis in vertebral and basilar artery, had imaging on brain and CTA of neck .       Manuals            Cervical mx wrk  AQ           Cervical mobs  AQ           Check julia hallpike             Check MTSCIB or DGI             Neuro Re-Ed             VOR x 1 horizontal  r            Saccades horizonta/vertical r 1x30\"                        UBE  4 min                                                  Ther Ex             Scap 4s  20x ea green band 20x rows  W/ black band           Chin tucks  10x5\" holds                                                                                         Ther Activity                                       Gait Training                                       Modalities                                            "

## 2024-06-06 ENCOUNTER — APPOINTMENT (OUTPATIENT)
Dept: PHYSICAL THERAPY | Facility: CLINIC | Age: 79
End: 2024-06-06
Payer: MEDICARE

## 2024-06-11 ENCOUNTER — CLINICAL SUPPORT (OUTPATIENT)
Dept: CARDIOLOGY CLINIC | Facility: CLINIC | Age: 79
End: 2024-06-11
Payer: MEDICARE

## 2024-06-11 ENCOUNTER — OFFICE VISIT (OUTPATIENT)
Dept: PHYSICAL THERAPY | Facility: CLINIC | Age: 79
End: 2024-06-11
Payer: MEDICARE

## 2024-06-11 DIAGNOSIS — M48.02 CERVICAL STENOSIS OF SPINE: Primary | ICD-10-CM

## 2024-06-11 DIAGNOSIS — Z86.73 HISTORY OF CVA (CEREBROVASCULAR ACCIDENT): ICD-10-CM

## 2024-06-11 PROCEDURE — 97140 MANUAL THERAPY 1/> REGIONS: CPT

## 2024-06-11 PROCEDURE — 97110 THERAPEUTIC EXERCISES: CPT

## 2024-06-11 PROCEDURE — 93248 EXT ECG>7D<15D REV&INTERPJ: CPT | Performed by: INTERNAL MEDICINE

## 2024-06-11 PROCEDURE — 97112 NEUROMUSCULAR REEDUCATION: CPT

## 2024-06-11 NOTE — PROGRESS NOTES
"Daily Note     Today's date: 2024  Patient name: Ángela Ayoub  : 1945  MRN: [id number]  Referring provider: Kristin Acosta PA-C  Dx:   Encounter Diagnosis     ICD-10-CM    1. Cervical stenosis of spine  M48.02       2. History of CVA (cerebrovascular accident)  Z86.73                      Subjective: Pt had soreness after addition of therabands LV.    Objective: See treatment diary below     Precautions: heart monitor, cervical stenosis in vertebral and basilar artery, had imaging on brain and CTA of neck    Manuals           Cervical mx wrk  AQ SH          Cervical mobs  AQ SH          Check julia hallpike             Check MTSCIB or DGI             Neuro Re-Ed             VOR x 1 horizontal  r            Saccades horizonta/vertical r 1x30\" 30\"x2 ea                       UBE  4 min 2' ea                       B/l TB ER   Red 5\"x15                       Ther Ex             Scap 4s  20x ea GTB  20x rows  blkTB 5\"x15 ea            Chin tucks  10x5\" holds 5\"x15                                                                                                                       Assessment: Tolerated treatment well. Patient demonstrated fatigue post treatment, exhibited good technique with therapeutic exercises, and would benefit from continued PT    Plan: Continue per plan of care.  Progress treatment as tolerated.     Richelle Live    "

## 2024-06-18 ENCOUNTER — OFFICE VISIT (OUTPATIENT)
Dept: PHYSICAL THERAPY | Facility: CLINIC | Age: 79
End: 2024-06-18
Payer: MEDICARE

## 2024-06-18 ENCOUNTER — TELEPHONE (OUTPATIENT)
Age: 79
End: 2024-06-18

## 2024-06-18 DIAGNOSIS — Z86.73 HISTORY OF CVA (CEREBROVASCULAR ACCIDENT): ICD-10-CM

## 2024-06-18 DIAGNOSIS — M48.02 CERVICAL STENOSIS OF SPINE: Primary | ICD-10-CM

## 2024-06-18 DIAGNOSIS — R55 NEAR SYNCOPE: ICD-10-CM

## 2024-06-18 DIAGNOSIS — R42 DIZZINESS AND GIDDINESS: ICD-10-CM

## 2024-06-18 PROCEDURE — 97140 MANUAL THERAPY 1/> REGIONS: CPT

## 2024-06-18 PROCEDURE — 97112 NEUROMUSCULAR REEDUCATION: CPT

## 2024-06-18 PROCEDURE — 97110 THERAPEUTIC EXERCISES: CPT

## 2024-06-18 RX ORDER — MECLIZINE HYDROCHLORIDE 25 MG/1
25 TABLET ORAL 3 TIMES DAILY PRN
Qty: 30 TABLET | Refills: 0 | Status: SHIPPED | OUTPATIENT
Start: 2024-06-18

## 2024-06-18 NOTE — PROGRESS NOTES
"Daily Note     Today's date: 2024  Patient name: Ángela Ayoub  : 1945  MRN: [id number]  Referring provider: Kristin Acosta PA-C  Dx:   Encounter Diagnosis     ICD-10-CM    1. Cervical stenosis of spine  M48.02       2. History of CVA (cerebrovascular accident)  Z86.73                    Subjective: Pt notes continued intermittent dizziness and has neck soreness that extended down into bilateral upper traps on Friday. TTP B UT today that is reduced post-manuals.    Objective: See treatment diary below     Precautions: heart monitor, cervical stenosis in vertebral and basilar artery, had imaging on brain and CTA of neck    Manuals          Cervical mx wrk  AQ SH SH         Cervical mobs  AQ SH SH SOR         Check ujlia hallpike             Check MTSCIB or DGI             Neuro Re-Ed            VOR x 1 horizontal  r            Saccades horizonta/vertical r 1x30\" 30\"x2 ea 30\"x2 ea                      UBE  4 min 2' ea 2' ea                      B/l TB ER   Red 5\"x15 Red 5\"x15                      Ther Ex            Scap 4s  20x ea GTB  20x rows  blkTB 5\"x15 ea   5\"x15 ea  Grn ext  Blk rows         Chin tucks  10x5\" holds 5\"x15 5\"x15                      Pec stretch    Doorway low/high  20\"x3 ea                      Leg press    55# 2x10                                                                Assessment: Tolerated treatment well. Patient demonstrated fatigue post treatment, exhibited good technique with therapeutic exercises, and would benefit from continued PT    Plan: Continue per plan of care.  Progress treatment as tolerated.    Richelle Live    "

## 2024-06-18 NOTE — TELEPHONE ENCOUNTER
Patient went to get here labs done today and was advised that the labs haven't been sent over to the [facility] lab.     Please upload labs for patient to get them done.    Thank you

## 2024-06-19 NOTE — TELEPHONE ENCOUNTER
I called and left a detailed message for the patient letting her know that Dr Jernigan advised that she is not due for any labs yet. She does have an apt with Dr Jernigan on July 25 maybe then Dr Jernigan and her can discuss about labs.

## 2024-06-25 ENCOUNTER — OFFICE VISIT (OUTPATIENT)
Dept: PHYSICAL THERAPY | Facility: CLINIC | Age: 79
End: 2024-06-25
Payer: MEDICARE

## 2024-06-25 DIAGNOSIS — Z86.73 HISTORY OF CVA (CEREBROVASCULAR ACCIDENT): ICD-10-CM

## 2024-06-25 DIAGNOSIS — M48.02 CERVICAL STENOSIS OF SPINE: Primary | ICD-10-CM

## 2024-06-25 PROCEDURE — 97140 MANUAL THERAPY 1/> REGIONS: CPT

## 2024-06-25 PROCEDURE — 97110 THERAPEUTIC EXERCISES: CPT

## 2024-06-25 NOTE — PROGRESS NOTES
"Daily Note     Today's date: 2024  Patient name: Ángela Ayoub  : 1945  MRN: [id number]  Referring provider: Kristin Acosta PA-C  Dx:   Encounter Diagnosis     ICD-10-CM    1. Cervical stenosis of spine  M48.02       2. History of CVA (cerebrovascular accident)  Z86.73                      Subjective: Pt not having a good day; is very lightheaded and a bit nauseous.    Objective: See treatment diary below. /90, measured by MK, PT post-session after resting.     Precautions: heart monitor, cervical stenosis in vertebral and basilar artery, had imaging on brain and CTA of neck    Manuals         Cervical mx wrk  AQ SH SH SH        Cervical mobs  AQ SH SH SOR         Check julia hallpike     MK checked BP        Check MTSCIB or DGI             Neuro Re-Ed           VOR x 1 horizontal  r            Saccades horizonta/vertical r 1x30\" 30\"x2 ea 30\"x2 ea                      UBE  4 min 2' ea 2' ea 2' ea                     B/l TB ER   Red 5\"x15 Red 5\"x15 Red 5\"x15                     Ther Ex           Scap 4s  20x ea GTB  20x rows  blkTB 5\"x15 ea   5\"x15 ea  Grn ext  Blk rows         Chin tucks  10x5\" holds 5\"x15 5\"x15 5\"x20                     Pec stretch    Doorway low/high  20\"x3 ea Doorway low/high  20\"x3 ea                     Leg press    55# 2x10                                                                Assessment: Tolerated treatment well. Patient demonstrated fatigue post treatment, exhibited good technique with therapeutic exercises, and would benefit from continued PT    Plan: Continue per plan of care.  Progress treatment as tolerated.    Richelle Live    "

## 2024-06-28 ENCOUNTER — NURSE TRIAGE (OUTPATIENT)
Age: 79
End: 2024-06-28

## 2024-06-28 ENCOUNTER — TELEPHONE (OUTPATIENT)
Age: 79
End: 2024-06-28

## 2024-06-28 NOTE — TELEPHONE ENCOUNTER
Called patient to let her know there is no provider in today but I will send the message to her PCP. The patient understands she should go to ED or urgent care if BP does not come down or sx get worse

## 2024-06-28 NOTE — TELEPHONE ENCOUNTER
"Patient reports her BP is very high today and she would like to know if she should take any of her Amlodipine or HCTZ she was previously on. Appointment made for 7/2/24 with PCP and patient advised to go to ED if her symptoms worsen. She would like a call back to advise what to do in the meantime about her medication.      Reason for Disposition   Systolic BP >= 160 OR Diastolic >= 100    Answer Assessment - Initial Assessment Questions  1. BLOOD PRESSURE: \"What is the blood pressure?\" \"Did you take at least two measurements 5 minutes apart?\"      187/101, 170/94, 174/106   2. ONSET: \"When did you take your blood pressure?\"      This morning   3. HOW: \"How did you obtain the blood pressure?\" (e.g., visiting nurse, automatic home BP monitor)      Home BP monitor   4. HISTORY: \"Do you have a history of high blood pressure?\"      Yes   5. MEDICATIONS: \"Are you taking any medications for blood pressure?\" \"Have you missed any doses recently?\"      No   6. OTHER SYMPTOMS: \"Do you have any symptoms?\" (e.g., headache, chest pain, blurred vision, difficulty breathing, weakness)      Headache (ongoing)   7. PREGNANCY: \"Is there any chance you are pregnant?\" \"When was your last menstrual period?\"      N/A    Protocols used: Blood Pressure - High-ADULT-OH    "

## 2024-06-28 NOTE — TELEPHONE ENCOUNTER
2nd call from patient today. States her BP is 165/98 and she wants to know if she can take an extra BP med. Told her that if she is experiencing symptoms or BP increases please go to the ED.

## 2024-06-29 NOTE — TELEPHONE ENCOUNTER
Called pt Saturday morning  Voice mail  Message left that she could take 10mg of Amlodipine  Stressed importance of going to ER if high blood pressure and symptomatic

## 2024-07-02 ENCOUNTER — OFFICE VISIT (OUTPATIENT)
Dept: FAMILY MEDICINE CLINIC | Facility: CLINIC | Age: 79
End: 2024-07-02
Payer: MEDICARE

## 2024-07-02 VITALS
DIASTOLIC BLOOD PRESSURE: 96 MMHG | HEIGHT: 66 IN | HEART RATE: 74 BPM | SYSTOLIC BLOOD PRESSURE: 148 MMHG | BODY MASS INDEX: 23.53 KG/M2 | WEIGHT: 146.4 LBS | OXYGEN SATURATION: 93 % | TEMPERATURE: 97.5 F

## 2024-07-02 DIAGNOSIS — R03.0 WHITE COAT SYNDROME WITH HIGH BLOOD PRESSURE BUT WITHOUT HYPERTENSION: ICD-10-CM

## 2024-07-02 DIAGNOSIS — G45.9 TIA (TRANSIENT ISCHEMIC ATTACK): ICD-10-CM

## 2024-07-02 DIAGNOSIS — F41.1 GENERALIZED ANXIETY DISORDER: ICD-10-CM

## 2024-07-02 DIAGNOSIS — Z86.73 HISTORY OF CVA (CEREBROVASCULAR ACCIDENT): ICD-10-CM

## 2024-07-02 DIAGNOSIS — I10 ESSENTIAL HYPERTENSION: Primary | ICD-10-CM

## 2024-07-02 PROCEDURE — 99214 OFFICE O/P EST MOD 30 MIN: CPT | Performed by: FAMILY MEDICINE

## 2024-07-02 PROCEDURE — G2211 COMPLEX E/M VISIT ADD ON: HCPCS | Performed by: FAMILY MEDICINE

## 2024-07-02 NOTE — PROGRESS NOTES
Subjective:    HPI  Ángela is a 79 y.o. female here today for:  Chief Complaint   Patient presents with    Headache     Pt states she called twice on Friday regarding high bp 176/114. Dizzy all the time. Can't see out of here eyes half the time. Blurred vision   .      ---Above per clinical staff & reviewed. ---  HPI:  79yof here with concerns of blood pressure  Had high readings over the weekend with headache and dizziness, which has been ongoing  Discussed meds and pt states she is not taking any BP meds since being in hospital  Reviewed hospital records again- she was not to stop Amlodipine 5mg  Pt was instructed to start taking 5mg Amlodipine daily again  She is to check BP daily and send or call in readings next week  Pt has ENT, cardiology, and neurology appointments in the next  weeks  Has follow up with me scheduled    The following portions of the patient's history were reviewed and updated as appropriate: allergies, current medications, past family history, past medical history, past social history, past surgical history and problem list.    Past Medical History:   Diagnosis Date    Anxiety     Atypical squamous cells cannot exclude high grade squamous intraepithelial lesion on cytologic smear of cervix (ASC-H)     Last Assessed 5/13/2014    Back pain     Cancer (HCC)     Cervical cancer (HCC)     Depression     Ovarian cyst     Tinnitus of both ears 10/2/2020    Tobacco abuse 11/20/2018       Past Surgical History:   Procedure Laterality Date    ANKLE FRACTURE SURGERY      Last Assessed 07/26/2016    ANKLE SURGERY      CERVICAL BIOPSY  W/ LOOP ELECTRODE EXCISION      COLONOSCOPY  10/2019    DIAGNOSTIC LAPAROSCOPY      DILATION AND CURETTAGE OF UTERUS      ESOPHAGOGASTRODUODENOSCOPY N/A 9/12/2016    Procedure: ESOPHAGOGASTRODUODENOSCOPY (EGD);  Surgeon: Alvin Powell MD;  Location: [facility];  Service:     FRACTURE SURGERY      ORTHOPEDIC SURGERY      MO COLONOSCOPY FLX DX W/COLLJ SPEC WHEN PFRMD N/A  9/12/2016    Procedure: FLEXIBLE COLONOSCOPY;  Surgeon: Alvin Powell MD;  Location: [facility];  Service: Colorectal    SALPINGOOPHORECTOMY Left     TONSILLECTOMY      UPPER GASTROINTESTINAL ENDOSCOPY  2016       Social History     Socioeconomic History    Marital status:      Spouse name: None    Number of children: None    Years of education: 15    Highest education level: None   Occupational History    None   Tobacco Use    Smoking status: Every Day     Current packs/day: 0.50     Average packs/day: 0.5 packs/day for 60.0 years (30.0 ttl pk-yrs)     Types: Cigarettes    Smokeless tobacco: Never   Vaping Use    Vaping status: Never Used   Substance and Sexual Activity    Alcohol use: Not Currently    Drug use: Never    Sexual activity: Not Currently   Other Topics Concern    None   Social History Narrative    ** Merged History Encounter **           since 2015 after 13 year marriage.  1 daughter from 1st marriage.  1 granddaughter, Natasha Crane, whom she raised.  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     Social Determinants of Health     Financial Resource Strain: Low Risk  (12/14/2023)    Overall Financial Resource Strain (CARDIA)     Difficulty of Paying Living Expenses: Not hard at all   Food Insecurity: No Food Insecurity (4/29/2024)    Hunger Vital Sign     Worried About Running Out of Food in the Last Year: Never true     Ran Out of Food in the Last Year: Never true   Transportation Needs: No Transportation Needs (4/29/2024)    PRAPARE - Transportation     Lack of Transportation (Medical): No     Lack of Transportation (Non-Medical): No   Physical Activity: Not on file   Stress: Not on file   Social Connections: Not on file   Intimate Partner Violence: Not on file   Housing Stability: Low Risk  (4/29/2024)    Housing Stability Vital Sign     Unable to Pay for Housing in the Last Year: No     Number of Times Moved in the Last Year: 1     Homeless in the Last Year: No  "      Current Outpatient Medications   Medication Sig Dispense Refill    ALPRAZolam (XANAX) 0.5 mg tablet Take 1 tablet (0.5 mg total) by mouth daily at bedtime as needed for anxiety 30 tablet 0    apixaban (ELIQUIS) 5 mg Take 1 tablet (5 mg total) by mouth 2 (two) times a day 60 tablet 4    aspirin (ECOTRIN LOW STRENGTH) 81 mg EC tablet Take 1 tablet (81 mg total) by mouth daily 30 tablet 1    atorvastatin (LIPITOR) 40 mg tablet Take 1 tablet (40 mg total) by mouth daily with dinner 30 tablet 5    dicyclomine (BENTYL) 20 mg tablet Take 1 tablet (20 mg total) by mouth every 6 (six) hours 60 tablet 1    escitalopram (LEXAPRO) 5 mg tablet TAKE ONE TABLET BY MOUTH ONCE DAILY 30 tablet 2    meclizine (ANTIVERT) 25 mg tablet Take 1 tablet (25 mg total) by mouth 3 (three) times a day as needed for dizziness 30 tablet 0     No current facility-administered medications for this visit.        Review of Systems   Constitutional: Negative.  Negative for chills and fever.   HENT: Negative.  Negative for ear pain and sore throat.    Eyes:  Negative for pain and visual disturbance.   Respiratory: Negative.  Negative for cough and shortness of breath.    Cardiovascular: Negative.  Negative for chest pain and palpitations.   Gastrointestinal: Negative.  Negative for abdominal pain and vomiting.   Genitourinary: Negative.  Negative for dysuria and hematuria.   Musculoskeletal:  Negative for arthralgias and back pain.   Skin:  Negative for color change and rash.   Neurological:  Positive for dizziness. Negative for seizures and syncope.   Psychiatric/Behavioral:  The patient is nervous/anxious.    All other systems reviewed and are negative.       Objective:    /96 (BP Location: Left arm, Patient Position: Sitting, Cuff Size: Large)   Pulse 74   Temp 97.5 °F (36.4 °C)   Ht 5' 6\" (1.676 m)   Wt 66.4 kg (146 lb 6.4 oz)   SpO2 93%   BMI 23.63 kg/m²   Wt Readings from Last 3 Encounters:   07/02/24 66.4 kg (146 lb 6.4 oz) "   05/15/24 66.7 kg (147 lb)   05/09/24 66.9 kg (147 lb 6.4 oz)     BP Readings from Last 3 Encounters:   07/02/24 148/96   05/15/24 144/92   05/09/24 148/84       Lab Results   Component Value Date    WBC 6.19 04/28/2024    HGB 16.3 (H) 04/28/2024    HCT 49.3 (H) 04/28/2024     04/28/2024    TRIG 70 04/29/2024    HDL 44 (L) 04/29/2024    ALT 9 04/28/2024    AST 10 (L) 04/28/2024     12/10/2015    K 3.9 04/28/2024     04/28/2024    CREATININE 0.91 04/28/2024    BUN 11 04/28/2024    CO2 28 04/28/2024    TSH 0.88 05/18/2019    INR 0.94 03/06/2024    GLUF 82 11/21/2023    HGBA1C 5.4 04/28/2024       Physical Exam  Vitals and nursing note reviewed.   Constitutional:       Appearance: Normal appearance. She is well-developed.   HENT:      Head: Normocephalic and atraumatic.   Eyes:      Pupils: Pupils are equal, round, and reactive to light.   Cardiovascular:      Rate and Rhythm: Normal rate and regular rhythm.      Heart sounds: Normal heart sounds. No murmur heard.  Pulmonary:      Effort: Pulmonary effort is normal.      Breath sounds: Normal breath sounds.   Musculoskeletal:      Cervical back: Normal range of motion and neck supple.   Skin:     General: Skin is warm.      Capillary Refill: Capillary refill takes less than 2 seconds.   Neurological:      Mental Status: She is alert and oriented to person, place, and time.      Cranial Nerves: No cranial nerve deficit.   Psychiatric:         Mood and Affect: Mood normal.         Thought Content: Thought content normal.                       Assessment/Plan:   Ángela was seen today for headache.    Diagnoses and all orders for this visit:    Essential hypertension    TIA (transient ischemic attack)    White coat syndrome with high blood pressure but without hypertension    Generalized anxiety disorder    History of CVA (cerebrovascular accident)      Return if symptoms worsen or fail to improve.  Patient Instructions   Restart Amlodipine 5mg  daily  Check BP and send or call in readings next monday

## 2024-07-03 ENCOUNTER — HOSPITAL ENCOUNTER (OUTPATIENT)
Dept: CT IMAGING | Facility: HOSPITAL | Age: 79
Discharge: HOME | End: 2024-07-03
Attending: PSYCHIATRY & NEUROLOGY
Payer: MEDICARE

## 2024-07-03 DIAGNOSIS — Z86.73 HISTORY OF CVA (CEREBROVASCULAR ACCIDENT): ICD-10-CM

## 2024-07-03 PROCEDURE — 70498 CT ANGIOGRAPHY NECK: CPT

## 2024-07-03 PROCEDURE — 70496 CT ANGIOGRAPHY HEAD: CPT

## 2024-07-03 RX ADMIN — IOHEXOL 85 ML: 350 INJECTION, SOLUTION INTRAVENOUS at 12:19

## 2024-07-22 ENCOUNTER — TELEPHONE (OUTPATIENT)
Age: 79
End: 2024-07-22

## 2024-07-22 DIAGNOSIS — R55 NEAR SYNCOPE: ICD-10-CM

## 2024-07-22 DIAGNOSIS — R42 DIZZINESS AND GIDDINESS: ICD-10-CM

## 2024-07-22 DIAGNOSIS — R55 SYNCOPE: ICD-10-CM

## 2024-07-22 DIAGNOSIS — F41.1 GENERALIZED ANXIETY DISORDER: ICD-10-CM

## 2024-07-22 DIAGNOSIS — R10.84 GENERALIZED ABDOMINAL PAIN: ICD-10-CM

## 2024-07-22 NOTE — TELEPHONE ENCOUNTER
Patient called the RX Refill Line. Message is being forwarded to the office.     Patient is requesting a refill of amLODIPine (NORVASC) tablet 5 mg. Would like that sent to [facility] [place] [address]. Not on active med list to que up. Out of medication.      Please contact patient at [phone] if any issues.

## 2024-07-22 NOTE — TELEPHONE ENCOUNTER
Patient requested 90 day supply with medications.        Reason for call:   [x] Refill   [] Prior Auth  [] Other:     Office:   [] PCP/Provider - [facility]      [] Specialty/Provider -       Does the patient have enough for 3 days?   [] Yes   [x] No - Send as HP to POD

## 2024-07-23 RX ORDER — DICYCLOMINE HCL 20 MG
20 TABLET ORAL EVERY 6 HOURS
Qty: 400 TABLET | Refills: 1 | Status: SHIPPED | OUTPATIENT
Start: 2024-07-23

## 2024-07-23 RX ORDER — MECLIZINE HYDROCHLORIDE 25 MG/1
25 TABLET ORAL 3 TIMES DAILY PRN
Qty: 90 TABLET | Refills: 0 | Status: SHIPPED | OUTPATIENT
Start: 2024-07-23

## 2024-07-23 RX ORDER — ALPRAZOLAM 0.5 MG/1
0.5 TABLET ORAL
Qty: 30 TABLET | Refills: 0 | Status: SHIPPED | OUTPATIENT
Start: 2024-07-23

## 2024-08-06 DIAGNOSIS — F33.9 DEPRESSION, RECURRENT (HCC): ICD-10-CM

## 2024-08-06 DIAGNOSIS — F41.1 GENERALIZED ANXIETY DISORDER: ICD-10-CM

## 2024-08-06 RX ORDER — ESCITALOPRAM OXALATE 5 MG/1
5 TABLET ORAL DAILY
Qty: 30 TABLET | Refills: 5 | Status: SHIPPED | OUTPATIENT
Start: 2024-08-06

## 2024-08-08 ENCOUNTER — TELEPHONE (OUTPATIENT)
Dept: NEUROLOGY | Facility: CLINIC | Age: 79
End: 2024-08-08

## 2024-08-14 NOTE — PROGRESS NOTES
Assessment/Plan:     Diagnoses and all orders for this visit:    Encounter to establish care with new doctor    Hyperlipidemia, unspecified hyperlipidemia type  -     Lipid panel  Lipid panel from August 2018 was reviewed  She reports she was intolerant to statin medication in the past  We discussed dietary changes including limiting saturated fat to <13 grams a day  Will repeat the lipid panel in 4 months  Tobacco abuse  -She is interested in quitting  She is going to attempt taking Zyban again and this was sent to the pharmacy  Chronic abdominal pain  -     dicyclomine (BENTYL) 10 mg capsule; Take 1 capsule (10 mg total) by mouth 2 (two) times a day as needed (abdominal pain)  -     buPROPion (ZYBAN) 150 MG 12 hr tablet; Take 1 tablet (150 mg total) by mouth 2 (two) times a day   She has had evaluation previously without a laron cause found  For completeness, I did give her a sheet for instructions for a Low FODMAP diet as well  She is going to also follow up with Gynecology for a check as well  Squamous cell carcinoma of cervix ([facility] [address] )  -S/p radiation therapy  White coat syndrome with high blood pressure but without hypertension    Home readings are 115-120s  She has been on previously BP medications in the past and reports having lightheadedness and passing out from them  I asked that she continue to monitor her BP at home  Subjective:      Patient ID: King Weinstein is a 72 y o  female  Ej Bolton is here today to establish care  Medical history was reviewed and updated  She reports a known history of squamous cell carcinoma of the cervix diagnosed in 2014  She reports treatment with radiation and was followed by Dr Juanis Meehan  She reports current tobacco use of 1/2 ppd and previous success with quitting with Zyban  She also reports having somewhat chronic abdominal pain/pelvic pain for some time  It seems to have worsened in the last 2 months   She reports at times it will feel like a gas pain  She reports no associated fevers but it will be intense enough where she has chills  She reports diarrhea at times  She denies blood or urinary/vaginal complaints  She has had previous evaluation with GI as well as imaging in the past          The following portions of the patient's history were reviewed and updated as appropriate: allergies, current medications, past family history, past medical history, past social history, past surgical history and problem list     Review of Systems   Constitutional: Negative for chills, fever and unexpected weight change  HENT: Negative for sinus pain  Respiratory: Negative for cough, chest tightness, shortness of breath and wheezing  Cardiovascular: Negative for chest pain, palpitations and leg swelling  Gastrointestinal: Positive for abdominal pain  Negative for blood in stool, constipation, diarrhea, nausea and vomiting  Genitourinary: Positive for pelvic pain  Negative for difficulty urinating, dysuria, vaginal bleeding and vaginal discharge  Musculoskeletal: Positive for arthralgias  Negative for back pain and myalgias  Neurological: Positive for light-headedness  Negative for dizziness, syncope, numbness and headaches  Psychiatric/Behavioral: Negative for dysphoric mood and sleep disturbance  The patient is nervous/anxious  Objective:      /88   Pulse 80   Resp 16   Ht 5' 6" (1 676 m)   Wt 66 2 kg (146 lb)   SpO2 97%   BMI 23 57 kg/m²          Physical Exam   Constitutional: She is oriented to person, place, and time  She appears well-developed and well-nourished  No distress  HENT:   Head: Normocephalic and atraumatic  Eyes: Conjunctivae and EOM are normal  Right eye exhibits no discharge  Left eye exhibits no discharge  No scleral icterus  Neck: Normal range of motion  No thyromegaly present  Cardiovascular: Normal rate, regular rhythm and normal heart sounds  No murmur heard    Pulmonary/Chest: Effort normal and [Good] : ~his/her~  mood as  good breath sounds normal  No respiratory distress  She has no wheezes  She exhibits no tenderness  Abdominal: Soft  Bowel sounds are normal  There is no tenderness  There is no rebound  Musculoskeletal: Normal range of motion  She exhibits no edema  Lymphadenopathy:     She has no cervical adenopathy  Neurological: She is alert and oriented to person, place, and time  Skin: Skin is warm and dry  She is not diaphoretic  No erythema  Psychiatric: She has a normal mood and affect  Her speech is normal and behavior is normal  Judgment and thought content normal    Vitals reviewed  [Intercurrent Urgi Care visits] : went to urgent care [Yes] : Yes [No] : In the past 12 months have you used drugs other than those required for medical reasons? No [Patient reported PAP Smear was normal] : Patient reported PAP Smear was normal [Never] : Never [NO] : No [FreeTextEntry1] : health maintenance [0] : 2) Feeling down, depressed, or hopeless: Not at all (0) [PHQ-2 Negative - No further assessment needed] : PHQ-2 Negative - No further assessment needed [de-identified] : NEURO [de-identified] : occasionally  [HMD7Gyrmu] : 0 [PapSmearDate] : 10/7 [PapSmearComments] : normal

## 2024-08-22 ENCOUNTER — TELEPHONE (OUTPATIENT)
Age: 79
End: 2024-08-22

## 2024-08-23 NOTE — TELEPHONE ENCOUNTER
We usually stop eliquis about 2-3 days prior to procedure and start taking right after, knowing there is a small risk stopping the medication

## 2024-09-30 ENCOUNTER — TELEPHONE (OUTPATIENT)
Dept: FAMILY MEDICINE CLINIC | Facility: CLINIC | Age: 79
End: 2024-09-30

## 2024-09-30 NOTE — TELEPHONE ENCOUNTER
Patient has upcoming appointment with a new PCP office.     She will now be under the care of Dr Monica Cherry at [facility]    Please update chart and remove our office as pcp    Thank you

## 2024-10-01 NOTE — TELEPHONE ENCOUNTER
09/30/24 10:44 PM        The office's request has been received, reviewed, and the patient chart updated. The PCP has successfully been removed with a patient attribution note. This message will now be completed.        Thank you  Joseph Danielle

## 2024-10-16 NOTE — TELEPHONE ENCOUNTER
Patient reports her BP has been high, even after taking meds. Today was 158/102 appx 90 minutes after taking meds. She is dizzy and has a headache in back of her head. Recommended she go to nearest  for evaluation. Scheduled her for follow up tomorrow with PCP. Asked her to call after she is seen at [facility] to report back how she is feeling.

## 2024-11-05 NOTE — PROGRESS NOTES
Daily Note     Today's date: 5/3/2021  Patient name: Amy Concepcion  : 1945  MRN: [id number]  Referring provider: SHEA Bernstein  Dx:   Encounter Diagnosis     ICD-10-CM    1  Chronic pain syndrome  G89 4    2  Sacroiliitis ([address] ) - Bilateral  M46 1    3  SI joint arthritis - Bilateral  M47 818    4  Lumbar spondylosis  M47 816    5  Left leg weakness  R29 898                   Subjective: "Much better  I can go up and down the steps much better "  Pain 0/10  Objective: See treatment diary below  Prior to treatment, no LLD and level ASIS  Assessment: Tolerated treatment well  Patient would benefit from continued PT  Pain, functional mobility improved  Plan: Continue per plan of care        Precautions: none      Manuals 4/19  /21 4/26 4/29 5/3         R lumbar rotation mob G3 w cavit G3 G3 HJ G3         L inom ant rot mob G3 G3 G3 HJ G3         L inom ant rot SCS 90" 90" 90" 90"                      Neuro Re-Ed             Self MET for L inom post rot/R inom ant rot 5"x3            TA cx 10"x15 x30                                                                            Ther Ex             Bridge 10"x15 x20 x20 x25         Clamshell B 10"x15 x20 x20  x25         H/L HS str nv 20"x5 20"x5 x5         Wall gastroc str nv 20"x5 x5 x5         Nu Step nv 5' 5' 7'         TKE w t band L nv blk 2x10 2x10 3x10                                   Ther Activity             T ball squat nv 2x10 2x10 3x10         FSU   4" 2x10  3x10         LSU                                                    Modalities Allergy;

## 2025-07-05 DIAGNOSIS — R42 DIZZINESS AND GIDDINESS: ICD-10-CM

## 2025-07-05 DIAGNOSIS — R55 NEAR SYNCOPE: ICD-10-CM

## 2025-07-07 RX ORDER — MECLIZINE HYDROCHLORIDE 25 MG/1
25 TABLET ORAL 3 TIMES DAILY PRN
Qty: 90 TABLET | Refills: 0 | Status: SHIPPED | OUTPATIENT
Start: 2025-07-07